# Patient Record
Sex: MALE | Race: WHITE | NOT HISPANIC OR LATINO | Employment: FULL TIME | URBAN - METROPOLITAN AREA
[De-identification: names, ages, dates, MRNs, and addresses within clinical notes are randomized per-mention and may not be internally consistent; named-entity substitution may affect disease eponyms.]

---

## 2019-01-14 RX ORDER — ATORVASTATIN CALCIUM 20 MG/1
TABLET, FILM COATED ORAL
Qty: 90 TABLET | Refills: 1 | Status: SHIPPED | OUTPATIENT
Start: 2019-01-14 | End: 2019-07-24 | Stop reason: SDUPTHER

## 2019-02-01 ENCOUNTER — LAB (OUTPATIENT)
Dept: LAB | Facility: HOSPITAL | Age: 69
End: 2019-02-01

## 2019-02-01 ENCOUNTER — OFFICE VISIT (OUTPATIENT)
Dept: INTERNAL MEDICINE | Facility: CLINIC | Age: 69
End: 2019-02-01

## 2019-02-01 VITALS
HEIGHT: 70 IN | SYSTOLIC BLOOD PRESSURE: 136 MMHG | HEART RATE: 60 BPM | WEIGHT: 200 LBS | TEMPERATURE: 97.6 F | DIASTOLIC BLOOD PRESSURE: 80 MMHG | BODY MASS INDEX: 28.63 KG/M2

## 2019-02-01 DIAGNOSIS — E78.00 HYPERCHOLESTEROLEMIA: ICD-10-CM

## 2019-02-01 DIAGNOSIS — Z12.5 PROSTATE CANCER SCREENING: ICD-10-CM

## 2019-02-01 DIAGNOSIS — I10 BENIGN ESSENTIAL HYPERTENSION: ICD-10-CM

## 2019-02-01 DIAGNOSIS — Z00.00 MEDICARE ANNUAL WELLNESS VISIT, SUBSEQUENT: Primary | ICD-10-CM

## 2019-02-01 LAB
ALBUMIN SERPL-MCNC: 4.47 G/DL (ref 3.2–4.8)
ALBUMIN/GLOB SERPL: 2.3 G/DL (ref 1.5–2.5)
ALP SERPL-CCNC: 50 U/L (ref 25–100)
ALT SERPL W P-5'-P-CCNC: 31 U/L (ref 7–40)
ANION GAP SERPL CALCULATED.3IONS-SCNC: 4 MMOL/L (ref 3–11)
ARTICHOKE IGE QN: 104 MG/DL (ref 0–130)
AST SERPL-CCNC: 31 U/L (ref 0–33)
BASOPHILS # BLD AUTO: 0.02 10*3/MM3 (ref 0–0.2)
BASOPHILS NFR BLD AUTO: 0.2 % (ref 0–1)
BILIRUB SERPL-MCNC: 0.8 MG/DL (ref 0.3–1.2)
BUN BLD-MCNC: 20 MG/DL (ref 9–23)
BUN/CREAT SERPL: 17.4 (ref 7–25)
CALCIUM SPEC-SCNC: 10.1 MG/DL (ref 8.7–10.4)
CHLORIDE SERPL-SCNC: 103 MMOL/L (ref 99–109)
CHOLEST SERPL-MCNC: 180 MG/DL (ref 0–200)
CO2 SERPL-SCNC: 31 MMOL/L (ref 20–31)
CREAT BLD-MCNC: 1.15 MG/DL (ref 0.6–1.3)
DEPRECATED RDW RBC AUTO: 44.2 FL (ref 37–54)
EOSINOPHIL # BLD AUTO: 0.19 10*3/MM3 (ref 0–0.3)
EOSINOPHIL NFR BLD AUTO: 2.3 % (ref 0–3)
ERYTHROCYTE [DISTWIDTH] IN BLOOD BY AUTOMATED COUNT: 12.5 % (ref 11.3–14.5)
GFR SERPL CREATININE-BSD FRML MDRD: 63 ML/MIN/1.73
GLOBULIN UR ELPH-MCNC: 1.9 GM/DL
GLUCOSE BLD-MCNC: 90 MG/DL (ref 70–100)
HCT VFR BLD AUTO: 49.8 % (ref 38.9–50.9)
HDLC SERPL-MCNC: 68 MG/DL (ref 40–60)
HGB BLD-MCNC: 16.5 G/DL (ref 13.1–17.5)
IMM GRANULOCYTES # BLD AUTO: 0.02 10*3/MM3 (ref 0–0.03)
IMM GRANULOCYTES NFR BLD AUTO: 0.2 % (ref 0–0.6)
LYMPHOCYTES # BLD AUTO: 3.61 10*3/MM3 (ref 0.6–4.8)
LYMPHOCYTES NFR BLD AUTO: 43.2 % (ref 24–44)
MCH RBC QN AUTO: 32 PG (ref 27–31)
MCHC RBC AUTO-ENTMCNC: 33.1 G/DL (ref 32–36)
MCV RBC AUTO: 96.5 FL (ref 80–99)
MONOCYTES # BLD AUTO: 0.88 10*3/MM3 (ref 0–1)
MONOCYTES NFR BLD AUTO: 10.5 % (ref 0–12)
NEUTROPHILS # BLD AUTO: 3.64 10*3/MM3 (ref 1.5–8.3)
NEUTROPHILS NFR BLD AUTO: 43.6 % (ref 41–71)
PLATELET # BLD AUTO: 321 10*3/MM3 (ref 150–450)
PMV BLD AUTO: 10.2 FL (ref 6–12)
POTASSIUM BLD-SCNC: 4.6 MMOL/L (ref 3.5–5.5)
PROT SERPL-MCNC: 6.4 G/DL (ref 5.7–8.2)
PSA SERPL-MCNC: 0.76 NG/ML (ref 0–4)
RBC # BLD AUTO: 5.16 10*6/MM3 (ref 4.2–5.76)
SODIUM BLD-SCNC: 138 MMOL/L (ref 132–146)
TRIGL SERPL-MCNC: 103 MG/DL (ref 0–150)
TSH SERPL DL<=0.05 MIU/L-ACNC: 2.31 MIU/ML (ref 0.35–5.35)
WBC NRBC COR # BLD: 8.36 10*3/MM3 (ref 3.5–10.8)

## 2019-02-01 PROCEDURE — 80061 LIPID PANEL: CPT

## 2019-02-01 PROCEDURE — 82043 UR ALBUMIN QUANTITATIVE: CPT

## 2019-02-01 PROCEDURE — 84443 ASSAY THYROID STIM HORMONE: CPT

## 2019-02-01 PROCEDURE — 85025 COMPLETE CBC W/AUTO DIFF WBC: CPT

## 2019-02-01 PROCEDURE — 80053 COMPREHEN METABOLIC PANEL: CPT

## 2019-02-01 PROCEDURE — 82570 ASSAY OF URINE CREATININE: CPT

## 2019-02-01 PROCEDURE — G0103 PSA SCREENING: HCPCS

## 2019-02-01 PROCEDURE — G0439 PPPS, SUBSEQ VISIT: HCPCS | Performed by: NURSE PRACTITIONER

## 2019-02-01 RX ORDER — MAGNESIUM CARB/ALUMINUM HYDROX 105-160MG
1 TABLET,CHEWABLE ORAL 2 TIMES DAILY
COMMUNITY
Start: 2013-02-01

## 2019-02-01 RX ORDER — VALACYCLOVIR HYDROCHLORIDE 500 MG/1
TABLET, FILM COATED ORAL
COMMUNITY
Start: 2018-04-19 | End: 2019-04-18 | Stop reason: SDUPTHER

## 2019-02-01 RX ORDER — ENALAPRIL MALEATE 5 MG/1
TABLET ORAL
COMMUNITY
Start: 2018-01-19 | End: 2019-04-18 | Stop reason: SDUPTHER

## 2019-02-01 NOTE — PROGRESS NOTES
QUICK REFERENCE INFORMATION:  The ABCs of the Annual Wellness Visit    Subsequent Medicare Wellness Visit    HEALTH RISK ASSESSMENT    1950    Recent Hospitalizations:  No hospitalization(s) within the last year..        Current Medical Providers:  Patient Care Team:  Keshawn Alexandre MD as PCP - General (Internal Medicine)  TinoScooter perez MD as Consulting Physician (Colon and Rectal Surgery)  Yanelis Hernandez APRN as Nurse Practitioner (Family Medicine)        Smoking Status:  Social History     Tobacco Use   Smoking Status Never Smoker   Smokeless Tobacco Never Used       Alcohol Consumption:  Social History     Substance and Sexual Activity   Alcohol Use Yes    Comment: occass       Depression Screen:   PHQ-2/PHQ-9 Depression Screening 2/1/2019   Little interest or pleasure in doing things 0   Feeling down, depressed, or hopeless 0   Total Score 0       Health Habits and Functional and Cognitive Screening:  Functional & Cognitive Status 2/1/2019   Do you have difficulty preparing food and eating? No   Do you have difficulty bathing yourself, getting dressed or grooming yourself? No   Do you have difficulty using the toilet? No   Do you have difficulty moving around from place to place? No   Do you have trouble with steps or getting out of a bed or a chair? No   In the past year have you fallen or experienced a near fall? No   Current Diet Well Balanced Diet   Dental Exam Up to date   Eye Exam Up to date   Exercise (times per week) 3 times per week   Current Exercise Activities Include Stationary Bicycling/Spin Class   Do you need help using the phone?  No   Are you deaf or do you have serious difficulty hearing?  No   Do you need help with transportation? No   Do you need help shopping? No   Do you need help preparing meals?  No   Do you need help with housework?  No   Do you need help with laundry? No   Do you need help taking your medications? No   Do you need help managing money? No   Do you ever drive  or ride in a car without wearing a seat belt? No   Have you felt unusual stress, anger or loneliness in the last month? No   Who do you live with? Spouse   If you need help, do you have trouble finding someone available to you? No   Have you been bothered in the last four weeks by sexual problems? No   Do you have difficulty concentrating, remembering or making decisions? No           Does the patient have evidence of cognitive impairment? No    Aspirin use counseling: Taking ASA appropriately as indicated      Recent Lab Results:  CMP:     HbA1c:  No results found for: HGBA1C  Microalbumin:  No results found for: MICROALBUR, POCMALB, POCCREAT  Lipid Panel  No results found for: CHOL, TRIG, HDL, LDL, AST, ALT    Visual Acuity:  No exam data present    Age-appropriate Screening Schedule:  Refer to the list below for future screening recommendations based on patient's age, sex and/or medical conditions. Orders for these recommended tests are listed in the plan section. The patient has been provided with a written plan.    Health Maintenance   Topic Date Due   • ZOSTER VACCINE (1 of 2) 09/20/2000   • TDAP/TD VACCINES (1 - Tdap) 06/05/2010   • COLONOSCOPY  07/11/2018   • LIPID PANEL  02/01/2019   • INFLUENZA VACCINE  Completed   • PNEUMOCOCCAL VACCINES (65+ LOW/MEDIUM RISK)  Completed        Subjective   History of Present Illness    Jamari Munoz is a 68 y.o. male who presents for an Subsequent Wellness Visit.    The following portions of the patient's history were reviewed and updated as appropriate: allergies, current medications, past family history, past medical history, past social history, past surgical history and problem list.    Outpatient Medications Prior to Visit   Medication Sig Dispense Refill   • atorvastatin (LIPITOR) 20 MG tablet TAKE 1 TABLET EVERY DAY 90 tablet 1   • enalapril (VASOTEC) 5 MG tablet enalapril maleate 5 mg tablet     • Glucosamine-Chondroitin 750-600 MG tablet Take  by mouth.     •  "hepatitis A (HAVRIX) 1440 EL U/ML vaccine Havrix (PF) 1,440 DENA unit/mL intramuscular syringe     • valACYclovir (VALTREX) 500 MG tablet Take 1/2 tablet twice a day       No facility-administered medications prior to visit.        Patient Active Problem List   Diagnosis   • Osteoarthritis   • Benign essential hypertension   • Hypercholesterolemia   • Low back pain   • Carbuncle and furuncle   • RLS (restless legs syndrome)       Advance Care Planning:  has an advance directive - a copy HAS NOT been provided. Have asked the patient to send this to us to add to record.    Identification of Risk Factors:  Risk factors include: none identified.    Review of Systems   Constitutional: Negative for chills, fatigue and fever.   HENT: Negative for congestion, ear pain and sinus pressure.    Respiratory: Negative for cough, chest tightness, shortness of breath and wheezing.    Cardiovascular: Negative for chest pain and palpitations.   Gastrointestinal: Negative for abdominal pain and blood in stool.   Skin: Negative for color change.   Allergic/Immunologic: Negative for environmental allergies.   Neurological: Negative for dizziness, speech difficulty and headaches.   Psychiatric/Behavioral: Negative for decreased concentration. The patient is not nervous/anxious.        Compared to one year ago, the patient feels his physical health is the same.  Compared to one year ago, the patient feels his mental health is the same.    Objective     Physical Exam     Procedures     Vitals:    02/01/19 0821   BP: 136/80   BP Location: Left arm   Patient Position: Sitting   Cuff Size: Adult   Pulse: 60   Temp: 97.6 °F (36.4 °C)   TempSrc: Temporal   Weight: 90.7 kg (200 lb)  Comment: without shoes   Height: 178 cm (70.08\")   PainSc: 0-No pain       Patient's Body mass index is 28.63 kg/m². BMI is above normal parameters. Recommendations include: none.      Assessment/Plan   Patient Self-Management and Personalized Health Advice  The " patient has been provided with information about: none and preventive services including:   · none.    Visit Diagnoses:    ICD-10-CM ICD-9-CM   1. Medicare annual wellness visit, subsequent Z00.00 V70.0   2. Benign essential hypertension I10 401.1   3. Hypercholesterolemia E78.00 272.0   4. Prostate cancer screening Z12.5 V76.44       Orders Placed This Encounter   Procedures   • Comprehensive Metabolic Panel     Standing Status:   Future   • Lipid Panel     Standing Status:   Future   • PSA Screen     Standing Status:   Future     Standing Expiration Date:   2/1/2020   • Microalbumin / Creatinine Urine Ratio - Urine, Clean Catch     Standing Status:   Future     Standing Expiration Date:   2/1/2020   • TSH Rfx On Abnormal To Free T4     Standing Status:   Future     Standing Expiration Date:   2/1/2020   • CBC & Differential     Standing Status:   Future     Order Specific Question:   Manual Differential     Answer:   No       Outpatient Encounter Medications as of 2/1/2019   Medication Sig Dispense Refill   • atorvastatin (LIPITOR) 20 MG tablet TAKE 1 TABLET EVERY DAY 90 tablet 1   • enalapril (VASOTEC) 5 MG tablet enalapril maleate 5 mg tablet     • Glucosamine-Chondroitin 750-600 MG tablet Take  by mouth.     • hepatitis A (HAVRIX) 1440 EL U/ML vaccine Havrix (PF) 1,440 DENA unit/mL intramuscular syringe     • valACYclovir (VALTREX) 500 MG tablet Take 1/2 tablet twice a day       No facility-administered encounter medications on file as of 2/1/2019.        Reviewed use of high risk medication in the elderly: not applicable  Reviewed for potential of harmful drug interactions in the elderly: not applicable    Follow Up:  Return for Labs this visit, Next scheduled follow up.     An After Visit Summary and PPPS with all of these plans were given to the patient.

## 2019-02-01 NOTE — PATIENT INSTRUCTIONS
Medicare Wellness  Personal Prevention Plan of Service     Date of Office Visit:  2019  Encounter Provider:  MARINA Carias  Place of Service:  [facility]  Patient Name: Jamari Munoz  :  1950    As part of the Medicare Wellness portion of your visit today, we are providing you with this personalized preventive plan of services (PPPS). This plan is based upon recommendations of the [place] Preventive Services Task Force (USPSTF) and the Advisory Committee on Immunization Practices (ACIP).    This lists the preventive care services that should be considered, and provides dates of when you are due. Items listed as completed are up-to-date and do not require any further intervention.    Health Maintenance   Topic Date Due   • ZOSTER VACCINE (1 of 2) 2000   • TDAP/TD VACCINES (1 - Tdap) 2010   • HEPATITIS C SCREENING  2018   • MEDICARE ANNUAL WELLNESS  2018   • COLONOSCOPY  2018   • LIPID PANEL  2019   • INFLUENZA VACCINE  Completed   • PNEUMOCOCCAL VACCINES (65+ LOW/MEDIUM RISK)  Completed       Orders Placed This Encounter   Procedures   • Comprehensive Metabolic Panel     Standing Status:   Future   • Lipid Panel     Standing Status:   Future   • PSA Screen     Standing Status:   Future     Standing Expiration Date:   2020   • Microalbumin / Creatinine Urine Ratio - Urine, Clean Catch     Standing Status:   Future     Standing Expiration Date:   2020   • TSH Rfx On Abnormal To Free T4     Standing Status:   Future     Standing Expiration Date:   2020   • CBC & Differential     Standing Status:   Future     Order Specific Question:   Manual Differential     Answer:   No       Return for Labs this visit, Next scheduled follow up.

## 2019-02-02 LAB
CREAT 24H UR-MCNC: 19.8 MG/DL
MICROALBUMIN UR-MCNC: <3 UG/ML
MICROALBUMIN/CREAT UR: <15.2 MG/G CREAT (ref 0–30)

## 2019-02-07 PROBLEM — E66.9 OBESITY: Status: ACTIVE | Noted: 2019-02-07

## 2019-02-07 PROBLEM — E78.2 MIXED HYPERLIPIDEMIA: Status: ACTIVE | Noted: 2019-02-07

## 2019-02-07 PROBLEM — I45.10 INCOMPLETE RIGHT BUNDLE BRANCH BLOCK: Status: ACTIVE | Noted: 2019-02-07

## 2019-02-07 PROBLEM — E66.3 OVERWEIGHT: Status: ACTIVE | Noted: 2019-02-07

## 2019-02-07 PROBLEM — M67.40 GANGLION CYST: Status: ACTIVE | Noted: 2019-02-07

## 2019-02-11 ENCOUNTER — OFFICE VISIT (OUTPATIENT)
Dept: INTERNAL MEDICINE | Facility: CLINIC | Age: 69
End: 2019-02-11

## 2019-02-11 VITALS
WEIGHT: 201 LBS | DIASTOLIC BLOOD PRESSURE: 88 MMHG | BODY MASS INDEX: 28.77 KG/M2 | SYSTOLIC BLOOD PRESSURE: 136 MMHG | HEIGHT: 70 IN

## 2019-02-11 DIAGNOSIS — I45.10 INCOMPLETE RIGHT BUNDLE BRANCH BLOCK: Primary | ICD-10-CM

## 2019-02-11 DIAGNOSIS — M54.50 MIDLINE LOW BACK PAIN WITHOUT SCIATICA, UNSPECIFIED CHRONICITY: ICD-10-CM

## 2019-02-11 DIAGNOSIS — E78.2 MIXED HYPERLIPIDEMIA: ICD-10-CM

## 2019-02-11 DIAGNOSIS — I10 BENIGN ESSENTIAL HYPERTENSION: ICD-10-CM

## 2019-02-11 DIAGNOSIS — M19.91 PRIMARY OSTEOARTHRITIS, UNSPECIFIED SITE: ICD-10-CM

## 2019-02-11 PROCEDURE — 99214 OFFICE O/P EST MOD 30 MIN: CPT | Performed by: INTERNAL MEDICINE

## 2019-02-11 PROCEDURE — 93000 ELECTROCARDIOGRAM COMPLETE: CPT | Performed by: INTERNAL MEDICINE

## 2019-02-11 NOTE — PATIENT INSTRUCTIONS
Results of EKG are discussed  Results of lab are discussed in copy given  Continue all current medications  Intended diet and exercise  The patient back in 6 months or when necessary
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
## 2019-04-18 RX ORDER — VALACYCLOVIR HYDROCHLORIDE 500 MG/1
TABLET, FILM COATED ORAL
Qty: 45 TABLET | Refills: 3 | Status: SHIPPED | OUTPATIENT
Start: 2019-04-18 | End: 2020-02-04 | Stop reason: SDUPTHER

## 2019-04-18 RX ORDER — ENALAPRIL MALEATE 5 MG/1
TABLET ORAL
Qty: 90 TABLET | Refills: 3 | Status: SHIPPED | OUTPATIENT
Start: 2019-04-18 | End: 2020-02-04 | Stop reason: SDUPTHER

## 2019-07-24 ENCOUNTER — TELEPHONE (OUTPATIENT)
Dept: INTERNAL MEDICINE | Facility: CLINIC | Age: 69
End: 2019-07-24

## 2019-07-24 RX ORDER — ATORVASTATIN CALCIUM 20 MG/1
TABLET, FILM COATED ORAL
Qty: 90 TABLET | Refills: 1 | Status: SHIPPED | OUTPATIENT
Start: 2019-07-24 | End: 2020-02-04 | Stop reason: SDUPTHER

## 2019-07-24 NOTE — TELEPHONE ENCOUNTER
Problems   This clinical information was not verified, but there are updates pending review:   No Longer Applicable Problems Start Date Reported By  Comments   Obesity 2/7/2019 Jamari Munoz  im not obese   Medications   This clinical information was not verified.   Allergies   This clinical information was not verified.

## 2019-08-12 ENCOUNTER — OFFICE VISIT (OUTPATIENT)
Dept: INTERNAL MEDICINE | Facility: CLINIC | Age: 69
End: 2019-08-12

## 2019-08-12 ENCOUNTER — APPOINTMENT (OUTPATIENT)
Dept: LAB | Facility: HOSPITAL | Age: 69
End: 2019-08-12

## 2019-08-12 VITALS
SYSTOLIC BLOOD PRESSURE: 136 MMHG | WEIGHT: 200 LBS | BODY MASS INDEX: 28.63 KG/M2 | HEART RATE: 56 BPM | HEIGHT: 70 IN | DIASTOLIC BLOOD PRESSURE: 82 MMHG

## 2019-08-12 DIAGNOSIS — B00.9 HERPES SIMPLEX TYPE 2 INFECTION: ICD-10-CM

## 2019-08-12 DIAGNOSIS — E78.2 MIXED HYPERLIPIDEMIA: ICD-10-CM

## 2019-08-12 DIAGNOSIS — I10 BENIGN ESSENTIAL HYPERTENSION: Primary | ICD-10-CM

## 2019-08-12 DIAGNOSIS — E78.00 HYPERCHOLESTEROLEMIA: ICD-10-CM

## 2019-08-12 LAB
ALBUMIN SERPL-MCNC: 4.3 G/DL (ref 3.5–5.2)
ALBUMIN/GLOB SERPL: 2 G/DL
ALP SERPL-CCNC: 44 U/L (ref 39–117)
ALT SERPL W P-5'-P-CCNC: 28 U/L (ref 1–41)
ANION GAP SERPL CALCULATED.3IONS-SCNC: 10.1 MMOL/L (ref 5–15)
AST SERPL-CCNC: 27 U/L (ref 1–40)
BILIRUB SERPL-MCNC: 0.5 MG/DL (ref 0.2–1.2)
BUN BLD-MCNC: 15 MG/DL (ref 8–23)
BUN/CREAT SERPL: 16 (ref 7–25)
CALCIUM SPEC-SCNC: 9.9 MG/DL (ref 8.6–10.5)
CHLORIDE SERPL-SCNC: 102 MMOL/L (ref 98–107)
CHOLEST SERPL-MCNC: 154 MG/DL (ref 0–200)
CO2 SERPL-SCNC: 27.9 MMOL/L (ref 22–29)
CREAT BLD-MCNC: 0.94 MG/DL (ref 0.76–1.27)
GFR SERPL CREATININE-BSD FRML MDRD: 80 ML/MIN/1.73
GLOBULIN UR ELPH-MCNC: 2.1 GM/DL
GLUCOSE BLD-MCNC: 95 MG/DL (ref 65–99)
HDLC SERPL-MCNC: 64 MG/DL (ref 40–60)
LDLC SERPL CALC-MCNC: 75 MG/DL (ref 0–100)
LDLC/HDLC SERPL: 1.18 {RATIO}
POTASSIUM BLD-SCNC: 5 MMOL/L (ref 3.5–5.2)
PROT SERPL-MCNC: 6.4 G/DL (ref 6–8.5)
SODIUM BLD-SCNC: 140 MMOL/L (ref 136–145)
TRIGL SERPL-MCNC: 74 MG/DL (ref 0–150)
VLDLC SERPL-MCNC: 14.8 MG/DL (ref 5–40)

## 2019-08-12 PROCEDURE — 80053 COMPREHEN METABOLIC PANEL: CPT | Performed by: INTERNAL MEDICINE

## 2019-08-12 PROCEDURE — 99214 OFFICE O/P EST MOD 30 MIN: CPT | Performed by: INTERNAL MEDICINE

## 2019-08-12 PROCEDURE — 80061 LIPID PANEL: CPT | Performed by: INTERNAL MEDICINE

## 2019-08-12 NOTE — PATIENT INSTRUCTIONS
Fasting lipid and CMP are pending  Continue current medical therapy  Encouraged same x-ray activity of exercise bicycle riding and physical fitness and healthy cardiac diet  Return visit in 6 months or as needed.

## 2019-08-12 NOTE — ASSESSMENT & PLAN NOTE
History of mixed hyperlipidemia on Lipitor 20 mg denies myalgia arthralgia continue current therapy fasting lab and CMP are pending

## 2019-08-12 NOTE — PROGRESS NOTES
[facility]     Jamari Munoz  1950   [id number]      Patient Care Team:  Keshawn Alexandre MD as PCP - General (Internal Medicine)  Yanelis Hernandez APRN as PCP - Claims Attributed  Scooter Jiménez MD as Consulting Physician (Colon and Rectal Surgery)  Yanelis Hernandez APRN as Nurse Practitioner (Family Medicine)    Chief Complaint::   Chief Complaint   Patient presents with   • Hyperlipidemia            HPI  Patient 68-year-old male with a history of essential hypertension hyperlipidemia overall feeling well no recent changes in diet medication or activity he remains physically active with bicycle riding general physical exercise takes his medications on a regular basis no change in diet sleep.  His medicines include Osteo Bi-Flex aspirin 81 Lipitor 20 Vasotec 5 Valtrex 250 mg      Patient Active Problem List   Diagnosis   • Osteoarthritis   • Benign essential hypertension   • Low back pain   • Carbuncle and furuncle   • RLS (restless legs syndrome)   • Overweight   • Ganglion cyst   • Incomplete right bundle branch block   • Mixed hyperlipidemia   • Herpes simplex type 2 infection        Past Medical History:   Diagnosis Date   • Arthritis    • Ganglion    • Hyperlipidemia    • Hypertension        Past Surgical History:   Procedure Laterality Date   • CYST REMOVAL      cyst removed left shoulder and left knee   • KNEE MENISCAL REPAIR  2005   • TONSILLECTOMY AND ADENOIDECTOMY     • VASECTOMY         Family History   Problem Relation Age of Onset   • Diabetes Maternal Grandmother    • Arthritis Paternal Grandmother    • Osteoarthritis Paternal Grandmother    • Coronary artery disease Paternal Grandfather        Social History     Socioeconomic History   • Marital status:      Spouse name: Not on file   • Number of children: Not on file   • Years of education: Not on file   • Highest education level: Not on file   Tobacco Use   • Smoking status: Never Smoker   • Smokeless tobacco: Never  "Used   Substance and Sexual Activity   • Alcohol use: Yes     Comment: occass   • Drug use: Defer   • Sexual activity: Defer       No Known Allergies    Review of Systems     HEENT: Denies headache or dizzy  NECK: Denies dysphasia or reflux  CHEST: Denies cough or wheeze is a non-smoker  CARDIAC: Denies chest pain pressure tightness palpitations  ABD: Denies nausea vomiting abdominal pain  : Denies dysuria frequency  NEURO: Denies headaches syncope concussion  PSYCH: Denies anxiety depression  EXTREM: Complains of minimal arthritic soreness denies edema    Vital Signs  Vitals:    08/12/19 0903   BP: 136/82   BP Location: Left arm   Patient Position: Sitting   Cuff Size: Adult   Pulse: 56   Weight: 90.7 kg (200 lb)   Height: 178 cm (70.08\")   PainSc: 0-No pain     Body mass index is 28.63 kg/m².        Current Outpatient Medications:   •  aspirin 81 MG tablet, Take 1 tablet by mouth Daily., Disp: 30 tablet, Rfl: 0  •  atorvastatin (LIPITOR) 20 MG tablet, TAKE 1 TABLET EVERY DAY, Disp: 90 tablet, Rfl: 1  •  enalapril (VASOTEC) 5 MG tablet, TAKE 1 TABLET EVERY DAY, Disp: 90 tablet, Rfl: 3  •  Glucosamine-Chondroitin 750-600 MG tablet, Take  by mouth., Disp: , Rfl:   •  valACYclovir (VALTREX) 500 MG tablet, TAKE 1/2 TABLET EVERY DAY, Disp: 45 tablet, Rfl: 3    Physical Exam     ACE III MINI         HEENT: Pupils equal reactive ENT clear no asymmetry pharynx is clear  NECK: No masses bruits thyromegaly or neck vein distention  CHEST: Clear  CARDIAC: Regular rhythm without gallop or rub  ABD: Abdomen supple liver and spleen are normal positive bowel sounds no bruits  :   NEURO: CNS is intact no neuropathy  PSYCH: Normal  EXTREM: Minimal arthritic change no edema  Skin: Patient has skin lesions that will be removed with Mohs surgery in the near future on face     Results Review:    No results found for this or any previous visit (from the past 672 hour(s)).  Procedures    Medication Review: Medications reviewed and " noted    Patient wellness counseling  Exercise: Continue riding exercise and general physical fitness  Diet: Continue excellent cardiac healthy diet  Smoking: Non-smoker  Alcohol: Infrequent alcohol  Screening:    Assessment/Plan:    Problem List Items Addressed This Visit        Cardiovascular and Mediastinum    Benign essential hypertension - Primary    Current Assessment & Plan     History of essential hypertension with current pressure 136/82 left and right on enalapril 5 mg daily denies headache or cough continue current therapy         Relevant Medications    enalapril (VASOTEC) 5 MG tablet    Other Relevant Orders    Comprehensive Metabolic Panel    Mixed hyperlipidemia    Current Assessment & Plan     History of mixed hyperlipidemia on Lipitor 20 mg denies myalgia arthralgia continue current therapy fasting lab and CMP are pending         Relevant Medications    atorvastatin (LIPITOR) 20 MG tablet    Other Relevant Orders    Lipid Panel       Other    Herpes simplex type 2 infection    Overview     On chronic suppression of Valtrex 500 mg half tablet daily         Relevant Medications    valACYclovir (VALTREX) 500 MG tablet      Other Visit Diagnoses     Hypercholesterolemia               Patient Instructions   Fasting lipid and CMP are pending  Continue current medical therapy  Encouraged same x-ray activity of exercise bicycle riding and physical fitness and healthy cardiac diet  Return visit in 6 months or as needed.       Plan of care reviewed with patient at the conclusion of today's visit. Education was provided regarding diagnosis, management, and any prescribed or recommended OTC medications.Patient verbalizes understanding of and agreement with management plan.         Keshawn Alexandre MD      Note: Part of this note may be an electronic transcription/translation of spoken language to printed text using the Dragon Dictation system.

## 2019-08-12 NOTE — ASSESSMENT & PLAN NOTE
History of essential hypertension with current pressure 136/82 left and right on enalapril 5 mg daily denies headache or cough continue current therapy

## 2019-08-13 ENCOUNTER — PATIENT MESSAGE (OUTPATIENT)
Dept: INTERNAL MEDICINE | Facility: CLINIC | Age: 69
End: 2019-08-13

## 2019-08-16 NOTE — TELEPHONE ENCOUNTER
Patient had not read his portal message in 5 days.  I called him and notified him his labs were normal.  He will view his results on [facility]

## 2020-01-31 ENCOUNTER — TELEPHONE (OUTPATIENT)
Dept: INTERNAL MEDICINE | Facility: CLINIC | Age: 70
End: 2020-01-31

## 2020-01-31 DIAGNOSIS — Z12.5 SPECIAL SCREENING FOR MALIGNANT NEOPLASM OF PROSTATE: ICD-10-CM

## 2020-01-31 DIAGNOSIS — I10 BENIGN ESSENTIAL HYPERTENSION: ICD-10-CM

## 2020-01-31 DIAGNOSIS — E78.2 MIXED HYPERLIPIDEMIA: Primary | ICD-10-CM

## 2020-02-04 ENCOUNTER — LAB (OUTPATIENT)
Dept: LAB | Facility: HOSPITAL | Age: 70
End: 2020-02-04

## 2020-02-04 ENCOUNTER — OFFICE VISIT (OUTPATIENT)
Dept: INTERNAL MEDICINE | Facility: CLINIC | Age: 70
End: 2020-02-04

## 2020-02-04 VITALS
DIASTOLIC BLOOD PRESSURE: 70 MMHG | WEIGHT: 200 LBS | OXYGEN SATURATION: 97 % | HEIGHT: 70 IN | HEART RATE: 55 BPM | SYSTOLIC BLOOD PRESSURE: 124 MMHG | BODY MASS INDEX: 28.63 KG/M2

## 2020-02-04 DIAGNOSIS — I10 BENIGN ESSENTIAL HYPERTENSION: Primary | ICD-10-CM

## 2020-02-04 DIAGNOSIS — B00.9 HERPES SIMPLEX TYPE 2 INFECTION: ICD-10-CM

## 2020-02-04 DIAGNOSIS — E78.2 MIXED HYPERLIPIDEMIA: ICD-10-CM

## 2020-02-04 DIAGNOSIS — Z12.5 SPECIAL SCREENING FOR MALIGNANT NEOPLASM OF PROSTATE: ICD-10-CM

## 2020-02-04 DIAGNOSIS — I10 BENIGN ESSENTIAL HYPERTENSION: ICD-10-CM

## 2020-02-04 LAB
ALBUMIN SERPL-MCNC: 4.2 G/DL (ref 3.5–5.2)
ALBUMIN UR-MCNC: <1.2 MG/DL
ALBUMIN/GLOB SERPL: 1.8 G/DL
ALP SERPL-CCNC: 47 U/L (ref 39–117)
ALT SERPL W P-5'-P-CCNC: 28 U/L (ref 1–41)
ANION GAP SERPL CALCULATED.3IONS-SCNC: 13.9 MMOL/L (ref 5–15)
AST SERPL-CCNC: 30 U/L (ref 1–40)
BASOPHILS # BLD AUTO: 0.03 10*3/MM3 (ref 0–0.2)
BASOPHILS NFR BLD AUTO: 0.5 % (ref 0–1.5)
BILIRUB SERPL-MCNC: 0.6 MG/DL (ref 0.2–1.2)
BUN BLD-MCNC: 17 MG/DL (ref 8–23)
BUN/CREAT SERPL: 14.2 (ref 7–25)
CALCIUM SPEC-SCNC: 9.5 MG/DL (ref 8.6–10.5)
CHLORIDE SERPL-SCNC: 100 MMOL/L (ref 98–107)
CHOLEST SERPL-MCNC: 158 MG/DL (ref 0–200)
CO2 SERPL-SCNC: 24.1 MMOL/L (ref 22–29)
CREAT BLD-MCNC: 1.2 MG/DL (ref 0.76–1.27)
CREAT UR-MCNC: 107.4 MG/DL
DEPRECATED RDW RBC AUTO: 43.2 FL (ref 37–54)
EOSINOPHIL # BLD AUTO: 0.17 10*3/MM3 (ref 0–0.4)
EOSINOPHIL NFR BLD AUTO: 2.7 % (ref 0.3–6.2)
ERYTHROCYTE [DISTWIDTH] IN BLOOD BY AUTOMATED COUNT: 12.2 % (ref 12.3–15.4)
GFR SERPL CREATININE-BSD FRML MDRD: 60 ML/MIN/1.73
GLOBULIN UR ELPH-MCNC: 2.4 GM/DL
GLUCOSE BLD-MCNC: 76 MG/DL (ref 65–99)
HCT VFR BLD AUTO: 45.4 % (ref 37.5–51)
HDLC SERPL-MCNC: 68 MG/DL (ref 40–60)
HGB BLD-MCNC: 15 G/DL (ref 13–17.7)
IMM GRANULOCYTES # BLD AUTO: 0.02 10*3/MM3 (ref 0–0.05)
IMM GRANULOCYTES NFR BLD AUTO: 0.3 % (ref 0–0.5)
LDLC SERPL CALC-MCNC: 75 MG/DL (ref 0–100)
LDLC/HDLC SERPL: 1.11 {RATIO}
LYMPHOCYTES # BLD AUTO: 2.6 10*3/MM3 (ref 0.7–3.1)
LYMPHOCYTES NFR BLD AUTO: 41 % (ref 19.6–45.3)
MCH RBC QN AUTO: 32.2 PG (ref 26.6–33)
MCHC RBC AUTO-ENTMCNC: 33 G/DL (ref 31.5–35.7)
MCV RBC AUTO: 97.4 FL (ref 79–97)
MICROALBUMIN/CREAT UR: NORMAL MG/G{CREAT}
MONOCYTES # BLD AUTO: 0.75 10*3/MM3 (ref 0.1–0.9)
MONOCYTES NFR BLD AUTO: 11.8 % (ref 5–12)
NEUTROPHILS # BLD AUTO: 2.77 10*3/MM3 (ref 1.7–7)
NEUTROPHILS NFR BLD AUTO: 43.7 % (ref 42.7–76)
NRBC BLD AUTO-RTO: 0 /100 WBC (ref 0–0.2)
PLATELET # BLD AUTO: 333 10*3/MM3 (ref 140–450)
PMV BLD AUTO: 10.1 FL (ref 6–12)
POTASSIUM BLD-SCNC: 4.5 MMOL/L (ref 3.5–5.2)
PROT SERPL-MCNC: 6.6 G/DL (ref 6–8.5)
PSA SERPL-MCNC: 1.3 NG/ML (ref 0–4)
RBC # BLD AUTO: 4.66 10*6/MM3 (ref 4.14–5.8)
SODIUM BLD-SCNC: 138 MMOL/L (ref 136–145)
TRIGL SERPL-MCNC: 74 MG/DL (ref 0–150)
TSH SERPL DL<=0.05 MIU/L-ACNC: 1.4 UIU/ML (ref 0.27–4.2)
VLDLC SERPL-MCNC: 14.8 MG/DL (ref 5–40)
WBC NRBC COR # BLD: 6.34 10*3/MM3 (ref 3.4–10.8)

## 2020-02-04 PROCEDURE — 85025 COMPLETE CBC W/AUTO DIFF WBC: CPT

## 2020-02-04 PROCEDURE — 80061 LIPID PANEL: CPT

## 2020-02-04 PROCEDURE — G0103 PSA SCREENING: HCPCS

## 2020-02-04 PROCEDURE — 84443 ASSAY THYROID STIM HORMONE: CPT

## 2020-02-04 PROCEDURE — 82043 UR ALBUMIN QUANTITATIVE: CPT

## 2020-02-04 PROCEDURE — G0439 PPPS, SUBSEQ VISIT: HCPCS | Performed by: PHYSICIAN ASSISTANT

## 2020-02-04 PROCEDURE — 80053 COMPREHEN METABOLIC PANEL: CPT

## 2020-02-04 PROCEDURE — 82570 ASSAY OF URINE CREATININE: CPT

## 2020-02-04 RX ORDER — ATORVASTATIN CALCIUM 20 MG/1
20 TABLET, FILM COATED ORAL DAILY
Qty: 90 TABLET | Refills: 1 | Status: SHIPPED | OUTPATIENT
Start: 2020-02-04 | End: 2020-02-07 | Stop reason: SDUPTHER

## 2020-02-04 RX ORDER — ENALAPRIL MALEATE 5 MG/1
5 TABLET ORAL DAILY
Qty: 90 TABLET | Refills: 3 | Status: SHIPPED | OUTPATIENT
Start: 2020-02-04 | End: 2020-02-07 | Stop reason: SDUPTHER

## 2020-02-04 RX ORDER — VALACYCLOVIR HYDROCHLORIDE 500 MG/1
250 TABLET, FILM COATED ORAL DAILY
Qty: 45 TABLET | Refills: 3 | Status: SHIPPED | OUTPATIENT
Start: 2020-02-04 | End: 2020-02-07 | Stop reason: SDUPTHER

## 2020-02-04 NOTE — PATIENT INSTRUCTIONS
"BMI for Adults    Body mass index (BMI) is a number that is calculated from a person's weight and height. BMI may help to estimate how much of a person's weight is composed of fat. BMI can help identify those who may be at higher risk for certain medical problems.  How is BMI used with adults?  BMI is used as a screening tool to identify possible weight problems. It is used to check whether a person is obese, overweight, healthy weight, or underweight.  How is BMI calculated?  BMI measures your weight and compares it to your height. This can be done either in English ([place] or metric measurements. Note that charts are available to help you find your BMI quickly and easily without having to do these calculations yourself.  To calculate your BMI in English ([place] measurements, your health care provider will:  1. Measure your weight in pounds (lb).  2. Multiply the number of pounds by 703.  ? For example, for a person who weighs 180 lb, multiply that number by 703, which equals 126,540.  3. Measure your height in inches (in). Then multiply that number by itself to get a measurement called \"inches squared.\"  ? For example, for a person who is 70 in tall, the \"inches squared\" measurement is 70 in x 70 in, which equals 4900 inches squared.  4. Divide the total from Step 2 (number of lb x 703) by the total from Step 3 (inches squared): 126,540 ÷ 4900 = 25.8. This is your BMI.  To calculate your BMI in metric measurements, your health care provider will:  1. Measure your weight in kilograms (kg).  2. Measure your height in meters (m). Then multiply that number by itself to get a measurement called \"meters squared.\"  ? For example, for a person who is 1.75 m tall, the \"meters squared\" measurement is 1.75 m x 1.75 m, which is equal to 3.1 meters squared.  3. Divide the number of kilograms (your weight) by the meters squared number. In this example: 70 ÷ 3.1 = 22.6. This is your BMI.  How is BMI interpreted?  To interpret your " results, your health care provider will use BMI charts to identify whether you are underweight, normal weight, overweight, or obese. The following guidelines will be used:  · Underweight: BMI less than 18.5.  · Normal weight: BMI between 18.5 and 24.9.  · Overweight: BMI between 25 and 29.9.  · Obese: BMI of 30 and above.  Please note:  · Weight includes both fat and muscle, so someone with a muscular build, such as an athlete, may have a BMI that is higher than 24.9. In cases like these, BMI is not an accurate measure of body fat.  · To determine if excess body fat is the cause of a BMI of 25 or higher, further assessments may need to be done by a health care provider.  · BMI is usually interpreted in the same way for men and women.  Why is BMI a useful tool?  BMI is useful in two ways:  · Identifying a weight problem that may be related to a medical condition, or that may increase the risk for medical problems.  · Promoting lifestyle and diet changes in order to reach a healthy weight.  Summary  · Body mass index (BMI) is a number that is calculated from a person's weight and height.  · BMI may help to estimate how much of a person's weight is composed of fat. BMI can help identify those who may be at higher risk for certain medical problems.  · BMI can be measured using English measurements or metric measurements.  · To interpret your results, your health care provider will use BMI charts to identify whether you are underweight, normal weight, overweight, or obese.  This information is not intended to replace advice given to you by your health care provider. Make sure you discuss any questions you have with your health care provider.  Document Released: 08/29/2005 Document Revised: 10/31/2018 Document Reviewed: 10/31/2018  [employer] Interactive Patient Education © 2019 [employer]    Medicare Wellness  Personal Prevention Plan of Service     Date of Office Visit:  02/04/2020  Encounter Provider:  Nu Banegas  JEAN Deng  Place of Service:  [facility]  Patient Name: Jamari Munoz  :  1950    As part of the Medicare Wellness portion of your visit today, we are providing you with this personalized preventive plan of services (PPPS). This plan is based upon recommendations of the [place] Preventive Services Task Force (USPSTF) and the Advisory Committee on Immunization Practices (ACIP).    This lists the preventive care services that should be considered, and provides dates of when you are due. Items listed as completed are up-to-date and do not require any further intervention.    Health Maintenance   Topic Date Due   • TDAP/TD VACCINES (1 - Tdap) 1961   • HEPATITIS C SCREENING  2018   • ZOSTER VACCINE (2 of 2) 2020   • MEDICARE ANNUAL WELLNESS  2020   • LIPID PANEL  2020   • COLONOSCOPY  2025   • Pneumococcal Vaccine Once at 65 Years Old  Completed   • INFLUENZA VACCINE  Completed       No orders of the defined types were placed in this encounter.      No follow-ups on file.

## 2020-02-04 NOTE — ASSESSMENT & PLAN NOTE
Obesity is improving with lifestyle modifications.  Discussed the patient's BMI.  The BMI is in the acceptable range.  Regular aerobic exercise program discussed.

## 2020-02-04 NOTE — PROGRESS NOTES
The ABCs of the Annual Wellness Visit  Subsequent Medicare Wellness Visit    Chief Complaint   Patient presents with   • Medicare Wellness-subsequent     He is fasting       Subjective   History of Present Illness:  Jamari Munoz is a 69 y.o. male who presents for a Subsequent Medicare Wellness Visit.    HEALTH RISK ASSESSMENT    Recent Hospitalizations:  No hospitalization(s) within the last year.    Current Medical Providers:  Patient Care Team:  Keshawn Alexandre MD as PCP - General (Internal Medicine)  Yanelis Hernandez APRN as PCP - Claims Attributed  TinoScooter MD as Consulting Physician (Colon and Rectal Surgery)  Yanelis Hernandez APRN as Nurse Practitioner (Family Medicine)  Alvino Berrios MD as Consulting Physician (Ophthalmology)    Smoking Status:  Social History     Tobacco Use   Smoking Status Never Smoker   Smokeless Tobacco Never Used       Alcohol Consumption:  Social History     Substance and Sexual Activity   Alcohol Use Yes   • Frequency: 4 or more times a week   • Drinks per session: 1 or 2   • Binge frequency: Never       Depression Screen:   PHQ-2/PHQ-9 Depression Screening 2/4/2020   Little interest or pleasure in doing things 0   Feeling down, depressed, or hopeless 0   Total Score 0       Fall Risk Screen:  STEADI Fall Risk Assessment was completed, and patient is at LOW risk for falls.Assessment completed on:2/4/2020    Health Habits and Functional and Cognitive Screening:  Functional & Cognitive Status 2/4/2020   Do you have difficulty preparing food and eating? No   Do you have difficulty bathing yourself, getting dressed or grooming yourself? No   Do you have difficulty using the toilet? No   Do you have difficulty moving around from place to place? No   Do you have trouble with steps or getting out of a bed or a chair? -   Current Diet Well Balanced Diet   Dental Exam Up to date   Eye Exam Up to date   Exercise (times per week) 4 times per week   Current Exercise Activities  Include Bicycling Outdoors   Do you need help using the phone?  No   Are you deaf or do you have serious difficulty hearing?  No   Do you need help with transportation? No   Do you need help shopping? No   Do you need help preparing meals?  No   Do you need help with housework?  No   Do you need help with laundry? No   Do you need help taking your medications? No   Do you need help managing money? No   Do you ever drive or ride in a car without wearing a seat belt? No   Have you felt unusual stress, anger or loneliness in the last month? No   Who do you live with? Spouse   If you need help, do you have trouble finding someone available to you? No   Have you been bothered in the last four weeks by sexual problems? No   Do you have difficulty concentrating, remembering or making decisions? No         Does the patient have evidence of cognitive impairment? No    Asprin use counseling:Taking ASA appropriately as indicated    Age-appropriate Screening Schedule:  Refer to the list below for future screening recommendations based on patient's age, sex and/or medical conditions. Orders for these recommended tests are listed in the plan section. The patient has been provided with a written plan.    Health Maintenance   Topic Date Due   • TDAP/TD VACCINES (1 - Tdap) 09/20/1961   • ZOSTER VACCINE (2 of 2) 01/03/2020   • LIPID PANEL  08/12/2020   • COLONOSCOPY  01/20/2025   • INFLUENZA VACCINE  Completed          The following portions of the patient's history were reviewed and updated as appropriate: allergies, current medications, past family history, past medical history, past social history, past surgical history and problem list.    Outpatient Medications Prior to Visit   Medication Sig Dispense Refill   • aspirin 81 MG tablet Take 1 tablet by mouth Daily. 30 tablet 0   • Glucosamine-Chondroitin 750-600 MG tablet Take  by mouth.     • atorvastatin (LIPITOR) 20 MG tablet TAKE 1 TABLET EVERY DAY 90 tablet 1   • enalapril  "(VASOTEC) 5 MG tablet TAKE 1 TABLET EVERY DAY 90 tablet 3   • valACYclovir (VALTREX) 500 MG tablet TAKE 1/2 TABLET EVERY DAY 45 tablet 3     No facility-administered medications prior to visit.        Patient Active Problem List   Diagnosis   • Osteoarthritis   • Benign essential hypertension   • Low back pain   • Carbuncle and furuncle   • RLS (restless legs syndrome)   • Overweight   • Ganglion cyst   • Incomplete right bundle branch block   • Mixed hyperlipidemia   • Herpes simplex type 2 infection       Advanced Care Planning:  ACP discussion was held with the patient during this visit. Patient has an advance directive, copy requested.    Review of Systems   Constitutional: Negative for chills, fatigue and fever.   HENT: Negative for congestion, ear pain and sinus pressure.    Respiratory: Negative for cough, chest tightness, shortness of breath and wheezing.    Cardiovascular: Negative for chest pain and palpitations.   Gastrointestinal: Negative for abdominal pain, blood in stool and constipation.   Skin: Negative for color change.   Allergic/Immunologic: Negative for environmental allergies.   Neurological: Negative for dizziness, speech difficulty and headaches.   Psychiatric/Behavioral: Negative for confusion. The patient is not nervous/anxious.        Compared to one year ago, the patient feels his physical health is the same.  Compared to one year ago, the patient feels his mental health is the same.    Reviewed chart for potential of high risk medication in the elderly: yes  Reviewed chart for potential of harmful drug interactions in the elderly:yes    Objective         Vitals:    02/04/20 0842   BP: 124/70   BP Location: Left arm   Patient Position: Sitting   Cuff Size: Adult   Pulse: 55   SpO2: 97%   Weight: 90.7 kg (200 lb)   Height: 178 cm (70.08\")   PainSc: 0-No pain       Body mass index is 28.63 kg/m².  Discussed the patient's BMI with him. The BMI is in the acceptable range.    Physical Exam     "      Assessment/Plan   Medicare Risks and Personalized Health Plan  CMS Preventative Services Quick Reference  Cardiovascular risk    The above risks/problems have been discussed with the patient.  Pertinent information has been shared with the patient in the After Visit Summary.  Follow up plans and orders are seen below in the Assessment/Plan Section.    Diagnoses and all orders for this visit:    1. Benign essential hypertension (Primary)  Assessment & Plan:  Hypertension is improving with lifestyle modifications.  Continue current treatment regimen.  Dietary sodium restriction.  Weight loss.  Regular aerobic exercise.  Continue current medications.  Blood pressure will be reassessed at the next regular appointment.    Orders:  -     enalapril (VASOTEC) 5 MG tablet; Take 1 tablet by mouth Daily.  Dispense: 90 tablet; Refill: 3    2. Mixed hyperlipidemia  Assessment & Plan:  Fasting labs today    Orders:  -     atorvastatin (LIPITOR) 20 MG tablet; Take 1 tablet by mouth Daily.  Dispense: 90 tablet; Refill: 1    3. Herpes simplex type 2 infection  -     valACYclovir (VALTREX) 500 MG tablet; Take 0.5 tablets by mouth Daily.  Dispense: 45 tablet; Refill: 3    4. BMI 28.0-28.9,adult  Assessment & Plan:  Obesity is improving with lifestyle modifications.  Discussed the patient's BMI.  The BMI is in the acceptable range.  Regular aerobic exercise program discussed.      Follow Up:  Return for Annual physical in 1 year.     An After Visit Summary and PPPS were given to the patient.             Answers for HPI/ROS submitted by the patient on 1/28/2020   What is the primary reason for your visit?: Physical

## 2020-02-07 ENCOUNTER — TELEPHONE (OUTPATIENT)
Dept: INTERNAL MEDICINE | Facility: CLINIC | Age: 70
End: 2020-02-07

## 2020-02-07 DIAGNOSIS — B00.9 HERPES SIMPLEX TYPE 2 INFECTION: ICD-10-CM

## 2020-02-07 DIAGNOSIS — I10 BENIGN ESSENTIAL HYPERTENSION: ICD-10-CM

## 2020-02-07 DIAGNOSIS — E78.2 MIXED HYPERLIPIDEMIA: ICD-10-CM

## 2020-02-07 RX ORDER — ENALAPRIL MALEATE 5 MG/1
5 TABLET ORAL DAILY
Qty: 90 TABLET | Refills: 3 | Status: SHIPPED | OUTPATIENT
Start: 2020-02-07 | End: 2021-01-18

## 2020-02-07 RX ORDER — VALACYCLOVIR HYDROCHLORIDE 500 MG/1
250 TABLET, FILM COATED ORAL DAILY
Qty: 45 TABLET | Refills: 3 | Status: SHIPPED | OUTPATIENT
Start: 2020-02-07 | End: 2021-01-13 | Stop reason: SDUPTHER

## 2020-02-07 RX ORDER — ATORVASTATIN CALCIUM 20 MG/1
20 TABLET, FILM COATED ORAL DAILY
Qty: 90 TABLET | Refills: 1 | Status: SHIPPED | OUTPATIENT
Start: 2020-02-07 | End: 2020-07-22

## 2020-02-07 NOTE — TELEPHONE ENCOUNTER
Called patient and told him that his prescriptions with to [facility].  However, when I called them and spoke with pharmacist Keith she told me that [facility] was being merged with [facility].  [facility] received our 3 prescriptions however they were denied because patient has [employer] Medicare.  I was told to call [facility] directly [phone] and spoke with Holden MCCARTHY.    [facility] was called and I spoke with Holden and he stated they would handled the [employer] Medicare prescriptions and to resend them to their facility.  Fax #[phone].  Changed patient's chart pharmacy to reflect the new change to [facility]. Resent prescriptions to [facility] and notified patient.

## 2020-02-07 NOTE — TELEPHONE ENCOUNTER
Patient states that the following medications atorvastatin (LIPITOR) 20 MG tablet, enalapril (VASOTEC) 5 MG tablet, & valACYclovir (VALTREX) 500 MG tablet should be sent to [facility]. Their telephone number is [phone].

## 2020-02-11 ENCOUNTER — OFFICE VISIT (OUTPATIENT)
Dept: INTERNAL MEDICINE | Facility: CLINIC | Age: 70
End: 2020-02-11

## 2020-02-11 VITALS
SYSTOLIC BLOOD PRESSURE: 128 MMHG | BODY MASS INDEX: 28.63 KG/M2 | HEIGHT: 70 IN | WEIGHT: 200 LBS | DIASTOLIC BLOOD PRESSURE: 80 MMHG | HEART RATE: 54 BPM

## 2020-02-11 DIAGNOSIS — B00.9 HERPES SIMPLEX TYPE 2 INFECTION: ICD-10-CM

## 2020-02-11 DIAGNOSIS — I10 BENIGN ESSENTIAL HYPERTENSION: Primary | ICD-10-CM

## 2020-02-11 DIAGNOSIS — R00.1 SINUS BRADYCARDIA: ICD-10-CM

## 2020-02-11 DIAGNOSIS — E78.2 MIXED HYPERLIPIDEMIA: ICD-10-CM

## 2020-02-11 PROCEDURE — 93000 ELECTROCARDIOGRAM COMPLETE: CPT | Performed by: INTERNAL MEDICINE

## 2020-02-11 PROCEDURE — 99214 OFFICE O/P EST MOD 30 MIN: CPT | Performed by: INTERNAL MEDICINE

## 2020-02-11 NOTE — PROGRESS NOTES
[facility]     Jamari Munoz  1950   [id number]      Patient Care Team:  Keshawn Alexandre MD as PCP - General (Internal Medicine)  Yanelis Hernandez APRN as PCP - Claims Attributed  Scooter Jiménez MD as Consulting Physician (Colon and Rectal Surgery)  Yanelis Hernandez APRN as Nurse Practitioner (Family Medicine)  Alvino Berrios MD as Consulting Physician (Ophthalmology)    Chief Complaint::   Chief Complaint   Patient presents with   • Hypertension     follow up.  Patient had preventative visit on 2/1/19 with MARINA Carias   • Hyperlipidemia     follow up            HPI  Patient 69-year-old male with a history of essential hypertension and mixed hyperlipidemia HSV-2 and EKG showing sinus bradycardia asymptomatic the patient overall feels well he exercises on a near daily basis either doing spin bicycle riding or riding bicycles is a major form of exercise.  The patient is a non-smoker denies cough or wheeze denies chest pain or pressure denies nausea vomiting said no recent bicycle injuries or falls overall doing well.      Patient Active Problem List   Diagnosis   • Osteoarthritis   • Benign essential hypertension   • Low back pain   • Carbuncle and furuncle   • RLS (restless legs syndrome)   • Overweight   • Ganglion cyst   • Incomplete right bundle branch block   • Mixed hyperlipidemia   • Herpes simplex type 2 infection   • Sinus bradycardia        Past Medical History:   Diagnosis Date   • Arthritis    • Ganglion    • Hyperlipidemia    • Hypertension        Past Surgical History:   Procedure Laterality Date   • CYST REMOVAL      cyst removed left shoulder and left knee   • KNEE MENISCAL REPAIR  2005   • TONSILLECTOMY AND ADENOIDECTOMY     • VASECTOMY         Family History   Problem Relation Age of Onset   • Diabetes Maternal Grandmother         controlled   • Arthritis Paternal Grandmother         not too bad   • Osteoarthritis Paternal Grandmother    • Coronary artery  "disease Paternal Grandfather    • Diabetes Mother         mostly controlled       Social History     Socioeconomic History   • Marital status:      Spouse name: Not on file   • Number of children: Not on file   • Years of education: Not on file   • Highest education level: Not on file   Tobacco Use   • Smoking status: Never Smoker   • Smokeless tobacco: Never Used   Substance and Sexual Activity   • Alcohol use: Yes     Frequency: 4 or more times a week     Drinks per session: 1 or 2     Binge frequency: Never   • Drug use: No   • Sexual activity: Yes     Partners: Female     Birth control/protection: None       No Known Allergies    Review of Systems     HEENT: Denies headache or dizzy vision or hearing change  NECK: Denies dysphasia or reflux or stiffness  CHEST: Non-smoker denies cough or wheeze  CARDIAC: Denies chest pain pressure tightness palpitations history of hypertension well-controlled  ABD: Denies nausea vomiting abdominal pain  : Denies dysuria or frequency  NEURO: Denies syncope concussion or neuropathy  PSYCH: Denies anxiety depression  EXTREM: Denies extremity arthritic changes or edema    Vital Signs  Vitals:    02/11/20 0949   BP: 128/80   BP Location: Right arm   Patient Position: Sitting   Cuff Size: Adult   Pulse: 54   Weight: 90.7 kg (200 lb)   Height: 178 cm (70.08\")   PainSc: 0-No pain     Body mass index is 28.63 kg/m².    Current Outpatient Medications:   •  aspirin 81 MG tablet, Take 1 tablet by mouth Daily., Disp: 30 tablet, Rfl: 0  •  atorvastatin (LIPITOR) 20 MG tablet, Take 1 tablet by mouth Daily., Disp: 90 tablet, Rfl: 1  •  enalapril (VASOTEC) 5 MG tablet, Take 1 tablet by mouth Daily., Disp: 90 tablet, Rfl: 3  •  Glucosamine-Chondroitin 750-600 MG tablet, Take  by mouth., Disp: , Rfl:   •  valACYclovir (VALTREX) 500 MG tablet, Take 0.5 tablets by mouth Daily., Disp: 45 tablet, Rfl: 3    Physical Exam     ACE III MINI         HEENT: No facial asymmetry pharynx is " clear  NECK: No masses bruit or thyromegaly  CHEST: Clear without rales or wheezing  CARDIAC: Regular rhythm without gallop or rub  ABD: Liver and spleen are normal positive bowel sounds no bruit  : Deferred  NEURO: CNS is intact no neuropathy  PSYCH: Normal  EXTREM: Normal no edema  Skin: Clear    Patient's Body mass index is 28.63 kg/m². BMI is above normal parameters. Recommendations include: nutrition counseling.     Results Review:    Recent Results (from the past 672 hour(s))   Comprehensive Metabolic Panel    Collection Time: 02/04/20  8:19 AM   Result Value Ref Range    Glucose 76 65 - 99 mg/dL    BUN 17 8 - 23 mg/dL    Creatinine 1.20 0.76 - 1.27 mg/dL    Sodium 138 136 - 145 mmol/L    Potassium 4.5 3.5 - 5.2 mmol/L    Chloride 100 98 - 107 mmol/L    CO2 24.1 22.0 - 29.0 mmol/L    Calcium 9.5 8.6 - 10.5 mg/dL    Total Protein 6.6 6.0 - 8.5 g/dL    Albumin 4.20 3.50 - 5.20 g/dL    ALT (SGPT) 28 1 - 41 U/L    AST (SGOT) 30 1 - 40 U/L    Alkaline Phosphatase 47 39 - 117 U/L    Total Bilirubin 0.6 0.2 - 1.2 mg/dL    eGFR Non African Amer 60 (L) >60 mL/min/1.73    Globulin 2.4 gm/dL    A/G Ratio 1.8 g/dL    BUN/Creatinine Ratio 14.2 7.0 - 25.0    Anion Gap 13.9 5.0 - 15.0 mmol/L   Lipid Panel    Collection Time: 02/04/20  8:19 AM   Result Value Ref Range    Total Cholesterol 158 0 - 200 mg/dL    Triglycerides 74 0 - 150 mg/dL    HDL Cholesterol 68 (H) 40 - 60 mg/dL    LDL Cholesterol  75 0 - 100 mg/dL    VLDL Cholesterol 14.8 5 - 40 mg/dL    LDL/HDL Ratio 1.11    TSH    Collection Time: 02/04/20  8:19 AM   Result Value Ref Range    TSH 1.400 0.270 - 4.200 uIU/mL   PSA Screen    Collection Time: 02/04/20  8:19 AM   Result Value Ref Range    PSA 1.300 0.000 - 4.000 ng/mL   Microalbumin / Creatinine Urine Ratio - Urine, Clean Catch    Collection Time: 02/04/20  8:19 AM   Result Value Ref Range    Microalbumin/Creatinine Ratio      Creatinine, Urine 107.4 mg/dL    Microalbumin, Urine <1.2 mg/dL   CBC Auto  Differential    Collection Time: 02/04/20  8:19 AM   Result Value Ref Range    WBC 6.34 3.40 - 10.80 10*3/mm3    RBC 4.66 4.14 - 5.80 10*6/mm3    Hemoglobin 15.0 13.0 - 17.7 g/dL    Hematocrit 45.4 37.5 - 51.0 %    MCV 97.4 (H) 79.0 - 97.0 fL    MCH 32.2 26.6 - 33.0 pg    MCHC 33.0 31.5 - 35.7 g/dL    RDW 12.2 (L) 12.3 - 15.4 %    RDW-SD 43.2 37.0 - 54.0 fl    MPV 10.1 6.0 - 12.0 fL    Platelets 333 140 - 450 10*3/mm3    Neutrophil % 43.7 42.7 - 76.0 %    Lymphocyte % 41.0 19.6 - 45.3 %    Monocyte % 11.8 5.0 - 12.0 %    Eosinophil % 2.7 0.3 - 6.2 %    Basophil % 0.5 0.0 - 1.5 %    Immature Grans % 0.3 0.0 - 0.5 %    Neutrophils, Absolute 2.77 1.70 - 7.00 10*3/mm3    Lymphocytes, Absolute 2.60 0.70 - 3.10 10*3/mm3    Monocytes, Absolute 0.75 0.10 - 0.90 10*3/mm3    Eosinophils, Absolute 0.17 0.00 - 0.40 10*3/mm3    Basophils, Absolute 0.03 0.00 - 0.20 10*3/mm3    Immature Grans, Absolute 0.02 0.00 - 0.05 10*3/mm3    nRBC 0.0 0.0 - 0.2 /100 WBC       ECG 12 Lead  Date/Time: 2/11/2020 5:26 PM  Performed by: Keshawn Alexandre MD  Authorized by: Keshawn Alexandre MD   Comparison: not compared with previous ECG   Rhythm: sinus rhythm and sinus bradycardia  Rate: bradycardic  Conduction: conduction normal  ST Segments: ST segments normal    Clinical impression: normal ECG and non-specific ECG  Comments: EKG shows sinus rhythm heart rate of 53 no ischemic change asymptomatic        Patient Wellness Counseling:   Plan of care reviewed with patient at the conclusion of today's visit. Education was provided in regards to diagnosis, diet and exercise, prostate cancer screening discussed including benefit of early detection, potential need for follow-up, and harms associated with additional management. Patient agrees to screening.    Nutrition, physical activity, healthy weight,ways to reduce stress, adequate sleep, injury prevention, misuse of tobacco, alcohol and drugs, sexual behavior and STD's, dental health, mental health, and  immunizations.    Plan of care reviewed with patient at the conclusion of today's visit. Education was provided regarding diagnosis, management and any prescribed or recommended OTC medications.  Patient verbalizes understanding of and agreement with management plan.        Medication Review: Medications reviewed and noted    Patient wellness counseling  Exercise: Continue excellent exercise and physical fitness training  Diet: Continue good cardiac diet  Smoking: Non-smoker  Alcohol: Infrequent alcohol  Screening: Recent labs discussed EKG discussed with sinus bradycardia asymptomatic    Assessment/Plan:    Problem List Items Addressed This Visit        Cardiovascular and Mediastinum    Benign essential hypertension - Primary    Overview     History of essential hypertension on enalapril 5 mg daily denies headache or cough         Current Assessment & Plan     Hypertension treated with enalapril 5 mg daily continue therapy recent CMP was normal with mild reduced GFR of 60 with creatinine of 1.20 BUN of 17 and blood sugar 76 continue enalapril 5.  With current blood pressure 128/80.         Relevant Medications    enalapril (VASOTEC) 5 MG tablet    Other Relevant Orders    ECG 12 Lead    Mixed hyperlipidemia    Overview     History of mixed hyperlipidemia on atorvastatin 20 mg daily denies myalgia arthralgia         Current Assessment & Plan     Recent cholesterol 158 LDL of 75 and HDL of 86 on atorvastatin 20 continue current therapy liver functions were normal.         Relevant Medications    atorvastatin (LIPITOR) 20 MG tablet    Sinus bradycardia    Overview     EKG shows sinus bradycardia with a rate of 53 asymptomatic no acute change         Current Assessment & Plan     History of sinus bradycardia on EKG with current heart rate of 54 and blood pressure 128/80 currently asymptomatic suggest no change in aspirin 81 enalapril 5 or atorvastatin 20.         Relevant Orders    ECG 12 Lead       Other    Herpes  simplex type 2 infection    Overview     On chronic suppression of Valtrex 500 mg half tablet daily         Current Assessment & Plan     Continue daily Valtrex 500 mg half tablet daily         Relevant Medications    valACYclovir (VALTREX) 500 MG tablet           Patient Instructions   Continue current medications  Labs reviewed, EKG reviewed  Continue excellent exercise program and healthy cardiac diet  Return visit in 6 months or as needed       Plan of care reviewed with patient at the conclusion of today's visit. Education was provided regarding diagnosis, management, and any prescribed or recommended OTC medications.Patient verbalizes understanding of and agreement with management plan.         Keshawn Alexandre MD      Note: Part of this note may be an electronic transcription/translation of spoken language to printed text using the Dragon Dictation system.

## 2020-02-11 NOTE — ASSESSMENT & PLAN NOTE
Recent cholesterol 158 LDL of 75 and HDL of 86 on atorvastatin 20 continue current therapy liver functions were normal.

## 2020-02-11 NOTE — ASSESSMENT & PLAN NOTE
Hypertension treated with enalapril 5 mg daily continue therapy recent CMP was normal with mild reduced GFR of 60 with creatinine of 1.20 BUN of 17 and blood sugar 76 continue enalapril 5.  With current blood pressure 128/80.

## 2020-02-11 NOTE — ASSESSMENT & PLAN NOTE
History of sinus bradycardia on EKG with current heart rate of 54 and blood pressure 128/80 currently asymptomatic suggest no change in aspirin 81 enalapril 5 or atorvastatin 20.

## 2020-02-11 NOTE — PATIENT INSTRUCTIONS
Continue current medications  Labs reviewed, EKG reviewed  Continue excellent exercise program and healthy cardiac diet  Return visit in 6 months or as needed

## 2020-03-23 ENCOUNTER — TELEPHONE (OUTPATIENT)
Dept: INTERNAL MEDICINE | Facility: CLINIC | Age: 70
End: 2020-03-23

## 2020-03-23 RX ORDER — OSELTAMIVIR PHOSPHATE 75 MG/1
75 CAPSULE ORAL 2 TIMES DAILY
Qty: 10 CAPSULE | Refills: 0 | Status: SHIPPED | OUTPATIENT
Start: 2020-03-23 | End: 2020-08-13

## 2020-03-23 NOTE — TELEPHONE ENCOUNTER
Patient spouse sent a portal message states patient has symptoms of cough, fever (101.4), headache and nausea.  Denies SOB or known COVID exposure.  Per , will treat for flu with Tamiflu 75 mg BID x 5 days.  RX sent to  for sign-off.

## 2020-04-04 ENCOUNTER — TELEPHONE (OUTPATIENT)
Dept: INTERNAL MEDICINE | Facility: CLINIC | Age: 70
End: 2020-04-04

## 2020-04-04 RX ORDER — DOXYCYCLINE HYCLATE 100 MG/1
100 CAPSULE ORAL 2 TIMES DAILY
Qty: 14 CAPSULE | Refills: 0 | Status: SHIPPED | OUTPATIENT
Start: 2020-04-04 | End: 2020-08-13

## 2020-04-04 NOTE — TELEPHONE ENCOUNTER
"PT's wife called, has had \"flu-like\"symptoms for several weeks. Has had fever on and off, but now cough is worse, nonproductive, temp .  Cough is paroxysmal, but otherwise he is in no distress.  Called in doxycycline for presumptive secondary bacterial infection.  "

## 2020-07-22 DIAGNOSIS — E78.2 MIXED HYPERLIPIDEMIA: ICD-10-CM

## 2020-07-22 RX ORDER — ATORVASTATIN CALCIUM 20 MG/1
TABLET, FILM COATED ORAL
Qty: 90 TABLET | Refills: 1 | Status: SHIPPED | OUTPATIENT
Start: 2020-07-22 | End: 2021-01-18

## 2020-07-23 ENCOUNTER — TELEPHONE (OUTPATIENT)
Dept: INTERNAL MEDICINE | Facility: CLINIC | Age: 70
End: 2020-07-23

## 2020-07-23 DIAGNOSIS — Z20.822 EXPOSURE TO COVID-19 VIRUS: Primary | ICD-10-CM

## 2020-07-23 PROCEDURE — U0003 INFECTIOUS AGENT DETECTION BY NUCLEIC ACID (DNA OR RNA); SEVERE ACUTE RESPIRATORY SYNDROME CORONAVIRUS 2 (SARS-COV-2) (CORONAVIRUS DISEASE [COVID-19]), AMPLIFIED PROBE TECHNIQUE, MAKING USE OF HIGH THROUGHPUT TECHNOLOGIES AS DESCRIBED BY CMS-2020-01-R: HCPCS | Performed by: INTERNAL MEDICINE

## 2020-07-23 NOTE — TELEPHONE ENCOUNTER
Please place Covid 19 order.  Going to [facility] to obtain today.  No symptoms, but has been in close contact to positive Covid pt.

## 2020-08-11 ENCOUNTER — PATIENT MESSAGE (OUTPATIENT)
Dept: INTERNAL MEDICINE | Facility: CLINIC | Age: 70
End: 2020-08-11

## 2020-08-11 DIAGNOSIS — E78.2 MIXED HYPERLIPIDEMIA: Primary | ICD-10-CM

## 2020-08-12 ENCOUNTER — LAB (OUTPATIENT)
Dept: LAB | Facility: HOSPITAL | Age: 70
End: 2020-08-12

## 2020-08-12 DIAGNOSIS — E78.2 MIXED HYPERLIPIDEMIA: ICD-10-CM

## 2020-08-12 LAB
ALBUMIN SERPL-MCNC: 4.2 G/DL (ref 3.5–5.2)
ALBUMIN/GLOB SERPL: 1.8 G/DL
ALP SERPL-CCNC: 44 U/L (ref 39–117)
ALT SERPL W P-5'-P-CCNC: 31 U/L (ref 1–41)
ANION GAP SERPL CALCULATED.3IONS-SCNC: 8.7 MMOL/L (ref 5–15)
AST SERPL-CCNC: 28 U/L (ref 1–40)
BILIRUB SERPL-MCNC: 0.5 MG/DL (ref 0–1.2)
BUN SERPL-MCNC: 20 MG/DL (ref 8–23)
BUN/CREAT SERPL: 17.7 (ref 7–25)
CALCIUM SPEC-SCNC: 9.7 MG/DL (ref 8.6–10.5)
CHLORIDE SERPL-SCNC: 102 MMOL/L (ref 98–107)
CHOLEST SERPL-MCNC: 161 MG/DL (ref 0–200)
CO2 SERPL-SCNC: 27.3 MMOL/L (ref 22–29)
CREAT SERPL-MCNC: 1.13 MG/DL (ref 0.76–1.27)
GFR SERPL CREATININE-BSD FRML MDRD: 64 ML/MIN/1.73
GLOBULIN UR ELPH-MCNC: 2.3 GM/DL
GLUCOSE SERPL-MCNC: 87 MG/DL (ref 65–99)
HDLC SERPL-MCNC: 59 MG/DL (ref 40–60)
LDLC SERPL CALC-MCNC: 86 MG/DL (ref 0–100)
LDLC/HDLC SERPL: 1.45 {RATIO}
POTASSIUM SERPL-SCNC: 4.1 MMOL/L (ref 3.5–5.2)
PROT SERPL-MCNC: 6.5 G/DL (ref 6–8.5)
SODIUM SERPL-SCNC: 138 MMOL/L (ref 136–145)
TRIGL SERPL-MCNC: 81 MG/DL (ref 0–150)
VLDLC SERPL-MCNC: 16.2 MG/DL (ref 5–40)

## 2020-08-12 PROCEDURE — 80061 LIPID PANEL: CPT

## 2020-08-12 PROCEDURE — 80053 COMPREHEN METABOLIC PANEL: CPT

## 2020-08-13 ENCOUNTER — OFFICE VISIT (OUTPATIENT)
Dept: INTERNAL MEDICINE | Facility: CLINIC | Age: 70
End: 2020-08-13

## 2020-08-13 VITALS
HEIGHT: 70 IN | BODY MASS INDEX: 28.58 KG/M2 | DIASTOLIC BLOOD PRESSURE: 72 MMHG | WEIGHT: 199.6 LBS | SYSTOLIC BLOOD PRESSURE: 128 MMHG | HEART RATE: 64 BPM | TEMPERATURE: 96.8 F

## 2020-08-13 DIAGNOSIS — E78.2 MIXED HYPERLIPIDEMIA: Primary | ICD-10-CM

## 2020-08-13 DIAGNOSIS — I10 BENIGN ESSENTIAL HYPERTENSION: ICD-10-CM

## 2020-08-13 PROCEDURE — 99214 OFFICE O/P EST MOD 30 MIN: CPT | Performed by: INTERNAL MEDICINE

## 2020-08-13 NOTE — ASSESSMENT & PLAN NOTE
Mixed hyperlipidemia on atorvastatin 20 mg daily denies myalgia arthralgia recent lab reveals cholesterol 161 and LDL of 86 continue therapy

## 2020-08-13 NOTE — ASSESSMENT & PLAN NOTE
Current blood pressure 128/72 left and right sitting on enalapril 5 mg daily denies headache or hyperactive airway cough CMP is normal continue therapy

## 2020-08-13 NOTE — PROGRESS NOTES
[facility]     Jamari Munoz  1950   [id number]      Patient Care Team:  Keshawn Alexandre MD as PCP - General (Internal Medicine)  Keshawn Alexandre MD as PCP - Claims Attributed  Scooter Jiménez MD as Consulting Physician (Colon and Rectal Surgery)  Yanelis Hernandez APRN as Nurse Practitioner (Family Medicine)  Alvino Berrios MD as Consulting Physician (Ophthalmology)    Chief Complaint::   Chief Complaint   Patient presents with   • Hypertension   • Hyperlipidemia            HPI  69-year-old male with a history of essential hypertension and mixed hyperlipidemia overall feeling well denying headache dizzy cough or wheeze recent lung infection chest pain pressure palpitations nausea vomiting he continues to exercise riding his bicycle multi-miles per week no recent falls or injuries overall doing well with no change in diet medication or rest.      Patient Active Problem List   Diagnosis   • Osteoarthritis   • Benign essential hypertension   • Low back pain   • Carbuncle and furuncle   • RLS (restless legs syndrome)   • Overweight   • Ganglion cyst   • Incomplete right bundle branch block   • Mixed hyperlipidemia   • Herpes simplex type 2 infection   • Sinus bradycardia        Past Medical History:   Diagnosis Date   • Arthritis    • Ganglion    • Hyperlipidemia    • Hypertension        Past Surgical History:   Procedure Laterality Date   • CYST REMOVAL      cyst removed left shoulder and left knee   • KNEE MENISCAL REPAIR  2005   • TONSILLECTOMY AND ADENOIDECTOMY     • VASECTOMY         Family History   Problem Relation Age of Onset   • Diabetes Maternal Grandmother         controlled   • Arthritis Paternal Grandmother         not too bad   • Osteoarthritis Paternal Grandmother    • Coronary artery disease Paternal Grandfather    • Diabetes Mother         mostly controlled       Social History     Socioeconomic History   • Marital status:      Spouse name: Not on file   • Number of  "children: Not on file   • Years of education: Not on file   • Highest education level: Not on file   Tobacco Use   • Smoking status: Never Smoker   • Smokeless tobacco: Never Used   Substance and Sexual Activity   • Alcohol use: Yes     Alcohol/week: 14.0 standard drinks     Types: 7 Cans of beer, 7 Glasses of wine per week     Frequency: 4 or more times a week     Drinks per session: 1 or 2     Binge frequency: Never   • Drug use: No   • Sexual activity: Yes     Partners: Female     Birth control/protection: None       No Known Allergies    Review of Systems     HEENT: Denies headache or dizzy vision or hearing change  NECK: Denies pain stiffness dysphasia  CHEST: No recent chest infections non-smoker denies cough wheeze shortness of breath  CARDIAC: Denies chest pain or pressure history of essential hypertension currently stable  ABD: Denies nausea vomiting abdominal pain  : Denies dysuria frequency  NEURO: Denies syncope concussion  PSYCH: Denies anxiety depression  EXTREM: Denies extremity discomfort soreness or edema    Vital Signs  Vitals:    08/13/20 0847   BP: 128/72   BP Location: Left arm   Patient Position: Sitting   Cuff Size: Adult   Pulse: 64   Temp: 96.8 °F (36 °C)   TempSrc: Temporal   Weight: 90.5 kg (199 lb 9.6 oz)   Height: 177.8 cm (70\")   PainSc: 0-No pain     Body mass index is 28.64 kg/m².  Patient's Body mass index is 28.64 kg/m². BMI is within normal parameters. No follow-up required..     Advance Care Planning        Current Outpatient Medications:   •  aspirin 81 MG tablet, Take 1 tablet by mouth Daily., Disp: 30 tablet, Rfl: 0  •  atorvastatin (LIPITOR) 20 MG tablet, TAKE 1 TABLET DAILY, Disp: 90 tablet, Rfl: 1  •  CALCIUM-MAGNESIUM-ZINC PO, Take 7 tablets by mouth Daily., Disp: , Rfl:   •  enalapril (VASOTEC) 5 MG tablet, Take 1 tablet by mouth Daily., Disp: 90 tablet, Rfl: 3  •  Glucosamine-Chondroitin 750-600 MG tablet, Take 1 tablet by mouth 2 (Two) Times a Day., Disp: , Rfl:   •  " valACYclovir (VALTREX) 500 MG tablet, Take 0.5 tablets by mouth Daily., Disp: 45 tablet, Rfl: 3    Physical Exam     ACE III MINI         HEENT: No asymmetry  NECK: No masses bruit thyromegaly  CHEST: Clear without rales or wheezing  CARDIAC: Regular rhythm without gallop or rub  ABD: Liver spleen normal positive bowel sounds no bruit  : Deferred  NEURO: Intact  PSYCH: Normal  EXTREM: Minimal arthritic change no edema  Skin: Clear     Results Review:    Recent Results (from the past 672 hour(s))   COVID-19,[facility] ROUTINE, NP/OP SWAB IN TRANSPORT MEDIA OR ESWAB 72 HR TAT - Swab, Oropharynx    Collection Time: 07/23/20  4:05 PM   Result Value Ref Range    SARS-CoV-2, ANGELINE Not Detected Not Detected   Comprehensive Metabolic Panel    Collection Time: 08/12/20  8:38 AM   Result Value Ref Range    Glucose 87 65 - 99 mg/dL    BUN 20 8 - 23 mg/dL    Creatinine 1.13 0.76 - 1.27 mg/dL    Sodium 138 136 - 145 mmol/L    Potassium 4.1 3.5 - 5.2 mmol/L    Chloride 102 98 - 107 mmol/L    CO2 27.3 22.0 - 29.0 mmol/L    Calcium 9.7 8.6 - 10.5 mg/dL    Total Protein 6.5 6.0 - 8.5 g/dL    Albumin 4.20 3.50 - 5.20 g/dL    ALT (SGPT) 31 1 - 41 U/L    AST (SGOT) 28 1 - 40 U/L    Alkaline Phosphatase 44 39 - 117 U/L    Total Bilirubin 0.5 0.0 - 1.2 mg/dL    eGFR Non African Amer 64 >60 mL/min/1.73    Globulin 2.3 gm/dL    A/G Ratio 1.8 g/dL    BUN/Creatinine Ratio 17.7 7.0 - 25.0    Anion Gap 8.7 5.0 - 15.0 mmol/L   Lipid Panel    Collection Time: 08/12/20  8:38 AM   Result Value Ref Range    Total Cholesterol 161 0 - 200 mg/dL    Triglycerides 81 0 - 150 mg/dL    HDL Cholesterol 59 40 - 60 mg/dL    LDL Cholesterol  86 0 - 100 mg/dL    VLDL Cholesterol 16.2 5 - 40 mg/dL    LDL/HDL Ratio 1.45      Procedures    Medication Review: Medications reviewed and noted    Patient wellness counseling  Exercise: Encouraged continued excellent exercise including bicycle riding  Diet: Encouraged healthy cardiac diet  Smoking: Non-smoker  Alcohol:  Infrequent alcohol  Screening: Labs discussed and copy given    Assessment/Plan:    Problem List Items Addressed This Visit        Cardiovascular and Mediastinum    Benign essential hypertension    Overview     History of essential hypertension on enalapril 5 mg daily denies headache or cough         Current Assessment & Plan       Current blood pressure 128/72 left and right sitting on enalapril 5 mg daily denies headache or hyperactive airway cough CMP is normal continue therapy         Relevant Medications    enalapril (VASOTEC) 5 MG tablet    Mixed hyperlipidemia - Primary    Overview     History of mixed hyperlipidemia on atorvastatin 20 mg daily denies myalgia arthralgia         Current Assessment & Plan       Mixed hyperlipidemia on atorvastatin 20 mg daily denies myalgia arthralgia recent lab reveals cholesterol 161 and LDL of 86 continue therapy           Relevant Medications    atorvastatin (LIPITOR) 20 MG tablet           Patient Instructions   Recent labs discussed and copy given  Continue all medical therapy  Continue excellent exercise program and healthy cardiac diet  Return visit for annual wellness visit and physical 6 months       Plan of care reviewed with patient at the conclusion of today's visit. Education was provided regarding diagnosis, management, and any prescribed or recommended OTC medications.Patient verbalizes understanding of and agreement with management plan.         Keshawn Alexandre MD      Note: Part of this note may be an electronic transcription/translation of spoken language to printed text using the Dragon Dictation system.

## 2020-08-14 NOTE — PATIENT INSTRUCTIONS
Recent labs discussed and copy given  Continue all medical therapy  Continue excellent exercise program and healthy cardiac diet  Return visit for annual wellness visit and physical 6 months

## 2021-01-13 DIAGNOSIS — B00.9 HERPES SIMPLEX TYPE 2 INFECTION: ICD-10-CM

## 2021-01-13 RX ORDER — VALACYCLOVIR HYDROCHLORIDE 500 MG/1
250 TABLET, FILM COATED ORAL DAILY
Qty: 45 TABLET | Refills: 3 | Status: SHIPPED | OUTPATIENT
Start: 2021-01-13 | End: 2022-01-06

## 2021-01-18 DIAGNOSIS — E78.2 MIXED HYPERLIPIDEMIA: ICD-10-CM

## 2021-01-18 DIAGNOSIS — I10 BENIGN ESSENTIAL HYPERTENSION: ICD-10-CM

## 2021-01-18 RX ORDER — ENALAPRIL MALEATE 5 MG/1
TABLET ORAL
Qty: 90 TABLET | Refills: 3 | Status: SHIPPED | OUTPATIENT
Start: 2021-01-18 | End: 2022-01-10 | Stop reason: SDUPTHER

## 2021-01-18 RX ORDER — ATORVASTATIN CALCIUM 20 MG/1
TABLET, FILM COATED ORAL
Qty: 90 TABLET | Refills: 3 | Status: SHIPPED | OUTPATIENT
Start: 2021-01-18 | End: 2022-01-10 | Stop reason: SDUPTHER

## 2021-02-02 ENCOUNTER — TELEPHONE (OUTPATIENT)
Dept: INTERNAL MEDICINE | Facility: CLINIC | Age: 71
End: 2021-02-02

## 2021-02-02 DIAGNOSIS — I10 BENIGN ESSENTIAL HYPERTENSION: ICD-10-CM

## 2021-02-02 DIAGNOSIS — E78.2 MIXED HYPERLIPIDEMIA: ICD-10-CM

## 2021-02-02 DIAGNOSIS — Z12.5 SPECIAL SCREENING FOR MALIGNANT NEOPLASM OF PROSTATE: Primary | ICD-10-CM

## 2021-02-02 NOTE — TELEPHONE ENCOUNTER
Caller: Jamari Munoz    Relationship: Self    Best call back number: [phone]    What orders are you requesting (i.e. lab or imaging): LABS    In what timeframe would the patient need to come in: 02/05/2021    Where will you receive your lab/imaging services: IN OFFICE OR [facility]    Additional notes: PATIENT STATED THAT HE HAS AN APPOINTMENT ON 02/15/2021 HOWEVER HE WOULD LIKE HIS LABS TO BE DONE PRIOR. HE WOULD LIKE AN ACTIVE LAB ORDER SO HE CAN GET THEM DONE ON 02/05/2021.    PLEASE ADVISE AND CALL PATIENT [phone]. OR SEND HIM A MESSAGE VIA [employer]

## 2021-02-05 ENCOUNTER — LAB (OUTPATIENT)
Dept: LAB | Facility: HOSPITAL | Age: 71
End: 2021-02-05

## 2021-02-05 DIAGNOSIS — I10 BENIGN ESSENTIAL HYPERTENSION: ICD-10-CM

## 2021-02-05 DIAGNOSIS — Z12.5 SPECIAL SCREENING FOR MALIGNANT NEOPLASM OF PROSTATE: ICD-10-CM

## 2021-02-05 DIAGNOSIS — E78.2 MIXED HYPERLIPIDEMIA: ICD-10-CM

## 2021-02-05 LAB
ALBUMIN SERPL-MCNC: 4.1 G/DL (ref 3.5–5.2)
ALBUMIN/GLOB SERPL: 1.8 G/DL
ALP SERPL-CCNC: 47 U/L (ref 39–117)
ALT SERPL W P-5'-P-CCNC: 26 U/L (ref 1–41)
ANION GAP SERPL CALCULATED.3IONS-SCNC: 9.9 MMOL/L (ref 5–15)
AST SERPL-CCNC: 24 U/L (ref 1–40)
BASOPHILS # BLD AUTO: 0.04 10*3/MM3 (ref 0–0.2)
BASOPHILS NFR BLD AUTO: 0.5 % (ref 0–1.5)
BILIRUB SERPL-MCNC: 0.4 MG/DL (ref 0–1.2)
BUN SERPL-MCNC: 16 MG/DL (ref 8–23)
BUN/CREAT SERPL: 14.3 (ref 7–25)
CALCIUM SPEC-SCNC: 10.2 MG/DL (ref 8.6–10.5)
CHLORIDE SERPL-SCNC: 103 MMOL/L (ref 98–107)
CHOLEST SERPL-MCNC: 165 MG/DL (ref 0–200)
CO2 SERPL-SCNC: 28.1 MMOL/L (ref 22–29)
CREAT SERPL-MCNC: 1.12 MG/DL (ref 0.76–1.27)
DEPRECATED RDW RBC AUTO: 40.6 FL (ref 37–54)
EOSINOPHIL # BLD AUTO: 0.28 10*3/MM3 (ref 0–0.4)
EOSINOPHIL NFR BLD AUTO: 3.3 % (ref 0.3–6.2)
ERYTHROCYTE [DISTWIDTH] IN BLOOD BY AUTOMATED COUNT: 11.6 % (ref 12.3–15.4)
GFR SERPL CREATININE-BSD FRML MDRD: 65 ML/MIN/1.73
GLOBULIN UR ELPH-MCNC: 2.3 GM/DL
GLUCOSE SERPL-MCNC: 80 MG/DL (ref 65–99)
HCT VFR BLD AUTO: 48 % (ref 37.5–51)
HDLC SERPL-MCNC: 58 MG/DL (ref 40–60)
HGB BLD-MCNC: 16.3 G/DL (ref 13–17.7)
IMM GRANULOCYTES # BLD AUTO: 0.03 10*3/MM3 (ref 0–0.05)
IMM GRANULOCYTES NFR BLD AUTO: 0.4 % (ref 0–0.5)
LDLC SERPL CALC-MCNC: 87 MG/DL (ref 0–100)
LDLC/HDLC SERPL: 1.45 {RATIO}
LYMPHOCYTES # BLD AUTO: 3.36 10*3/MM3 (ref 0.7–3.1)
LYMPHOCYTES NFR BLD AUTO: 39.4 % (ref 19.6–45.3)
MCH RBC QN AUTO: 32.8 PG (ref 26.6–33)
MCHC RBC AUTO-ENTMCNC: 34 G/DL (ref 31.5–35.7)
MCV RBC AUTO: 96.6 FL (ref 79–97)
MONOCYTES # BLD AUTO: 0.92 10*3/MM3 (ref 0.1–0.9)
MONOCYTES NFR BLD AUTO: 10.8 % (ref 5–12)
NEUTROPHILS NFR BLD AUTO: 3.9 10*3/MM3 (ref 1.7–7)
NEUTROPHILS NFR BLD AUTO: 45.6 % (ref 42.7–76)
NRBC BLD AUTO-RTO: 0 /100 WBC (ref 0–0.2)
PLATELET # BLD AUTO: 343 10*3/MM3 (ref 140–450)
PMV BLD AUTO: 10.4 FL (ref 6–12)
POTASSIUM SERPL-SCNC: 5 MMOL/L (ref 3.5–5.2)
PROT SERPL-MCNC: 6.4 G/DL (ref 6–8.5)
PSA SERPL-MCNC: 0.8 NG/ML (ref 0–4)
RBC # BLD AUTO: 4.97 10*6/MM3 (ref 4.14–5.8)
SODIUM SERPL-SCNC: 141 MMOL/L (ref 136–145)
TRIGL SERPL-MCNC: 115 MG/DL (ref 0–150)
TSH SERPL DL<=0.05 MIU/L-ACNC: 2.1 UIU/ML (ref 0.27–4.2)
VLDLC SERPL-MCNC: 20 MG/DL (ref 5–40)
WBC # BLD AUTO: 8.53 10*3/MM3 (ref 3.4–10.8)

## 2021-02-05 PROCEDURE — 80053 COMPREHEN METABOLIC PANEL: CPT

## 2021-02-05 PROCEDURE — 80061 LIPID PANEL: CPT

## 2021-02-05 PROCEDURE — 85025 COMPLETE CBC W/AUTO DIFF WBC: CPT

## 2021-02-05 PROCEDURE — G0103 PSA SCREENING: HCPCS

## 2021-02-05 PROCEDURE — 84443 ASSAY THYROID STIM HORMONE: CPT

## 2021-02-08 ENCOUNTER — OFFICE VISIT (OUTPATIENT)
Dept: INTERNAL MEDICINE | Facility: CLINIC | Age: 71
End: 2021-02-08

## 2021-02-08 VITALS
OXYGEN SATURATION: 97 % | HEIGHT: 70 IN | SYSTOLIC BLOOD PRESSURE: 127 MMHG | WEIGHT: 205.6 LBS | HEART RATE: 58 BPM | BODY MASS INDEX: 29.43 KG/M2 | TEMPERATURE: 97.3 F | DIASTOLIC BLOOD PRESSURE: 71 MMHG

## 2021-02-08 DIAGNOSIS — Z00.00 MEDICARE ANNUAL WELLNESS VISIT, SUBSEQUENT: Primary | ICD-10-CM

## 2021-02-08 DIAGNOSIS — B00.9 HERPES SIMPLEX TYPE 2 INFECTION: ICD-10-CM

## 2021-02-08 DIAGNOSIS — E78.2 MIXED HYPERLIPIDEMIA: ICD-10-CM

## 2021-02-08 DIAGNOSIS — I10 BENIGN ESSENTIAL HYPERTENSION: ICD-10-CM

## 2021-02-08 PROCEDURE — G0439 PPPS, SUBSEQ VISIT: HCPCS | Performed by: PHYSICIAN ASSISTANT

## 2021-02-08 NOTE — PATIENT INSTRUCTIONS
Advance Directive    Advance directives are legal documents that let you make choices ahead of time about your health care and medical treatment in case you become unable to communicate for yourself. Advance directives are a way for you to make known your wishes to family, friends, and health care providers. This can let others know about your end-of-life care if you become unable to communicate.  Discussing and writing advance directives should happen over time rather than all at once. Advance directives can be changed depending on your situation and what you want, even after you have signed the advance directives.  There are different types of advance directives, such as:  · Medical power of .  · Living will.  · Do not resuscitate (DNR) or do not attempt resuscitation (DNAR) order.  Health care proxy and medical power of   A health care proxy is also called a health care agent. This is a person who is appointed to make medical decisions for you in cases where you are unable to make the decisions yourself. Generally, people choose someone they know well and trust to represent their preferences. Make sure to ask this person for an agreement to act as your proxy. A proxy may have to exercise judgment in the event of a medical decision for which your wishes are not known.  A medical power of  is a legal document that names your health care proxy. Depending on the laws in your state, after the document is written, it may also need to be:  · Signed.  · Notarized.  · Dated.  · Copied.  · Witnessed.  · Incorporated into your medical record.  You may also want to appoint someone to manage your money in a situation in which you are unable to do so. This is called a durable power of  for finances. It is a separate legal document from the durable power of  for health care. You may choose the same person or someone different from your health care proxy to act as your agent in money  matters.  If you do not appoint a proxy, or if there is a concern that the proxy is not acting in your best interests, a court may appoint a guardian to act on your behalf.  Living will  A living will is a set of instructions that state your wishes about medical care when you cannot express them yourself. Health care providers should keep a copy of your living will in your medical record. You may want to give a copy to family members or friends. To alert caregivers in case of an emergency, you can place a card in your wallet to let them know that you have a living will and where they can find it. A living will is used if you become:  · Terminally ill.  · Disabled.  · Unable to communicate or make decisions.  Items to consider in your living will include:  · To use or not to use life-support equipment, such as dialysis machines and breathing machines (ventilators).  · A DNR or DNAR order. This tells health care providers not to use cardiopulmonary resuscitation (CPR) if breathing or heartbeat stops.  · To use or not to use tube feeding.  · To be given or not to be given food and fluids.  · Comfort (palliative) care when the goal becomes comfort rather than a cure.  · Donation of organs and tissues.  A living will does not give instructions for distributing your money and property if you should pass away.  DNR or DNAR  A DNR or DNAR order is a request not to have CPR in the event that your heart stops beating or you stop breathing. If a DNR or DNAR order has not been made and shared, a health care provider will try to help any patient whose heart has stopped or who has stopped breathing. If you plan to have surgery, talk with your health care provider about how your DNR or DNAR order will be followed if problems occur.  What if I do not have an advance directive?  If you do not have an advance directive, some states assign family decision makers to act on your behalf based on how closely you are related to them. Each  state has its own laws about advance directives. You may want to check with your health care provider, , or state representative about the laws in your state.  Summary  · Advance directives are the legal documents that allow you to make choices ahead of time about your health care and medical treatment in case you become unable to tell others about your care.  · The process of discussing and writing advance directives should happen over time. You can change the advance directives, even after you have signed them.  · Advance directives include DNR or DNAR orders, living goldberg, and designating an agent as your medical power of .  This information is not intended to replace advice given to you by your health care provider. Make sure you discuss any questions you have with your health care provider.  Document Revised: 2020 Document Reviewed: 2020  Elsevier Patient Education ©  [employer]    Medicare Wellness  Personal Prevention Plan of Service     Date of Office Visit:  2021  Encounter Provider:  Nu Deng PA-C  Place of Service:  [facility]  Patient Name: Jamari Munoz  :  1950    As part of the Medicare Wellness portion of your visit today, we are providing you with this personalized preventive plan of services (PPPS). This plan is based upon recommendations of the [place] Preventive Services Task Force (USPSTF) and the Advisory Committee on Immunization Practices (ACIP).    This lists the preventive care services that should be considered, and provides dates of when you are due. Items listed as completed are up-to-date and do not require any further intervention.    Health Maintenance   Topic Date Due   • TDAP/TD VACCINES (1 - Tdap) 1969   • HEPATITIS C SCREENING  2018   • INFLUENZA VACCINE  2020   • ANNUAL WELLNESS VISIT  2021   • LIPID PANEL  2022   • COLONOSCOPY  2025   •  Pneumococcal Vaccine 65+  Completed   • ZOSTER VACCINE  Completed   • MENINGOCOCCAL VACCINE  Aged Out       No orders of the defined types were placed in this encounter.      No follow-ups on file.

## 2021-02-08 NOTE — PROGRESS NOTES
"The ABCs of the Annual Wellness Visit  Subsequent Medicare Wellness Visit    Chief Complaint   Patient presents with   • Annual Exam     f/u        Subjective   History of Present Illness:  Jamari Munoz is a 70 y.o. male who presents for a Subsequent Medicare Wellness Visit.    HEALTH RISK ASSESSMENT    Recent Hospitalizations:  {Hospitalization history:4324267925::\"No hospitalization(s) within the last year.\"}    Current Medical Providers:  Patient Care Team:  Keshawn Alexandre MD as PCP - General (Internal Medicine)  TinoScooter perez MD as Consulting Physician (Colon and Rectal Surgery)  Yanelis Hernandez APRN as Nurse Practitioner (Family Medicine)  Alvino Berrios MD as Consulting Physician (Ophthalmology)    Smoking Status:  Social History     Tobacco Use   Smoking Status Never Smoker   Smokeless Tobacco Never Used       Alcohol Consumption:  Social History     Substance and Sexual Activity   Alcohol Use Yes   • Alcohol/week: 14.0 standard drinks   • Types: 7 Cans of beer, 7 Glasses of wine per week   • Frequency: 4 or more times a week   • Drinks per session: 1 or 2   • Binge frequency: Never       Depression Screen:   PHQ-2/PHQ-9 Depression Screening 2/8/2021   Little interest or pleasure in doing things 0   Feeling down, depressed, or hopeless 0   Total Score 0       Fall Risk Screen:  STEADI Fall Risk Assessment was completed, and patient is at LOW risk for falls.Assessment completed on:2/8/2021    Health Habits and Functional and Cognitive Screening:  Functional & Cognitive Status 2/8/2021   Do you have difficulty preparing food and eating? No   Do you have difficulty bathing yourself, getting dressed or grooming yourself? No   Do you have difficulty using the toilet? No   Do you have difficulty moving around from place to place? No   Do you have trouble with steps or getting out of a bed or a chair? No   Current Diet Well Balanced Diet   Dental Exam Up to date   Eye Exam Up to date   Exercise (times " "per week) 3 times per week   Current Exercise Activities Include Bicycling Outdoors   Do you need help using the phone?  No   Are you deaf or do you have serious difficulty hearing?  No   Do you need help with transportation? No   Do you need help shopping? No   Do you need help preparing meals?  No   Do you need help with housework?  No   Do you need help with laundry? No   Do you need help taking your medications? No   Do you need help managing money? No   Do you ever drive or ride in a car without wearing a seat belt? No   Have you felt unusual stress, anger or loneliness in the last month? -   Who do you live with? -   If you need help, do you have trouble finding someone available to you? -   Have you been bothered in the last four weeks by sexual problems? -   Do you have difficulty concentrating, remembering or making decisions? -         Does the patient have evidence of cognitive impairment? {Yes/No w/ pre-defaulted No:44341::\"No\"}    Asprin use counseling:{Aspirin :72591}    Age-appropriate Screening Schedule:  Refer to the list below for future screening recommendations based on patient's age, sex and/or medical conditions. Orders for these recommended tests are listed in the plan section. The patient has been provided with a written plan.    Health Maintenance   Topic Date Due   • TDAP/TD VACCINES (1 - Tdap) 09/20/1969   • INFLUENZA VACCINE  08/01/2020   • LIPID PANEL  02/05/2022   • COLONOSCOPY  01/20/2025   • ZOSTER VACCINE  Completed          The following portions of the patient's history were reviewed and updated as appropriate: {history reviewed:20406::\"allergies\",\"current medications\",\"past family history\",\"past medical history\",\"past social history\",\"past surgical history\",\"problem list\"}.    Outpatient Medications Prior to Visit   Medication Sig Dispense Refill   • aspirin 81 MG tablet Take 1 tablet by mouth Daily. 30 tablet 0   • atorvastatin (LIPITOR) 20 MG tablet TAKE 1 TABLET DAILY 90 " "tablet 3   • CALCIUM-MAGNESIUM-ZINC PO Take 7 tablets by mouth Daily.     • enalapril (VASOTEC) 5 MG tablet TAKE 1 TABLET DAILY 90 tablet 3   • Glucosamine-Chondroitin 750-600 MG tablet Take 1 tablet by mouth 2 (Two) Times a Day.     • valACYclovir (VALTREX) 500 MG tablet Take 0.5 tablets by mouth Daily. 45 tablet 3     No facility-administered medications prior to visit.        Patient Active Problem List   Diagnosis   • Osteoarthritis   • Benign essential hypertension   • Low back pain   • Carbuncle and furuncle   • RLS (restless legs syndrome)   • Overweight   • Ganglion cyst   • Incomplete right bundle branch block   • Mixed hyperlipidemia   • Herpes simplex type 2 infection   • Sinus bradycardia       Advanced Care Planning:  {Advanced Directive Status:26161}    Review of Systems    Compared to one year ago, the patient feels his physical health is {better worse same:09045}.  Compared to one year ago, the patient feels his mental health is {better worse same:89810}.    Reviewed chart for potential of high risk medication in the elderly: {Response;Yes/No/NA:1856128481::\"yes\"}  Reviewed chart for potential of harmful drug interactions in the elderly:{Response;Yes/No/NA:3184910037::\"yes\"}    Objective         Vitals:    02/08/21 0834   PainSc: 0-No pain       There is no height or weight on file to calculate BMI.  Discussed the patient's BMI with him. The BMI {BMI plan ([facility] measure 421):25154}.    Physical Exam    Lab Results   Component Value Date    TRIG 115 02/05/2021    HDL 58 02/05/2021    LDL 87 02/05/2021    VLDL 20 02/05/2021        Assessment/Plan   Medicare Risks and Personalized Health Plan  CMS Preventative Services Quick Reference  {Medicare Wellness Risk Factors and Personalized Health Plan:61319}    The above risks/problems have been discussed with the patient.  Pertinent information has been shared with the patient in the After Visit Summary.  Follow up plans and orders are seen below in the " Assessment/Plan Section.    There are no diagnoses linked to this encounter.  Follow Up:  No follow-ups on file.     An After Visit Summary and PPPS were given to the patient.

## 2021-02-08 NOTE — PROGRESS NOTES
QUICK REFERENCE INFORMATION:  The ABCs of the Annual Wellness Visit    Subsequent Medicare Wellness Visit     HEALTH RISK ASSESSMENT    : 1950    Recent Hospitalizations:  No hospitalization(s) within the last year..  ccc      Current Medical Providers:  Patient Care Team:  Keshawn Alexandre MD as PCP - General (Internal Medicine)  Scooter Jiménez MD as Consulting Physician (Colon and Rectal Surgery)  Yanelis Hernandez APRN as Nurse Practitioner (Family Medicine)  Alvino Berrios MD as Consulting Physician (Ophthalmology)        Smoking Status:  Social History     Tobacco Use   Smoking Status Never Smoker   Smokeless Tobacco Never Used       Alcohol Consumption:  Social History     Substance and Sexual Activity   Alcohol Use Yes   • Alcohol/week: 14.0 standard drinks   • Types: 7 Cans of beer, 7 Glasses of wine per week   • Frequency: 4 or more times a week   • Drinks per session: 1 or 2   • Binge frequency: Never       Depression Screen:   PHQ-2/PHQ-9 Depression Screening 2021   Little interest or pleasure in doing things 0   Feeling down, depressed, or hopeless 0   Total Score 0       Health Habits and Functional and Cognitive Screening:  Functional & Cognitive Status 2021   Do you have difficulty preparing food and eating? No   Do you have difficulty bathing yourself, getting dressed or grooming yourself? No   Do you have difficulty using the toilet? No   Do you have difficulty moving around from place to place? No   Do you have trouble with steps or getting out of a bed or a chair? No   Current Diet Well Balanced Diet   Dental Exam Up to date   Eye Exam Up to date   Exercise (times per week) 3 times per week   Current Exercise Activities Include Bicycling Outdoors   Do you need help using the phone?  No   Are you deaf or do you have serious difficulty hearing?  No   Do you need help with transportation? No   Do you need help shopping? No   Do you need help preparing meals?  No   Do you need  help with housework?  No   Do you need help with laundry? No   Do you need help taking your medications? No   Do you need help managing money? No   Do you ever drive or ride in a car without wearing a seat belt? No   Have you felt unusual stress, anger or loneliness in the last month? -   Who do you live with? -   If you need help, do you have trouble finding someone available to you? -   Have you been bothered in the last four weeks by sexual problems? -   Do you have difficulty concentrating, remembering or making decisions? -         Does the patient have evidence of cognitive impairment? No    Asiprin use counseling: Taking ASA appropriately as indicated    Recent Lab Results:  Lab on 02/05/2021   Component Date Value Ref Range Status   • Glucose 02/05/2021 80  65 - 99 mg/dL Final   • BUN 02/05/2021 16  8 - 23 mg/dL Final   • Creatinine 02/05/2021 1.12  0.76 - 1.27 mg/dL Final   • Sodium 02/05/2021 141  136 - 145 mmol/L Final   • Potassium 02/05/2021 5.0  3.5 - 5.2 mmol/L Final   • Chloride 02/05/2021 103  98 - 107 mmol/L Final   • CO2 02/05/2021 28.1  22.0 - 29.0 mmol/L Final   • Calcium 02/05/2021 10.2  8.6 - 10.5 mg/dL Final   • Total Protein 02/05/2021 6.4  6.0 - 8.5 g/dL Final   • Albumin 02/05/2021 4.10  3.50 - 5.20 g/dL Final   • ALT (SGPT) 02/05/2021 26  1 - 41 U/L Final   • AST (SGOT) 02/05/2021 24  1 - 40 U/L Final   • Alkaline Phosphatase 02/05/2021 47  39 - 117 U/L Final   • Total Bilirubin 02/05/2021 0.4  0.0 - 1.2 mg/dL Final   • eGFR Non  Amer 02/05/2021 65  >60 mL/min/1.73 Final   • Globulin 02/05/2021 2.3  gm/dL Final   • A/G Ratio 02/05/2021 1.8  g/dL Final   • BUN/Creatinine Ratio 02/05/2021 14.3  7.0 - 25.0 Final   • Anion Gap 02/05/2021 9.9  5.0 - 15.0 mmol/L Final   • Total Cholesterol 02/05/2021 165  0 - 200 mg/dL Final   • Triglycerides 02/05/2021 115  0 - 150 mg/dL Final   • HDL Cholesterol 02/05/2021 58  40 - 60 mg/dL Final   • LDL Cholesterol  02/05/2021 87  0 - 100 mg/dL Final    • VLDL Cholesterol 02/05/2021 20  5 - 40 mg/dL Final   • LDL/HDL Ratio 02/05/2021 1.45   Final   • TSH 02/05/2021 2.100  0.270 - 4.200 uIU/mL Final   • PSA 02/05/2021 0.799  0.000 - 4.000 ng/mL Final   • WBC 02/05/2021 8.53  3.40 - 10.80 10*3/mm3 Final   • RBC 02/05/2021 4.97  4.14 - 5.80 10*6/mm3 Final   • Hemoglobin 02/05/2021 16.3  13.0 - 17.7 g/dL Final   • Hematocrit 02/05/2021 48.0  37.5 - 51.0 % Final   • MCV 02/05/2021 96.6  79.0 - 97.0 fL Final   • MCH 02/05/2021 32.8  26.6 - 33.0 pg Final   • MCHC 02/05/2021 34.0  31.5 - 35.7 g/dL Final   • RDW 02/05/2021 11.6* 12.3 - 15.4 % Final   • RDW-SD 02/05/2021 40.6  37.0 - 54.0 fl Final   • MPV 02/05/2021 10.4  6.0 - 12.0 fL Final   • Platelets 02/05/2021 343  140 - 450 10*3/mm3 Final   • Neutrophil % 02/05/2021 45.6  42.7 - 76.0 % Final   • Lymphocyte % 02/05/2021 39.4  19.6 - 45.3 % Final   • Monocyte % 02/05/2021 10.8  5.0 - 12.0 % Final   • Eosinophil % 02/05/2021 3.3  0.3 - 6.2 % Final   • Basophil % 02/05/2021 0.5  0.0 - 1.5 % Final   • Immature Grans % 02/05/2021 0.4  0.0 - 0.5 % Final   • Neutrophils, Absolute 02/05/2021 3.90  1.70 - 7.00 10*3/mm3 Final   • Lymphocytes, Absolute 02/05/2021 3.36* 0.70 - 3.10 10*3/mm3 Final   • Monocytes, Absolute 02/05/2021 0.92* 0.10 - 0.90 10*3/mm3 Final   • Eosinophils, Absolute 02/05/2021 0.28  0.00 - 0.40 10*3/mm3 Final   • Basophils, Absolute 02/05/2021 0.04  0.00 - 0.20 10*3/mm3 Final   • Immature Grans, Absolute 02/05/2021 0.03  0.00 - 0.05 10*3/mm3 Final   • nRBC 02/05/2021 0.0  0.0 - 0.2 /100 WBC Final       Imaging:  No radiology results for the last 30 days.    Age-appropriate Screening Schedule:  Refer to the list below for future screening recommendations based on patient's age, sex and/or medical conditions. Orders for these recommended tests are listed in the plan section. The patient has been provided with a written plan.    Health Maintenance   Topic Date Due   • TDAP/TD VACCINES (1 - Tdap) 09/20/1969    • INFLUENZA VACCINE  08/01/2020   • LIPID PANEL  02/05/2022   • COLONOSCOPY  01/20/2025   • ZOSTER VACCINE  Completed        Subjective   History of Present Illness    Jamari Munoz is a 70 y.o. male who presents for an Annual Wellness Visit.  He continues to work as an .  Did have Covid infection early last year.  No complications.  Has recently completed both Covid vaccinations.  He has gained 6 pounds in the past 6 months.  Was going to spin class regularly prior to pandemic.  Compliant with medications.  Denies chest pain, shortness of breath, cough, or headaches.    The following portions of the patient's history were reviewed and updated as appropriate: allergies, current medications, past family history, past medical history, past social history, past surgical history and problem list.    Outpatient Medications Prior to Visit   Medication Sig Dispense Refill   • aspirin 81 MG tablet Take 1 tablet by mouth Daily. 30 tablet 0   • atorvastatin (LIPITOR) 20 MG tablet TAKE 1 TABLET DAILY 90 tablet 3   • CALCIUM-MAGNESIUM-ZINC PO Take 7 tablets by mouth Daily.     • enalapril (VASOTEC) 5 MG tablet TAKE 1 TABLET DAILY 90 tablet 3   • Glucosamine-Chondroitin 750-600 MG tablet Take 1 tablet by mouth 2 (Two) Times a Day.     • valACYclovir (VALTREX) 500 MG tablet Take 0.5 tablets by mouth Daily. 45 tablet 3     No facility-administered medications prior to visit.        Patient Active Problem List   Diagnosis   • Osteoarthritis   • Benign essential hypertension   • Low back pain   • Carbuncle and furuncle   • RLS (restless legs syndrome)   • Overweight   • Ganglion cyst   • Incomplete right bundle branch block   • Mixed hyperlipidemia   • Herpes simplex type 2 infection   • Sinus bradycardia   • Medicare annual wellness visit, subsequent       Advance Care Planning:  ACP discussion was held with the patient during this visit. Patient has an advance directive (not in EMR), copy requested.    Identification  of Risk Factors:  Risk factors include: Advance Directive Discussion.    Review of Systems   Constitutional: Negative for activity change, appetite change, diaphoresis, fatigue, unexpected weight gain and unexpected weight loss.   HENT: Negative for hearing loss.    Eyes: Negative for visual disturbance.   Respiratory: Negative for chest tightness and shortness of breath.    Cardiovascular: Negative for chest pain, palpitations and leg swelling.   Gastrointestinal: Negative for abdominal pain, blood in stool, GERD and indigestion.   Endocrine: Negative for cold intolerance and heat intolerance.   Genitourinary: Negative for dysuria and hematuria.   Musculoskeletal: Negative for arthralgias and myalgias.   Skin: Negative for skin lesions.   Neurological: Negative for tremors, seizures, syncope, speech difficulty, weakness, headache, memory problem and confusion.   Hematological: Does not bruise/bleed easily.   Psychiatric/Behavioral: Negative for sleep disturbance and depressed mood. The patient is not nervous/anxious.        Compared to one year ago, the patient feels his physical health is the same.  Compared to one year ago, the patient feels his mental health is the same.    Objective     Physical Exam  Vitals signs and nursing note reviewed.   Constitutional:       General: He is not in acute distress.     Appearance: He is well-developed and normal weight. He is not diaphoretic.   HENT:      Head: Normocephalic and atraumatic.      Nose: Nose normal.   Neck:      Vascular: No JVD.   Cardiovascular:      Rate and Rhythm: Normal rate and regular rhythm.      Heart sounds: Normal heart sounds. No murmur.   Pulmonary:      Effort: Pulmonary effort is normal. No respiratory distress.      Breath sounds: Normal breath sounds.   Chest:      Chest wall: No tenderness.   Abdominal:      General: There is no distension.      Palpations: Abdomen is soft.      Tenderness: There is no abdominal tenderness.   Skin:      "General: Skin is warm and dry.      Coloration: Skin is not pale.      Findings: No erythema.   Neurological:      Mental Status: He is alert.   Psychiatric:         Mood and Affect: Mood normal.         Behavior: Behavior normal.         Thought Content: Thought content normal.         Judgment: Judgment normal.         Vitals:    02/08/21 0834   BP: 127/71   BP Location: Right arm   Patient Position: Sitting   Cuff Size: Adult   Pulse: 58   Temp: 97.3 °F (36.3 °C)   TempSrc: Temporal   SpO2: 97%   Weight: 93.3 kg (205 lb 9.6 oz)   Height: 177.8 cm (70\")   PainSc: 0-No pain       Patient's Body mass index is 29.5 kg/m². BMI is within normal parameters. No follow-up required..      Assessment/Plan   Patient Self-Management and Personalized Health Advice  The patient has been provided with information about: exercise and preventive services including:   · Annual Wellness Visit (AWV).    Problem List Items Addressed This Visit        Cardiac and Vasculature    Benign essential hypertension    Overview     History of essential hypertension on enalapril 5 mg daily denies headache or cough         Current Assessment & Plan     Hypertension is improving with treatment.  Continue current treatment regimen.  Dietary sodium restriction.  Weight loss.  Regular aerobic exercise.  Continue current medications.  Blood pressure will be reassessed at the next regular appointment.         Relevant Medications    enalapril (VASOTEC) 5 MG tablet    Mixed hyperlipidemia    Overview     History of mixed hyperlipidemia on atorvastatin 20 mg daily denies myalgia arthralgia         Relevant Medications    atorvastatin (LIPITOR) 20 MG tablet       Health Encounters    Medicare annual wellness visit, subsequent - Primary    Overview     He is current with vaccinations and immunizations.  Reviewed recent labs with patient.  He has gained 6 pounds in the past 6 months.  Discussed importance of regular cardiovascular exercise.  ACE 3 mini 29 " out of 30.            Infectious Diseases    Herpes simplex type 2 infection    Overview     On chronic suppression of Valtrex 500 mg half tablet daily         Relevant Medications    valACYclovir (VALTREX) 500 MG tablet          Reviewed use of high risk medication in the elderly: yes  Reviewed for potential of harmful drug interactions in the elderly: yes    Follow Up:  Return for Next scheduled follow up.     An After Visit Summary and PPPS with all of these plans were given to the patient.

## 2021-02-15 ENCOUNTER — OFFICE VISIT (OUTPATIENT)
Dept: INTERNAL MEDICINE | Facility: CLINIC | Age: 71
End: 2021-02-15

## 2021-02-15 VITALS
WEIGHT: 205 LBS | BODY MASS INDEX: 29.35 KG/M2 | DIASTOLIC BLOOD PRESSURE: 78 MMHG | HEART RATE: 65 BPM | SYSTOLIC BLOOD PRESSURE: 122 MMHG | HEIGHT: 70 IN | TEMPERATURE: 96.4 F

## 2021-02-15 DIAGNOSIS — E78.2 MIXED HYPERLIPIDEMIA: ICD-10-CM

## 2021-02-15 DIAGNOSIS — I10 BENIGN ESSENTIAL HYPERTENSION: ICD-10-CM

## 2021-02-15 DIAGNOSIS — Z00.00 ANNUAL PHYSICAL EXAM: Primary | ICD-10-CM

## 2021-02-15 DIAGNOSIS — E66.3 OVERWEIGHT: ICD-10-CM

## 2021-02-15 DIAGNOSIS — R00.1 SINUS BRADYCARDIA: ICD-10-CM

## 2021-02-15 PROCEDURE — 93000 ELECTROCARDIOGRAM COMPLETE: CPT | Performed by: INTERNAL MEDICINE

## 2021-02-15 PROCEDURE — 99214 OFFICE O/P EST MOD 30 MIN: CPT | Performed by: INTERNAL MEDICINE

## 2021-02-15 NOTE — PATIENT INSTRUCTIONS
Lab on February 8, 2021 discussed  EKG at this time discussed-normal  Continue all current medications  Encouraged resuming exercise program including walking and bicycling  Encouraged healthy cardiac diet reduce carbs smaller portions of weight loss  Return visit in 6 months or as needed

## 2021-02-15 NOTE — ASSESSMENT & PLAN NOTE
Current blood pressure 122/78, on enalapril 5 mg daily denies headache or cough CMP is normal continue enalapril 5 mg daily

## 2021-02-15 NOTE — ASSESSMENT & PLAN NOTE
Patient's current weight is 205 pounds with a BMI of 29.41 encouraged healthy cardiac diet with reduce carbs smaller portions and weight loss and resuming exercise

## 2021-02-15 NOTE — PROGRESS NOTES
[facility]     Jamari Munoz  1950   [id number]      Patient Care Team:  Keshawn Alexandre MD as PCP - General (Internal Medicine)  Scooter Jiménez MD as Consulting Physician (Colon and Rectal Surgery)  Yanelis Hernandez APRN as Nurse Practitioner (Family Medicine)  Alvino Berrios MD as Consulting Physician (Ophthalmology)    Chief Complaint::   Chief Complaint   Patient presents with   • Hypertension     follow up. Patient had preventative visit on 2/8/21 with Nu Deng PA-C   • Hyperlipidemia     follow up            HPI  Patient 70-year-old male presents for annual physical examination having a preventative visit on February 8, 2021 with a history of essential hypertension on enalapril 5 mg and mixed hyperlipidemia on atorvastatin 20 mg with a history of overweight.  The patient had probable Covid infection March 22 through April 5, 2020 he tested - negative July 23, 2020 he recently had blood donation and was found to have COVID-19 antibodies, he had his first Covid vaccination on December 25 followed by the second Covid vaccination with no sequela patient overall feels well without headache or dizzy cough or wheeze chest pain or pressure nausea vomiting or extremity discomfort.      Patient Active Problem List   Diagnosis   • Osteoarthritis   • Benign essential hypertension   • Low back pain   • Carbuncle and furuncle   • RLS (restless legs syndrome)   • Overweight   • Ganglion cyst   • Incomplete right bundle branch block   • Mixed hyperlipidemia   • Herpes simplex type 2 infection   • Sinus bradycardia   • Medicare annual wellness visit, subsequent   • Annual physical exam        Past Medical History:   Diagnosis Date   • Arthritis    • Ganglion    • Hyperlipidemia    • Hypertension        Past Surgical History:   Procedure Laterality Date   • CYST REMOVAL      cyst removed left shoulder and left knee   • KNEE MENISCAL REPAIR  2005   • TONSILLECTOMY AND ADENOIDECTOMY     •  "VASECTOMY         Family History   Problem Relation Age of Onset   • Diabetes Maternal Grandmother         controlled   • Arthritis Paternal Grandmother         not too bad   • Osteoarthritis Paternal Grandmother    • Coronary artery disease Paternal Grandfather    • Diabetes Mother         mostly controlled       Social History     Socioeconomic History   • Marital status:      Spouse name: Not on file   • Number of children: Not on file   • Years of education: Not on file   • Highest education level: Not on file   Tobacco Use   • Smoking status: Never Smoker   • Smokeless tobacco: Never Used   Substance and Sexual Activity   • Alcohol use: Yes     Alcohol/week: 14.0 standard drinks     Types: 7 Cans of beer, 7 Glasses of wine per week     Frequency: 4 or more times a week     Drinks per session: 1 or 2     Binge frequency: Never   • Drug use: No   • Sexual activity: Yes     Partners: Female     Birth control/protection: None       No Known Allergies    Review of Systems   Constitutional: Negative for chills, fatigue and fever.   HENT: Negative for congestion, ear pain and sinus pressure.    Respiratory: Negative for cough, chest tightness, shortness of breath and wheezing.    Cardiovascular: Negative for chest pain and palpitations.   Gastrointestinal: Negative for abdominal pain, blood in stool and constipation.   Skin: Negative for color change.   Allergic/Immunologic: Negative for environmental allergies.   Neurological: Negative for dizziness, speech difficulty and headache.   Psychiatric/Behavioral: Negative for decreased concentration. The patient is not nervous/anxious.             Vital Signs  Vitals:    02/15/21 0930 02/15/21 0940   BP: 142/82 122/78   BP Location: Left arm    Patient Position: Sitting    Cuff Size: Adult    Pulse: 65    Temp: 96.4 °F (35.8 °C)    Weight: 93 kg (205 lb)    Height: 177.8 cm (70\")    PainSc: 0-No pain      Body mass index is 29.41 kg/m².  Patient's Body mass index is " 29.41 kg/m². BMI is above normal parameters. Recommendations include: nutrition counseling.     Advance Care Planning   ACP discussion was held with the patient during this visit. Patient has an advance directive in EMR which is still valid.        Current Outpatient Medications:   •  aspirin 81 MG tablet, Take 1 tablet by mouth Daily., Disp: 30 tablet, Rfl: 0  •  atorvastatin (LIPITOR) 20 MG tablet, TAKE 1 TABLET DAILY, Disp: 90 tablet, Rfl: 3  •  CALCIUM-MAGNESIUM-ZINC PO, Take 7 tablets by mouth Daily., Disp: , Rfl:   •  enalapril (VASOTEC) 5 MG tablet, TAKE 1 TABLET DAILY, Disp: 90 tablet, Rfl: 3  •  Glucosamine-Chondroitin 750-600 MG tablet, Take 1 tablet by mouth 2 (Two) Times a Day., Disp: , Rfl:   •  valACYclovir (VALTREX) 500 MG tablet, Take 0.5 tablets by mouth Daily., Disp: 45 tablet, Rfl: 3    Physical Exam     ACE III MINI         HEENT: Pupils equal reactive ENT clear no facial asymmetry pharynx is clear  NECK: No mass bruit thyromegaly neck vein distention  CHEST: Clear without rales wheezes or rhonchi  CARDIAC: Regular rhythm without gallop rub or click blood pressure repeated 122/78 left and right sitting with normal heart rate and normal EKG  ABD: Liver spleen normal positive bowel sounds no bruit  : Deferred  NEURO: Intact  PSYCH: Normal  EXTREM: Normal  Skin: Clear     Results Review:    Recent Results (from the past 672 hour(s))   Comprehensive Metabolic Panel    Collection Time: 02/05/21  8:17 AM    Specimen: Blood   Result Value Ref Range    Glucose 80 65 - 99 mg/dL    BUN 16 8 - 23 mg/dL    Creatinine 1.12 0.76 - 1.27 mg/dL    Sodium 141 136 - 145 mmol/L    Potassium 5.0 3.5 - 5.2 mmol/L    Chloride 103 98 - 107 mmol/L    CO2 28.1 22.0 - 29.0 mmol/L    Calcium 10.2 8.6 - 10.5 mg/dL    Total Protein 6.4 6.0 - 8.5 g/dL    Albumin 4.10 3.50 - 5.20 g/dL    ALT (SGPT) 26 1 - 41 U/L    AST (SGOT) 24 1 - 40 U/L    Alkaline Phosphatase 47 39 - 117 U/L    Total Bilirubin 0.4 0.0 - 1.2 mg/dL    eGFR  Non African Amer 65 >60 mL/min/1.73    Globulin 2.3 gm/dL    A/G Ratio 1.8 g/dL    BUN/Creatinine Ratio 14.3 7.0 - 25.0    Anion Gap 9.9 5.0 - 15.0 mmol/L   Lipid Panel    Collection Time: 02/05/21  8:17 AM    Specimen: Blood   Result Value Ref Range    Total Cholesterol 165 0 - 200 mg/dL    Triglycerides 115 0 - 150 mg/dL    HDL Cholesterol 58 40 - 60 mg/dL    LDL Cholesterol  87 0 - 100 mg/dL    VLDL Cholesterol 20 5 - 40 mg/dL    LDL/HDL Ratio 1.45    TSH    Collection Time: 02/05/21  8:17 AM    Specimen: Blood   Result Value Ref Range    TSH 2.100 0.270 - 4.200 uIU/mL   PSA Screen    Collection Time: 02/05/21  8:17 AM    Specimen: Blood   Result Value Ref Range    PSA 0.799 0.000 - 4.000 ng/mL   CBC Auto Differential    Collection Time: 02/05/21  8:17 AM    Specimen: Blood   Result Value Ref Range    WBC 8.53 3.40 - 10.80 10*3/mm3    RBC 4.97 4.14 - 5.80 10*6/mm3    Hemoglobin 16.3 13.0 - 17.7 g/dL    Hematocrit 48.0 37.5 - 51.0 %    MCV 96.6 79.0 - 97.0 fL    MCH 32.8 26.6 - 33.0 pg    MCHC 34.0 31.5 - 35.7 g/dL    RDW 11.6 (L) 12.3 - 15.4 %    RDW-SD 40.6 37.0 - 54.0 fl    MPV 10.4 6.0 - 12.0 fL    Platelets 343 140 - 450 10*3/mm3    Neutrophil % 45.6 42.7 - 76.0 %    Lymphocyte % 39.4 19.6 - 45.3 %    Monocyte % 10.8 5.0 - 12.0 %    Eosinophil % 3.3 0.3 - 6.2 %    Basophil % 0.5 0.0 - 1.5 %    Immature Grans % 0.4 0.0 - 0.5 %    Neutrophils, Absolute 3.90 1.70 - 7.00 10*3/mm3    Lymphocytes, Absolute 3.36 (H) 0.70 - 3.10 10*3/mm3    Monocytes, Absolute 0.92 (H) 0.10 - 0.90 10*3/mm3    Eosinophils, Absolute 0.28 0.00 - 0.40 10*3/mm3    Basophils, Absolute 0.04 0.00 - 0.20 10*3/mm3    Immature Grans, Absolute 0.03 0.00 - 0.05 10*3/mm3    nRBC 0.0 0.0 - 0.2 /100 WBC       ECG 12 Lead    Date/Time: 2/15/2021 12:20 PM  Performed by: Keshawn Alexandre MD  Authorized by: Keshawn Alexandre MD   Comparison: compared with previous ECG from 2/11/2020  Comparison to previous ECG: Today's EKG is normal with normal heart rate no  ST changes as compared to bradycardia arrhythmia and nonspecific ST changes on EKG the February 11, 2020  Rhythm: sinus rhythm  Rate: normal  Conduction: conduction normal  ST Segments: ST segments normal  T Waves: T waves normal  QRS axis: normal  Other: no other findings    Clinical impression: normal ECG  Comments: EKG is normal sinus rhythm with no ischemia with a heart rate of 65 as compared to EKG of February 11, 2020 with sinus bradycardia and nonspecific ST changes        Patient Wellness Counseling:   Plan of care reviewed with patient at the conclusion of today's visit. Education was provided in regards to diagnosis, diet and exercise, prostate cancer screening discussed including benefit of early detection, potential need for follow-up, and harms associated with additional management. Patient agrees to screening.    Nutrition, physical activity, healthy weight,ways to reduce stress, adequate sleep, injury prevention, misuse of tobacco, alcohol and drugs, sexual behavior and STD's, dental health, mental health, and immunizations.    Plan of care reviewed with patient at the conclusion of today's visit. Education was provided regarding diagnosis, management and any prescribed or recommended OTC medications.  Patient verbalizes understanding of and agreement with management plan.      Medication Review: Medications reviewed and noted    Patient wellness counseling  Exercise: Resume exercise primarily bicycle riding and walking  Diet: Healthy cardiac diet reduce portions lower carbs and weight loss  Smoking: Non-smoker  Alcohol: In the morning  Screening: Recent labs February 2021 discussed    Assessment/Plan:    Problem List Items Addressed This Visit        Cardiac and Vasculature    Benign essential hypertension    Overview     History of essential hypertension on enalapril 5 mg daily denies headache or cough         Current Assessment & Plan       Current blood pressure 122/78, on enalapril 5 mg daily  denies headache or cough CMP is normal continue enalapril 5 mg daily         Relevant Medications    enalapril (VASOTEC) 5 MG tablet    Other Relevant Orders    ECG 12 Lead    Mixed hyperlipidemia    Overview     History of mixed hyperlipidemia on atorvastatin 20 mg daily denies myalgia arthralgia         Current Assessment & Plan       Current cholesterol 165 and LDL is 87 February 7, 2021 patient is on atorvastatin 20 mg daily denies myalgia arthralgia continue therapy         Relevant Medications    atorvastatin (LIPITOR) 20 MG tablet    Sinus bradycardia    Overview     EKG shows sinus bradycardia with a rate of 53 asymptomatic no acute change         Current Assessment & Plan     Current heart rate is 65 with a stable normal EKG is compared with February 11, 2020 showing bradycardia arrhythmia and ST changes EKG this day is normal         Relevant Orders    ECG 12 Lead       Endocrine and Metabolic    Overweight    Overview     BMI 25-29         Current Assessment & Plan       Patient's current weight is 205 pounds with a BMI of 29.41 encouraged healthy cardiac diet with reduce carbs smaller portions and weight loss and resuming exercise            Health Encounters    Annual physical exam - Primary    Overview     Patient 70-year-old male presents for annual physical examination having undergone preventative visit on February 8, 2021                Patient Instructions   Lab on February 8, 2021 discussed  EKG at this time discussed-normal  Continue all current medications  Encouraged resuming exercise program including walking and bicycling  Encouraged healthy cardiac diet reduce carbs smaller portions of weight loss  Return visit in 6 months or as needed       Plan of care reviewed with patient at the conclusion of today's visit. Education was provided regarding diagnosis, management, and any prescribed or recommended OTC medications.Patient verbalizes understanding of and agreement with management plan.          Keshawn Alexandre MD      Note: Part of this note may be an electronic transcription/translation of spoken language to printed text using the Dragon Dictation system.

## 2021-02-15 NOTE — ASSESSMENT & PLAN NOTE
Current heart rate is 65 with a stable normal EKG is compared with February 11, 2020 showing bradycardia arrhythmia and ST changes EKG this day is normal

## 2021-02-15 NOTE — ASSESSMENT & PLAN NOTE
Current cholesterol 165 and LDL is 87 February 7, 2021 patient is on atorvastatin 20 mg daily denies myalgia arthralgia continue therapy

## 2021-08-16 ENCOUNTER — OFFICE VISIT (OUTPATIENT)
Dept: INTERNAL MEDICINE | Facility: CLINIC | Age: 71
End: 2021-08-16

## 2021-08-16 ENCOUNTER — LAB (OUTPATIENT)
Dept: LAB | Facility: HOSPITAL | Age: 71
End: 2021-08-16

## 2021-08-16 VITALS
SYSTOLIC BLOOD PRESSURE: 136 MMHG | DIASTOLIC BLOOD PRESSURE: 76 MMHG | HEIGHT: 70 IN | BODY MASS INDEX: 28.35 KG/M2 | HEART RATE: 52 BPM | TEMPERATURE: 97.7 F | WEIGHT: 198 LBS

## 2021-08-16 DIAGNOSIS — I10 BENIGN ESSENTIAL HYPERTENSION: ICD-10-CM

## 2021-08-16 DIAGNOSIS — E78.2 MIXED HYPERLIPIDEMIA: Primary | ICD-10-CM

## 2021-08-16 DIAGNOSIS — E78.2 MIXED HYPERLIPIDEMIA: ICD-10-CM

## 2021-08-16 DIAGNOSIS — M19.91 PRIMARY OSTEOARTHRITIS, UNSPECIFIED SITE: ICD-10-CM

## 2021-08-16 PROCEDURE — 80053 COMPREHEN METABOLIC PANEL: CPT

## 2021-08-16 PROCEDURE — 80061 LIPID PANEL: CPT

## 2021-08-16 PROCEDURE — 99213 OFFICE O/P EST LOW 20 MIN: CPT | Performed by: INTERNAL MEDICINE

## 2021-08-16 NOTE — ASSESSMENT & PLAN NOTE
Mixed hyperlipidemia on atorvastatin 20 mg daily denies myalgia arthralgia continue therapy fasting CMP and lipid are pending

## 2021-08-16 NOTE — ASSESSMENT & PLAN NOTE
Essential hypertension with current blood pressure 136/76 continue enalapril 5 mg daily CMP is pending

## 2021-08-16 NOTE — PATIENT INSTRUCTIONS
Continue current therapy  Fasting CMP and lipid are pending  Suggest Voltaren gel to the thumb joints 2-3 times per day  Continue excellent exercise  Continue healthy cardiac diet  Return visit in 6 months for annual wellness visit

## 2021-08-16 NOTE — ASSESSMENT & PLAN NOTE
Degenerative arthritis disease primarily of the thumbs right greater than left of the MCP joint encouraged Voltaren gel twice daily

## 2021-08-16 NOTE — PROGRESS NOTES
[facility]     Jamari Munoz  1950   [id number]      Patient Care Team:  Keshawn Alexandre MD as PCP - General (Internal Medicine)  Scooter Jiménez MD as Consulting Physician (Colon and Rectal Surgery)  Yanelis Hernandez APRN as Nurse Practitioner (Family Medicine)  Alvino Berrios MD as Consulting Physician (Ophthalmology)    Chief Complaint::   Chief Complaint   Patient presents with   • Hyperlipidemia     follow up   • Hand Pain     bilateral thumb, right worse than left            HPI  Patient 70-year-old male with essential hypertension and mixed hyperlipidemia complaining of some hand arthritic soreness primarily of the thumbs MCP joints right hand greater than left, the patient has recently taken some rather long possible excursions in the [place] and overall did well with no falls or injuries and minor shortness of breath and fatigue with extended drips the patient states he overall feels well with exception of hand and thumb digits discomfort.  Patient denies headache or dizzy cough or wheeze chest pain or pressure nausea vomiting.      Patient Active Problem List   Diagnosis   • Osteoarthritis   • Benign essential hypertension   • Low back pain   • Carbuncle and furuncle   • RLS (restless legs syndrome)   • Overweight   • Ganglion cyst   • Incomplete right bundle branch block   • Mixed hyperlipidemia   • Herpes simplex type 2 infection   • Sinus bradycardia   • Medicare annual wellness visit, subsequent   • Annual physical exam        Past Medical History:   Diagnosis Date   • Arthritis    • Ganglion    • Hyperlipidemia    • Hypertension        Past Surgical History:   Procedure Laterality Date   • CYST REMOVAL      cyst removed left shoulder and left knee   • KNEE MENISCAL REPAIR  2005   • TONSILLECTOMY AND ADENOIDECTOMY     • VASECTOMY         Family History   Problem Relation Age of Onset   • Diabetes Maternal Grandmother         controlled   • Arthritis Paternal  "Grandmother         not too bad   • Osteoarthritis Paternal Grandmother    • Coronary artery disease Paternal Grandfather    • Diabetes Mother         mostly controlled       Social History     Socioeconomic History   • Marital status:      Spouse name: Not on file   • Number of children: Not on file   • Years of education: Not on file   • Highest education level: Not on file   Tobacco Use   • Smoking status: Never Smoker   • Smokeless tobacco: Never Used   Substance and Sexual Activity   • Alcohol use: Yes     Alcohol/week: 14.0 standard drinks     Types: 7 Cans of beer, 7 Glasses of wine per week   • Drug use: No   • Sexual activity: Yes     Partners: Female     Birth control/protection: None       No Known Allergies    Review of Systems     HEENT: Denies headache or dizzy vision or hearing change  NECK: Denies pain stiffness swelling  CHEST: Denies cough wheeze or shortness of breath non-smoker no recent lung infections  CARDIAC: Denies chest pain or pressure hypertension controlled with enalapril 5 mg  ABD: Denies nausea vomiting  : Denies dysuria frequency  NEURO: Denies syncope concussion  PSYCH: Denies anxiety depression  EXTREM: Complains of arthritic soreness primarily the hands right greater than left thumb MCP joint    Vital Signs  Vitals:    08/16/21 0903 08/16/21 0930   BP: 140/80 136/76   BP Location: Left arm    Patient Position: Sitting    Cuff Size: Adult    Pulse: 52    Temp: 97.7 °F (36.5 °C)    Weight: 89.8 kg (198 lb)    Height: 177.8 cm (70\")    PainSc: 0-No pain      Body mass index is 28.41 kg/m².  Patient's Body mass index is 28.41 kg/m². indicating that he is overweight (BMI 25-29.9). Obesity-related health conditions include the following: hypertension. Obesity is improving with treatment. BMI is is above average; BMI management plan is completed. We discussed low calorie, low carb based diet program, portion control and increasing exercise..     Advance Care Planning   ACP " discussion was held with the patient during this visit. Patient has an advance directive in EMR which is still valid.        Current Outpatient Medications:   •  aspirin 81 MG tablet, Take 1 tablet by mouth Daily., Disp: 30 tablet, Rfl: 0  •  atorvastatin (LIPITOR) 20 MG tablet, TAKE 1 TABLET DAILY, Disp: 90 tablet, Rfl: 3  •  CALCIUM-MAGNESIUM-ZINC PO, Take 7 tablets by mouth Daily., Disp: , Rfl:   •  enalapril (VASOTEC) 5 MG tablet, TAKE 1 TABLET DAILY, Disp: 90 tablet, Rfl: 3  •  Glucosamine-Chondroitin 750-600 MG tablet, Take 1 tablet by mouth 2 (Two) Times a Day., Disp: , Rfl:   •  valACYclovir (VALTREX) 500 MG tablet, Take 0.5 tablets by mouth Daily., Disp: 45 tablet, Rfl: 3  •  Diclofenac Sodium (VOLTAREN) 1 % gel gel, Apply 4 g topically to the appropriate area as directed 2 (Two) Times a Day., Disp: , Rfl:     Physical Exam     ACE III MINI         HEENT: No asymmetry pharynx is clear  NECK: No mass bruit or thyromegaly or neck vein distention  CHEST: Clear without rales or wheezing  CARDIAC: Regular rhythm without gallop rub or click  ABD: Liver and spleen normal good bowel sounds  : Deferred  NEURO: Intact  PSYCH: Normal  EXTREM: Mild arthritic changes of the MCPs right hand greater than left of the thumb joints  Skin: Clear     Results Review:    No results found for this or any previous visit (from the past 672 hour(s)).  Procedures    Medication Review: Medications reviewed and noted    Patient wellness counseling  Exercise: Encouraged continued healthy exercise  Diet: Encouraged continued healthy cardiac diet  Smoking: Non-smoker  Alcohol: Evening wine  Screening: Fasting CMP and lipid are pending    Assessment/Plan:    Problem List Items Addressed This Visit        Cardiac and Vasculature    Benign essential hypertension    Overview     History of essential hypertension on enalapril 5 mg daily denies headache or cough         Current Assessment & Plan       Essential hypertension with current blood  pressure 136/76 continue enalapril 5 mg daily CMP is pending         Relevant Medications    enalapril (VASOTEC) 5 MG tablet    Other Relevant Orders    Comprehensive Metabolic Panel    Lipid Panel    Mixed hyperlipidemia - Primary    Overview     History of mixed hyperlipidemia on atorvastatin 20 mg daily denies myalgia arthralgia         Current Assessment & Plan       Mixed hyperlipidemia on atorvastatin 20 mg daily denies myalgia arthralgia continue therapy fasting CMP and lipid are pending         Relevant Medications    atorvastatin (LIPITOR) 20 MG tablet    Other Relevant Orders    Comprehensive Metabolic Panel    Lipid Panel       Musculoskeletal and Injuries    Osteoarthritis    Overview     Patient complains of bilateral thumb MCP joint discomfort and swelling particularly with activity he has used CBD oil with some effect, suggest Voltaren gel 2-3 times per day         Current Assessment & Plan     Degenerative arthritis disease primarily of the thumbs right greater than left of the MCP joint encouraged Voltaren gel twice daily                Patient Instructions   Continue current therapy  Fasting CMP and lipid are pending  Suggest Voltaren gel to the thumb joints 2-3 times per day  Continue excellent exercise  Continue healthy cardiac diet  Return visit in 6 months for annual wellness visit       Plan of care reviewed with patient at the conclusion of today's visit. Education was provided regarding diagnosis, management, and any prescribed or recommended OTC medications.Patient verbalizes understanding of and agreement with management plan.         Keshawn Alexandre MD      Note: Part of this note may be an electronic transcription/translation of spoken language to printed text using the Dragon Dictation system.

## 2021-08-17 LAB
ALBUMIN SERPL-MCNC: 4.1 G/DL (ref 3.5–5.2)
ALBUMIN/GLOB SERPL: 1.6 G/DL
ALP SERPL-CCNC: 46 U/L (ref 39–117)
ALT SERPL W P-5'-P-CCNC: 41 U/L (ref 1–41)
ANION GAP SERPL CALCULATED.3IONS-SCNC: 7.7 MMOL/L (ref 5–15)
AST SERPL-CCNC: 56 U/L (ref 1–40)
BILIRUB SERPL-MCNC: 0.4 MG/DL (ref 0–1.2)
BUN SERPL-MCNC: 19 MG/DL (ref 8–23)
BUN/CREAT SERPL: 19.2 (ref 7–25)
CALCIUM SPEC-SCNC: 9.4 MG/DL (ref 8.6–10.5)
CHLORIDE SERPL-SCNC: 104 MMOL/L (ref 98–107)
CHOLEST SERPL-MCNC: 162 MG/DL (ref 0–200)
CO2 SERPL-SCNC: 25.3 MMOL/L (ref 22–29)
CREAT SERPL-MCNC: 0.99 MG/DL (ref 0.76–1.27)
GFR SERPL CREATININE-BSD FRML MDRD: 75 ML/MIN/1.73
GLOBULIN UR ELPH-MCNC: 2.6 GM/DL
GLUCOSE SERPL-MCNC: 89 MG/DL (ref 65–99)
HDLC SERPL-MCNC: 71 MG/DL (ref 40–60)
LDLC SERPL CALC-MCNC: 76 MG/DL (ref 0–100)
LDLC/HDLC SERPL: 1.05 {RATIO}
POTASSIUM SERPL-SCNC: 4.8 MMOL/L (ref 3.5–5.2)
PROT SERPL-MCNC: 6.7 G/DL (ref 6–8.5)
SODIUM SERPL-SCNC: 137 MMOL/L (ref 136–145)
TRIGL SERPL-MCNC: 82 MG/DL (ref 0–150)
VLDLC SERPL-MCNC: 15 MG/DL (ref 5–40)

## 2022-01-06 DIAGNOSIS — B00.9 HERPES SIMPLEX TYPE 2 INFECTION: ICD-10-CM

## 2022-01-06 RX ORDER — VALACYCLOVIR HYDROCHLORIDE 500 MG/1
TABLET, FILM COATED ORAL
Qty: 45 TABLET | Refills: 3 | Status: SHIPPED | OUTPATIENT
Start: 2022-01-06 | End: 2022-01-10 | Stop reason: SDUPTHER

## 2022-01-10 ENCOUNTER — TELEPHONE (OUTPATIENT)
Dept: INTERNAL MEDICINE | Facility: CLINIC | Age: 72
End: 2022-01-10

## 2022-01-10 DIAGNOSIS — B00.9 HERPES SIMPLEX TYPE 2 INFECTION: ICD-10-CM

## 2022-01-10 DIAGNOSIS — I10 BENIGN ESSENTIAL HYPERTENSION: ICD-10-CM

## 2022-01-10 DIAGNOSIS — E78.2 MIXED HYPERLIPIDEMIA: ICD-10-CM

## 2022-01-10 RX ORDER — ENALAPRIL MALEATE 5 MG/1
5 TABLET ORAL DAILY
Qty: 90 TABLET | Refills: 3 | Status: SHIPPED | OUTPATIENT
Start: 2022-01-10 | End: 2022-01-13

## 2022-01-10 RX ORDER — ATORVASTATIN CALCIUM 20 MG/1
20 TABLET, FILM COATED ORAL DAILY
Qty: 90 TABLET | Refills: 3 | Status: SHIPPED | OUTPATIENT
Start: 2022-01-10 | End: 2022-01-13

## 2022-01-10 RX ORDER — VALACYCLOVIR HYDROCHLORIDE 500 MG/1
250 TABLET, FILM COATED ORAL DAILY
Qty: 45 TABLET | Refills: 3 | Status: SHIPPED | OUTPATIENT
Start: 2022-01-10 | End: 2023-01-17 | Stop reason: SDUPTHER

## 2022-01-10 NOTE — TELEPHONE ENCOUNTER
Caller: Jamari Munoz    Relationship to patient: Self    Best call back number: [phone]    Patient is needing: PATIENT STATED THAT HE IS CHANGING PRESCRIPTION PLANS TO [employer] [facility] TO [facility] [facility] FOR MEDICATIONS AND REFILLS     MEMBER ID [id number]  [facility] #[phone] FOR MEDICATIONS     PLEASE ADVISE

## 2022-01-10 NOTE — TELEPHONE ENCOUNTER
I called patient.  Needs new prescriptions sent to his new mailorder pharmacy.  The phone number provided below is associated with [facility].

## 2022-01-13 DIAGNOSIS — I10 BENIGN ESSENTIAL HYPERTENSION: ICD-10-CM

## 2022-01-13 DIAGNOSIS — E78.2 MIXED HYPERLIPIDEMIA: ICD-10-CM

## 2022-01-13 RX ORDER — ENALAPRIL MALEATE 5 MG/1
TABLET ORAL
Qty: 90 TABLET | Refills: 3 | Status: SHIPPED | OUTPATIENT
Start: 2022-01-13 | End: 2022-12-27

## 2022-01-13 RX ORDER — ATORVASTATIN CALCIUM 20 MG/1
TABLET, FILM COATED ORAL
Qty: 90 TABLET | Refills: 3 | Status: SHIPPED | OUTPATIENT
Start: 2022-01-13 | End: 2022-12-27

## 2022-02-11 ENCOUNTER — TELEPHONE (OUTPATIENT)
Dept: INTERNAL MEDICINE | Facility: CLINIC | Age: 72
End: 2022-02-11

## 2022-02-11 NOTE — TELEPHONE ENCOUNTER
Caller: Jamari Munoz    Relationship: Self    Best call back number: [phone]    What orders are you requesting (i.e. lab or imaging): LABS    In what timeframe would the patient need to come in: 02/14/2022    Where will you receive your lab/imaging services: IN OFFICE     Additional notes: PATIENT WOULD LIKE A CALL BACK WHEN THE ORDERS ARE PLACED

## 2022-02-14 DIAGNOSIS — I10 BENIGN ESSENTIAL HYPERTENSION: Primary | ICD-10-CM

## 2022-02-14 DIAGNOSIS — Z12.5 SPECIAL SCREENING FOR MALIGNANT NEOPLASM OF PROSTATE: ICD-10-CM

## 2022-02-14 DIAGNOSIS — E78.2 MIXED HYPERLIPIDEMIA: ICD-10-CM

## 2022-02-16 ENCOUNTER — LAB (OUTPATIENT)
Dept: LAB | Facility: HOSPITAL | Age: 72
End: 2022-02-16

## 2022-02-16 ENCOUNTER — OFFICE VISIT (OUTPATIENT)
Dept: INTERNAL MEDICINE | Facility: CLINIC | Age: 72
End: 2022-02-16

## 2022-02-16 VITALS
TEMPERATURE: 98.4 F | DIASTOLIC BLOOD PRESSURE: 80 MMHG | WEIGHT: 206.2 LBS | SYSTOLIC BLOOD PRESSURE: 140 MMHG | HEART RATE: 66 BPM | HEIGHT: 70 IN | BODY MASS INDEX: 29.52 KG/M2

## 2022-02-16 DIAGNOSIS — E66.3 OVERWEIGHT: ICD-10-CM

## 2022-02-16 DIAGNOSIS — M19.91 PRIMARY OSTEOARTHRITIS, UNSPECIFIED SITE: ICD-10-CM

## 2022-02-16 DIAGNOSIS — I10 BENIGN ESSENTIAL HYPERTENSION: ICD-10-CM

## 2022-02-16 DIAGNOSIS — Z12.5 SPECIAL SCREENING FOR MALIGNANT NEOPLASM OF PROSTATE: ICD-10-CM

## 2022-02-16 DIAGNOSIS — E78.2 MIXED HYPERLIPIDEMIA: ICD-10-CM

## 2022-02-16 DIAGNOSIS — Z00.00 MEDICARE ANNUAL WELLNESS VISIT, SUBSEQUENT: Primary | ICD-10-CM

## 2022-02-16 LAB
ALBUMIN SERPL-MCNC: 4.2 G/DL (ref 3.5–5.2)
ALBUMIN/GLOB SERPL: 1.8 G/DL
ALP SERPL-CCNC: 53 U/L (ref 39–117)
ALT SERPL W P-5'-P-CCNC: 30 U/L (ref 1–41)
ANION GAP SERPL CALCULATED.3IONS-SCNC: 5.6 MMOL/L (ref 5–15)
AST SERPL-CCNC: 28 U/L (ref 1–40)
BASOPHILS # BLD AUTO: 0.04 10*3/MM3 (ref 0–0.2)
BASOPHILS NFR BLD AUTO: 0.5 % (ref 0–1.5)
BILIRUB SERPL-MCNC: 0.5 MG/DL (ref 0–1.2)
BUN SERPL-MCNC: 15 MG/DL (ref 8–23)
BUN/CREAT SERPL: 13.2 (ref 7–25)
CALCIUM SPEC-SCNC: 9.9 MG/DL (ref 8.6–10.5)
CHLORIDE SERPL-SCNC: 102 MMOL/L (ref 98–107)
CHOLEST SERPL-MCNC: 162 MG/DL (ref 0–200)
CO2 SERPL-SCNC: 26.4 MMOL/L (ref 22–29)
CREAT SERPL-MCNC: 1.14 MG/DL (ref 0.76–1.27)
DEPRECATED RDW RBC AUTO: 41.6 FL (ref 37–54)
EOSINOPHIL # BLD AUTO: 0.42 10*3/MM3 (ref 0–0.4)
EOSINOPHIL NFR BLD AUTO: 5.5 % (ref 0.3–6.2)
ERYTHROCYTE [DISTWIDTH] IN BLOOD BY AUTOMATED COUNT: 11.8 % (ref 12.3–15.4)
GFR SERPL CREATININE-BSD FRML MDRD: 63 ML/MIN/1.73
GLOBULIN UR ELPH-MCNC: 2.4 GM/DL
GLUCOSE SERPL-MCNC: 92 MG/DL (ref 65–99)
HCT VFR BLD AUTO: 48.8 % (ref 37.5–51)
HDLC SERPL-MCNC: 57 MG/DL (ref 40–60)
HGB BLD-MCNC: 16.6 G/DL (ref 13–17.7)
IMM GRANULOCYTES # BLD AUTO: 0.02 10*3/MM3 (ref 0–0.05)
IMM GRANULOCYTES NFR BLD AUTO: 0.3 % (ref 0–0.5)
LDLC SERPL CALC-MCNC: 87 MG/DL (ref 0–100)
LDLC/HDLC SERPL: 1.49 {RATIO}
LYMPHOCYTES # BLD AUTO: 3.1 10*3/MM3 (ref 0.7–3.1)
LYMPHOCYTES NFR BLD AUTO: 40.5 % (ref 19.6–45.3)
MCH RBC QN AUTO: 32.7 PG (ref 26.6–33)
MCHC RBC AUTO-ENTMCNC: 34 G/DL (ref 31.5–35.7)
MCV RBC AUTO: 96.1 FL (ref 79–97)
MONOCYTES # BLD AUTO: 0.89 10*3/MM3 (ref 0.1–0.9)
MONOCYTES NFR BLD AUTO: 11.6 % (ref 5–12)
NEUTROPHILS NFR BLD AUTO: 3.19 10*3/MM3 (ref 1.7–7)
NEUTROPHILS NFR BLD AUTO: 41.6 % (ref 42.7–76)
NRBC BLD AUTO-RTO: 0 /100 WBC (ref 0–0.2)
PLATELET # BLD AUTO: 303 10*3/MM3 (ref 140–450)
PMV BLD AUTO: 9.9 FL (ref 6–12)
POTASSIUM SERPL-SCNC: 4.4 MMOL/L (ref 3.5–5.2)
PROT SERPL-MCNC: 6.6 G/DL (ref 6–8.5)
PSA SERPL-MCNC: 0.73 NG/ML (ref 0–4)
RBC # BLD AUTO: 5.08 10*6/MM3 (ref 4.14–5.8)
SODIUM SERPL-SCNC: 134 MMOL/L (ref 136–145)
TRIGL SERPL-MCNC: 100 MG/DL (ref 0–150)
TSH SERPL DL<=0.05 MIU/L-ACNC: 2.23 UIU/ML (ref 0.27–4.2)
VLDLC SERPL-MCNC: 18 MG/DL (ref 5–40)
WBC NRBC COR # BLD: 7.66 10*3/MM3 (ref 3.4–10.8)

## 2022-02-16 PROCEDURE — 1159F MED LIST DOCD IN RCRD: CPT | Performed by: INTERNAL MEDICINE

## 2022-02-16 PROCEDURE — 80053 COMPREHEN METABOLIC PANEL: CPT

## 2022-02-16 PROCEDURE — 85025 COMPLETE CBC W/AUTO DIFF WBC: CPT

## 2022-02-16 PROCEDURE — G0103 PSA SCREENING: HCPCS

## 2022-02-16 PROCEDURE — 93000 ELECTROCARDIOGRAM COMPLETE: CPT | Performed by: INTERNAL MEDICINE

## 2022-02-16 PROCEDURE — 80061 LIPID PANEL: CPT

## 2022-02-16 PROCEDURE — G0439 PPPS, SUBSEQ VISIT: HCPCS | Performed by: INTERNAL MEDICINE

## 2022-02-16 PROCEDURE — 84443 ASSAY THYROID STIM HORMONE: CPT

## 2022-02-16 PROCEDURE — 99213 OFFICE O/P EST LOW 20 MIN: CPT | Performed by: INTERNAL MEDICINE

## 2022-02-16 NOTE — ASSESSMENT & PLAN NOTE
Fasting labs were drawn earlier this morning and are pending.  EKG is normal.  He will continue all current medications.  I encouraged healthy cardiac diet and weight loss.  Essential hypertension with current blood pressure 140/80 on enalapril 5 mg daily continue therapy

## 2022-02-16 NOTE — ASSESSMENT & PLAN NOTE
Fasting labs were drawn earlier this morning and are pending.  He will continue all current medications.  I encouraged healthy cardiac diet and weight loss.  Continue atorvastatin 20 mg denies myalgia arthralgia continue therapy pending lab

## 2022-02-16 NOTE — PROGRESS NOTES
QUICK REFERENCE INFORMATION:  The ABCs of the Annual Wellness Visit    Subsequent Medicare Wellness Visit    HEALTH RISK ASSESSMENT    1950    Recent Hospitalizations:  No hospitalization(s) within the last year..        Current Medical Providers:  Patient Care Team:  Keshawn Alexandre MD as PCP - General (Internal Medicine)  Scooter Jiménez MD as Consulting Physician (Colon and Rectal Surgery)  Yanelis Hernandez APRN as Nurse Practitioner (Family Medicine)  Alvino Berrios MD as Consulting Physician (Ophthalmology)        Smoking Status:  Social History     Tobacco Use   Smoking Status Never Smoker   Smokeless Tobacco Never Used       Alcohol Consumption:  Social History     Substance and Sexual Activity   Alcohol Use Yes   • Alcohol/week: 14.0 standard drinks   • Types: 7 Cans of beer, 7 Glasses of wine per week       Depression Screen:   PHQ-2/PHQ-9 Depression Screening 2/16/2022   Little interest or pleasure in doing things 0   Feeling down, depressed, or hopeless 0   Total Score 0       Health Habits and Functional and Cognitive Screening:  Functional & Cognitive Status 2/16/2022   Do you have difficulty preparing food and eating? No   Do you have difficulty bathing yourself, getting dressed or grooming yourself? No   Do you have difficulty using the toilet? No   Do you have difficulty moving around from place to place? No   Do you have trouble with steps or getting out of a bed or a chair? No   Current Diet Well Balanced Diet   Dental Exam Up to date   Eye Exam Up to date   Exercise (times per week) 3 times per week   Current Exercises Include Bicycling Outdoors   Current Exercise Activities Include -   Do you need help using the phone?  No   Are you deaf or do you have serious difficulty hearing?  No   Do you need help with transportation? No   Do you need help shopping? No   Do you need help preparing meals?  No   Do you need help with housework?  No   Do you need help with laundry? No   Do you need  help taking your medications? No   Do you need help managing money? No   Do you ever drive or ride in a car without wearing a seat belt? No   Have you felt unusual stress, anger or loneliness in the last month? No   Who do you live with? Spouse   If you need help, do you have trouble finding someone available to you? No   Have you been bothered in the last four weeks by sexual problems? No   Do you have difficulty concentrating, remembering or making decisions? No       Fall Risk Screen:  STEADI Fall Risk Assessment was completed, and patient is at LOW risk for falls.Assessment completed on:2/16/2022    ACE III MINI        Does the patient have evidence of cognitive impairment? No    Aspirin use counseling: Does not need ASA (and currently is not on it)    Recent Lab Results:  CMP:  Lab Results   Component Value Date    BUN 19 08/16/2021    CREATININE 0.99 08/16/2021    EGFRIFNONA 75 08/16/2021    BCR 19.2 08/16/2021     08/16/2021    K 4.8 08/16/2021    CO2 25.3 08/16/2021    CALCIUM 9.4 08/16/2021    ALBUMIN 4.10 08/16/2021    BILITOT 0.4 08/16/2021    ALKPHOS 46 08/16/2021    AST 56 (H) 08/16/2021    ALT 41 08/16/2021     HbA1c:  No results found for: HGBA1C  Microalbumin:  Lab Results   Component Value Date    MICROALBUR <1.2 02/04/2020     Lipid Panel  Lab Results   Component Value Date    CHOL 162 08/16/2021    TRIG 82 08/16/2021    HDL 71 (H) 08/16/2021    LDL 76 08/16/2021    AST 56 (H) 08/16/2021    ALT 41 08/16/2021       Visual Acuity:  No exam data present    Age-appropriate Screening Schedule:  Refer to the list below for future screening recommendations based on patient's age, sex and/or medical conditions. Orders for these recommended tests are listed in the plan section. The patient has been provided with a written plan.    Health Maintenance   Topic Date Due   • TDAP/TD VACCINES (2 - Tdap) 06/04/2020   • LIPID PANEL  08/16/2022   • INFLUENZA VACCINE  Completed   • ZOSTER VACCINE  Completed     "    Subjective   History of Present Illness    The patient consents to being recorded using JESSICA.    Jamari Munoz is a 71 y.o. male who presents for a Subsequent Wellness Visit with essential hypertension, mixed hyperlipidemia, degenerative arthritis disease.    The patient states he has been doing well. He reports he gained 5 or 6 pounds since he has not been to the gym or riding his bike much this winter. He reports he walked 0.8 mile to [employer] last night and consumed a shrimp wrap and salad for dinner. The patient reports he pulled a muscle in his left back while using a kettle ball, which caused him to cease activity for approximately 3 weeks to allow time to heal. He states he is asymptomatic at this time. The patient denies recent bicycle crashes. He reports he went to [place] in 07/2021 and had swollen ankles for a few days after mountain biking. He states he now wears compression socks when he cycles. The patient states he is traveling to [place], [place] for 1 week on 02/18/2022 where he will ride his gravel bike for 20 to 25 miles daily.     The patient denies any recent changes to his medication. He reports he has discontinued aspirin 81 mg because he \"read some things\" and due to platelet donation. The patient confirms he continues to take atorvastatin, calcium, and enalapril 5 mg. He states he takes 0.5 tablet of Valtrex daily for suppression therapy. He states he has taken Osteo Bi-Flex for approximately 20 years and reports his knees are still doing very well. The patient states he switched to Medicare Part D this year and had to receive all new prescriptions, so he does not need refills at this time.     The patient denies allergy drainage and adds he has to blow his nose every morning. He confirms Dr. Alvino Berrios states his eyes are doing well, and he denies having cataract surgery. He states he visits Dr. Berrios once per year and was told he has \"slight\" cataracts and is to " "inform the provider when he is ready for surgery. The patient states his hearing is \"too good\" and he adds he had to leave the [employer] at the [facility] 2 weeks ago because it was too loud. He reports waking once per night to empty his bladder on occasion.     CHRONIC CONDITIONS    The following portions of the patient's history were reviewed and updated as appropriate: allergies, current medications, past family history, past medical history, past social history and past surgical history.    Outpatient Medications Prior to Visit   Medication Sig Dispense Refill   • atorvastatin (LIPITOR) 20 MG tablet TAKE 1 TABLET DAILY 90 tablet 3   • CALCIUM-MAGNESIUM-ZINC PO Take 7 tablets by mouth Daily.     • enalapril (VASOTEC) 5 MG tablet TAKE 1 TABLET DAILY 90 tablet 3   • Glucosamine-Chondroitin 750-600 MG tablet Take 1 tablet by mouth 2 (Two) Times a Day.     • valACYclovir (VALTREX) 500 MG tablet Take 0.5 tablets by mouth Daily. 45 tablet 3   • aspirin 81 MG tablet Take 1 tablet by mouth Daily. 30 tablet 0   • Diclofenac Sodium (VOLTAREN) 1 % gel gel Apply 4 g topically to the appropriate area as directed 2 (Two) Times a Day.       No facility-administered medications prior to visit.       Patient Active Problem List   Diagnosis   • Osteoarthritis   • Benign essential hypertension   • Low back pain   • Carbuncle and furuncle   • RLS (restless legs syndrome)   • Overweight   • Ganglion cyst   • Incomplete right bundle branch block   • Mixed hyperlipidemia   • Herpes simplex type 2 infection   • Sinus bradycardia   • Medicare annual wellness visit, subsequent   • Annual physical exam   • Medicare annual wellness visit, subsequent       Advance Care Planning:  ACP discussion was held with the patient during this visit. Patient has an advance directive in EMR which is still valid.     Identification of Risk Factors:  Risk factors include: Cardiovascular risk.    Review of Systems    HEENT: Denies any " hearing changes, eyesight changes, no headache or dizziness, denies allergy drainage, reports clears nose each morning, denies cataract surgery  NECK: Denies any pain, stiffness, swelling or dysphagia  CHEST: Denies cough or wheeze or recent lung infections, denies COVID-19 infections  CARDIAC: Denies chest pain, pressure or palpitations  ABD: Denies nausea, vomiting or abdominal pain  : Denies dysuria, reports occasional nocturia  NEURO: Denies syncope, concussion, neuropathy  PSYCH: Denies any stress  EXTREM: Denies arthritic changes myalgia or arthralgia, denies lower extremity edema with compression socks  SKIN: Denies any rash    Compared to one year ago, the patient feels his physical health is the same.  Compared to one year ago, the patient feels his mental health is the same.    Objective     Physical Exam     HEENT: Pupils equal reactive ENT clear, no facial asymmetry, pharynx is clear  NECK: No masses bruit or thyromegaly  CHEST: Clear without rales wheezes or rhonchi  CARDIAC: Regular rhythm without gallop rub or click, blood pressure is 128/78 mmHg bilaterally, heart rate normal and stable, no murmur  ABD: Liver and spleen normal, good bowel sounds, nontender, no umbilical hernia  : Deferred  NEURO: Intact  PSYCH: Normal  EXTREM: No significant arthritic changes, no edema, good circulation, no fluid in the left leg with excellent circulation in the foot, right knee and hip are normal, left knee and hip are normal  SKIN: Clear      ECG 12 Lead    Date/Time: 2/16/2022 5:02 PM  Performed by: Keshawn Alexandre MD  Authorized by: Keshawn Alexandre MD   Comparison: compared with previous ECG from 2/15/2021  Similar to previous ECG  Comparison to previous ECG: EKG is sinus rhythm normal similar to EKG of February 15, 2021  Rhythm: sinus rhythm  Rate: normal  Conduction: conduction normal  ST Segments: ST segments normal  T Waves: T waves normal  QRS axis: normal  Other: no other findings    Clinical impression:  "normal ECG  Comments: Current EKG is sinus rhythm normal and similar to EKG of February 15, 2021         Patient Wellness Counseling:   Plan of care reviewed with patient at the conclusion of today's visit. Education was provided in regards to diagnosis, diet and exercise, prostate cancer screening discussed including benefit of early detection, potential need for follow-up, and harms associated with additional management. Patient agrees to screening.    Nutrition, physical activity, healthy weight,ways to reduce stress, adequate sleep, injury prevention, misuse of tobacco, alcohol and drugs, sexual behavior and STD's, dental health, mental health, and immunizations.    Plan of care reviewed with patient at the conclusion of today's visit. Education was provided regarding diagnosis, management and any prescribed or recommended OTC medications.  Patient verbalizes understanding of and agreement with management plan.        Vitals:    02/16/22 0950   BP: 140/80   BP Location: Left arm   Patient Position: Sitting   Cuff Size: Adult   Pulse: 66   Temp: 98.4 °F (36.9 °C)   TempSrc: Infrared   Weight: 93.5 kg (206 lb 3.2 oz)   Height: 177.8 cm (70\")   PainSc: 0-No pain       Patient's Body mass index is 29.59 kg/m². indicating that he is overweight (BMI 25-29.9). Patient's (Body mass index is 29.59 kg/m².) indicates that they are overweight with health conditions that include hypertension . Weight is unchanged. BMI is is above average; BMI management plan is completed. We discussed low calorie, low carb based diet program, portion control and increasing exercise. .      Assessment/Plan   Problem List Items Addressed This Visit        Cardiac and Vasculature    Benign essential hypertension    Overview     History of essential hypertension on enalapril 5 mg daily denies headache or cough         Current Assessment & Plan     Fasting labs were drawn earlier this morning and are pending.  EKG is normal.  He will continue all " current medications.  I encouraged healthy cardiac diet and weight loss.  Essential hypertension with current blood pressure 140/80 on enalapril 5 mg daily continue therapy         Relevant Medications    enalapril (VASOTEC) 5 MG tablet    Other Relevant Orders    ECG 12 Lead    Mixed hyperlipidemia    Overview     History of mixed hyperlipidemia on atorvastatin 20 mg daily denies myalgia arthralgia         Current Assessment & Plan     Fasting labs were drawn earlier this morning and are pending.  He will continue all current medications.  I encouraged healthy cardiac diet and weight loss.  Continue atorvastatin 20 mg denies myalgia arthralgia continue therapy pending lab         Relevant Medications    atorvastatin (LIPITOR) 20 MG tablet       Endocrine and Metabolic    Overweight    Overview     BMI 25-29         Current Assessment & Plan     I encouraged healthy cardiac diet and weight loss.  Current weight is 206 pounds up 8 pounds since August 2021 with BMI of 29.59 encouraged healthy cardiac diet            Health Encounters    Medicare annual wellness visit, subsequent - Primary    Overview     Patient 71-year-old male presents for Medicare annual wellness visit and physical exam            Musculoskeletal and Injuries    Osteoarthritis    Overview     Patient complains of bilateral thumb MCP joint discomfort and swelling particularly with activity he has used CBD oil with some effect, suggest Voltaren gel 2-3 times per day         Current Assessment & Plan     He will continue all current medications.             Patient Self-Management and Personalized Health Advice  The patient has been provided with information about: prevention of cardiac or vascular disease and preventive services including:   · Annual Wellness Visit (AWV).    Outpatient Encounter Medications as of 2/16/2022   Medication Sig Dispense Refill   • atorvastatin (LIPITOR) 20 MG tablet TAKE 1 TABLET DAILY 90 tablet 3   •  CALCIUM-MAGNESIUM-ZINC PO Take 7 tablets by mouth Daily.     • enalapril (VASOTEC) 5 MG tablet TAKE 1 TABLET DAILY 90 tablet 3   • Glucosamine-Chondroitin 750-600 MG tablet Take 1 tablet by mouth 2 (Two) Times a Day.     • valACYclovir (VALTREX) 500 MG tablet Take 0.5 tablets by mouth Daily. 45 tablet 3   • [DISCONTINUED] aspirin 81 MG tablet Take 1 tablet by mouth Daily. 30 tablet 0   • [DISCONTINUED] Diclofenac Sodium (VOLTAREN) 1 % gel gel Apply 4 g topically to the appropriate area as directed 2 (Two) Times a Day.       No facility-administered encounter medications on file as of 2/16/2022.       Reviewed use of high risk medication in the elderly: yes  Reviewed for potential of harmful drug interactions in the elderly: yes    Follow Up:  Return in about 6 months (around 8/16/2022) for Recheck.     Patient Instructions   Fasting labs are pending drawn earlier this morning  EKG is normal  Continue all current medications  Encouraged exercise  Encouraged healthy cardiac diet and weight loss  Return visit in 6 months or as needed      An After Visit Summary and PPPS with all of these plans were given to the patient.          Transcribed from ambient dictation for Keshawn Alexandre MD by Lashay Roberto.  02/16/22   16:56 EST    Patient verbalized consent to the visit recording.  I have personally performed the services described in this document as transcribed by the above individual, and it is both accurate and complete.  Keshawn Alexandre MD  2/16/2022  17:04 EST

## 2022-02-16 NOTE — ASSESSMENT & PLAN NOTE
I encouraged healthy cardiac diet and weight loss.  Current weight is 206 pounds up 8 pounds since August 2021 with BMI of 29.59 encouraged healthy cardiac diet

## 2022-02-16 NOTE — PATIENT INSTRUCTIONS
Fasting labs are pending drawn earlier this morning  EKG is normal  Continue all current medications  Encouraged exercise  Encouraged healthy cardiac diet and weight loss  Return visit in 6 months or as needed

## 2022-02-21 ENCOUNTER — PATIENT MESSAGE (OUTPATIENT)
Dept: INTERNAL MEDICINE | Facility: CLINIC | Age: 72
End: 2022-02-21

## 2022-08-02 ENCOUNTER — OFFICE VISIT (OUTPATIENT)
Dept: INTERNAL MEDICINE | Facility: CLINIC | Age: 72
End: 2022-08-02

## 2022-08-02 VITALS
HEART RATE: 64 BPM | SYSTOLIC BLOOD PRESSURE: 130 MMHG | HEIGHT: 70 IN | BODY MASS INDEX: 29.63 KG/M2 | WEIGHT: 207 LBS | DIASTOLIC BLOOD PRESSURE: 80 MMHG

## 2022-08-02 DIAGNOSIS — M25.421 ELBOW SWELLING, RIGHT: Primary | ICD-10-CM

## 2022-08-02 DIAGNOSIS — M70.21 OLECRANON BURSITIS OF RIGHT ELBOW: ICD-10-CM

## 2022-08-02 PROCEDURE — 99213 OFFICE O/P EST LOW 20 MIN: CPT | Performed by: INTERNAL MEDICINE

## 2022-08-02 NOTE — ASSESSMENT & PLAN NOTE
Apply ice packs 4 times a day for 24 hours then heat 4 times a day for 2 days and take Aleve 220 mg 2 tablets twice daily for 5 days with food if not improved notify us for potential orthopedic evaluation    - I advised the patient to be careful about hammering or any twisting, such as using a screwdriver, or performing any kind of yard work for approximately 3 to 4 days.

## 2022-08-02 NOTE — ASSESSMENT & PLAN NOTE
Right elbow olecranon bursitis  Suggest ice 24 hours  Suggest heat to follow-up  Suggest Aleve 440 mg twice daily for 5 days  To notify us if not improved within 48 hours and will refer to Ortho

## 2022-08-02 NOTE — PROGRESS NOTES
[facility]     Jamari Munoz  1950   [id number]      Patient Care Team:  Keshawn Alexandre MD as PCP - General (Internal Medicine)  Scooter Jiménez MD as Consulting Physician (Colon and Rectal Surgery)  Yanelis Hernandez APRN as Nurse Practitioner (Family Medicine)  Alvino Berrios MD as Consulting Physician (Ophthalmology)    Chief Complaint::   Chief Complaint   Patient presents with   • Joint Swelling     Elbow, right            HPI    The patient is a 71-year-old male complaining of right elbow pain and swelling.    The patient states that he has had elbow issues in the past, approximately 40 years ago. He notes that his right elbow felt warmer this morning, 08/02/2022, than it feels currently. He has an abrasion of his right elbow, which he first noticed 08/01/2022 or 07/31/2022, and he does not know how the abrasion occurred. The patient notes he recently went camping. He states that the right elbow abrasion is not painful. He reports that this morning, he placed his right elbow on a piece of paper on a counter and noted blood on the piece of paper. He states that the right elbow abrasion is improved compared to its appearance on 08/01/2022. The patient reports he briefly applied heat to his right elbow. He states that he took approximately 2 Advil  on the night of 07/31/2022 for a slight headache and denies having taken Advil on 08/01/2022. He notes that he did apply alcohol to his right elbow abrasion. The patient denies fever and chills and states that he feels well. He denies any shoulder, wrist, or hand soreness. He states that he has been camping off and on over the past approximately 3 weeks and has visited [place], [place]; [place]; [place]; and [place]. He denies over-exercising and chopping wood. The patient states that he rides his bike while camping. He recalls magan staph infections in high school and college, affecting either one of his elbows or his bilateral  elbows. One of these staph infections was contracted playing basketball. The patient states that he has plenty of Advil at home.    His repeat blood pressure today is 120/70 mmHg.      Patient Active Problem List   Diagnosis   • Osteoarthritis   • Benign essential hypertension   • Low back pain   • Carbuncle and furuncle   • RLS (restless legs syndrome)   • Overweight   • Ganglion cyst   • Incomplete right bundle branch block   • Mixed hyperlipidemia   • Herpes simplex type 2 infection   • Sinus bradycardia   • Medicare annual wellness visit, subsequent   • Annual physical exam   • Medicare annual wellness visit, subsequent   • Elbow swelling, right   • Olecranon bursitis of right elbow        Past Medical History:   Diagnosis Date   • Arthritis    • Ganglion    • Hyperlipidemia    • Hypertension        Past Surgical History:   Procedure Laterality Date   • CYST REMOVAL      cyst removed left shoulder and left knee   • KNEE MENISCAL REPAIR  2005   • TONSILLECTOMY AND ADENOIDECTOMY     • VASECTOMY         Family History   Problem Relation Age of Onset   • Diabetes Maternal Grandmother         controlled   • Arthritis Paternal Grandmother         not too bad   • Osteoarthritis Paternal Grandmother    • Coronary artery disease Paternal Grandfather    • Diabetes Mother         mostly controlled       Social History     Socioeconomic History   • Marital status:    Tobacco Use   • Smoking status: Never Smoker   • Smokeless tobacco: Never Used   Substance and Sexual Activity   • Alcohol use: Yes     Alcohol/week: 14.0 standard drinks     Types: 7 Cans of beer, 7 Glasses of wine per week   • Drug use: No   • Sexual activity: Yes     Partners: Female     Birth control/protection: None       No Known Allergies    Review of Systems     HEENT: Denies any hearing changes, eyesight changes, no headache or dizziness  NECK: Denies any pain, stiffness, swelling or dysphagia  CHEST: Denies cough or wheeze or recent lung  "infections  CARDIAC: Denies chest pain, pressure or palpitations  ABD: Denies nausea, vomiting or abdominal pain  : Denies dysuria  NEURO: Denies syncope, concussion, neuropathy  PSYCH: Denies any stress  EXTREM: Denies arthritic changes myalgia or  arthralgia complains of soreness and swelling of the right elbow  SKIN: Denies any rash    Vital Signs  Vitals:    08/02/22 1355   BP: 130/80   BP Location: Left arm   Patient Position: Sitting   Cuff Size: Adult   Pulse: 64   Weight: 93.9 kg (207 lb)   Height: 177.8 cm (70\")   PainSc: 0-No pain     Body mass index is 29.7 kg/m².  BMI is >= 25 and <30. (Overweight) The following options were offered after discussion;: exercise counseling/recommendations and nutrition counseling/recommendations     Advance Care Planning   ACP discussion was held with the patient during this visit. Patient has an advance directive in EMR which is still valid.        Current Outpatient Medications:   •  atorvastatin (LIPITOR) 20 MG tablet, TAKE 1 TABLET DAILY, Disp: 90 tablet, Rfl: 3  •  CALCIUM-MAGNESIUM-ZINC PO, Take 7 tablets by mouth Daily., Disp: , Rfl:   •  enalapril (VASOTEC) 5 MG tablet, TAKE 1 TABLET DAILY, Disp: 90 tablet, Rfl: 3  •  Glucosamine-Chondroitin 750-600 MG tablet, Take 1 tablet by mouth 2 (Two) Times a Day., Disp: , Rfl:   •  valACYclovir (VALTREX) 500 MG tablet, Take 0.5 tablets by mouth Daily., Disp: 45 tablet, Rfl: 3    Physical Exam     ACE III MINI         HEENT: Pupils equal reactive ENT clear, no facial asymmetry, pharynx is clear  NECK: No masses bruit or thyromegaly  CHEST: Clear without rales wheezes or rhonchi  CARDIAC: Regular rhythm without gallop rub or click, blood pressure heart rate stable  ABD: Liver spleen normal, good bowel sounds, nontender  : Deferred  NEURO: Intact  PSYCH: Normal  EXTREM: Right olecranon bursitis and right elbow abrasion noted. Right elbow is very soft, movable, does not appear infected. No prominent lymph glands noted under " his right upper extremity. No significant arthritic changes, no edema  SKIN: Clear      Results Review:    No results found for this or any previous visit (from the past 672 hour(s)).  Procedures    Medication Review: Medications reviewed and noted    Patient wellness counseling  Exercise: Denies exercise right arm  Diet: Healthy cardiac diet  Screening:  Social History     Socioeconomic History   • Marital status:    Tobacco Use   • Smoking status: Never Smoker   • Smokeless tobacco: Never Used   Substance and Sexual Activity   • Alcohol use: Yes     Alcohol/week: 14.0 standard drinks     Types: 7 Cans of beer, 7 Glasses of wine per week   • Drug use: No   • Sexual activity: Yes     Partners: Female     Birth control/protection: None        Assessment/Plan:    Problem List Items Addressed This Visit        Musculoskeletal and Injuries    Elbow swelling, right - Primary    Overview     Swollen right elbow without significant pain discomfort fever chills and no injury         Current Assessment & Plan     Right elbow olecranon bursitis  Suggest ice 24 hours  Suggest heat to follow-up  Suggest Aleve 440 mg twice daily for 5 days  To notify us if not improved within 48 hours and will refer to Ortho         Olecranon bursitis of right elbow    Overview     48 hours of swelling right elbow minor sore no fever chills no injury         Current Assessment & Plan     Apply ice packs 4 times a day for 24 hours then heat 4 times a day for 2 days and take Aleve 220 mg 2 tablets twice daily for 5 days with food if not improved notify us for potential orthopedic evaluation    - I advised the patient to be careful about hammering or any twisting, such as using a screwdriver, or performing any kind of yard work for approximately 3 to 4 days.                Patient Instructions   Olecranon bursitis right elbow  Apply ice 24 hours then heat 10 minutes 4 times a day  Take Aleve 220 mg 2 tablets twice daily for 5 days  If not  improved to notify us  See regular appointment August 17       Plan of care reviewed with patient at the conclusion of today's visit. Education was provided regarding diagnosis, management, and any prescribed or recommended OTC medications.Patient verbalizes understanding of and agreement with management plan.         Keshawn Alexandre MD      Note: Part of this note may be an electronic transcription/translation of spoken language to printed text using the Dragon Dictation system.    Transcribed from ambient dictation for Keshawn Alexandre MD by KEYON TAY.  08/02/22   14:47 EDT    Patient verbalized consent to the visit recording.  I have personally performed the services described in this document as transcribed by the above individual, and it is both accurate and complete.  Keshawn Alexandre MD  8/2/2022  19:13 EDT

## 2022-08-02 NOTE — PATIENT INSTRUCTIONS
Olecranon bursitis right elbow  Apply ice 24 hours then heat 10 minutes 4 times a day  Take Aleve 220 mg 2 tablets twice daily for 5 days  If not improved to notify us  See regular appointment August 17

## 2022-08-12 ENCOUNTER — TELEPHONE (OUTPATIENT)
Dept: INTERNAL MEDICINE | Facility: CLINIC | Age: 72
End: 2022-08-12

## 2022-08-12 DIAGNOSIS — I10 BENIGN ESSENTIAL HYPERTENSION: Primary | ICD-10-CM

## 2022-08-12 DIAGNOSIS — E78.2 MIXED HYPERLIPIDEMIA: ICD-10-CM

## 2022-08-15 ENCOUNTER — LAB (OUTPATIENT)
Dept: LAB | Facility: HOSPITAL | Age: 72
End: 2022-08-15

## 2022-08-15 DIAGNOSIS — E78.2 MIXED HYPERLIPIDEMIA: ICD-10-CM

## 2022-08-15 LAB
ALBUMIN SERPL-MCNC: 4.2 G/DL (ref 3.5–5.2)
ALBUMIN/GLOB SERPL: 2.1 G/DL
ALP SERPL-CCNC: 52 U/L (ref 39–117)
ALT SERPL W P-5'-P-CCNC: 30 U/L (ref 1–41)
ANION GAP SERPL CALCULATED.3IONS-SCNC: 6.8 MMOL/L (ref 5–15)
AST SERPL-CCNC: 26 U/L (ref 1–40)
BILIRUB SERPL-MCNC: 0.6 MG/DL (ref 0–1.2)
BUN SERPL-MCNC: 19 MG/DL (ref 8–23)
BUN/CREAT SERPL: 16.7 (ref 7–25)
CALCIUM SPEC-SCNC: 9.6 MG/DL (ref 8.6–10.5)
CHLORIDE SERPL-SCNC: 105 MMOL/L (ref 98–107)
CHOLEST SERPL-MCNC: 175 MG/DL (ref 0–200)
CO2 SERPL-SCNC: 26.2 MMOL/L (ref 22–29)
CREAT SERPL-MCNC: 1.14 MG/DL (ref 0.76–1.27)
EGFRCR SERPLBLD CKD-EPI 2021: 68.8 ML/MIN/1.73
GLOBULIN UR ELPH-MCNC: 2 GM/DL
GLUCOSE SERPL-MCNC: 92 MG/DL (ref 65–99)
HDLC SERPL-MCNC: 60 MG/DL (ref 40–60)
LDLC SERPL CALC-MCNC: 92 MG/DL (ref 0–100)
LDLC/HDLC SERPL: 1.47 {RATIO}
POTASSIUM SERPL-SCNC: 4.7 MMOL/L (ref 3.5–5.2)
PROT SERPL-MCNC: 6.2 G/DL (ref 6–8.5)
SODIUM SERPL-SCNC: 138 MMOL/L (ref 136–145)
TRIGL SERPL-MCNC: 133 MG/DL (ref 0–150)
VLDLC SERPL-MCNC: 23 MG/DL (ref 5–40)

## 2022-08-15 PROCEDURE — 80061 LIPID PANEL: CPT

## 2022-08-15 PROCEDURE — 80053 COMPREHEN METABOLIC PANEL: CPT

## 2022-08-17 ENCOUNTER — OFFICE VISIT (OUTPATIENT)
Dept: INTERNAL MEDICINE | Facility: CLINIC | Age: 72
End: 2022-08-17

## 2022-08-17 VITALS
HEIGHT: 70 IN | HEART RATE: 60 BPM | WEIGHT: 201.6 LBS | BODY MASS INDEX: 28.86 KG/M2 | SYSTOLIC BLOOD PRESSURE: 126 MMHG | DIASTOLIC BLOOD PRESSURE: 76 MMHG

## 2022-08-17 DIAGNOSIS — E78.2 MIXED HYPERLIPIDEMIA: ICD-10-CM

## 2022-08-17 DIAGNOSIS — I10 BENIGN ESSENTIAL HYPERTENSION: Primary | ICD-10-CM

## 2022-08-17 PROCEDURE — 99213 OFFICE O/P EST LOW 20 MIN: CPT | Performed by: INTERNAL MEDICINE

## 2022-08-17 NOTE — PATIENT INSTRUCTIONS
Lab of August 15 discussed copy given with excellent cholesterol and CMP  Continue current therapy  Continue exercise  Continue healthy cardiac diet  Return visit in 6 months for annual wellness

## 2022-08-17 NOTE — PROGRESS NOTES
[facility]     Jamari Munoz  1950   [id number]      Patient Care Team:  Keshawn Alexandre MD as PCP - General (Internal Medicine)  Scooter Jiménez MD as Consulting Physician (Colon and Rectal Surgery)  Yanelis Hernandez APRN as Nurse Practitioner (Family Medicine)  Alvino Berrios MD as Consulting Physician (Ophthalmology)    Chief Complaint::   Chief Complaint   Patient presents with   • Hyperlipidemia     Follow up            HPI    The patient is a 71-year-old male who presents for an office visit for hypercholesterolemia and essential hypertension.    The patient reports that he is doing well. He denies any recent changes in his medication, diet, or activities. He reports this being a stressful time of year for him due to his occupation. He denies having any bicycle incidents since 2018.     The patient reports that he had laboratory work done on 08/16/2022. He confirms continuing to take atorvastatin 20 mg.    He denies any headache, dizziness, lightheadedness, allergies, or congestion. He denies any difficulty swallowing, coughing, wheezing, or shortness of breath. He denies any chest pain, palpitations, or anything unusual.     The patient reports that he has received both doses of the COVID-19 vaccine, and both boosters with the last one given on 06/14/2022. The patient receives his medications through a 90-day mail order and is not requesting any refills at this time.       Patient Active Problem List   Diagnosis   • Osteoarthritis   • Benign essential hypertension   • Low back pain   • Carbuncle and furuncle   • RLS (restless legs syndrome)   • Overweight   • Ganglion cyst   • Incomplete right bundle branch block   • Mixed hyperlipidemia   • Herpes simplex type 2 infection   • Sinus bradycardia   • Medicare annual wellness visit, subsequent   • Annual physical exam   • Medicare annual wellness visit, subsequent   • Elbow swelling, right   • Olecranon bursitis of right elbow     "    Past Medical History:   Diagnosis Date   • Arthritis    • Ganglion    • Hyperlipidemia    • Hypertension        Past Surgical History:   Procedure Laterality Date   • CYST REMOVAL      cyst removed left shoulder and left knee   • KNEE MENISCAL REPAIR  2005   • TONSILLECTOMY AND ADENOIDECTOMY     • VASECTOMY         Family History   Problem Relation Age of Onset   • Diabetes Maternal Grandmother         controlled   • Arthritis Paternal Grandmother         not too bad   • Osteoarthritis Paternal Grandmother    • Coronary artery disease Paternal Grandfather    • Diabetes Mother         mostly controlled       Social History     Socioeconomic History   • Marital status:    Tobacco Use   • Smoking status: Never Smoker   • Smokeless tobacco: Never Used   Substance and Sexual Activity   • Alcohol use: Yes     Alcohol/week: 14.0 standard drinks     Types: 7 Cans of beer, 7 Glasses of wine per week   • Drug use: No   • Sexual activity: Yes     Partners: Female     Birth control/protection: None       No Known Allergies    Review of Systems     HEENT: Denies any hearing changes, eyesight changes, no headache or dizziness  NECK: Denies any pain, stiffness, swelling or dysphagia  CHEST: Denies cough or wheeze or recent lung infections  CARDIAC: Denies chest pain, pressure or palpitations  ABD: Denies nausea, vomiting or abdominal pain  : Denies dysuria  NEURO: Denies syncope, concussion, neuropathy  PSYCH: Denies any stress  EXTREM: Denies arthritic changes myalgia or arthralgia  SKIN: Denies any rash    Vital Signs  Vitals:    08/17/22 0850 08/17/22 0905   BP: 140/84 126/76   BP Location: Left arm    Patient Position: Sitting    Cuff Size: Adult    Pulse: 60    Weight: 91.4 kg (201 lb 9.6 oz)    Height: 177.8 cm (70\")    PainSc: 0-No pain      Body mass index is 28.93 kg/m².  BMI is >= 25 and <30. (Overweight) The following options were offered after discussion;: exercise counseling/recommendations and nutrition " counseling/recommendations     Advance Care Planning   ACP discussion was held with the patient during this visit. Patient has an advance directive in EMR which is still valid.        Current Outpatient Medications:   •  atorvastatin (LIPITOR) 20 MG tablet, TAKE 1 TABLET DAILY, Disp: 90 tablet, Rfl: 3  •  CALCIUM-MAGNESIUM-ZINC PO, Take 7 tablets by mouth Daily., Disp: , Rfl:   •  enalapril (VASOTEC) 5 MG tablet, TAKE 1 TABLET DAILY, Disp: 90 tablet, Rfl: 3  •  Glucosamine-Chondroitin 750-600 MG tablet, Take 1 tablet by mouth 2 (Two) Times a Day., Disp: , Rfl:   •  valACYclovir (VALTREX) 500 MG tablet, Take 0.5 tablets by mouth Daily., Disp: 45 tablet, Rfl: 3    Physical Exam     ACE III MINI         HEENT: Pupils equal reactive ENT clear, no facial asymmetry, pharynx is clear  NECK: No masses bruit or thyromegaly  CHEST: Clear without rales wheezes or rhonchi  CARDIAC: Regular rhythm, normal heart rate, and excellent blood pressure repeated  ABD: Liver spleen normal, good bowel sounds, nontender  : Deferred  NEURO: Intact  PSYCH: Normal  EXTREM: No significant arthritic changes, no edema  SKIN: Clear  VITALS: Blood pressure is 140/84 mmHg in the office today. Repeat blood pressure 124/76 mmHg and at 126/76 mmHg.      Results Review:    Recent Results (from the past 672 hour(s))   Comprehensive Metabolic Panel    Collection Time: 08/15/22  8:43 AM    Specimen: Blood   Result Value Ref Range    Glucose 92 65 - 99 mg/dL    BUN 19 8 - 23 mg/dL    Creatinine 1.14 0.76 - 1.27 mg/dL    Sodium 138 136 - 145 mmol/L    Potassium 4.7 3.5 - 5.2 mmol/L    Chloride 105 98 - 107 mmol/L    CO2 26.2 22.0 - 29.0 mmol/L    Calcium 9.6 8.6 - 10.5 mg/dL    Total Protein 6.2 6.0 - 8.5 g/dL    Albumin 4.20 3.50 - 5.20 g/dL    ALT (SGPT) 30 1 - 41 U/L    AST (SGOT) 26 1 - 40 U/L    Alkaline Phosphatase 52 39 - 117 U/L    Total Bilirubin 0.6 0.0 - 1.2 mg/dL    Globulin 2.0 gm/dL    A/G Ratio 2.1 g/dL    BUN/Creatinine Ratio 16.7 7.0 -  25.0    Anion Gap 6.8 5.0 - 15.0 mmol/L    eGFR 68.8 >60.0 mL/min/1.73   Lipid Panel    Collection Time: 08/15/22  8:43 AM    Specimen: Blood   Result Value Ref Range    Total Cholesterol 175 0 - 200 mg/dL    Triglycerides 133 0 - 150 mg/dL    HDL Cholesterol 60 40 - 60 mg/dL    LDL Cholesterol  92 0 - 100 mg/dL    VLDL Cholesterol 23 5 - 40 mg/dL    LDL/HDL Ratio 1.47      Procedures    Medication Review: Medications reviewed and noted    Patient wellness counseling  Exercise: Continue excellent exercise program including bicycle riding   Diet: Continue with cardiac diet with reduced cholesterol and weight loss  Screening: Recent lipid panel and CPM noted and discussed  Social History     Socioeconomic History   • Marital status:    Tobacco Use   • Smoking status: Never Smoker   • Smokeless tobacco: Never Used   Substance and Sexual Activity   • Alcohol use: Yes     Alcohol/week: 14.0 standard drinks     Types: 7 Cans of beer, 7 Glasses of wine per week   • Drug use: No   • Sexual activity: Yes     Partners: Female     Birth control/protection: None        Assessment/Plan:    Problem List Items Addressed This Visit        Cardiac and Vasculature    Benign essential hypertension - Primary    Overview     History of essential hypertension on enalapril 5 mg daily denies headache or cough         Current Assessment & Plan       Essential hypertension with current blood pressure 126/76 on enalapril 5 mg daily continue therapy, recent CMP of August 15 is normal continue therapy           Relevant Medications    enalapril (VASOTEC) 5 MG tablet    Mixed hyperlipidemia    Overview     History of mixed hyperlipidemia on atorvastatin 20 mg daily denies myalgia arthralgia         Current Assessment & Plan         Lipidemia on atorvastatin 20 mg daily denies myalgia arthralgia, lab of August 15 reveals cholesterol of 175 and liver functions normal continue therapy           Relevant Medications    atorvastatin (LIPITOR)  20 MG tablet         The patient was counseled on the new booster for COVID-19 that will be given this fall. He was instructed to not pursue any boosters at this time since he has been vaccinated with both boosters.    Patient Instructions   Lab of August 15 discussed copy given with excellent cholesterol and CMP  Continue current therapy  Continue exercise  Continue healthy cardiac diet  Return visit in 6 months for annual wellness       Plan of care reviewed with patient at the conclusion of today's visit. Education was provided regarding diagnosis, management, and any prescribed or recommended OTC medications.Patient verbalizes understanding of and agreement with management plan.         Keshawn Alexandre MD      Note: Part of this note may be an electronic transcription/translation of spoken language to printed text using the Dragon Dictation system.      Answers for HPI/ROS submitted by the patient on 8/11/2022  What is the primary reason for your visit?: Physical    Transcribed from ambient dictation for Keshawn Alexandre MD by Ally Crokos.  08/17/22   11:10 EDT    Patient verbalized consent to the visit recording.  I have personally performed the services described in this document as transcribed by the above individual, and it is both accurate and complete.  Keshawn Alexandre MD  8/17/2022  16:27 EDT

## 2022-08-17 NOTE — ASSESSMENT & PLAN NOTE
Essential hypertension with current blood pressure 126/76 on enalapril 5 mg daily continue therapy, recent CMP of August 15 is normal continue therapy

## 2022-08-17 NOTE — ASSESSMENT & PLAN NOTE
Lipidemia on atorvastatin 20 mg daily denies myalgia arthralgia, lab of August 15 reveals cholesterol of 175 and liver functions normal continue therapy

## 2022-12-27 DIAGNOSIS — E78.2 MIXED HYPERLIPIDEMIA: ICD-10-CM

## 2022-12-27 DIAGNOSIS — I10 BENIGN ESSENTIAL HYPERTENSION: ICD-10-CM

## 2022-12-27 RX ORDER — ATORVASTATIN CALCIUM 20 MG/1
TABLET, FILM COATED ORAL
Qty: 90 TABLET | Refills: 3 | Status: SHIPPED | OUTPATIENT
Start: 2022-12-27 | End: 2023-02-10 | Stop reason: SDUPTHER

## 2022-12-27 RX ORDER — ENALAPRIL MALEATE 5 MG/1
TABLET ORAL
Qty: 90 TABLET | Refills: 3 | Status: SHIPPED | OUTPATIENT
Start: 2022-12-27 | End: 2023-02-10 | Stop reason: SDUPTHER

## 2023-01-17 ENCOUNTER — PATIENT MESSAGE (OUTPATIENT)
Dept: INTERNAL MEDICINE | Facility: CLINIC | Age: 73
End: 2023-01-17
Payer: MEDICARE

## 2023-01-17 DIAGNOSIS — B00.9 HERPES SIMPLEX TYPE 2 INFECTION: ICD-10-CM

## 2023-01-17 DIAGNOSIS — E78.2 MIXED HYPERLIPIDEMIA: ICD-10-CM

## 2023-01-17 DIAGNOSIS — I10 BENIGN ESSENTIAL HYPERTENSION: ICD-10-CM

## 2023-01-17 RX ORDER — VALACYCLOVIR HYDROCHLORIDE 500 MG/1
250 TABLET, FILM COATED ORAL DAILY
Qty: 45 TABLET | Refills: 3 | Status: SHIPPED | OUTPATIENT
Start: 2023-01-17 | End: 2023-02-10 | Stop reason: SDUPTHER

## 2023-01-17 RX ORDER — ENALAPRIL MALEATE 5 MG/1
5 TABLET ORAL DAILY
Qty: 90 TABLET | Refills: 3 | OUTPATIENT
Start: 2023-01-17

## 2023-01-17 RX ORDER — ATORVASTATIN CALCIUM 20 MG/1
20 TABLET, FILM COATED ORAL DAILY
Qty: 90 TABLET | Refills: 3 | OUTPATIENT
Start: 2023-01-17

## 2023-02-10 RX ORDER — ENALAPRIL MALEATE 5 MG/1
5 TABLET ORAL DAILY
Qty: 90 TABLET | Refills: 3 | Status: SHIPPED | OUTPATIENT
Start: 2023-02-10

## 2023-02-10 RX ORDER — VALACYCLOVIR HYDROCHLORIDE 500 MG/1
250 TABLET, FILM COATED ORAL DAILY
Qty: 45 TABLET | Refills: 3 | Status: SHIPPED | OUTPATIENT
Start: 2023-02-10

## 2023-02-10 RX ORDER — ATORVASTATIN CALCIUM 20 MG/1
20 TABLET, FILM COATED ORAL DAILY
Qty: 90 TABLET | Refills: 3 | Status: SHIPPED | OUTPATIENT
Start: 2023-02-10

## 2023-02-10 NOTE — TELEPHONE ENCOUNTER
Rx Refill Note  Requested Prescriptions     Pending Prescriptions Disp Refills   • atorvastatin (LIPITOR) 20 MG tablet 90 tablet 3     Sig: Take 1 tablet by mouth Daily.   • enalapril (VASOTEC) 5 MG tablet 90 tablet 3     Sig: Take 1 tablet by mouth Daily.     Signed Prescriptions Disp Refills   • valACYclovir (VALTREX) 500 MG tablet 45 tablet 3     Sig: Take 0.5 tablets by mouth Daily.     Authorizing Provider: KERRY JAMES     Ordering User: DONNA BORGES      Last office visit with prescribing clinician: 8/17/2022   Last telemedicine visit with prescribing clinician: 3/6/2023   Next office visit with prescribing clinician: 3/6/2023                             Donna Borges RN  02/10/23, 10:39 EST

## 2023-03-01 DIAGNOSIS — Z12.5 SPECIAL SCREENING FOR MALIGNANT NEOPLASM OF PROSTATE: ICD-10-CM

## 2023-03-01 DIAGNOSIS — I10 BENIGN ESSENTIAL HYPERTENSION: ICD-10-CM

## 2023-03-01 DIAGNOSIS — E78.2 MIXED HYPERLIPIDEMIA: Primary | ICD-10-CM

## 2023-03-03 ENCOUNTER — LAB (OUTPATIENT)
Dept: LAB | Facility: HOSPITAL | Age: 73
End: 2023-03-03
Payer: MEDICARE

## 2023-03-03 DIAGNOSIS — I10 BENIGN ESSENTIAL HYPERTENSION: ICD-10-CM

## 2023-03-03 DIAGNOSIS — E78.2 MIXED HYPERLIPIDEMIA: ICD-10-CM

## 2023-03-03 DIAGNOSIS — Z12.5 SPECIAL SCREENING FOR MALIGNANT NEOPLASM OF PROSTATE: ICD-10-CM

## 2023-03-03 LAB
ALBUMIN SERPL-MCNC: 4.3 G/DL (ref 3.5–5.2)
ALBUMIN/GLOB SERPL: 1.8 G/DL
ALP SERPL-CCNC: 55 U/L (ref 39–117)
ALT SERPL W P-5'-P-CCNC: 37 U/L (ref 1–41)
ANION GAP SERPL CALCULATED.3IONS-SCNC: 6.9 MMOL/L (ref 5–15)
AST SERPL-CCNC: 34 U/L (ref 1–40)
BASOPHILS # BLD AUTO: 0.04 10*3/MM3 (ref 0–0.2)
BASOPHILS NFR BLD AUTO: 0.5 % (ref 0–1.5)
BILIRUB SERPL-MCNC: 0.6 MG/DL (ref 0–1.2)
BUN SERPL-MCNC: 20 MG/DL (ref 8–23)
BUN/CREAT SERPL: 18.7 (ref 7–25)
CALCIUM SPEC-SCNC: 9.8 MG/DL (ref 8.6–10.5)
CHLORIDE SERPL-SCNC: 104 MMOL/L (ref 98–107)
CHOLEST SERPL-MCNC: 175 MG/DL (ref 0–200)
CO2 SERPL-SCNC: 27.1 MMOL/L (ref 22–29)
CREAT SERPL-MCNC: 1.07 MG/DL (ref 0.76–1.27)
DEPRECATED RDW RBC AUTO: 44.6 FL (ref 37–54)
EGFRCR SERPLBLD CKD-EPI 2021: 73.7 ML/MIN/1.73
EOSINOPHIL # BLD AUTO: 0.37 10*3/MM3 (ref 0–0.4)
EOSINOPHIL NFR BLD AUTO: 5 % (ref 0.3–6.2)
ERYTHROCYTE [DISTWIDTH] IN BLOOD BY AUTOMATED COUNT: 12.5 % (ref 12.3–15.4)
GLOBULIN UR ELPH-MCNC: 2.4 GM/DL
GLUCOSE SERPL-MCNC: 91 MG/DL (ref 65–99)
HCT VFR BLD AUTO: 45.9 % (ref 37.5–51)
HDLC SERPL-MCNC: 60 MG/DL (ref 40–60)
HGB BLD-MCNC: 15.4 G/DL (ref 13–17.7)
IMM GRANULOCYTES # BLD AUTO: 0.03 10*3/MM3 (ref 0–0.05)
IMM GRANULOCYTES NFR BLD AUTO: 0.4 % (ref 0–0.5)
LDLC SERPL CALC-MCNC: 96 MG/DL (ref 0–100)
LDLC/HDLC SERPL: 1.55 {RATIO}
LYMPHOCYTES # BLD AUTO: 3.11 10*3/MM3 (ref 0.7–3.1)
LYMPHOCYTES NFR BLD AUTO: 42.3 % (ref 19.6–45.3)
MCH RBC QN AUTO: 33 PG (ref 26.6–33)
MCHC RBC AUTO-ENTMCNC: 33.6 G/DL (ref 31.5–35.7)
MCV RBC AUTO: 98.3 FL (ref 79–97)
MONOCYTES # BLD AUTO: 0.92 10*3/MM3 (ref 0.1–0.9)
MONOCYTES NFR BLD AUTO: 12.5 % (ref 5–12)
NEUTROPHILS NFR BLD AUTO: 2.89 10*3/MM3 (ref 1.7–7)
NEUTROPHILS NFR BLD AUTO: 39.3 % (ref 42.7–76)
NRBC BLD AUTO-RTO: 0 /100 WBC (ref 0–0.2)
PLATELET # BLD AUTO: 301 10*3/MM3 (ref 140–450)
PMV BLD AUTO: 10.1 FL (ref 6–12)
POTASSIUM SERPL-SCNC: 4.7 MMOL/L (ref 3.5–5.2)
PROT SERPL-MCNC: 6.7 G/DL (ref 6–8.5)
PSA SERPL-MCNC: 0.82 NG/ML (ref 0–4)
RBC # BLD AUTO: 4.67 10*6/MM3 (ref 4.14–5.8)
SODIUM SERPL-SCNC: 138 MMOL/L (ref 136–145)
TRIGL SERPL-MCNC: 109 MG/DL (ref 0–150)
TSH SERPL DL<=0.05 MIU/L-ACNC: 2.55 UIU/ML (ref 0.27–4.2)
VLDLC SERPL-MCNC: 19 MG/DL (ref 5–40)
WBC NRBC COR # BLD: 7.36 10*3/MM3 (ref 3.4–10.8)

## 2023-03-03 PROCEDURE — 84443 ASSAY THYROID STIM HORMONE: CPT

## 2023-03-03 PROCEDURE — 80061 LIPID PANEL: CPT

## 2023-03-03 PROCEDURE — 85025 COMPLETE CBC W/AUTO DIFF WBC: CPT

## 2023-03-03 PROCEDURE — 80053 COMPREHEN METABOLIC PANEL: CPT

## 2023-03-03 PROCEDURE — G0103 PSA SCREENING: HCPCS

## 2023-03-05 PROBLEM — Z00.00 MEDICARE ANNUAL WELLNESS VISIT, SUBSEQUENT: Status: ACTIVE | Noted: 2023-03-05

## 2023-03-06 ENCOUNTER — OFFICE VISIT (OUTPATIENT)
Dept: INTERNAL MEDICINE | Facility: CLINIC | Age: 73
End: 2023-03-06
Payer: MEDICARE

## 2023-03-06 VITALS
DIASTOLIC BLOOD PRESSURE: 86 MMHG | HEIGHT: 70 IN | SYSTOLIC BLOOD PRESSURE: 134 MMHG | WEIGHT: 203 LBS | HEART RATE: 64 BPM | BODY MASS INDEX: 29.06 KG/M2

## 2023-03-06 DIAGNOSIS — Z00.00 MEDICARE ANNUAL WELLNESS VISIT, SUBSEQUENT: Primary | ICD-10-CM

## 2023-03-06 DIAGNOSIS — G89.29 CHRONIC PAIN OF LEFT KNEE: ICD-10-CM

## 2023-03-06 DIAGNOSIS — I10 BENIGN ESSENTIAL HYPERTENSION: ICD-10-CM

## 2023-03-06 DIAGNOSIS — M25.562 CHRONIC PAIN OF LEFT KNEE: ICD-10-CM

## 2023-03-06 DIAGNOSIS — E78.2 MIXED HYPERLIPIDEMIA: ICD-10-CM

## 2023-03-06 DIAGNOSIS — M77.8 LEFT SHOULDER TENDINITIS: ICD-10-CM

## 2023-03-06 DIAGNOSIS — G25.81 RLS (RESTLESS LEGS SYNDROME): ICD-10-CM

## 2023-03-06 PROCEDURE — 1125F AMNT PAIN NOTED PAIN PRSNT: CPT | Performed by: INTERNAL MEDICINE

## 2023-03-06 PROCEDURE — 99213 OFFICE O/P EST LOW 20 MIN: CPT | Performed by: INTERNAL MEDICINE

## 2023-03-06 PROCEDURE — 93000 ELECTROCARDIOGRAM COMPLETE: CPT | Performed by: INTERNAL MEDICINE

## 2023-03-06 PROCEDURE — 1170F FXNL STATUS ASSESSED: CPT | Performed by: INTERNAL MEDICINE

## 2023-03-06 PROCEDURE — 1159F MED LIST DOCD IN RCRD: CPT | Performed by: INTERNAL MEDICINE

## 2023-03-06 PROCEDURE — G0439 PPPS, SUBSEQ VISIT: HCPCS | Performed by: INTERNAL MEDICINE

## 2023-03-06 NOTE — ASSESSMENT & PLAN NOTE
Mixed hyperlipidemia on atorvastatin 20 mg daily denies myalgia arthralgia, recent cholesterol of 175 HDL of 60 and LDL of 96 continue therapy and encouraged healthy cardiac diet with reduce carbs smaller portions and weight loss and continue therapy

## 2023-03-06 NOTE — PROGRESS NOTES
QUICK REFERENCE INFORMATION:  The ABCs of the Annual Wellness Visit    Subsequent Medicare Wellness Visit    HEALTH RISK ASSESSMENT    1950    Recent Hospitalizations:  No hospitalization(s) within the last year..        Current Medical Providers:  Patient Care Team:  Keshawn Alexandre MD as PCP - General (Internal Medicine)  Scotoer Jiménez MD as Consulting Physician (Colon and Rectal Surgery)  Yanelis Hernandez APRN as Nurse Practitioner (Family Medicine)  Alvino Berrios MD as Consulting Physician (Ophthalmology)        Smoking Status:  Social History     Tobacco Use   Smoking Status Never   Smokeless Tobacco Never       Alcohol Consumption:  Social History     Substance and Sexual Activity   Alcohol Use Yes   • Alcohol/week: 14.0 standard drinks   • Types: 7 Cans of beer, 7 Glasses of wine per week       Depression Screen:   PHQ-2/PHQ-9 Depression Screening 3/6/2023   Retired PHQ-9 Total Score -   Retired Total Score -   Little Interest or Pleasure in Doing Things 0-->not at all   Feeling Down, Depressed or Hopeless 0-->not at all   PHQ-9: Brief Depression Severity Measure Score 0       Health Habits and Functional and Cognitive Screening:  Functional & Cognitive Status 3/6/2023   Do you have difficulty preparing food and eating? No   Do you have difficulty bathing yourself, getting dressed or grooming yourself? No   Do you have difficulty using the toilet? No   Do you have difficulty moving around from place to place? No   Do you have trouble with steps or getting out of a bed or a chair? No   Current Diet Well Balanced Diet   Dental Exam Up to date   Eye Exam Up to date   Exercise (times per week) 7 times per week   Current Exercises Include Bicycling Outdoors   Current Exercise Activities Include -   Do you need help using the phone?  No   Are you deaf or do you have serious difficulty hearing?  No   Do you need help with transportation? No   Do you need help shopping? No   Do you need help preparing  meals?  No   Do you need help with housework?  No   Do you need help with laundry? No   Do you need help taking your medications? No   Do you need help managing money? No   Do you ever drive or ride in a car without wearing a seat belt? No   Have you felt unusual stress, anger or loneliness in the last month? No   Who do you live with? Spouse   If you need help, do you have trouble finding someone available to you? No   Have you been bothered in the last four weeks by sexual problems? No   Do you have difficulty concentrating, remembering or making decisions? No       Fall Risk Screen:  STEADI Fall Risk Assessment was completed, and patient is at LOW risk for falls.Assessment completed on:3/6/2023    ACE III MINI        Does the patient have evidence of cognitive impairment? No    Aspirin use counseling: Does not need ASA (and currently is not on it)    Recent Lab Results:  CMP:  Lab Results   Component Value Date    BUN 20 03/03/2023    CREATININE 1.07 03/03/2023    EGFRIFNONA 63 02/16/2022    BCR 18.7 03/03/2023     03/03/2023    K 4.7 03/03/2023    CO2 27.1 03/03/2023    CALCIUM 9.8 03/03/2023    ALBUMIN 4.3 03/03/2023    BILITOT 0.6 03/03/2023    ALKPHOS 55 03/03/2023    AST 34 03/03/2023    ALT 37 03/03/2023     HbA1c:  No results found for: HGBA1C  Microalbumin:  Lab Results   Component Value Date    MICROALBUR <1.2 02/04/2020     Lipid Panel  Lab Results   Component Value Date    CHOL 175 03/03/2023    TRIG 109 03/03/2023    HDL 60 03/03/2023    LDL 96 03/03/2023    AST 34 03/03/2023    ALT 37 03/03/2023       Visual Acuity:  No results found.    Age-appropriate Screening Schedule:  Refer to the list below for future screening recommendations based on patient's age, sex and/or medical conditions. Orders for these recommended tests are listed in the plan section. The patient has been provided with a written plan.    Health Maintenance   Topic Date Due   • HEPATITIS C SCREENING  Never done   • TDAP/TD  VACCINES (2 - Tdap) 06/04/2020   • LIPID PANEL  03/03/2024   • ANNUAL WELLNESS VISIT  03/06/2024   • COLORECTAL CANCER SCREENING  01/20/2025   • COVID-19 Vaccine  Completed   • INFLUENZA VACCINE  Completed   • Pneumococcal Vaccine 65+  Completed   • ZOSTER VACCINE  Completed        Subjective   History of Present Illness    Jamari Munoz is a 72 y.o. male who presents for a Medicare Annual Wellness Visit and physical examination. He is complaining of left knee pain and left shoulder discomfort with a history of mixed hyperlipidemia and essential hypertension.    The patient reports that he is doing well overall. He has been experiencing pain in his left shoulder. He describes occasional catching in his left shoulder and left knee. He had a meniscus repair done in his left knee approximately 15 years ago by Dr. Reginaldo Mckinney, orthopedic surgeon. The patient notes intermittent pain of the left knee, but denies any pain today, 03/06/2023. He notices pain when he navigates stairs. He has intermittent pain when he twists or takes a step. He denies any locking or giving way. He denies pain while riding his bicycle. He denies any falls since 10/2018. He purchased an e-assist mountain bike and rode 44 miles on Wednesday, 03/01/2023.     The patient has intermittent pain in his left shoulder. He has pain when he tries to do push-ups, or while in bed rolling over the wrong way. He denies pain waking him up at night. The pain has not been present during the last  2 to 3 days. He has tenderness in the left shoulder.      He denies any headache, dizziness, or lightheadedness. His vision is doing well. His hearing is good. The patient has mild allergy congestion and drainage every morning that occurs only in his house. He denies any trouble swallowing. He denies any coughing, wheezing, or shortness of breath. The patient denies any chest pain, heart skipping, or palpitations. He denies any nausea or vomiting. His bowel and  "bladder are well.     The patient has not had cataract surgery performed. He has an appointment with his ophthalmologist in 05/2023. His vision is good. He admits to using readers.    The patient is still taking atorvastatin 20 mg and enalapril 5 mg. He denies any headaches or coughing with the medications. He denies any aches or pains while on the medications.    The patient describes his feet to be cold at night during the winter. He wears short socks on cold nights and takes them off longterm through the night.    The patient had a \"bad\" restless leg night approximately 2 weeks ago. Symptoms kept him awake for 2.5 hours.    CHRONIC CONDITIONS    The following portions of the patient's history were reviewed and updated as appropriate: allergies, current medications, past family history, past medical history, past social history and past surgical history.    Outpatient Medications Prior to Visit   Medication Sig Dispense Refill   • atorvastatin (LIPITOR) 20 MG tablet Take 1 tablet by mouth Daily. 90 tablet 3   • CALCIUM-MAGNESIUM-ZINC PO Take 7 tablets by mouth Daily.     • enalapril (VASOTEC) 5 MG tablet Take 1 tablet by mouth Daily. 90 tablet 3   • Glucosamine-Chondroitin 750-600 MG tablet Take 1 tablet by mouth 2 (Two) Times a Day.     • valACYclovir (VALTREX) 500 MG tablet Take 0.5 tablets by mouth Daily. 45 tablet 3     No facility-administered medications prior to visit.       Patient Active Problem List   Diagnosis   • Osteoarthritis   • Benign essential hypertension   • Low back pain   • Carbuncle and furuncle   • RLS (restless legs syndrome)   • Overweight   • Ganglion cyst   • Incomplete right bundle branch block   • Mixed hyperlipidemia   • Herpes simplex type 2 infection   • Sinus bradycardia   • Medicare annual wellness visit, subsequent   • Annual physical exam   • Medicare annual wellness visit, subsequent   • Elbow swelling, right   • Olecranon bursitis of right elbow   • Medicare annual wellness " visit, subsequent   • Left shoulder tendinitis   • Chronic pain of left knee       Advance Care Planning:  ACP discussion was held with the patient during this visit. Patient has an advance directive in EMR which is still valid.     Identification of Risk Factors:  Risk factors include: Cardiovascular risk  Obesity/Overweight .    Review of Systems    HEENT: Denies any hearing changes, eyesight changes, no headache or dizziness  NECK: Denies any pain, stiffness, swelling or dysphagia  CHEST: Denies cough or wheeze or recent lung infections  CARDIAC: Denies chest pain, pressure or palpitations  ABD: Denies nausea, vomiting or abdominal pain  : Denies dysuria  NEURO: Denies syncope, concussion, neuropathy  PSYCH: Denies any stress  EXTREM: Denies arthritic changes myalgia or arthralgia complains of left anterior shoulder discomfort and left knee medial aspect discomfort  SKIN: Denies any rash    Compared to one year ago, the patient feels his physical health is the same.  Compared to one year ago, the patient feels his mental health is the same.    Objective     Physical Exam     HEENT: Pupils equal reactive ENT clear, no facial asymmetry, pharynx is clear, cerumen noted in right ear, mild cerumen in the left ear, bilateral eardrums are normal  NECK: No masses bruit or thyromegaly  CHEST: Clear without rales wheezes or rhonchi  CARDIAC: Regular rhythm without gallop rub or click, blood pressure 138/76 mmHg, heart rate normal, no murmur heard  ABD: Liver edge normal, spleen normal, good bowel sounds, nontender  : Deferred  NEURO: Intact  PSYCH: Normal  EXTREM: No significant arthritic changes, no edema in bilateral ankles, posterior tibialis and dorsalis pedis excellent circulation, bilateral feet are warm, small amount of fluid noted in the left patella mild tenderness left anterior shoulder  SKIN: Clear      ECG 12 Lead    Date/Time: 3/6/2023 6:09 PM  Performed by: Keshawn Alexandre MD  Authorized by: Keshawn Alexandre,  "MD   Comparison: compared with previous ECG from 2/16/2022  Similar to previous ECG  Comparison to previous ECG: Current EKG is normal and similar to EKG of February 16, 2022  Rhythm: sinus rhythm  Rate: normal  Conduction: conduction normal  ST Segments: ST segments normal  T Waves: T waves normal  QRS axis: normal  Other: no other findings    Clinical impression: normal ECG  Comments: Current EKG is sinus rhythm normal similar to EKG of February 16, 2022         Patient Wellness Counseling:   Plan of care reviewed with patient at the conclusion of today's visit. Education was provided in regards to diagnosis, diet and exercise, prostate cancer screening discussed including benefit of early detection, potential need for follow-up, and harms associated with additional management. Patient agrees to screening.    Nutrition, physical activity, healthy weight,ways to reduce stress, adequate sleep, injury prevention, misuse of tobacco, alcohol and drugs, sexual behavior and STD's, dental health, mental health, and immunizations.    Plan of care reviewed with patient at the conclusion of today's visit. Education was provided regarding diagnosis, management and any prescribed or recommended OTC medications.  Patient verbalizes understanding of and agreement with management plan.        Vitals:    03/06/23 0933   BP: 134/86   BP Location: Right arm   Patient Position: Sitting   Cuff Size: Adult   Pulse: 64   Weight: 92.1 kg (203 lb)   Height: 177.8 cm (70\")   PainSc: 0-No pain       BMI is >= 25 and <30. (Overweight) The following options were offered after discussion;: nutrition counseling/recommendations      Assessment & Plan    Problem List Items Addressed This Visit        Cardiac and Vasculature    Benign essential hypertension    Overview     History of essential hypertension on enalapril 5 mg daily denies headache or cough         Current Assessment & Plan       Essential hypertension with current blood pressure " 134/84 and heart rate of 64 on enalapril 5 mg daily denies headache denies hyperactive airway cough recent chemistry profile of CMP is normal EKG is normal continue the         Relevant Medications    enalapril (VASOTEC) 5 MG tablet    Other Relevant Orders    ECG 12 Lead    Mixed hyperlipidemia    Overview     History of mixed hyperlipidemia on atorvastatin 20 mg daily denies myalgia arthralgia         Current Assessment & Plan       Mixed hyperlipidemia on atorvastatin 20 mg daily denies myalgia arthralgia, recent cholesterol of 175 HDL of 60 and LDL of 96 continue therapy and encouraged healthy cardiac diet with reduce carbs smaller portions and weight loss and continue therapy         Relevant Medications    atorvastatin (LIPITOR) 20 MG tablet       Health Encounters    Medicare annual wellness visit, subsequent - Primary    Overview     72-year-old male presents for Medicare annual wellness visit and physical examination on March 6, 2023            Musculoskeletal and Injuries    Left shoulder tendinitis    Overview     Complains of mild left anterior shoulder discomfort and soreness he knows of no specific injury, and it only bothers him intermittently with certain anterior rotation movements General exam reveals good range of motion and some soreness in the right anterior shoulder area         Current Assessment & Plan     Believe the patient has a low-grade tendinitis right anterior shoulder suggest heat application twice daily and ibuprofen twice daily and mild range of motion if not improved will refer to Ortho         Chronic pain of left knee    Overview     Remote meniscus surgery approximately 20 years ago left knee         Current Assessment & Plan     Current right knee medial aspect soreness and occasional discomfort it does not bother him with walking or with riding his bicycle long distances but occasionally on steps twisting etc. he had remote meniscus surgery approximately 20 years ago on the  left knee the knee is stable and he has not fallen, suggest elastic bracing when physically active such as playing pickle ball or riding bicycle to apply heat and ibuprofen as needed if not improved will refer to Ortho            Neuro    RLS (restless legs syndrome)    Overview     Occasional restless legs calcium magnesium zinc seems to be affected in therapy treating the leg cramping         Current Assessment & Plan     Continue with over-the-counter magnesium therapy          Problem List Items Addressed This Visit        Cardiac and Vasculature    Benign essential hypertension    Overview     History of essential hypertension on enalapril 5 mg daily denies headache or cough         Relevant Medications    enalapril (VASOTEC) 5 MG tablet    Mixed hyperlipidemia    Overview     History of mixed hyperlipidemia on atorvastatin 20 mg daily denies myalgia arthralgia         Relevant Medications    atorvastatin (LIPITOR) 20 MG tablet       Health Encounters    Medicare annual wellness visit, subsequent - Primary    Overview     72-year-old male presents for Medicare annual wellness visit and physical examination on March 6, 2023            Musculoskeletal and Injuries    Left shoulder tendinitis    Chronic pain of left knee    Overview     Remote meniscus surgery approximately 20 years ago left knee            Neuro    RLS (restless legs syndrome)     The patient will follow up in 6 months or as needed.    Patient Self-Management and Personalized Health Advice  The patient has been provided with information about: weight management and prevention of cardiac or vascular disease and preventive services including:   · Annual Wellness Visit (AWV).    Outpatient Encounter Medications as of 3/6/2023   Medication Sig Dispense Refill   • atorvastatin (LIPITOR) 20 MG tablet Take 1 tablet by mouth Daily. 90 tablet 3   • CALCIUM-MAGNESIUM-ZINC PO Take 7 tablets by mouth Daily.     • enalapril (VASOTEC) 5 MG tablet Take 1 tablet  by mouth Daily. 90 tablet 3   • Glucosamine-Chondroitin 750-600 MG tablet Take 1 tablet by mouth 2 (Two) Times a Day.     • valACYclovir (VALTREX) 500 MG tablet Take 0.5 tablets by mouth Daily. 45 tablet 3     No facility-administered encounter medications on file as of 3/6/2023.       Reviewed use of high risk medication in the elderly: yes  Reviewed for potential of harmful drug interactions in the elderly: yes    Follow Up:  Return in about 6 months (around 9/6/2023) for Recheck.     Patient Instructions   Lab work discussed  EKG normal discussed  Suggest heat application left shoulder twice daily along with Advil or ibuprofen twice daily and if not improved notify us  Suggest elastic brace left knee with heat application and Advil ibuprofen as needed  Continue all current medications  Encouraged healthy cardiac diet  Encouraged continued exercise including bicycle riding and now pickleball  Return visit in 6 months or as needed      An After Visit Summary and PPPS with all of these plans were given to the patient.      Keshawn Alexandre MD    Transcribed from ambient dictation for Keshawn Alexandre MD by Ally Crooks.  03/06/23   13:54 EST    Patient or patient representative verbalized consent to the visit recording.  I have personally performed the services described in this document as transcribed by the above individual, and it is both accurate and complete.  Keshawn Alexandre MD  3/6/2023  18:11 EST

## 2023-03-06 NOTE — ASSESSMENT & PLAN NOTE
Believe the patient has a low-grade tendinitis right anterior shoulder suggest heat application twice daily and ibuprofen twice daily and mild range of motion if not improved will refer to Ortho

## 2023-03-06 NOTE — PATIENT INSTRUCTIONS
Lab work discussed  EKG normal discussed  Suggest heat application left shoulder twice daily along with Advil or ibuprofen twice daily and if not improved notify us  Suggest elastic brace left knee with heat application and Advil ibuprofen as needed  Continue all current medications  Encouraged healthy cardiac diet  Encouraged continued exercise including bicycle riding and now pickleball  Return visit in 6 months or as needed

## 2023-03-06 NOTE — ASSESSMENT & PLAN NOTE
Essential hypertension with current blood pressure 134/84 and heart rate of 64 on enalapril 5 mg daily denies headache denies hyperactive airway cough recent chemistry profile of CMP is normal EKG is normal continue the

## 2023-08-30 DIAGNOSIS — E78.2 MIXED HYPERLIPIDEMIA: ICD-10-CM

## 2023-08-30 DIAGNOSIS — I10 BENIGN ESSENTIAL HYPERTENSION: Primary | ICD-10-CM

## 2023-09-05 ENCOUNTER — LAB (OUTPATIENT)
Dept: LAB | Facility: HOSPITAL | Age: 73
End: 2023-09-05
Payer: MEDICARE

## 2023-09-05 DIAGNOSIS — I10 BENIGN ESSENTIAL HYPERTENSION: ICD-10-CM

## 2023-09-05 DIAGNOSIS — E78.2 MIXED HYPERLIPIDEMIA: ICD-10-CM

## 2023-09-05 LAB
ALBUMIN SERPL-MCNC: 4.6 G/DL (ref 3.5–5.2)
ALBUMIN/GLOB SERPL: 2.2 G/DL
ALP SERPL-CCNC: 54 U/L (ref 39–117)
ALT SERPL W P-5'-P-CCNC: 27 U/L (ref 1–41)
ANION GAP SERPL CALCULATED.3IONS-SCNC: 10 MMOL/L (ref 5–15)
AST SERPL-CCNC: 28 U/L (ref 1–40)
BASOPHILS # BLD AUTO: 0.03 10*3/MM3 (ref 0–0.2)
BASOPHILS NFR BLD AUTO: 0.5 % (ref 0–1.5)
BILIRUB SERPL-MCNC: 0.5 MG/DL (ref 0–1.2)
BUN SERPL-MCNC: 22 MG/DL (ref 8–23)
BUN/CREAT SERPL: 21 (ref 7–25)
CALCIUM SPEC-SCNC: 9.8 MG/DL (ref 8.6–10.5)
CHLORIDE SERPL-SCNC: 102 MMOL/L (ref 98–107)
CHOLEST SERPL-MCNC: 175 MG/DL (ref 0–200)
CO2 SERPL-SCNC: 26 MMOL/L (ref 22–29)
CREAT SERPL-MCNC: 1.05 MG/DL (ref 0.76–1.27)
DEPRECATED RDW RBC AUTO: 39.8 FL (ref 37–54)
EGFRCR SERPLBLD CKD-EPI 2021: 75.4 ML/MIN/1.73
EOSINOPHIL # BLD AUTO: 0.31 10*3/MM3 (ref 0–0.4)
EOSINOPHIL NFR BLD AUTO: 4.7 % (ref 0.3–6.2)
ERYTHROCYTE [DISTWIDTH] IN BLOOD BY AUTOMATED COUNT: 11.6 % (ref 12.3–15.4)
GLOBULIN UR ELPH-MCNC: 2.1 GM/DL
GLUCOSE SERPL-MCNC: 86 MG/DL (ref 65–99)
HCT VFR BLD AUTO: 46.1 % (ref 37.5–51)
HDLC SERPL-MCNC: 64 MG/DL (ref 40–60)
HGB BLD-MCNC: 15.8 G/DL (ref 13–17.7)
IMM GRANULOCYTES # BLD AUTO: 0.02 10*3/MM3 (ref 0–0.05)
IMM GRANULOCYTES NFR BLD AUTO: 0.3 % (ref 0–0.5)
LDLC SERPL CALC-MCNC: 94 MG/DL (ref 0–100)
LDLC/HDLC SERPL: 1.44 {RATIO}
LYMPHOCYTES # BLD AUTO: 3.03 10*3/MM3 (ref 0.7–3.1)
LYMPHOCYTES NFR BLD AUTO: 46 % (ref 19.6–45.3)
MCH RBC QN AUTO: 32.6 PG (ref 26.6–33)
MCHC RBC AUTO-ENTMCNC: 34.3 G/DL (ref 31.5–35.7)
MCV RBC AUTO: 95.2 FL (ref 79–97)
MONOCYTES # BLD AUTO: 0.74 10*3/MM3 (ref 0.1–0.9)
MONOCYTES NFR BLD AUTO: 11.2 % (ref 5–12)
NEUTROPHILS NFR BLD AUTO: 2.45 10*3/MM3 (ref 1.7–7)
NEUTROPHILS NFR BLD AUTO: 37.3 % (ref 42.7–76)
NRBC BLD AUTO-RTO: 0 /100 WBC (ref 0–0.2)
PLATELET # BLD AUTO: 304 10*3/MM3 (ref 140–450)
PMV BLD AUTO: 9.9 FL (ref 6–12)
POTASSIUM SERPL-SCNC: 4.5 MMOL/L (ref 3.5–5.2)
PROT SERPL-MCNC: 6.7 G/DL (ref 6–8.5)
RBC # BLD AUTO: 4.84 10*6/MM3 (ref 4.14–5.8)
SODIUM SERPL-SCNC: 138 MMOL/L (ref 136–145)
TRIGL SERPL-MCNC: 95 MG/DL (ref 0–150)
VLDLC SERPL-MCNC: 17 MG/DL (ref 5–40)
WBC NRBC COR # BLD: 6.58 10*3/MM3 (ref 3.4–10.8)

## 2023-09-05 PROCEDURE — 85025 COMPLETE CBC W/AUTO DIFF WBC: CPT

## 2023-09-05 PROCEDURE — 80053 COMPREHEN METABOLIC PANEL: CPT

## 2023-09-05 PROCEDURE — 80061 LIPID PANEL: CPT

## 2023-09-06 ENCOUNTER — OFFICE VISIT (OUTPATIENT)
Dept: INTERNAL MEDICINE | Facility: CLINIC | Age: 73
End: 2023-09-06
Payer: MEDICARE

## 2023-09-06 VITALS
DIASTOLIC BLOOD PRESSURE: 80 MMHG | HEART RATE: 60 BPM | HEIGHT: 70 IN | SYSTOLIC BLOOD PRESSURE: 128 MMHG | WEIGHT: 192 LBS | BODY MASS INDEX: 27.49 KG/M2

## 2023-09-06 DIAGNOSIS — I10 BENIGN ESSENTIAL HYPERTENSION: Primary | ICD-10-CM

## 2023-09-06 DIAGNOSIS — M54.50 MIDLINE LOW BACK PAIN WITHOUT SCIATICA, UNSPECIFIED CHRONICITY: ICD-10-CM

## 2023-09-06 DIAGNOSIS — E78.2 MIXED HYPERLIPIDEMIA: ICD-10-CM

## 2023-09-06 PROCEDURE — 1160F RVW MEDS BY RX/DR IN RCRD: CPT | Performed by: INTERNAL MEDICINE

## 2023-09-06 PROCEDURE — 3079F DIAST BP 80-89 MM HG: CPT | Performed by: INTERNAL MEDICINE

## 2023-09-06 PROCEDURE — 99214 OFFICE O/P EST MOD 30 MIN: CPT | Performed by: INTERNAL MEDICINE

## 2023-09-06 PROCEDURE — 3074F SYST BP LT 130 MM HG: CPT | Performed by: INTERNAL MEDICINE

## 2023-09-06 PROCEDURE — 1159F MED LIST DOCD IN RCRD: CPT | Performed by: INTERNAL MEDICINE

## 2023-09-06 NOTE — ASSESSMENT & PLAN NOTE
Essential hypertension with current blood pressure 128/80 and heart rate of 60 on enalapril 5 mg daily denies headache or hyperactive airway cough with normal CMP blood sugar kidney function liver function and electrolyte salts continue therapy

## 2023-09-06 NOTE — PATIENT INSTRUCTIONS
Recent lab discussed no change in therapy  Suggest physical therapy for right SI lumbar pain  Continue current medications  Continue ADL active lifestyle and healthy cardiac diet  Return for annual wellness visit in 6 months or as needed

## 2023-09-06 NOTE — ASSESSMENT & PLAN NOTE
Mixed hyperlipidemia on atorvastatin 20 mg daily with current cholesterol 175 triglycerides 95 LDL 94 and HDL 64 with normal liver functions continue therapy

## 2023-10-13 ENCOUNTER — TELEMEDICINE (OUTPATIENT)
Dept: FAMILY MEDICINE CLINIC | Facility: TELEHEALTH | Age: 73
End: 2023-10-13
Payer: MEDICARE

## 2023-10-13 ENCOUNTER — E-VISIT (OUTPATIENT)
Dept: FAMILY MEDICINE CLINIC | Facility: TELEHEALTH | Age: 73
End: 2023-10-13
Payer: MEDICARE

## 2023-10-13 DIAGNOSIS — U07.1 COVID-19 VIRUS INFECTION: Primary | ICD-10-CM

## 2023-10-13 NOTE — PATIENT INSTRUCTIONS
"Here are the facts about Paxlovid. Please review and acknowledge.      Authorized Use     The FDA has authorized the emergency use of PAXLOVID, an investigational medicine, for the treatment of mild-to-moderate COVID-19 in adults and children (12 years of age and older weighing at least 88 pounds [40 kg]) with a positive test for the virus that causes COVID-19, and who are at high risk for progression to severe COVID-19, including hospitalization or death, under an EUA.     PAXLOVID is investigational because it is still being studied. There is limited information about the safety and effectiveness of using PAXLOVID to treat people with mild-to-moderate COVID-19.  FACT SHEET FOR PATIENTS, PARENTS, AND CAREGIVERS  EMERGENCY USE AUTHORIZATION (EUA) OF PAXLOVID  FOR CORONAVIRUS DISEASE 2019 (COVID-19)  You are being given this Fact Sheet because your healthcare provider believes it is   necessary to provide you with PAXLOVID for the treatment of mild-to-moderate   coronavirus disease (COVID-19) caused by the SARS-CoV-2 virus. This Fact Sheet   contains information to help you understand the risks and benefits of taking the   PAXLOVID you have received or may receive.   The [place] Food and Drug Administration (FDA) has issued an Emergency Use   Authorization (EUA) to make PAXLOVID available during the COVID-19 pandemic (for   more details about an EUA please see "What is an Emergency Use Authorization?"   at the end of this document). PAXLOVID is not an FDA-approved medicine in the   [place]. Read this Fact Sheet for information about PAXLOVID. Talk to your   healthcare provider about your options or if you have any questions. It is your choice to   take PAXLOVID.     What is COVID-19?  COVID-19 is caused by a virus called a coronavirus. You can get COVID-19 through   close contact with another person who has the virus.   COVID-19 illnesses have ranged from very mild-to-severe, including illness resulting in "   death. While information so far suggests that most COVID-19 illness is mild, serious   illness can happen and may cause some of your other medical conditions to become   worse. Older people and people of all ages with severe, long lasting (chronic) medical   conditions like heart disease, lung disease, and diabetes, for example seem to be at   higher risk of being hospitalized for COVID-19.     What is PAXLOVID?  PAXLOVID is an investigational medicine used to treat mild-to-moderate COVID-19 in   adults and children [12 years of age and older weighing at least 88 pounds (40 kg)] with   positive results of direct SARS-CoV-2 viral testing, and who are at high risk for   progression to severe COVID-19, including hospitalization or death. PAXLOVID is   investigational because it is still being studied. There is limited information about the   safety and effectiveness of using PAXLOVID to treat people with mild-to-moderate   COVID-19.    The FDA has authorized the emergency use of PAXLOVID for the treatment of mild-tomoderate COVID-19 in adults and children [12 years of age and older weighing at least   88 pounds (40 kg)] with a positive test for the virus that causes COVID-19, and who are   at high risk for progression to severe COVID-19, including hospitalization or death,   under an EUA.  What should I tell my healthcare provider before I take PAXLOVID?  Tell your healthcare provider if you:   Have any allergies   Have liver or kidney disease   Are pregnant or plan to become pregnant   Are breastfeeding a child   Have any serious illnesses    Tell your healthcare provider about all the medicines you take, including   prescription and over-the-counter medicines, vitamins, and herbal supplements.   Some medicines may interact with PAXLOVID and may cause serious side effects.   Keep a list of your medicines to show your healthcare provider and pharmacist when   you get a new medicine.   You can ask your healthcare  provider or pharmacist for a list of medicines that interact   with PAXLOVID. Do not start taking a new medicine without telling your   healthcare provider. Your healthcare provider can tell you if it is safe to take   PAXLOVID with other medicines.   Tell your healthcare provider if you are taking combined hormonal contraceptive.  PAXLOVID may affect how your birth control pills work. Females who are able to   become pregnant should use another effective alternative form of contraception or an   additional barrier method of contraception. Talk to your healthcare provider if you have   any questions about contraceptive methods that might be right for you.    How do I take PAXLOVID?    PAXLOVID consists of 2 medicines: nirmatrelvir and ritonavir.  o Take 2 pink tablets of nirmatrelvir with 1 white tablet of ritonavir by mouth  2 times each day (in the morning and in the evening) for 5 days. For each  dose, take all 3 tablets at the same time.  o If you have kidney disease, talk to your healthcare provider. You  may need a different dose.   Swallow the tablets whole. Do not chew, break, or crush the tablets.   Take PAXLOVID with or without food.   Do not stop taking PAXLOVID without talking to your healthcare provider, even if  you feel better.   If you miss a dose of PAXLOVID within 8 hours of the time it is usually taken,  take it as soon as you remember. If you miss a dose by more than 8 hours, skip  the missed dose and take the next dose at your regular time. Do not take  2 doses of PAXLOVID at the same time.   If you take too much PAXLOVID, call your healthcare provider or go to the  nearest hospital emergency room right away.   If you are taking a ritonavir- or cobicistat-containing medicine to treat hepatitis C  or Human Immunodeficiency Virus (HIV), you should continue to take your  medicine as prescribed by your healthcare provider.  3   Talk to your healthcare provider if you do not feel better or if you  feel worse after   5 days.    Who should generally not take PAXLOVID?   Do not take PAXLOVID if:   You are allergic to nirmatrelvir, ritonavir, or any of the ingredients in PAXLOVID.   You are taking any of the following medicines:  o Alfuzosin  o Pethidine, piroxicam, propoxyphene  o Ranolazine  o Amiodarone, dronedarone, flecainide, propafenone, quinidine  o Colchicine  o Lurasidone, pimozide, clozapine  o Dihydroergotamine, ergotamine, methylergonovine  o Lovastatin, simvastatin  o Sildenafil (Revatior) for pulmonary arterial hypertension (PAH)  o Triazolam, oral midazolam  o Apalutamide  o Carbamazepine, phenobarbital, phenytoin  o Rifampin  o Lidia's Wort (hypericum perforatum)  Taking PAXLOVID with these medicines may cause serious or life-threatening side   effects or affect how PAXLOVID works.     These are not the only medicines that may cause serious side effects if taken with   PAXLOVID. PAXLOVID may increase or decrease the levels of multiple other   medicines. It is very important to tell your healthcare provider about all of the medicines   you are taking because additional laboratory tests or changes in the dose of your other   medicines may be necessary while you are taking PAXLOVID. Your healthcare provider   may also tell you about specific symptoms to watch out for that may indicate that you   need to stop or decrease the dose of some of your other medicines.    What are the important possible side effects of PAXLOVID?   Possible side effects of PAXLOVID are:   Liver Problems. Tell your healthcare provider right away if you have any of  these signs and symptoms of liver problems: loss of appetite, yellowing of your  skin and the whites of eyes (jaundice), dark-colored urine, pale colored stools  and itchy skin, stomach area (abdominal) pain.   Resistance to HIV Medicines. If you have untreated HIV infection, PAXLOVID  may lead to some HIV medicines not working as well in the future.  4    Other  possible side effects include:  o altered sense of taste  o diarrhea  o high blood pressure  o muscle aches  These are not all the possible side effects of PAXLOVID. Not many people have taken  PAXLOVID. Serious and unexpected side effects may happen. PAXLOVID is still being   studied, so it is possible that all of the risks are not known at this time.    What other treatment choices are there?  Like PAXLOVID, FDA may allow for the emergency use of other medicines to treat   people with COVID-19. Go to https://www.fda.gov/emergency-preparedness-andresponse/mcm-legal-regulatory-and-policy-framework/emergency-use-authorization for   information on the emergency use of other medicines that are authorized by FDA to   treat people with COVID-19. Your healthcare provider may talk with you about clinical   trials for which you may be eligible.   It is your choice to be treated or not to be treated with PAXLOVID. Should you decide   not to receive it or for your child not to receive it, it will not change your standard   medical care.    What if I am pregnant or breastfeeding?   There is no experience treating pregnant women or breastfeeding mothers with   PAXLOVID. For a mother and unborn baby, the benefit of taking PAXLOVID may be   greater than the risk from the treatment. If you are pregnant, discuss your options and   specific situation with your healthcare provider.   It is recommended that you use effective barrier contraception or do not have sexual   activity while taking PAXLOVID.   If you are breastfeeding, discuss your options and specific situation with your   healthcare provider.     How do I report side effects with PAXLOVID?   Contact your healthcare provider if you have any side effects that bother you or do not   go away.   Report side effects to FDA MedWatch at www.fda.gov/medwatch or call 5-021-BAT1940 or you can report side effects to [employer] at the contact information provided   below.  Website Fax  number Telephone number  [email or web address] [phone]  5     How should I store PAXLOVID?   Store PAXLOVID tablets at room temperature, between 68?F to 77?F (20?C to 25?C).  How can I learn more about COVID-19?    Ask your healthcare provider.   Visit https://www.cdc.gov/COVID19.   Contact your local or state public health department.  What is an Emergency Use Authorization (EUA)?   The [place] FDA has made PAXLOVID available under an emergency access   mechanism called an Emergency Use Authorization (EUA). The EUA is supported by a    of Health and Human Service (HHS) declaration that circumstances exist to   justify the emergency use of drugs and biological products during the COVID-19   pandemic.     PAXLOVID for the treatment of mild-to-moderate COVID-19 in adults and children   [12 years of age and older weighing at least 88 pounds (40 kg)] with positive results of   direct SARS-CoV-2 viral testing, and who are at high risk for progression to severe   COVID-19, including hospitalization or death, has not undergone the same type of   review as an FDA-approved product. In issuing an EUA under the COVID-19 public   health emergency, the FDA has determined, among other things, that based on the   total amount of scientific evidence available including data from adequate and   well-controlled clinical trials, if available, it is reasonable to believe that the product may   be effective for diagnosing, treating, or preventing COVID-19, or a serious or   life-threatening disease or condition caused by COVID-19; that the known and potential   benefits of the product, when used to diagnose, treat, or prevent such disease or   condition, outweigh the known and potential risks of such product; and that there are no   adequate, approved, and available alternatives.    All of these criteria must be met to allow for the product to be used in the treatment of   patients during the  COVID-19 pandemic. The EUA for PAXLOVID is in effect for the   duration of the COVID-19 declaration justifying emergency use of this product, unless  terminated or revoked (after which the products may no longer be used under the   EUA).  6     Additional Information  For general questions, visit the website or call the telephone number provided below.   Website Telephone number  [employer]  [phone]  (5-306-O28-CUMA)  You can also go to [email or web address] or call [phone] for more   information.  [id number]   Revised: 12/22/2021

## 2023-10-13 NOTE — E-VISIT ESCALATED
Patient escalated   Provider MARINA Law chose to escalate patient to another level of care because: Insufficient information to diagnose   Patient was sent the following message:   Based on the information you've provided us, you need to take another step to get care.   What to do now:   Setup a video visit   Please, schedule your video visit   Video visit     You won't be charged for your eVisit. If you paid with a credit card, the charge will be reversed.   Chief Complaint: Cold, flu, COVID, sinus, hay fever, or seasonal allergies   Patient introduction   Patient is 73-year-old male with cough, congestion, nasal discharge, itchy or watery eyes, itchy nose or sneezing, sore throat, and sweats that started 3 to 5 days ago. Regarding date of symptom onset, patient writes: monday October 9.   COVID-19 testing history, vaccination status, and exposure:    Patient did a self-test within the last 24 hours. Test result was positive.    Received 3 doses of the COVID-19 vaccine (Pfizer, Pfizer, Pfizer). Received their most recent dose of the vaccine more than 14 days ago.   Risk factors for severe disease from COVID-19 infection    Age 65 or older.    BMI >= 25.   General presentation   Symptoms came on suddenly.   Fever:    No fever.   Sinus and nasal symptoms:    Nasal discharge.    Itchy nose or sneezing.    Clear nasal drainage.    Nasal drainage is thin.    Postnasal drip.    Sinus pain or pressure on or around the forehead, eyes, and nose.    Patient first noticed sinus pain 5 to 9 days ago.    Sinus pain is not worse with Valsalva.   Throat symptoms:    Sore throat.    Previous tonsillectomy.    Patient can swallow liquids and solid foods with ease.   Head and body aches:    Sweats.    No headache.    No chills.    No myalgia.    No fatigue.   Cough:    Cough is not noticeably worse depending on time of day    Cough is not productive of sputum.   Wheezing and shortness of breath:    No COPD diagnosis.    No  asthma diagnosis.    No wheezing.    No shortness of breath.    No previous albuterol inhaler use for URIs, bronchitis, or pneumonia.    No previous steroid inhaler use for URIs, bronchitis, or pneumonia.   Chest pain:    No chest pain.   Ear symptoms:    None.   Dizziness:    No dizziness.   Flu exposure:    Has not had a flu vaccine this season.   Patient is taking over-the-counter medications for current symptoms, including dextromethorphan and ibuprofen.   Review of red flags/alarm symptoms:    No changes in alertness or awareness.    No nuchal rigidity.    No symptoms suggesting airway obstruction.    No decreased urination.   Self-exam:    Height: 177 centimeters    Weight: 84.8 kilograms    No difficulty moving their chin toward their chest.    No palatal petechiae.    Neck lymph nodes feel normal.    No periorbital edema.   Current medications   Currently taking enalapril 5 MG tablet, valACYclovir 500 MG tablet, atorvastatin 20 MG tablet, Glucosamine-Chondroitin 750-600 MG tablet, and CALCIUM-MAGNESIUM-ZINC PO.   Medication allergies   None.   Medication contraindication review   Patient may be eligible for treatment with Paxlovid. Use of Paxlovid with drugs that either induce CYP3 or are highly dependent on CYP3 for clearance may lead to significant drug interactions.   No history of anaphylactic reaction to beta-lactam antibiotics; aspirin triad; blood dyscrasia; bone marrow depression; catecholamine-releasing paraganglioma; coronary artery disease; coagulation disorder; congenital long QT syndrome; depression; electrolyte abnormalities; fungal infection; GI bleeding; GI obstruction; G6PD deficiency; heart arrhythmia; hypertension; mononucleosis; myasthenia; recent myocardial infarction; NSAID-induced asthma/urticaria; Parkinson's disease; pheochromocytoma; porphyria; Reye syndrome; seizure disorder; ulcerative colitis; and urinary retention.   No history of metoclopramide-associated dystonic reaction and  tardive dyskinesia.   No known history of amoxicillin-clavulanate-associated cholestatic jaundice or hepatic impairment.   No known history of azithromycin-associated cholestatic jaundice or hepatic impairment.   Past medical history   Immune conditions: No immunocompromising conditions.   No history of cancer.   Social history   Never smoked tobacco.   High-risk household contacts:    Household contact 65 years or older.   Patient-submitted comments   Patient was asked if they had anything to add about their symptoms. Patient writes: I am some better today, Friday. the symptoms started Monday.   Patient did not request an excuse note.   Assessment:   Patient determined to need a level of care not appropriate to be delivered through eVisit.   Plan:   Patient informed of need to seek in-person care   ----------   Electronically signed by MARINA Law on 2023-10-13 at 11:44AM   ----------   Patient Interview Transcript:   Which of these symptoms are bothering you? Select all that apply.    Cough    Stuffed-up nose or sinuses    Runny nose    Itchy or watery eyes    Itchy nose or sneezing    Sore throat    Sweats   Not selected:    Shortness of breath    Loss of smell or taste    Hoarse voice or loss of voice    Headache    Fever    Chills    Muscle or body aches    Fatigue or tiredness    Nausea or vomiting    Diarrhea    I don't have any of these symptoms   When did your current symptoms start? Select one.    3 to 5 days ago   Not selected:    Less than 48 hours ago    6 to 9 days ago    10 to 14 days ago    2 to 3 weeks ago    3 to 4 weeks ago    More than a month ago   Do you know the exact date your symptoms started? If so, enter the date as MM/DD/YY. Select one.    Yes (specify): monday October 9   Not selected:    No   Did your symptoms come on suddenly or gradually? Select one.    Suddenly   Not selected:    Gradually    I'm not sure   Since your current symptoms started, have you had a viral test for  COVID-19? - This includes home self-tests as well as nose swab or saliva tests done at a doctor's office, lab, or testing site. - It does NOT include antibody tests, which use a blood sample to test for past infection. Select one.    Yes   Not selected:    No   When was your most recent COVID-19 test? Select one.    Within the last 24 hours   Not selected:    Within the last week (specify date as MM/DD/YY)    7 to 14 days ago    15 to 30 days ago    More than 1 month ago   What type of COVID-19 test did you most recently have? Select one.    Viral self-test or home test   Not selected:    Viral test at a doctor's office, lab, or testing site   What was the result of your most recent COVID-19 test? Select one.    Positive   Not selected:    Negative   Have you gotten the COVID-19 vaccine? Select one.    Yes   Not selected:    No   How many total doses of the COVID-19 vaccine have you gotten? This includes boosters as well as additional doses for those who are immunocompromised. Select one.    3 doses   Not selected:    1 dose    2 doses    4 doses    5 doses    6 doses   1st dose    Pfizer   Not selected:    J&J/Cirilo    Moderna    Novavax   2nd dose    Pfizer   Not selected:    J&J/Cirilo    Moderna    Novavax   3rd dose    Pfizer   Not selected:    J&J/Cirilo    Moderna    Novavax   When did you get your most recent dose of the COVID-19 vaccine?    More than 14 days ago   Not selected:    Less than 48 hours (2 days) ago    48 to 72 hours (3 days) ago    3 to 5 days ago    5 to 7 days ago    7 to 14 days ago   Do you cough so hard that it's made you gag or vomit? By gag, we mean has your coughing made you choke or dry heave? Select all that apply.    No   Not selected:    Yes, my coughing has made me gag    Yes, my coughing has made me vomit   When is your cough the worst? Select all that apply.    I haven't noticed a difference depending on time of day   Not selected:    In the morning, or when I wake up    " During the day    At nighttime, or while I'm sleeping   Are you coughing up mucus or phlegm? Select one.    No, my cough is dry   Not selected:    Yes, a little    Yes, a lot   Do you feel sinus pain or pressure in any of these areas?    In my forehead    Around my eyes    Behind my nose   Not selected:    In my cheeks    In my upper teeth or jaw    No   When did you first notice your sinus pain or pressure? Select one.    5 to 9 days ago   Not selected:    Less than 5 days ago    10 to 14 days ago    2 to 4 weeks ago    1 month ago or longer   Does coughing, sneezing, or leaning forward make your sinuses feel worse? Select one.    No   Not selected:    Yes   What color is your nasal drainage? Select one.    Clear   Not selected:    White    Yellow or yellowish    Green or greenish    My nose is stuffed but not draining or running   Is your nasal drainage thick or thin? Select one.    Thin   Not selected:    Thick   Is there any drainage (mucus) going down the back of your throat? This kind of drainage is also called \"postnasal drip.\" Select one.    Yes   Not selected:    No, not that I know of   Can you swallow liquids and solid foods? A sore throat may be painful when swallowing, but it shouldn't prevent you from swallowing. Select one.    Yes, with ease   Not selected:    Yes, but it's uncomfortable    Yes, but it's painful    It's hard to swallow anything because it feels like liquids and food get stuck in my throat    No, I can't swallow anything, liquid or solid foods   Is your throat pain worse on one side than the other? Select one.    No, it's the same on both sides   Not selected:    Yes, it's worse on the right side    Yes, it's worse on the left side   Since your symptoms started, have you felt dizzy? Select one.    No   Not selected:    Yes, but I can continue with my regular daily activities    Yes, and it makes it hard to stand, walk, or do daily activities   Do you have chest pain? You might also " feel it as discomfort, aching, tightness, or squeezing in the chest. Select one.    No   Not selected:    Yes   Have you urinated at least 3 times in the last 24 hours? Select one.    Yes   Not selected:    No   Changes in alertness or awareness may mean you need emergency care. Since your symptoms started, have you had any of these? Select all that apply.    None of the above   Not selected:    Confusion    Slurred speech    Not knowing where you are or what day it is    Difficulty staying conscious    Fainting or passing out   Do your symptoms include a whistling sound, or wheezing, when you breathe? Select one.    No   Not selected:    Yes    I'm not sure   Are your eyelids or the areas around your eyes puffy? Select one.    No   Not selected:    Yes, but I can easily open my eye(s)    Yes, and it's hard to open my eye(s)    Yes, and my eye(s) are completely swollen shut   Do you have any of these symptoms in your ear(s)? Select all that apply.    None of the above   Not selected:    Pain    Pressure    Fullness    Crackling or popping    Plugged or blocked sensation   Can you move your chin toward your chest?    Yes   Not selected:    No, my neck is too stiff   Are your tonsils larger than usual?    I've had my tonsils removed   Not selected:    Yes    No, not that I can tell   Are there red spots on the roof of your mouth or the back of your throat?    No, not that I can see   Not selected:    Yes   Are your glands/lymph nodes swollen, or does it hurt when you touch them?    No, not that I can tell   Not selected:    Yes   Have you ever been diagnosed with asthma? Select one.    No   Not selected:    Yes   Have you ever been prescribed albuterol to use for wheezing, cough, or shortness of breath caused by a cold, bronchitis, or pneumonia? Albuterol (ProAir, Proventil, Ventolin) is prescribed as an inhaler or a solution to be used with a nebulizer machine. Select one.    No, not that I know of   Not selected:     Yes   Have you ever been prescribed a steroid inhaler to use for wheezing, cough, or shortness of breath caused by a cold, bronchitis, or pneumonia? Some examples of steroid inhalers include Pulmicort, Flovent, Qvar, and Alvesco. Select one.    No, not that I know of   Not selected:    Yes   Have you ever been diagnosed with chronic obstructive pulmonary disease (COPD)? Select one.    No, not that I know of   Not selected:    Yes   Have you had a flu shot this season? Select one.    No   Not selected:    Yes, less than 2 weeks ago    Yes, 2 to 4 weeks ago    Yes, 1 to 3 months ago    Yes, 3 to 6 months ago    Yes, more than 6 months ago   The flu and COVID-19 can be more serious for people in certain groups. The next few questions help us figure out if you or anyone you live with is at higher risk for complications from these infections. Do any of these statements apply to you? Select all that apply.    None of the above   Not selected:    I'm     I'm     I'm Black    I'm  or    Do you smoke tobacco? Select one.    No   Not selected:    Yes, every day    Yes, some days    No, I quit   Do you have any of these conditions? Select all that apply.    None of the above   Not selected:    Chronic lung disease, such as cystic fibrosis or interstitial fibrosis    Heart disease, such as congenital heart disease, congestive heart failure, or coronary artery disease    Disorder of the brain, spinal cord, or nerves and muscles, such as dementia, cerebral palsy, epilepsy, muscular dystrophy, or developmental delay    Metabolic disorder or mitochondrial disease    Cerebrovascular disease, such as stroke or another condition affecting the blood vessels or blood supply to the brain    Down syndrome    Mood disorder, including depression or schizophrenia spectrum disorders    Substance use disorder, such as alcohol, opioid, or cocaine use disorder    Tuberculosis   Do you live in a group  care setting? Examples include: - Nursing home - Residential care - Psychiatric treatment facility - [facility] - Board and [facility] home - Homeless shelter - Foster care setting Select one.    No   Not selected:    Yes   Are you a healthcare worker? Select one.    No   Not selected:    Yes   People with a very high body mass index (BMI) are at higher risk for developing complications from the flu and severe illness from COVID-19. To determine your BMI, we need to know your weight and height. Please enter your weight (in pounds).    Weight   Please enter your height.    Height   Do you have any of these conditions that can affect the immune system? Scroll to see all options. Select all that apply.    None of these   Not selected:    History of bone marrow transplant    Chronic kidney disease    Chronic liver disease (including cirrhosis)    HIV/AIDS    Inflammatory bowel disease (Crohn's disease or ulcerative colitis)    Lupus    Moderate to severe plaque psoriasis    Multiple sclerosis    Rheumatoid arthritis    Sickle cell anemia    Alpha or beta thalassemia    History of solid organ transplant (kidney, liver, or heart)    History of spleen removal    An autoimmune disorder not listed here (specify)    A condition requiring treatment with long-term use of oral steroids (such as prednisone, prednisolone, or dexamethasone) (specify)   Have you ever been diagnosed with cancer? Select one.    No   Not selected:    Yes, I have cancer now    Yes, but I'm in remission   Do any of these apply to you? Select all that apply.    None of the above   Not selected:    I've been hospitalized within the last 5 days    I have diabetes    I'm in close contact with a child in    The flu and COVID-19 can be more serious for people in certain groups. Do any of these apply to the people who live with you? Select all that apply.    Over age 65   Not selected:    Under age 5            Black      or     Pregnant    Has given birth, had a miscarriage, had a pregnancy loss, or had an  in the last 2 weeks    None of the above   Does any member of your household have any of these medical conditions? Select all that apply.    None of the above   Not selected:    Asthma    Disorders of the brain, spinal cord, or nerves and muscles, such as dementia, cerebral palsy, epilepsy, muscular dystrophy, or developmental delay    Chronic lung disease, such as COPD or cystic fibrosis    Heart disease, such as congenital heart disease, congestive heart failure, or coronary artery disease    Cerebrovascular disease, such as stroke or another condition affecting the blood vessels or blood supply to the brain    Blood disorders, such as sickle cell disease    Diabetes    Metabolic disorders such as inherited metabolic disorders or mitochondrial disease    Kidney disorders    Liver disorders    Weakened immune system due to illness or medications such as chemotherapy or steroids    Children under the age of 19 who are on long-term aspirin therapy    Extreme obesity (BMI > 40)   Do you have any of these conditions? Scroll to see all options. Select all that apply.    None of the above   Not selected:    Aspirin triad (also known as Samter's triad or ASA triad)    Asthma or hives from taking aspirin or other NSAIDs, such as ibuprofen or naproxen    Blockage or narrowing of the blood vessels of the heart    Blood clotting disorder    Blood dyscrasia, such anemia, leukemia, lymphoma, or myeloma    Bone marrow depression    Catecholamine-releasing paraganglioma    Congenital long QT syndrome    Depression    Difficulty urinating or completely emptying your bladder    Uncorrected electrolyte abnormalities    Fungal infection    Gastrointestinal (GI) bleeding    Gastrointestinal (GI) obstruction    G6PD deficiency    Recent heart attack    High blood pressure    Irregular heartbeat or heart rhythm     Mononucleosis (mono)    Myasthenia gravis    Parkinson's disease    Pheochromocytoma    Reye syndrome    Seizure disorder    Thyroid disease    Ulcerative colitis   Have you ever had either of these conditions? Select all that apply.    No   Not selected:    Metoclopramide-associated dystonic reaction    Tardive dyskinesia   Just a few more questions about medications, and then you're finished. Have you used any non-prescription medications or nasal sprays for your current symptoms? Examples include saline sprays, decongestants, NyQuil, and Tylenol. Select one.    Yes   Not selected:    No   Which of these non-prescription medications have you tried? Scroll to see all options. Select all that apply.    Dextromethorphan (Delsym, Robitussin, Vicks DayQuil Cough)    Ibuprofen (Advil, Motrin, Midol)   Not selected:    Acetaminophen (Tylenol)    Budesonide (Rhinocort)    Cetirizine (Zyrtec)    Chlorpheniramine (Aller-chlor, Chlor-Trimeton)    Cromolyn (NasalCrom)    Diphenhydramine (Benadryl)    Fexofenadine (Allegra)    Fluticasone (Flonase)    Guaifenesin (Mucinex)    Guaifenesin/dextromethorphan (Delsym DM, Mucinex DM, Robitussin DM)    Ketotifen (Alaway, Zaditor)    Loratadine (Alavert, Claritin)    Naphazoline-pheniramine (Naphcon-A, Opcon-A, Visine-A)    Omeprazole (Prilosec)    Oxymetazoline (Afrin)    Phenylephrine (Sudafed PE)    Triamcinolone (Nasacort)    None of the above   Have you taken any monoamine oxidase inhibitor (MAOI) medications in the last 14 days? Examples include rasagiline (Azilect), selegiline (Eldepryl, Zelapar), isocarboxazid (Marplan), phenelzine (Nardil), and tranylcypromine (Parnate). Select one.    No, not that I know of   Not selected:    Yes   Do you take Kynmobi or Apokyn (apomorphine)? Select one.    No   Not selected:    Yes   Are you still taking these medications listed in your medical record? If you're not taking any of these, click Next. Select all that apply.    enalapril 5 MG  tablet    valACYclovir 500 MG tablet    atorvastatin 20 MG tablet    Glucosamine-Chondroitin 750-600 MG tablet    CALCIUM-MAGNESIUM-ZINC PO   Are you taking any other medications, vitamins, or supplements? Select one.    No   Not selected:    Yes   Have you ever had an allergic or bad reaction to any medication? Select one.    No   Not selected:    Yes   Are you allergic to milk or to the proteins found in milk (for example, whey or casein)? A milk allergy is different from lactose intolerance. Select one.    No, not that I know of   Not selected:    Yes   Have you ever had jaundice or liver problems as a result of taking amoxicillin-clavulanate (Augmentin)? Jaundice is a condition in which the skin and the whites of the eyes turn yellow. Select all that apply.    No, not that I know of   Not selected:    Yes, jaundice    Yes, liver problems   Have you ever had jaundice or liver problems as a result of taking azithromycin (Zithromax, Zmax)? Jaundice is a condition in which the skin and the whites of the eyes turn yellow. Select all that apply.    No, not that I know of   Not selected:    Yes, jaundice    Yes, liver problems   Do you need a doctor's note? A doctor's note confirms that you received care today and states when you can return to school or work. It does not contain information about your diagnosis or treatment plan. Your provider will make the final decision on whether to give you a doctor's note and for how long. Doctor's notes CANNOT be backdated. We can't provide medical leave paperwork through this type of visit. If more paperwork is needed to request time off, contact your primary care provider. Select one.    No   Not selected:    Today only (1 day)    Today and tomorrow (2 days)    3 days    5 days    7 days    10 days    14 days   Is there anything you'd like to add about your symptoms? Please limit your comments to the symptoms asked about in this interview. If you include comments about other  concerns, your provider may recommend that you be seen in person.    I am some better today, Friday. the symptoms started Monday   ----------   Medical history   The following information was received from the EMR on October 13, 2023.   Allergies:    No Known Allergies   - Allergy Type:   - Reaction:   - Severity: None   - Clinical Status: Active   - Verification Status: Confirmed   Medications:    enalapril (VASOTEC) tablet   - Route: Oral   - Start Date: February 10, 2023   - End Date: None   - Status: Active    valACYclovir (VALTREX) tablet   - Route: Oral   - Start Date: February 10, 2023   - End Date: None   - Status: Active    atorvastatin (LIPITOR) tablet   - Route: Oral   - Start Date: February 10, 2023   - End Date: None   - Status: Active    GLUCOSAMINE-CHONDROITIN 750-600 MG PO TABS   - Route: Oral   - Start Date: February 01, 2019   - End Date: None   - Status: Active    CALCIUM-MAGNESIUM-ZINC PO   - Route: Oral   - Start Date: August 13, 2020   - End Date: None   - Status: Active   Problem list:    Osteoarthritis   - Category: Problem List Item   - Health Status:   - Start Date: January 30, 2019   - End Date: None   - Status: Active    Benign essential hypertension   - Category: Problem List Item   - Health Status:   - Start Date: January 30, 2019   - End Date: None   - Status: Active    Hypercholesterolemia   - Category: Problem List Item   - Health Status:   - Start Date: January 30, 2019   - End Date: None   - Status: Inactive    Low back pain   - Category: Problem List Item   - Health Status:   - Start Date: January 30, 2019   - End Date: None   - Status: Active    Carbuncle and furuncle   - Category: Problem List Item   - Health Status:   - Start Date: January 30, 2019   - End Date: None   - Status: Active    RLS (restless legs syndrome)   - Category: Problem List Item   - Health Status:   - Start Date: January 30, 2019   - End Date: None   - Status: Active    Overweight   - Category: Problem List  Item   - Health Status:   - Start Date: February 07, 2019   - End Date: None   - Status: Active    Ganglion cyst   - Category: Problem List Item   - Health Status:   - Start Date: February 07, 2019   - End Date: None   - Status: Active    Incomplete right bundle branch block   - Category: Problem List Item   - Health Status:   - Start Date: February 07, 2019   - End Date: None   - Status: Active    Mixed hyperlipidemia   - Category: Problem List Item   - Health Status:   - Start Date: February 07, 2019   - End Date: None   - Status: Active    Obesity   - Category: Problem List Item   - Health Status:   - Start Date: February 07, 2019   - End Date: None   - Status: Inactive    Herpes simplex type 2 infection   - Category: Problem List Item   - Health Status:   - Start Date: August 12, 2019   - End Date: None   - Status: Active    Sinus bradycardia   - Category: Problem List Item   - Health Status:   - Start Date: February 11, 2020   - End Date: None   - Status: Active    Medicare annual wellness visit, subsequent   - Category: Problem List Item   - Health Status:   - Start Date: February 08, 2021   - End Date: None   - Status: Active    Annual physical exam   - Category: Problem List Item   - Health Status:   - Start Date: February 15, 2021   - End Date: None   - Status: Active    Medicare annual wellness visit, subsequent   - Category: Problem List Item   - Health Status:   - Start Date: February 16, 2022   - End Date: None   - Status: Active    Elbow swelling, right   - Category: Problem List Item   - Health Status:   - Start Date: August 02, 2022   - End Date: None   - Status: Active    Olecranon bursitis of right elbow   - Category: Problem List Item   - Health Status:   - Start Date: August 02, 2022   - End Date: None   - Status: Active    Medicare annual wellness visit, subsequent   - Category: Problem List Item   - Health Status:   - Start Date: March 05, 2023   - End Date: None   - Status: Active    Left  shoulder tendinitis   - Category: Problem List Item   - Health Status:   - Start Date: March 06, 2023   - End Date: None   - Status: Active    Chronic pain of left knee   - Category: Problem List Item   - Health Status:   - Start Date: March 06, 2023   - End Date: None   - Status: Active

## 2023-10-13 NOTE — PROGRESS NOTES
You have chosen to receive care through a telehealth visit.  Do you consent to use a video/audio connection for your medical care today? Yes     CHIEF COMPLAINT  No chief complaint on file.        HPI  Jamari Munoz is a 73 y.o. male  presents with complaint of tested positive for COVID with symptoms beginning on Monday, runny nose.  Wants to take paxlovid.  Is starting to feel better today.  Is taking dayquil and nyquil for symptoms which does help some.     Review of Systems  See HPI    Past Medical History:   Diagnosis Date    Arthritis     Ganglion     Hyperlipidemia     Hypertension        Family History   Problem Relation Age of Onset    Diabetes Maternal Grandmother         controlled    Arthritis Paternal Grandmother         not too bad    Osteoarthritis Paternal Grandmother     Coronary artery disease Paternal Grandfather     Diabetes Mother         mostly controlled       Social History     Socioeconomic History    Marital status:    Tobacco Use    Smoking status: Never    Smokeless tobacco: Never   Substance and Sexual Activity    Alcohol use: Yes     Alcohol/week: 14.0 standard drinks of alcohol     Types: 7 Cans of beer, 7 Glasses of wine per week    Drug use: No    Sexual activity: Yes     Partners: Female     Birth control/protection: None       Jamari Munoz  reports that he has never smoked. He has never used smokeless tobacco..              There were no vitals taken for this visit.    PHYSICAL EXAM  Physical Exam   Constitutional: He is oriented to person, place, and time. He appears well-developed and well-nourished. He does not have a sickly appearance. He appears ill.   HENT:   Head: Normocephalic and atraumatic.   Pulmonary/Chest: Effort normal.  No respiratory distress (persistent cough during visit).  Neurological: He is alert and oriented to person, place, and time.           Diagnoses and all orders for this visit:    1. COVID-19 virus infection (Primary)  -      Nirmatrelvir&Ritonavir 300/100 (PAXLOVID) 20 x 150 MG & 10 x 100MG tablet therapy pack tablet; Take 3 tablets by mouth 2 (Two) Times a Day for 5 days.  Dispense: 30 tablet; Refill: 0    --take medications as prescribed  --increase fluids, rest as needed, tylenol or ibuprofen for pain  --f/u in 5-7 days if no improvement        FOLLOW-UP  As discussed during visit with PCP/[facility] if no improvement or Urgent Care/Emergency Department if worsening of symptoms    Patient verbalizes understanding of medication dosage, comfort measures, instructions for treatment and follow-up.    Parvin Saleh, APRN  10/13/2023  11:54 EDT    The use of a video visit has been reviewed with the patient and verbal informed consent has been obtained. Myself and Jamari Munoz participated in this visit. The patient is located in [address] [place] [address].    I am located in [place], [place]. [employer] [employer] [facility] [employer] were utilized. I spent 8 minutes in the patient's chart for this visit.

## 2024-01-03 DIAGNOSIS — I10 BENIGN ESSENTIAL HYPERTENSION: ICD-10-CM

## 2024-01-03 DIAGNOSIS — B00.9 HERPES SIMPLEX TYPE 2 INFECTION: ICD-10-CM

## 2024-01-03 DIAGNOSIS — E78.2 MIXED HYPERLIPIDEMIA: ICD-10-CM

## 2024-01-03 RX ORDER — ATORVASTATIN CALCIUM 20 MG/1
20 TABLET, FILM COATED ORAL DAILY
Qty: 90 TABLET | Refills: 3 | Status: SHIPPED | OUTPATIENT
Start: 2024-01-03

## 2024-01-03 RX ORDER — VALACYCLOVIR HYDROCHLORIDE 500 MG/1
250 TABLET, FILM COATED ORAL DAILY
Qty: 45 TABLET | Refills: 3 | Status: SHIPPED | OUTPATIENT
Start: 2024-01-03

## 2024-01-03 RX ORDER — ENALAPRIL MALEATE 5 MG/1
5 TABLET ORAL DAILY
Qty: 90 TABLET | Refills: 3 | Status: SHIPPED | OUTPATIENT
Start: 2024-01-03

## 2024-02-09 DIAGNOSIS — I10 BENIGN ESSENTIAL HYPERTENSION: ICD-10-CM

## 2024-02-09 DIAGNOSIS — B00.9 HERPES SIMPLEX TYPE 2 INFECTION: ICD-10-CM

## 2024-02-09 DIAGNOSIS — E78.2 MIXED HYPERLIPIDEMIA: ICD-10-CM

## 2024-02-09 RX ORDER — VALACYCLOVIR HYDROCHLORIDE 500 MG/1
250 TABLET, FILM COATED ORAL DAILY
Qty: 45 TABLET | Refills: 3 | Status: SHIPPED | OUTPATIENT
Start: 2024-02-09

## 2024-02-09 RX ORDER — ATORVASTATIN CALCIUM 20 MG/1
20 TABLET, FILM COATED ORAL DAILY
Qty: 90 TABLET | Refills: 3 | Status: SHIPPED | OUTPATIENT
Start: 2024-02-09

## 2024-02-09 RX ORDER — ENALAPRIL MALEATE 5 MG/1
5 TABLET ORAL DAILY
Qty: 90 TABLET | Refills: 3 | Status: SHIPPED | OUTPATIENT
Start: 2024-02-09

## 2024-03-07 ENCOUNTER — PATIENT MESSAGE (OUTPATIENT)
Dept: INTERNAL MEDICINE | Facility: CLINIC | Age: 74
End: 2024-03-07

## 2024-03-07 DIAGNOSIS — R00.1 SINUS BRADYCARDIA: ICD-10-CM

## 2024-03-07 DIAGNOSIS — I10 BENIGN ESSENTIAL HYPERTENSION: Primary | ICD-10-CM

## 2024-03-07 DIAGNOSIS — Z12.5 SCREENING PSA (PROSTATE SPECIFIC ANTIGEN): ICD-10-CM

## 2024-03-07 DIAGNOSIS — E78.2 MIXED HYPERLIPIDEMIA: ICD-10-CM

## 2024-03-07 NOTE — TELEPHONE ENCOUNTER
From: Jamari Munoz  To: Keshawn Alexandre  Sent: 3/7/2024 1:47 PM EST  Subject: Labs    I would like to schedule my labs for next Tuesday before my appt on Thursday.....

## 2024-03-12 ENCOUNTER — LAB (OUTPATIENT)
Dept: LAB | Facility: HOSPITAL | Age: 74
End: 2024-03-12
Payer: MEDICARE

## 2024-03-12 DIAGNOSIS — I10 BENIGN ESSENTIAL HYPERTENSION: ICD-10-CM

## 2024-03-12 DIAGNOSIS — Z12.5 SCREENING PSA (PROSTATE SPECIFIC ANTIGEN): ICD-10-CM

## 2024-03-12 DIAGNOSIS — E78.2 MIXED HYPERLIPIDEMIA: ICD-10-CM

## 2024-03-12 LAB
ALBUMIN SERPL-MCNC: 4.3 G/DL (ref 3.5–5.2)
ALBUMIN UR-MCNC: <1.2 MG/DL
ALBUMIN/GLOB SERPL: 2 G/DL
ALP SERPL-CCNC: 53 U/L (ref 39–117)
ALT SERPL W P-5'-P-CCNC: 28 U/L (ref 1–41)
ANION GAP SERPL CALCULATED.3IONS-SCNC: 8.5 MMOL/L (ref 5–15)
AST SERPL-CCNC: 26 U/L (ref 1–40)
BASOPHILS # BLD AUTO: 0.04 10*3/MM3 (ref 0–0.2)
BASOPHILS NFR BLD AUTO: 0.4 % (ref 0–1.5)
BILIRUB SERPL-MCNC: 0.6 MG/DL (ref 0–1.2)
BUN SERPL-MCNC: 19 MG/DL (ref 8–23)
BUN/CREAT SERPL: 17.6 (ref 7–25)
CALCIUM SPEC-SCNC: 9.9 MG/DL (ref 8.6–10.5)
CHLORIDE SERPL-SCNC: 104 MMOL/L (ref 98–107)
CHOLEST SERPL-MCNC: 158 MG/DL (ref 0–200)
CO2 SERPL-SCNC: 27.5 MMOL/L (ref 22–29)
CREAT SERPL-MCNC: 1.08 MG/DL (ref 0.76–1.27)
CREAT UR-MCNC: 107.6 MG/DL
DEPRECATED RDW RBC AUTO: 43.5 FL (ref 37–54)
EGFRCR SERPLBLD CKD-EPI 2021: 72.5 ML/MIN/1.73
EOSINOPHIL # BLD AUTO: 0.27 10*3/MM3 (ref 0–0.4)
EOSINOPHIL NFR BLD AUTO: 2.8 % (ref 0.3–6.2)
ERYTHROCYTE [DISTWIDTH] IN BLOOD BY AUTOMATED COUNT: 12.1 % (ref 12.3–15.4)
GLOBULIN UR ELPH-MCNC: 2.2 GM/DL
GLUCOSE SERPL-MCNC: 81 MG/DL (ref 65–99)
HCT VFR BLD AUTO: 46.1 % (ref 37.5–51)
HDLC SERPL-MCNC: 58 MG/DL (ref 40–60)
HGB BLD-MCNC: 15.4 G/DL (ref 13–17.7)
IMM GRANULOCYTES # BLD AUTO: 0.04 10*3/MM3 (ref 0–0.05)
IMM GRANULOCYTES NFR BLD AUTO: 0.4 % (ref 0–0.5)
LDLC SERPL CALC-MCNC: 82 MG/DL (ref 0–100)
LDLC/HDLC SERPL: 1.38 {RATIO}
LYMPHOCYTES # BLD AUTO: 3.17 10*3/MM3 (ref 0.7–3.1)
LYMPHOCYTES NFR BLD AUTO: 33 % (ref 19.6–45.3)
MCH RBC QN AUTO: 32.6 PG (ref 26.6–33)
MCHC RBC AUTO-ENTMCNC: 33.4 G/DL (ref 31.5–35.7)
MCV RBC AUTO: 97.5 FL (ref 79–97)
MICROALBUMIN/CREAT UR: NORMAL MG/G{CREAT}
MONOCYTES # BLD AUTO: 0.95 10*3/MM3 (ref 0.1–0.9)
MONOCYTES NFR BLD AUTO: 9.9 % (ref 5–12)
NEUTROPHILS NFR BLD AUTO: 5.14 10*3/MM3 (ref 1.7–7)
NEUTROPHILS NFR BLD AUTO: 53.5 % (ref 42.7–76)
NRBC BLD AUTO-RTO: 0 /100 WBC (ref 0–0.2)
PLATELET # BLD AUTO: 313 10*3/MM3 (ref 140–450)
PMV BLD AUTO: 10 FL (ref 6–12)
POTASSIUM SERPL-SCNC: 4.6 MMOL/L (ref 3.5–5.2)
PROT SERPL-MCNC: 6.5 G/DL (ref 6–8.5)
PSA SERPL-MCNC: 0.9 NG/ML (ref 0–4)
RBC # BLD AUTO: 4.73 10*6/MM3 (ref 4.14–5.8)
SODIUM SERPL-SCNC: 140 MMOL/L (ref 136–145)
TRIGL SERPL-MCNC: 99 MG/DL (ref 0–150)
TSH SERPL DL<=0.05 MIU/L-ACNC: 1.83 UIU/ML (ref 0.27–4.2)
VLDLC SERPL-MCNC: 18 MG/DL (ref 5–40)
WBC NRBC COR # BLD AUTO: 9.61 10*3/MM3 (ref 3.4–10.8)

## 2024-03-12 PROCEDURE — 85025 COMPLETE CBC W/AUTO DIFF WBC: CPT

## 2024-03-12 PROCEDURE — G0103 PSA SCREENING: HCPCS

## 2024-03-12 PROCEDURE — 84443 ASSAY THYROID STIM HORMONE: CPT

## 2024-03-12 PROCEDURE — 82570 ASSAY OF URINE CREATININE: CPT

## 2024-03-12 PROCEDURE — 80053 COMPREHEN METABOLIC PANEL: CPT

## 2024-03-12 PROCEDURE — 82043 UR ALBUMIN QUANTITATIVE: CPT

## 2024-03-12 PROCEDURE — 80061 LIPID PANEL: CPT

## 2024-03-14 ENCOUNTER — OFFICE VISIT (OUTPATIENT)
Dept: INTERNAL MEDICINE | Facility: CLINIC | Age: 74
End: 2024-03-14
Payer: MEDICARE

## 2024-03-14 VITALS
DIASTOLIC BLOOD PRESSURE: 84 MMHG | OXYGEN SATURATION: 97 % | BODY MASS INDEX: 28.35 KG/M2 | HEIGHT: 70 IN | SYSTOLIC BLOOD PRESSURE: 136 MMHG | HEART RATE: 60 BPM | WEIGHT: 198 LBS

## 2024-03-14 DIAGNOSIS — I10 BENIGN ESSENTIAL HYPERTENSION: ICD-10-CM

## 2024-03-14 DIAGNOSIS — E78.2 MIXED HYPERLIPIDEMIA: ICD-10-CM

## 2024-03-14 DIAGNOSIS — S49.91XA INJURY OF RIGHT SHOULDER, INITIAL ENCOUNTER: ICD-10-CM

## 2024-03-14 DIAGNOSIS — Z00.00 MEDICARE ANNUAL WELLNESS VISIT, SUBSEQUENT: Primary | ICD-10-CM

## 2024-03-14 NOTE — ASSESSMENT & PLAN NOTE
Essential hypertension with current blood pressure 136/84 heart rate of 60 O2 saturation 97% on enalapril 5 mg daily denies headache or cough with recent CMP normal and EKG sinus rhythm normal continue therapy

## 2024-03-14 NOTE — ASSESSMENT & PLAN NOTE
Mixed hyperlipidemia on atorvastatin 20 mg daily denies myalgia or arthralgia liver functions are normal recent cholesterol on March 13 of 158 and LDL of 82 continue therapy
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
## 2024-03-14 NOTE — PATIENT INSTRUCTIONS
Lab discussed of March 12, 2024  EKG normal discussed  Continue current medications  Continue healthy cardiac diet but encouraged reduced carbs and weight loss  Continue daily exercise ADL active lifestyle  Suggest Tylenol and heat application right shoulder and range of motion exercises  Return visit in 6 months or as needed

## 2024-04-05 ENCOUNTER — APPOINTMENT (OUTPATIENT)
Dept: MRI IMAGING | Facility: HOSPITAL | Age: 74
End: 2024-04-05
Payer: MEDICARE

## 2024-04-05 ENCOUNTER — APPOINTMENT (OUTPATIENT)
Dept: CT IMAGING | Facility: HOSPITAL | Age: 74
End: 2024-04-05
Payer: MEDICARE

## 2024-04-05 ENCOUNTER — APPOINTMENT (OUTPATIENT)
Dept: GENERAL RADIOLOGY | Facility: HOSPITAL | Age: 74
End: 2024-04-05
Payer: MEDICARE

## 2024-04-05 ENCOUNTER — HOSPITAL ENCOUNTER (EMERGENCY)
Facility: HOSPITAL | Age: 74
Discharge: HOME | End: 2024-04-05
Attending: EMERGENCY MEDICINE
Payer: MEDICARE

## 2024-04-05 VITALS
DIASTOLIC BLOOD PRESSURE: 83 MMHG | BODY MASS INDEX: 28.06 KG/M2 | RESPIRATION RATE: 20 BRPM | TEMPERATURE: 97 F | WEIGHT: 196 LBS | HEART RATE: 55 BPM | HEIGHT: 70 IN | SYSTOLIC BLOOD PRESSURE: 125 MMHG | OXYGEN SATURATION: 95 %

## 2024-04-05 DIAGNOSIS — R26.81 GAIT INSTABILITY: ICD-10-CM

## 2024-04-05 DIAGNOSIS — R29.90 EPISODE OF TRANSIENT NEUROLOGIC SYMPTOMS: ICD-10-CM

## 2024-04-05 DIAGNOSIS — R68.89 OTHER GENERAL SYMPTOMS AND SIGNS: ICD-10-CM

## 2024-04-05 DIAGNOSIS — I10 BENIGN ESSENTIAL HYPERTENSION: ICD-10-CM

## 2024-04-05 DIAGNOSIS — H53.2 DIPLOPIA: Primary | ICD-10-CM

## 2024-04-05 DIAGNOSIS — G45.9 TIA (TRANSIENT ISCHEMIC ATTACK): ICD-10-CM

## 2024-04-05 LAB
ALBUMIN SERPL-MCNC: 4.4 G/DL (ref 3.5–5.2)
ALBUMIN/GLOB SERPL: 1.8 G/DL
ALP SERPL-CCNC: 57 U/L (ref 39–117)
ALT SERPL W P-5'-P-CCNC: 32 U/L (ref 1–41)
ANION GAP SERPL CALCULATED.3IONS-SCNC: 8 MMOL/L (ref 5–15)
AST SERPL-CCNC: 33 U/L (ref 1–40)
BASOPHILS # BLD AUTO: 0.04 10*3/MM3 (ref 0–0.2)
BASOPHILS NFR BLD AUTO: 0.5 % (ref 0–1.5)
BILIRUB SERPL-MCNC: 0.6 MG/DL (ref 0–1.2)
BILIRUB UR QL STRIP: NEGATIVE
BUN SERPL-MCNC: 17 MG/DL (ref 8–23)
BUN/CREAT SERPL: 18.5 (ref 7–25)
CALCIUM SPEC-SCNC: 9.9 MG/DL (ref 8.6–10.5)
CHLORIDE SERPL-SCNC: 101 MMOL/L (ref 98–107)
CLARITY UR: CLEAR
CO2 SERPL-SCNC: 25 MMOL/L (ref 22–29)
COLOR UR: YELLOW
CREAT SERPL-MCNC: 0.92 MG/DL (ref 0.76–1.27)
DEPRECATED RDW RBC AUTO: 44.6 FL (ref 37–54)
EGFRCR SERPLBLD CKD-EPI 2021: 87.8 ML/MIN/1.73
EOSINOPHIL # BLD AUTO: 0.37 10*3/MM3 (ref 0–0.4)
EOSINOPHIL NFR BLD AUTO: 4.8 % (ref 0.3–6.2)
ERYTHROCYTE [DISTWIDTH] IN BLOOD BY AUTOMATED COUNT: 12.3 % (ref 12.3–15.4)
GLOBULIN UR ELPH-MCNC: 2.4 GM/DL
GLUCOSE SERPL-MCNC: 103 MG/DL (ref 65–99)
GLUCOSE UR STRIP-MCNC: NEGATIVE MG/DL
HCT VFR BLD AUTO: 48.8 % (ref 37.5–51)
HGB BLD-MCNC: 16.2 G/DL (ref 13–17.7)
HGB UR QL STRIP.AUTO: NEGATIVE
HOLD SPECIMEN: NORMAL
IMM GRANULOCYTES # BLD AUTO: 0.05 10*3/MM3 (ref 0–0.05)
IMM GRANULOCYTES NFR BLD AUTO: 0.7 % (ref 0–0.5)
KETONES UR QL STRIP: NEGATIVE
LEUKOCYTE ESTERASE UR QL STRIP.AUTO: NEGATIVE
LYMPHOCYTES # BLD AUTO: 3.23 10*3/MM3 (ref 0.7–3.1)
LYMPHOCYTES NFR BLD AUTO: 42.3 % (ref 19.6–45.3)
MAGNESIUM SERPL-MCNC: 2.1 MG/DL (ref 1.6–2.4)
MCH RBC QN AUTO: 32.5 PG (ref 26.6–33)
MCHC RBC AUTO-ENTMCNC: 33.2 G/DL (ref 31.5–35.7)
MCV RBC AUTO: 98 FL (ref 79–97)
MONOCYTES # BLD AUTO: 0.77 10*3/MM3 (ref 0.1–0.9)
MONOCYTES NFR BLD AUTO: 10.1 % (ref 5–12)
NEUTROPHILS NFR BLD AUTO: 3.17 10*3/MM3 (ref 1.7–7)
NEUTROPHILS NFR BLD AUTO: 41.6 % (ref 42.7–76)
NITRITE UR QL STRIP: NEGATIVE
NRBC BLD AUTO-RTO: 0 /100 WBC (ref 0–0.2)
PH UR STRIP.AUTO: 7.5 [PH] (ref 5–8)
PLATELET # BLD AUTO: 316 10*3/MM3 (ref 140–450)
PMV BLD AUTO: 9.6 FL (ref 6–12)
POTASSIUM SERPL-SCNC: 4.5 MMOL/L (ref 3.5–5.2)
PROT SERPL-MCNC: 6.8 G/DL (ref 6–8.5)
PROT UR QL STRIP: NEGATIVE
RBC # BLD AUTO: 4.98 10*6/MM3 (ref 4.14–5.8)
SODIUM SERPL-SCNC: 134 MMOL/L (ref 136–145)
SP GR UR STRIP: 1.03 (ref 1–1.03)
TROPONIN T SERPL HS-MCNC: 11 NG/L
UROBILINOGEN UR QL STRIP: NORMAL
WBC NRBC COR # BLD AUTO: 7.63 10*3/MM3 (ref 3.4–10.8)
WHOLE BLOOD HOLD COAG: NORMAL
WHOLE BLOOD HOLD SPECIMEN: NORMAL

## 2024-04-05 PROCEDURE — 71045 X-RAY EXAM CHEST 1 VIEW: CPT

## 2024-04-05 PROCEDURE — 85025 COMPLETE CBC W/AUTO DIFF WBC: CPT

## 2024-04-05 PROCEDURE — 70498 CT ANGIOGRAPHY NECK: CPT

## 2024-04-05 PROCEDURE — 36415 COLL VENOUS BLD VENIPUNCTURE: CPT

## 2024-04-05 PROCEDURE — 84484 ASSAY OF TROPONIN QUANT: CPT

## 2024-04-05 PROCEDURE — 70551 MRI BRAIN STEM W/O DYE: CPT

## 2024-04-05 PROCEDURE — 25510000001 IOPAMIDOL PER 1 ML: Performed by: EMERGENCY MEDICINE

## 2024-04-05 PROCEDURE — 80053 COMPREHEN METABOLIC PANEL: CPT

## 2024-04-05 PROCEDURE — 0042T HC CT CEREBRAL PERFUSION W/WO CONTRAST: CPT

## 2024-04-05 PROCEDURE — 99285 EMERGENCY DEPT VISIT HI MDM: CPT

## 2024-04-05 PROCEDURE — 99284 EMERGENCY DEPT VISIT MOD MDM: CPT | Performed by: NURSE PRACTITIONER

## 2024-04-05 PROCEDURE — 70450 CT HEAD/BRAIN W/O DYE: CPT

## 2024-04-05 PROCEDURE — 93005 ELECTROCARDIOGRAM TRACING: CPT

## 2024-04-05 PROCEDURE — 70496 CT ANGIOGRAPHY HEAD: CPT

## 2024-04-05 PROCEDURE — 81003 URINALYSIS AUTO W/O SCOPE: CPT

## 2024-04-05 PROCEDURE — 83735 ASSAY OF MAGNESIUM: CPT

## 2024-04-05 RX ORDER — SODIUM CHLORIDE 0.9 % (FLUSH) 0.9 %
10 SYRINGE (ML) INJECTION AS NEEDED
Status: DISCONTINUED | OUTPATIENT
Start: 2024-04-05 | End: 2024-04-05 | Stop reason: HOSPADM

## 2024-04-05 RX ORDER — ASPIRIN 81 MG/1
81 TABLET, CHEWABLE ORAL DAILY
Status: DISCONTINUED | OUTPATIENT
Start: 2024-04-05 | End: 2024-04-05 | Stop reason: HOSPADM

## 2024-04-05 RX ADMIN — IOPAMIDOL 115 ML: 755 INJECTION, SOLUTION INTRAVENOUS at 10:53

## 2024-04-05 RX ADMIN — ASPIRIN 81 MG: 81 TABLET, CHEWABLE ORAL at 11:24

## 2024-04-05 NOTE — DISCHARGE INSTRUCTIONS
Initiate Aspirin 81mg daily.  Follow up in the [facility]roke [facility]rology [facility] as scheduled.    Discussed the importance of medication compliance Aspirin 81mg daily and Atorvastatin 20mg nightly and lifestyle modifications adequate control of blood pressure, adequate control of cholesterol (goal LDL <70), adequate control of glucose (<140, A1c goal <7), increased physical activity, and implementation of healthy diet to help reduce the risk of future cerebrovascular events.  Also discussed the signs symptoms that would warrant the patient return back to the emergency department including unilateral weakness, unilateral numbness, visual disturbances, loss of balance, speech difficulties, and/or a sudden severe headache.

## 2024-04-05 NOTE — CONSULTS
Stroke Consult Note    Patient Name: Jamari Munoz   MRN: [id number]  Age: 73 y.o.  Sex: male  : 1950    Primary Care Physician: Keshawn Alexandre MD  Referring Physician: Dr. Richard Delacruz    TIME STROKE TEAM CALLED: 0940 EST     TIME PATIENT SEEN: 1000 EST  ED consult.    Handedness: Right  Race:     Chief Complaint/Reason for Consultation: Transient episode of double vision and difficulty walking    Subjective .  HPI:     Jamari Munoz is a 73-year-old  male with a past medical history significant for hyperlipidemia, hypertension, bradycardia, ROS, multiple lifetime and arthritis.  He presented to the emergency department this morning with complaints of double vision and difficulty walking.  He was driving his car and was stopped at a traffic light when he had a sudden onset of double vision.  He reports that with closure or covering of the left eye or the right eye that the double vision was corrected.  He was able to continue his drive to his destination and when he got out of the car at that time he realized that he was also having a very mild difficulty with his gait.  Entire episode lasted approximately 6-10 minutes.  Now in the emergency department he denies any neurologic changes or symptoms.  Blood pressure is normotensive 125/83.  He is afebrile.  Denies any pain or headache.  Speech is fluent and intact.  NIH 0 on my examination.  Of note he does tell me that he has a history of ocular migraine with aura, however he does feel like this episode was different than his typical episodes. Further workup to be completed at this time in the ED.     Last Known Normal Date/Time: 830 EST     Review of Systems   HENT:  Negative for trouble swallowing.    Eyes:  Positive for visual disturbance.   Cardiovascular:  Negative for palpitations.   Gastrointestinal:  Positive for vomiting. Negative for nausea.   Musculoskeletal:  Positive for gait problem.   Neurological:  Negative for speech  difficulty, weakness and headaches.   Psychiatric/Behavioral:  Negative for confusion. The patient is not nervous/anxious.         Past Medical History:   Diagnosis Date    Arthritis     Ganglion     Hyperlipidemia     Hypertension      Past Surgical History:   Procedure Laterality Date    CYST REMOVAL      cyst removed left shoulder and left knee    KNEE MENISCAL REPAIR  2005    TONSILLECTOMY AND ADENOIDECTOMY      VASECTOMY       Family History   Problem Relation Age of Onset    Diabetes Maternal Grandmother         controlled    Arthritis Paternal Grandmother         not too bad    Osteoarthritis Paternal Grandmother     Coronary artery disease Paternal Grandfather     Diabetes Mother         mostly controlled     Social History     Socioeconomic History    Marital status:    Tobacco Use    Smoking status: Never    Smokeless tobacco: Never   Substance and Sexual Activity    Alcohol use: Yes     Alcohol/week: 14.0 standard drinks of alcohol     Types: 7 Cans of beer, 7 Glasses of wine per week    Drug use: No    Sexual activity: Yes     Partners: Female     Birth control/protection: None     No Known Allergies  Prior to Admission medications    Medication Sig Start Date End Date Taking? Authorizing Provider   atorvastatin (LIPITOR) 20 MG tablet TAKE 1 TABLET DAILY 2/9/24   Neri Collins MD   CALCIUM-MAGNESIUM-ZINC PO Take 7 tablets by mouth Daily.    ProviderKalyan MD   enalapril (VASOTEC) 5 MG tablet TAKE 1 TABLET DAILY 2/9/24   Neri Collins MD   Glucosamine-Chondroitin 750-600 MG tablet Take 1 tablet by mouth 2 (Two) Times a Day. 2/1/13   Kalyan Barrett MD   valACYclovir (VALTREX) 500 MG tablet TAKE ONE-HALF (1/2) TABLET DAILY 2/9/24   Keshawn Alexandre MD     Objective     Temp:  [97 °F (36.1 °C)] 97 °F (36.1 °C)  Heart Rate:  [65] 65  Resp:  [20] 20  BP: (158)/(88) 158/88    Neurological Exam  Mental Status  Alert. Oriented to person, place, time and situation. Oriented to person,  place, and time. Speech is normal. Language is fluent with no aphasia. Fund of knowledge is appropriate for level of education.    Cranial Nerves  CN II: Visual fields full to confrontation.  CN III, IV, VI: Extraocular movements intact bilaterally. Pupils equal round and reactive to light bilaterally.  CN V:  Right: Facial sensation is normal.  Left: Facial sensation is normal on the left.  CN VII:  Right: There is no facial weakness.  Left: There is no facial weakness.  CN VIII: Hearing is intact bilaterally .  CN IX, X:  Right: Palate is normal.  Left: Palate is normal.    Motor  Normal muscle bulk throughout. No fasciculations present. Normal muscle tone. Strength is 5/5 throughout all four extremities.    Sensory  Light touch is normal in upper and lower extremities.     Coordination  Right: Finger-to-nose normal. Rapid alternating movement normal.Left: Finger-to-nose normal. Rapid alternating movement normal.    Gait  Casual gait is normal including stance, stride, and arm swing.      Physical Exam  Vitals reviewed.   Constitutional:       General: He is not in acute distress.     Appearance: Normal appearance.   HENT:      Head: Normocephalic and atraumatic.      Mouth/Throat:      Pharynx: Oropharynx is clear.   Eyes:      Extraocular Movements: Extraocular movements intact.      Pupils: Pupils are equal, round, and reactive to light.   Cardiovascular:      Rate and Rhythm: Normal rate and regular rhythm.   Pulmonary:      Effort: Pulmonary effort is normal.   Musculoskeletal:         General: Normal range of motion.      Cervical back: Normal range of motion.   Skin:     General: Skin is warm and dry.   Neurological:      General: No focal deficit present.      Mental Status: He is alert and oriented to person, place, and time. Mental status is at baseline.      Motor: Motor strength is normal.  Psychiatric:         Mood and Affect: Mood normal.         Speech: Speech normal.         Behavior: Behavior  normal.         Thought Content: Thought content normal.         Judgment: Judgment normal.       Acute Stroke Data    IV Thrombolytic (TPA/Tenecteplase) Inclusion / Exclusion Criteria    Time: 09:58 EDT  Person Administering Scale: MARINA Brown    Inclusion Criteria  [x]   18 years of age or greater   []   Onset of symptoms < 4.5 hours before beginning treatment (stroke onset = time patient was last seen well or without symptoms).   []   Diagnosis of acute ischemic stroke causing measurable disabling deficit (Complete Hemianopia, Any Aphasia, Visual or Sensory Extinction, Any weakness limiting sustained effort against gravity)   []   Any remaining deficit considered potentially disabling in view of patient and practitioner   Exclusion criteria (Do not proceed with Alteplase if any are checked under exclusion criteria)  []   Onset unknown or GREATER than 4.5 hours   []   ICH on CT/MRI   []   CT demonstrates hypodensity representing acute or subacute infarct   []   Significant head trauma or prior stroke in the previous 3 months   []   Symptoms suggestive of subarachnoid hemorrhage   []   History of un-ruptured intracranial aneurysm GREATER than 10 mm   []   Recent intracranial or intraspinal surgery within the last 3 months   []   Arterial puncture at a non-compressible site in the previous 7 days   []   Active internal bleeding   []   Acute bleeding tendency   []   Platelet count LESS than 100,000 for known hematological diseases such as leukemia, thrombocytopenia or chronic cirrhosis   []   Current use of anticoagulant with INR GREATER than 1.7 or PT GREATER than 15 seconds, aPTT GREATER than 40 seconds   []   Heparin received within 48 hours, resulting in abnormally elevated aPTT GREATER than upper limit of normal   []   Current use of direct thrombin inhibitors or direct factor Xa inhibitors in the past 48 hours   []   Elevated blood pressure refractory to treatment (systolic GREATER than 185 mm/Hg or  diastolic  GREATER than 110 mm/Hg   []   Suspected infective endocarditis and aortic arch dissection   []   Current use of therapeutic treatment dose of low-molecular-weight heparin (LMWH) within the previous 24 hours   []   Structural GI malignancy or bleed   Relative exclusion for all patients  []   Only minor nondisabling symptoms   []   Pregnancy   []   Seizure at onset with postictal residual neurological impairments   []   Major surgery or previous trauma within past 14 days   []   History of previous spontaneous ICH, intracranial neoplasm, or AV malformation   []   Postpartum (within previous 14 days)   []   Recent GI or urinary tract hemorrhage (within previous 21 days)   []   Recent acute MI (within previous 3 months)   []   History of unruptured intracranial aneurysm LESS than 10 mm   []   History of ruptured intracranial aneurysm   []   Blood glucose LESS than 50 mg/dL (2.7 mmol/L)   []   Dural puncture within the last 7 days   []   Known GREATER than 10 cerebral microbleeds   Additional exclusions for patients with symptoms onset between 3 and 4.5 hours.  []   Age > 80.   []   On any anticoagulants regardless of INR  >>> Warfarin (Coumadin), Heparin, Enoxaparin (Lovenox), fondaparinux (Arixtra), bivalirudin (Angiomax), Argatroban, dabigatran (Pradaxa), rivaroxaban (Xarelto), or apixaban (Eliquis)   []   Severe stroke (NIHSS > 25).   []   History of BOTH diabetes and previous ischemic stroke.   []   The risks and benefits have been discussed with the patient or family related to the administration of IV alteplase for stroke symptoms.   []   I have discussed and reviewed the patient's case and imaging with the attending prior to IV Thrombolytic (TPA/Tenecteplase).   N/A Time Thrombolytic administered     Return to baseline    Hospital Meds:  Scheduled-    Infusions-     PRNs-   sodium chloride    Functional Status Prior to Current Stroke/Pickens Score: 0    NIH Stroke Scale  Time: 09:58 EDT  Person  Administering Scale: MARINA Brown    1a  Level of consciousness: 0=alert; keenly responsive   1b. LOC questions:  0=Performs both tasks correctly   1c. LOC commands: 0=Performs both tasks correctly   2.  Best Gaze: 0=normal   3.  Visual: 0=No visual loss   4. Facial Palsy: 0=Normal symmetric movement   5a.  Motor left arm: 0=No drift, limb holds 90 (or 45) degrees for full 10 seconds   5b.  Motor right arm: 0=No drift, limb holds 90 (or 45) degrees for full 10 seconds   6a. motor left le=No drift, limb holds 90 (or 45) degrees for full 10 seconds   6b  Motor right le=No drift, limb holds 90 (or 45) degrees for full 10 seconds   7. Limb Ataxia: 0=Absent   8.  Sensory: 0=Normal; no sensory loss   9. Best Language:  0=No aphasia, normal   10. Dysarthria: 0=Normal   11. Extinction and Inattention: 0=No abnormality    Total:   0       Results Reviewed:  I have personally reviewed current lab, radiology, and data and agree with results.    Lab Results   Component Value Date    GLUCOSE 103 (H) 2024    BUN 17 2024    CREATININE 0.92 2024    EGFR 87.8 2024    BCR 18.5 2024    K 4.5 2024    CO2 25.0 2024    CALCIUM 9.9 2024    ALBUMIN 4.4 2024    BILITOT 0.6 2024    AST 33 2024    ALT 32 2024     PCXR: negative     ECG 12 Lead ED Triage Standing Order; Weak / Dizzy / AMS   Preliminary Result   Test Reason : ED Triage Standing Order~   Blood Pressure :   */*   mmHG   Vent. Rate :  58 BPM     Atrial Rate :  58 BPM      P-R Int : 168 ms          QRS Dur :  92 ms       QT Int : 404 ms       P-R-T Axes :  45   9  24 degrees      QTc Int : 396 ms      Sinus bradycardia   Otherwise normal ECG   No previous ECGs available      Referred By: EDMD           Confirmed By:         CT Head Without Contrast Stroke Protocol    Result Date: 2024  CT HEAD WO CONTRAST STROKE PROTOCOL Date of Exam: 2024 9:50 AM EDT Indication: Double vision,  difficulty walking. Comparison: None available. Technique: Axial CT images were obtained of the head without contrast administration.  Reconstructed coronal images were also obtained. Automated exposure control and iterative construction methods were used. Findings: Gray-white differentiation appears maintained and there is no evidence of intracranial hemorrhage, mass or mass effect. The ventricles are normal in size and configuration. The orbits are normal. The paranasal sinuses are clear. The calvarium is intact.     Impression: Impression: No acute intracranial abnormality. Electronically Signed: Judd Alonso MD  4/5/2024 10:55 AM EDT  Workstation ID: [id number]     CT ANGIOGRAM HEAD W AI ANALYSIS OF LVO, CT ANGIOGRAM NECK, CT CEREBRAL PERFUSION W WO CONTRAST     Date of Exam: 4/5/2024 9:51 AM EDT     Indication: Acute Stroke.     Comparison: Noncontrast head CT from the same day     Technique: CTA of the head was performed after the uneventful intravenous administration of 115 mL Isovue-370. Reconstructed coronal and sagittal images were also obtained. In addition, a 3-D volume rendered image was created for interpretation.   Automated exposure control and iterative reconstruction methods were used.     FINDINGS:     Vascular Findings:     The right common carotid, internal carotid, middle cerebral, anterior cerebral, vertebral, and posterior cerebral arteries are patent without abrupt cut off or aneurysmal dilation. 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     The left common carotid, internal carotid, middle cerebral, anterior cerebral, vertebral, and posterior cerebral arteries are patent without abrupt cut off or aneurysmal dilation.     Basilar artery appears patent and appears unremarkable.     Non-vascular Findings:     For description of nonvascular intracranial findings, please refer to the noncontrast head CT performed the same date.     No acute  abnormality is identified within the visualized soft tissue or bony structures of the neck.     The visualized lung apices are clear.     FINDINGS:      CT Perfusion:  CBF (<30%) volume: 0 mL  Tmax (>6.0s) volume: 52 mL  Mismatch volume: 52 mL  Mismatch ratio: Infinite     Geographic region of prolonged Tmax greater than 6 seconds (52 mL) involving the bilateral cerebellum on a single image. This appears artifactual. Recommend correlation with MRI.           IMPRESSION:  1.No acute abnormality identified within the large arteries of the head or neck.  2.No significant stenosis of the bilateral internal carotid arteries.  3.CT perfusion study shows a geographic region of prolonged Tmax greater than 6 seconds (52 mL) involving the bilateral cerebellum on a single image. This appears artifactual and may be related to streak artifact on the corresponding source images. No   territorial core infarct is demonstrated. Recommend correlation with MRI.              Electronically Signed: Magdaleno Lopez MD    4/5/2024 11:09 AM EDT    Workstation ID: [id number]    CT ANGIOGRAM HEAD W AI ANALYSIS OF LVO, CT ANGIOGRAM NECK, CT CEREBRAL PERFUSION W WO CONTRAST     Date of Exam: 4/5/2024 9:51 AM EDT     Indication: Acute Stroke.     Comparison: Noncontrast head CT from the same day     Technique: CTA of the head was performed after the uneventful intravenous administration of 115 mL Isovue-370. Reconstructed coronal and sagittal images were also obtained. In addition, a 3-D volume rendered image was created for interpretation.   Automated exposure control and iterative reconstruction methods were used.     FINDINGS:     Vascular Findings:     The right common carotid, internal carotid, middle cerebral, anterior cerebral, vertebral, and posterior cerebral arteries are patent without abrupt cut off or aneurysmal dilation. 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     The left common  carotid, internal carotid, middle cerebral, anterior cerebral, vertebral, and posterior cerebral arteries are patent without abrupt cut off or aneurysmal dilation.     Basilar artery appears patent and appears unremarkable.     Non-vascular Findings:     For description of nonvascular intracranial findings, please refer to the noncontrast head CT performed the same date.     No acute abnormality is identified within the visualized soft tissue or bony structures of the neck.     The visualized lung apices are clear.     FINDINGS:      CT Perfusion:  CBF (<30%) volume: 0 mL  Tmax (>6.0s) volume: 52 mL  Mismatch volume: 52 mL  Mismatch ratio: Infinite     Geographic region of prolonged Tmax greater than 6 seconds (52 mL) involving the bilateral cerebellum on a single image. This appears artifactual. Recommend correlation with MRI.           IMPRESSION:  1.No acute abnormality identified within the large arteries of the head or neck.  2.No significant stenosis of the bilateral internal carotid arteries.  3.CT perfusion study shows a geographic region of prolonged Tmax greater than 6 seconds (52 mL) involving the bilateral cerebellum on a single image. This appears artifactual and may be related to streak artifact on the corresponding source images. No   territorial core infarct is demonstrated. Recommend correlation with MRI.              Electronically Signed: Magdaleno Lopez MD    4/5/2024 11:09 AM EDT    Workstation ID: [id number]      Assessment/Plan:  73-year-old  male with vascular risk factors who presented to [facility] emergency department after had a transient episode of double vision and gait instability. CT imaging is negative for any acute findings. CTA head and neck is negative for any area of significant stenosis or LVO.  Not deemed to be an appropriate IV thrombolytic therapy candidate secondary to resolution of symptoms.     Antiplatelet PTA: None  Anticoagulant PTA: None       Transient episode of blurred vision and Gait Disturbance  Differentials to include TIA versus ocular migraine  -ASA 81mg daily, discussed implementation with the patient & he is agreeable  -CT imaging obtained and reviewed cholesterol medications  -MRI brain without contrast, stat  -Outpatient echocardiogram with bubble study, ordered  -Follow-up in the stroke neurology clinic, ordered (ADT)  -Continue home medications  -Discussed stroke signs and symptoms with the patient including reasons to return to the emergency department    Discussed the importance of medication compliance Aspirin 81mg daily and Atorvastatin 20mg nightly and lifestyle modifications adequate control of blood pressure, adequate control of cholesterol (goal LDL <70), adequate control of glucose (<140, A1c goal <7), increased physical activity, and implementation of healthy diet to help reduce the risk of future cerebrovascular events.  Also discussed the signs symptoms that would warrant the patient return back to the emergency department including unilateral weakness, unilateral numbness, visual disturbances, loss of balance, speech difficulties, and/or a sudden severe headache.  Patient and his wife verbalized understanding.    If MRI brain without contrast is negative, no further inpatient stroke workup is warranted at this time.  Plan of care discussed with the patient and his wife as well as Dr. Delacruz.  All are agreeable.  Thanks for the consult the care of the patient.  Please call with any further questions or concerns.    Jennifer Renee, APRN  April 5, 2024  13:03 EDT

## 2024-04-05 NOTE — ED PROVIDER NOTES
Subjective   History of Present Illness  Mr. Munoz presents with an episode of double vision and difficulty walking.  He tells me he was sitting on a traffic light in his car when his vision suddenly doubled.  He drives to his destination and when he got out of the car he realized he was having difficulty walking.  He denies any focal numbness or weakness.  He tells me the episode lasted 15 to 20 minutes.  He tells me he felt fine afterward and now feels fine again.  He denies any history of similar episodes.  He denies any recent illnesses.      Review of Systems    Past Medical History:   Diagnosis Date    Arthritis     Ganglion     Hyperlipidemia     Hypertension        No Known Allergies    Past Surgical History:   Procedure Laterality Date    CYST REMOVAL      cyst removed left shoulder and left knee    KNEE MENISCAL REPAIR  2005    TONSILLECTOMY AND ADENOIDECTOMY      VASECTOMY         Family History   Problem Relation Age of Onset    Diabetes Maternal Grandmother         controlled    Arthritis Paternal Grandmother         not too bad    Osteoarthritis Paternal Grandmother     Coronary artery disease Paternal Grandfather     Diabetes Mother         mostly controlled       Social History     Socioeconomic History    Marital status:    Tobacco Use    Smoking status: Never    Smokeless tobacco: Never   Substance and Sexual Activity    Alcohol use: Yes     Alcohol/week: 14.0 standard drinks of alcohol     Types: 7 Cans of beer, 7 Glasses of wine per week    Drug use: No    Sexual activity: Yes     Partners: Female     Birth control/protection: None           Objective   Physical Exam  Vitals and nursing note reviewed.   Constitutional:       General: He is not in acute distress.     Appearance: Normal appearance.   HENT:      Nose: Nose normal.   Eyes:      General: No scleral icterus.     Conjunctiva/sclera: Conjunctivae normal.   Cardiovascular:      Rate and Rhythm: Normal rate and regular rhythm.       Heart sounds: No murmur heard.  Pulmonary:      Effort: Pulmonary effort is normal.      Breath sounds: Normal breath sounds. No wheezing or rales.   Musculoskeletal:      Cervical back: Normal range of motion and neck supple.   Skin:     General: Skin is warm and dry.      Capillary Refill: Capillary refill takes less than 2 seconds.   Neurological:      General: No focal deficit present.      Mental Status: He is alert.   Psychiatric:         Mood and Affect: Mood normal.         Thought Content: Thought content normal.         Procedures           ED Course  ED Course as of 04/05/24 1355   Fri Apr 05, 2024   0931 I spoke with the stroke navigator.  Symptoms are suspicious for TIA.  Have ordered CT scans. [DT]   1117 I discussed his CT angiograms and perfusion study with Jennifer from the stroke team.  They have ordered MRI.  As long as that looks okay they recommend discharge with baby aspirin and will follow-up in neurology clinic. [DT]   1307 The stroke team has reviewed his MRI and advises it is normal.  They have recommended he start a baby aspirin daily and they will call him and make arrangements to follow him up in their clinic. [DT]      ED Course User Index  [DT] Richard Delacruz MD                          Total (NIH Stroke Scale): 0                  Medical Decision Making  Please see course notes.  I saw him on arrival in the hallway.  I ordered and interpreted multiple labs as well as CT angiograms of his head and neck.  I consulted the stroke team.  They ordered MRI and I observed him while in the emergency department.  MRI was normal.  We arranged outpatient follow-up and I had reevaluation.    Problems Addressed:  TIA (transient ischemic attack): complicated acute illness or injury that poses a threat to life or bodily functions    Amount and/or Complexity of Data Reviewed  Labs: ordered. Decision-making details documented in ED Course.  Radiology: ordered. Decision-making details documented in ED  Course.  ECG/medicine tests: ordered. Decision-making details documented in ED Course.    Risk  Prescription drug management.        Final diagnoses:   TIA (transient ischemic attack)       ED Disposition  ED Disposition       ED Disposition   Discharge    Condition   Stable    Comment   --               Cherelle Lea, APRN  [address] [facility] [address]  [place] [address]  [phone]          Keshawn Alexandre MD  [address] [facility] [address]  [place] [address]  [phone]               Medication List      No changes were made to your prescriptions during this visit.            Richard Delacruz MD  04/05/24 4701

## 2024-04-06 LAB
QT INTERVAL: 404 MS
QTC INTERVAL: 396 MS

## 2024-04-16 DIAGNOSIS — S49.91XA INJURY OF RIGHT SHOULDER, INITIAL ENCOUNTER: Primary | ICD-10-CM

## 2024-05-14 NOTE — PROGRESS NOTES
New Patient Office Visit      Encounter Date: 05/15/2024   Patient Name: Jamari Munoz  : 1950   MRN: [id number]   PCP: Keshawn Alexandre MD    Referring Provider: No ref. provider found     Chief Complaint:    Chief Complaint   Patient presents with    Follow-up       History of Present Illness: Jamari Munoz is a 73 y.o. male who is here today to establish care.  Patient has past medical history significant for hypertension, hyperlipidemia, bradycardia.  He presented to [facility] ED on 2024 with double vision and difficulty walking.  NIH on presentation to the ED is a 0.  Of note patient does report history of ocular migraine.  CT of the head is negative for acute intercranial abnormality.  CTA of the head and neck is negative for large vessel occlusion or flow-limiting stenosis.  MRI of the brain was negative for acute intracranial process.  Patient was recommended to initiate a baby aspirin daily.  He was recommended to complete an echocardiogram with bubble study on an outpatient basis.    Patient reports to clinic today following his recent ED visit for double vision.  He arrives early to his appointment as he has a 9:00 echocardiogram scheduled.  He reports he has been doing well.  He has had no return of his double vision.  He has had no new or worsening neurologic symptoms.  He has had no recent ocular migraine.  Patient has been taking his aspirin and Lipitor as prescribed without issue or side effect.  I reviewed his diagnostic workup, differential diagnoses and treatment plan.  Although it is difficult to say for sure if this was a transient ischemic attack we will proceed with treating it as such in order to provide him optimal protection against future stroke.  I have encouraged the patient to complete a 14-day Holter monitor as well.  He is in agreement.    Stroke Risk Factors: hyperlipidemia and hypertension      Subjective      Review of Systems:   Review of Systems   Constitutional:  Negative  for activity change, appetite change, chills, diaphoresis, fatigue, fever, unexpected weight gain and unexpected weight loss.   Eyes:  Positive for visual disturbance. Negative for photophobia.   Respiratory:  Negative for cough and shortness of breath.    Cardiovascular:  Negative for chest pain, palpitations and leg swelling.   Gastrointestinal:  Negative for blood in stool, nausea and vomiting.   Musculoskeletal:  Negative for gait problem.   Neurological:  Negative for dizziness, tremors, seizures, syncope, facial asymmetry, speech difficulty, weakness, light-headedness, numbness, headache, memory problem and confusion.       Past Medical History:   Past Medical History:   Diagnosis Date    Arthritis     Ganglion     Head injury 1967    several concussions from football and biking    Hyperlipidemia     Hypertension     Migraine 1975    one now every month or so with Aura       Past Surgical History:   Past Surgical History:   Procedure Laterality Date    CYST REMOVAL      cyst removed left shoulder and left knee    KNEE MENISCAL REPAIR  2005    TONSILLECTOMY AND ADENOIDECTOMY      VASECTOMY         Family History:   Family History   Problem Relation Age of Onset    Diabetes Maternal Grandmother         controlled    Arthritis Paternal Grandmother         not too bad    Osteoarthritis Paternal Grandmother     Coronary artery disease Paternal Grandfather     Diabetes Mother         mostly controlled       Social History:   Social History     Socioeconomic History    Marital status:    Tobacco Use    Smoking status: Never     Passive exposure: Never    Smokeless tobacco: Never   Vaping Use    Vaping status: Never Used   Substance and Sexual Activity    Alcohol use: Yes     Alcohol/week: 14.0 standard drinks of alcohol     Types: 7 Glasses of wine, 7 Cans of beer per week     Comment: beer mostly in summer after cycling; liquor is [place]    Drug use: No    Sexual activity: Yes     Partners: Female     Birth  "control/protection: None       Medications:     Current Outpatient Medications:     aspirin 81 MG EC tablet, Take 1 tablet by mouth Daily., Disp: , Rfl:     atorvastatin (LIPITOR) 20 MG tablet, TAKE 1 TABLET DAILY, Disp: 90 tablet, Rfl: 3    CALCIUM-MAGNESIUM-ZINC PO, Take 7 tablets by mouth Daily., Disp: , Rfl:     enalapril (VASOTEC) 5 MG tablet, TAKE 1 TABLET DAILY, Disp: 90 tablet, Rfl: 3    Glucosamine-Chondroitin 750-600 MG tablet, Take 1 tablet by mouth 2 (Two) Times a Day., Disp: , Rfl:     valACYclovir (VALTREX) 500 MG tablet, TAKE ONE-HALF (1/2) TABLET DAILY, Disp: 45 tablet, Rfl: 3    Allergies:   No Known Allergies    Objective     Physical Exam:  Vital Signs:   Vitals:    05/15/24 0810   BP: 118/76   Pulse: 58   Temp: 98.4 °F (36.9 °C)   SpO2: 94%   Weight: 88.9 kg (196 lb)   Height: 177.8 cm (70\")     Body mass index is 28.12 kg/m².     Physical Exam  Vitals and nursing note reviewed.   Constitutional:       General: He is awake. He is not in acute distress.     Appearance: Normal appearance. He is normal weight. He is not ill-appearing.      Comments: 73-year-old  male   HENT:      Head: Normocephalic and atraumatic.      Nose: Nose normal.      Mouth/Throat:      Mouth: Mucous membranes are moist.   Eyes:      General: Lids are normal.      Extraocular Movements: Extraocular movements intact.      Pupils: Pupils are equal, round, and reactive to light.   Cardiovascular:      Rate and Rhythm: Normal rate and regular rhythm.      Pulses: Normal pulses.   Pulmonary:      Effort: Pulmonary effort is normal. No respiratory distress.   Skin:     General: Skin is warm and dry.   Neurological:      General: No focal deficit present.      Mental Status: He is alert and oriented to person, place, and time. Mental status is at baseline.      Motor: Motor strength is normal.     Coordination: Coordination is intact.   Psychiatric:         Mood and Affect: Mood normal.         Speech: Speech normal.    "      Behavior: Behavior normal.       Neurological Exam  Mental Status  Awake and alert. Oriented to person, place, time and situation. Oriented to person, place, and time. Speech is normal. Language is fluent with no aphasia. Attention and concentration are normal. Fund of knowledge is appropriate for level of education.    Cranial Nerves  CN II: Visual acuity is normal. Visual fields full to confrontation.  CN III, IV, VI: Extraocular movements intact bilaterally. Normal lids and orbits bilaterally. Pupils equal round and reactive to light bilaterally.  CN V: Facial sensation is normal.  CN VII: Full and symmetric facial movement.  CN VIII: Hearing is normal to speech .  CN XI: Shoulder shrug strength is normal.  CN XII: Tongue midline without atrophy or fasciculations.    Motor  Normal muscle bulk throughout. No fasciculations present. Normal muscle tone. Strength is 5/5 throughout all four extremities.    Sensory  Light touch is normal in upper and lower extremities. Pinprick is normal in upper and lower extremities.     Coordination    Finger-to-nose, rapid alternating movements and heel-to-shin normal bilaterally without dysmetria.  No obvious dysmetria .    Gait  Casual gait is normal including stance, stride, and arm swing.     NIH 0     Modified Shawn Score: 0        0  No Symptoms    1 No significant disability. Able to carry out all usual activities, despite some symptoms.    2 Slight disability. Able to look after own affairs without assistance, but unable to carry out all previous activities.    3 Moderate disability. Requires some help, but able to walk unassisted.    4 Moderately severe disability. Unable to attend to own bodily needs without assistance, and unable to walk unassisted.    5 Severe disability. Requires constant nursing care and attention, bedridden, incontinent.    6 Dead       PHQ-9 Depression Screening  Little interest or pleasure in doing things? 0-->not at all   Feeling down,  "depressed, or hopeless? 0-->not at all   Trouble falling or staying asleep, or sleeping too much?     Feeling tired or having little energy?     Poor appetite or overeating?     Feeling bad about yourself - or that you are a failure or have let yourself or your family down?     Trouble concentrating on things, such as reading the newspaper or watching television?     Moving or speaking so slowly that other people could have noticed? Or the opposite - being so fidgety or restless that you have been moving around a lot more than usual?     Thoughts that you would be better off dead, or of hurting yourself in some way?     PHQ-9 Total Score 0   If you checked off any problems, how difficult have these problems made it for you to do your work, take care of things at home, or get along with other people?         STOP-Bang Score  Have you been diagnosed with Sleep Apnea?: no  Snoring?: no  Tired?: no  Observed?: no  Pressure?: yes  Stop Score: 1  Body Mass Index more than 35 kg/m2?: no  Age older than 50 year old?: yes  Neck large? \">17\"/43cm-M, >16\"/41cm-F: no  Gender=Male?: yes  Total Stop-Bang Score: 3       STEADI Fall Risk Clinician Key Questions   Have you fallen in the past year?: No  Do you feel unsteady with walking?: No  Are you worried about falling?: No      Imaging Reviewed:   MRI Brain Without Contrast    Result Date: 4/5/2024  Impression: No acute intracranial process. Negative exam for CVA. Electronically Signed: Jennyfer Rodriguez MD  4/5/2024 1:01 PM EDT  Workstation ID: [id number]    CT CEREBRAL PERFUSION WITH & WITHOUT CONTRAST    Result Date: 4/5/2024  1.No acute abnormality identified within the large arteries of the head or neck. 2.No significant stenosis of the bilateral internal carotid arteries. 3.CT perfusion study shows a geographic region of prolonged Tmax greater than 6 seconds (52 mL) involving the bilateral cerebellum on a single image. This appears artifactual and may be related to streak " artifact on the corresponding source images. No territorial core infarct is demonstrated. Recommend correlation with MRI. Electronically Signed: Magdaleno Lopez MD  4/5/2024 11:09 AM EDT  Workstation ID: [id number]    CT Angiogram Neck    Result Date: 4/5/2024  1.No acute abnormality identified within the large arteries of the head or neck. 2.No significant stenosis of the bilateral internal carotid arteries. 3.CT perfusion study shows a geographic region of prolonged Tmax greater than 6 seconds (52 mL) involving the bilateral cerebellum on a single image. This appears artifactual and may be related to streak artifact on the corresponding source images. No territorial core infarct is demonstrated. Recommend correlation with MRI. Electronically Signed: Magdaleno Lopez MD  4/5/2024 11:09 AM EDT  Workstation ID: [id number]    CT Angiogram Head w AI Analysis of LVO    Result Date: 4/5/2024  1.No acute abnormality identified within the large arteries of the head or neck. 2.No significant stenosis of the bilateral internal carotid arteries. 3.CT perfusion study shows a geographic region of prolonged Tmax greater than 6 seconds (52 mL) involving the bilateral cerebellum on a single image. This appears artifactual and may be related to streak artifact on the corresponding source images. No territorial core infarct is demonstrated. Recommend correlation with MRI. Electronically Signed: Magdaleno Lopez MD  4/5/2024 11:09 AM EDT  Workstation ID: [id number]    CT Head Without Contrast Stroke Protocol    Result Date: 4/5/2024  Impression: No acute intracranial abnormality. Electronically Signed: Judd Alonso MD  4/5/2024 10:55 AM EDT  Workstation ID: [id number]    XR Chest 1 View    Result Date: 4/5/2024  Impression: Negative. Electronically Signed: Jennyfer Rodriguez MD  4/5/2024 9:15 AM EDT  Workstation ID: [id number]      Laboratory Results:   Lab Results   Component Value Date    GLUCOSE 103 (H) 04/05/2024    BUN 17 04/05/2024     CREATININE 0.92 04/05/2024    EGFR 87.8 04/05/2024    BCR 18.5 04/05/2024    K 4.5 04/05/2024    CO2 25.0 04/05/2024    CALCIUM 9.9 04/05/2024    ALBUMIN 4.4 04/05/2024    BILITOT 0.6 04/05/2024    AST 33 04/05/2024    ALT 32 04/05/2024     Lipid Panel          9/5/2023    08:16 3/12/2024    08:40   Lipid Panel   Total Cholesterol 175  158    Triglycerides 95  99    HDL Cholesterol 64  58    VLDL Cholesterol 17  18    LDL Cholesterol  94  82    LDL/HDL Ratio 1.44  1.38             Assessment / Plan      Assessment/Plan:   # Transient episode of blurred vision and Gait Disturbance  Differentials to include TIA versus ocular migraine  -continue ASA 81mg daily  -CT, CTA negative for acute intracranial abnormality   -MRI brain w/o negative for acute ischemia but does show some mild cortical atrophy   -Outpatient echocardiogram with bubble study, ordered to be completed at 9 am this morning   -recommend 14 day holter monitor, ordered   -if ocular migraines become more frequent discussed initiation of Depakote for migraine prophylaxis and also a referral to migraine clinic.   -normal BP goals, <130/80. Today's /76  -Discussed stroke signs and symptoms with the patient including reasons to return to the emergency department  -follow up in stroke clinic in 8 weeks     Discussed the importance of medication compliance and lifestyle modifications (adequate blood pressure control, adequate control of hyperlipidemia, adequate glycemic control, increase physical activity, and healthy diet) to help reduce the risk of future cerebrovascular events.  Also discussed the signs symptoms that would warrant the patient return back to the emergency department including unilateral weakness, unilateral numbness, visual disturbances, loss of balance, speech difficulties, and/or a sudden severe headache.      Follow Up:   Return in about 8 weeks (around 7/10/2024).    MARINA Hillman  [facility]

## 2024-05-15 ENCOUNTER — HOSPITAL ENCOUNTER (OUTPATIENT)
Dept: CARDIOLOGY | Facility: HOSPITAL | Age: 74
Discharge: HOME | End: 2024-05-15
Admitting: NURSE PRACTITIONER
Payer: MEDICARE

## 2024-05-15 ENCOUNTER — OFFICE VISIT (OUTPATIENT)
Dept: NEUROLOGY | Facility: CLINIC | Age: 74
End: 2024-05-15
Payer: MEDICARE

## 2024-05-15 VITALS — WEIGHT: 196 LBS | HEIGHT: 70 IN | BODY MASS INDEX: 28.06 KG/M2

## 2024-05-15 VITALS
TEMPERATURE: 98.4 F | OXYGEN SATURATION: 94 % | SYSTOLIC BLOOD PRESSURE: 118 MMHG | DIASTOLIC BLOOD PRESSURE: 76 MMHG | WEIGHT: 196 LBS | HEIGHT: 70 IN | HEART RATE: 58 BPM | BODY MASS INDEX: 28.06 KG/M2

## 2024-05-15 DIAGNOSIS — I10 BENIGN ESSENTIAL HYPERTENSION: ICD-10-CM

## 2024-05-15 DIAGNOSIS — R68.89 OTHER GENERAL SYMPTOMS AND SIGNS: ICD-10-CM

## 2024-05-15 DIAGNOSIS — R29.818 TRANSIENT NEUROLOGICAL SYMPTOMS: Primary | ICD-10-CM

## 2024-05-15 DIAGNOSIS — H53.2 DIPLOPIA: ICD-10-CM

## 2024-05-15 LAB
BH CV ECHO MEAS - AI P1/2T: 835.5 MSEC
BH CV ECHO MEAS - AO MAX PG: 6 MMHG
BH CV ECHO MEAS - AO MEAN PG: 3 MMHG
BH CV ECHO MEAS - AO ROOT DIAM: 3.7 CM
BH CV ECHO MEAS - AO V2 MAX: 122 CM/SEC
BH CV ECHO MEAS - AO V2 VTI: 24.4 CM
BH CV ECHO MEAS - AVA(I,D): 2.9 CM2
BH CV ECHO MEAS - EDV(CUBED): 88.1 ML
BH CV ECHO MEAS - EDV(MOD-SP2): 145 ML
BH CV ECHO MEAS - EDV(MOD-SP4): 107 ML
BH CV ECHO MEAS - EF(MOD-BP): 61 %
BH CV ECHO MEAS - EF(MOD-SP2): 63.9 %
BH CV ECHO MEAS - EF(MOD-SP4): 59.4 %
BH CV ECHO MEAS - ESV(CUBED): 25.5 ML
BH CV ECHO MEAS - ESV(MOD-SP2): 52.4 ML
BH CV ECHO MEAS - ESV(MOD-SP4): 43.4 ML
BH CV ECHO MEAS - FS: 33.9 %
BH CV ECHO MEAS - IVS/LVPW: 1.12 CM
BH CV ECHO MEAS - IVSD: 1.06 CM
BH CV ECHO MEAS - LA DIMENSION: 4.6 CM
BH CV ECHO MEAS - LAT PEAK E' VEL: 9.9 CM/SEC
BH CV ECHO MEAS - LV DIASTOLIC VOL/BSA (35-75): 51.7 CM2
BH CV ECHO MEAS - LV MASS(C)D: 151.1 GRAMS
BH CV ECHO MEAS - LV MAX PG: 3.9 MMHG
BH CV ECHO MEAS - LV MEAN PG: 2 MMHG
BH CV ECHO MEAS - LV SYSTOLIC VOL/BSA (12-30): 21 CM2
BH CV ECHO MEAS - LV V1 MAX: 98.5 CM/SEC
BH CV ECHO MEAS - LV V1 VTI: 21.9 CM
BH CV ECHO MEAS - LVIDD: 4.5 CM
BH CV ECHO MEAS - LVIDS: 2.9 CM
BH CV ECHO MEAS - LVOT AREA: 3.2 CM2
BH CV ECHO MEAS - LVOT DIAM: 2.02 CM
BH CV ECHO MEAS - LVPWD: 0.95 CM
BH CV ECHO MEAS - MED PEAK E' VEL: 7.2 CM/SEC
BH CV ECHO MEAS - MV A MAX VEL: 61.7 CM/SEC
BH CV ECHO MEAS - MV DEC SLOPE: 282 CM/SEC2
BH CV ECHO MEAS - MV DEC TIME: 0.19 SEC
BH CV ECHO MEAS - MV E MAX VEL: 66 CM/SEC
BH CV ECHO MEAS - MV E/A: 1.07
BH CV ECHO MEAS - MV MAX PG: 2.34 MMHG
BH CV ECHO MEAS - MV MEAN PG: 0.69 MMHG
BH CV ECHO MEAS - MV P1/2T: 70.4 MSEC
BH CV ECHO MEAS - MV V2 VTI: 24.9 CM
BH CV ECHO MEAS - MVA(P1/2T): 3.1 CM2
BH CV ECHO MEAS - MVA(VTI): 2.8 CM2
BH CV ECHO MEAS - PA ACC TIME: 0.13 SEC
BH CV ECHO MEAS - PI END-D VEL: 127.6 CM/SEC
BH CV ECHO MEAS - RAP SYSTOLE: 3 MMHG
BH CV ECHO MEAS - RVSP: 31 MMHG
BH CV ECHO MEAS - SV(LVOT): 70 ML
BH CV ECHO MEAS - SV(MOD-SP2): 92.6 ML
BH CV ECHO MEAS - SV(MOD-SP4): 63.6 ML
BH CV ECHO MEAS - SVI(LVOT): 33.8 ML/M2
BH CV ECHO MEAS - SVI(MOD-SP2): 44.7 ML/M2
BH CV ECHO MEAS - SVI(MOD-SP4): 30.7 ML/M2
BH CV ECHO MEAS - TAPSE (>1.6): 1.7 CM
BH CV ECHO MEAS - TR MAX PG: 28.4 MMHG
BH CV ECHO MEAS - TR MAX VEL: 264.7 CM/SEC
BH CV ECHO MEASUREMENTS AVERAGE E/E' RATIO: 7.72
BH CV ECHO SHUNT ASSESSMENT PERFORMED (HIDDEN SCRIPTING): 1
BH CV XLRA - RV BASE: 3.5 CM
BH CV XLRA - RV LENGTH: 8.1 CM
BH CV XLRA - RV MID: 3.2 CM
BH CV XLRA - TDI S': 12.9 CM/SEC
LEFT ATRIUM VOLUME INDEX: 26.8 ML/M2

## 2024-05-15 PROCEDURE — 3078F DIAST BP <80 MM HG: CPT | Performed by: NURSE PRACTITIONER

## 2024-05-15 PROCEDURE — 93306 TTE W/DOPPLER COMPLETE: CPT

## 2024-05-15 PROCEDURE — 99214 OFFICE O/P EST MOD 30 MIN: CPT | Performed by: NURSE PRACTITIONER

## 2024-05-15 PROCEDURE — 3074F SYST BP LT 130 MM HG: CPT | Performed by: NURSE PRACTITIONER

## 2024-05-15 RX ORDER — ASPIRIN 81 MG/1
81 TABLET ORAL DAILY
COMMUNITY

## 2024-05-22 ENCOUNTER — HOSPITAL ENCOUNTER (OUTPATIENT)
Dept: CARDIOLOGY | Facility: HOSPITAL | Age: 74
Discharge: HOME | End: 2024-05-22
Payer: MEDICARE

## 2024-05-22 ENCOUNTER — OFFICE VISIT (OUTPATIENT)
Dept: CARDIOLOGY | Facility: HOSPITAL | Age: 74
End: 2024-05-22
Payer: MEDICARE

## 2024-05-22 VITALS
BODY MASS INDEX: 28.96 KG/M2 | DIASTOLIC BLOOD PRESSURE: 87 MMHG | HEART RATE: 54 BPM | OXYGEN SATURATION: 94 % | WEIGHT: 202.31 LBS | TEMPERATURE: 97.8 F | RESPIRATION RATE: 16 BRPM | SYSTOLIC BLOOD PRESSURE: 140 MMHG | HEIGHT: 70 IN

## 2024-05-22 DIAGNOSIS — I10 BENIGN ESSENTIAL HYPERTENSION: ICD-10-CM

## 2024-05-22 DIAGNOSIS — H53.2 DIPLOPIA: ICD-10-CM

## 2024-05-22 DIAGNOSIS — R00.1 SINUS BRADYCARDIA: ICD-10-CM

## 2024-05-22 DIAGNOSIS — R00.2 PALPITATIONS: ICD-10-CM

## 2024-05-22 DIAGNOSIS — H53.2 DIPLOPIA: Primary | ICD-10-CM

## 2024-05-22 NOTE — PROGRESS NOTES
[facility] Heart Monitor Documentation    Jamari Munoz  1950  [id number]  05/22/24      [] ZIO XT Patch  Model [id number] Prescribed for N/A Days    Serial Number: (N + 9 Digits) N   Apply-By Date on Box:   USPS Tracking Number:   USPS Tracking        [] [employer] MINI PLUS Mobile Cardiac Telemetry  Model BGMINIPLUS Prescribed for 30 Days    Serial Number: (BGM + 7 Digits) [id number]  Shipped-By Date on Box: 355337  UPS Tracking Number: [id number]  UPS Tracking      [] [employer] MINI Holter Monitor  Model BGMINIEL Prescribed for N/A Days    Serial Number: (7 Digits)   Shipped-By Date on Box:   UPS Tracking Number: [id number]  UPS Tracking        This monitor was applied to the patient's chest and checked for proper functioning.  Mr. Jamari Munoz was instructed in the proper use of this monitor.  He was given the opportunity to ask questions and left the office with the device 's instruction manual.    Audra Benitez MA, 09:02 EDT, 05/22/24                  [facility]

## 2024-05-22 NOTE — PROGRESS NOTES
"Chief Complaint  Establish Care and Hypertension (Transient neurological symptoms)    Subjective    History of Present Illness {  Problem List  Visit  Diagnosis   Encounters  Notes  Medications  Labs  Result Review Imaging  Media :23}       History of Present Illness   73-year-old male presents the office today at the request of neurology for transient neurologic symptoms.  Reports that he experienced diplopia a few weeks ago for about 15 to 20 minutes.  Patient has a past medical history of hypertension, hyperlipidemia and bradycardia.  He presented to [facility] ED on 4/5/2024 with double vision and difficulty walking.  NIH on presentation was 0.  Patient does have a history of ocular migraines.  CT of the head was negative for acute intracranial abnormality.  CT of the head and neck was negative for large vessel occlusion or flow-limiting stenosis.  MRI of the brain was negative for acute intracranial process.  Echo 5/15/2024 showed EF of 61%, left atrial cavity mildly dilated with saline test negative.  Trace AR, trace MR, trace TR. denies chest pain, dyspnea, palpitations, presyncope or syncope.  Objective     Vital Signs:   Vitals:    05/22/24 0837 05/22/24 0838   BP: 147/83 140/87   BP Location: Left arm Left arm   Patient Position: Standing Sitting   Cuff Size: Adult Adult   Pulse: 56 54   Resp:  16   Temp:  97.8 °F (36.6 °C)   TempSrc:  Temporal   SpO2: 94% 94%   Weight:  91.8 kg (202 lb 5 oz)   Height:  177.8 cm (70\")     Body mass index is 29.03 kg/m².  Physical Exam  Vitals and nursing note reviewed.   Constitutional:       Appearance: Normal appearance.   HENT:      Head: Normocephalic.   Eyes:      Pupils: Pupils are equal, round, and reactive to light.   Cardiovascular:      Rate and Rhythm: Normal rate and regular rhythm.      Pulses: Normal pulses.      Heart sounds: Normal heart sounds. No murmur heard.  Pulmonary:      Effort: Pulmonary effort is normal.      Breath " sounds: Normal breath sounds.   Abdominal:      General: Bowel sounds are normal.      Palpations: Abdomen is soft.   Musculoskeletal:         General: Normal range of motion.      Cervical back: Normal range of motion.      Right lower leg: No edema.      Left lower leg: No edema.   Skin:     General: Skin is warm and dry.      Capillary Refill: Capillary refill takes less than 2 seconds.   Neurological:      Mental Status: He is alert and oriented to person, place, and time.   Psychiatric:         Mood and Affect: Mood normal.         Thought Content: Thought content normal.              Result Review  Data Reviewed:{ Labs  Result Review  Imaging  Med Tab  Media :23}   ECG 12 Lead ED Triage Standing Order; Weak / Dizzy / AMS (04/05/2024 08:59)  Adult Transthoracic Echo Complete W/ Cont if Necessary Per Protocol (05/15/2024 09:31)    Lab Results   Component Value Date    GLUCOSE 103 (H) 04/05/2024    CALCIUM 9.9 04/05/2024     (L) 04/05/2024    K 4.5 04/05/2024    CO2 25.0 04/05/2024     04/05/2024    BUN 17 04/05/2024    CREATININE 0.92 04/05/2024    EGFR 87.8 04/05/2024    BCR 18.5 04/05/2024    ANIONGAP 8.0 04/05/2024     Lab Results   Component Value Date    WBC 7.63 04/05/2024    HGB 16.2 04/05/2024    HCT 48.8 04/05/2024    MCV 98.0 (H) 04/05/2024     04/05/2024              Assessment and Plan {CC Problem List  Visit Diagnosis  ROS  Review (Popup)  Health Maintenance  Quality  BestPractice  Medications  SmartSets  SnapShot Encounters  Media :23}   1. Diplopia    - Mobile Cardiac Outpatient Telemetry; Future to rule out afib     2. Sinus bradycardia  Asymptomatic  Patient cycles on a regular basis   - Mobile Cardiac Outpatient Telemetry; Future    3. Benign essential hypertension  Stable on enalapril   Continue to monitor       Follow Up {Instructions Charge Capture  Follow-up Communications :23}   Return in about 5 weeks (around 6/26/2024) for Office visit, Monitor  results.    Patient was given instructions and counseling regarding his condition or for health maintenance advice. Please see specific information pulled into the AVS if appropriate.  Patient was instructed to call the [facility] with any questions, concerns, or worsening symptoms.

## 2024-06-17 ENCOUNTER — DOCUMENTATION (OUTPATIENT)
Dept: CARDIOLOGY | Facility: HOSPITAL | Age: 74
End: 2024-06-17
Payer: MEDICARE

## 2024-06-25 ENCOUNTER — OFFICE VISIT (OUTPATIENT)
Dept: CARDIOLOGY | Facility: HOSPITAL | Age: 74
End: 2024-06-25
Payer: MEDICARE

## 2024-06-25 VITALS
BODY MASS INDEX: 29 KG/M2 | RESPIRATION RATE: 16 BRPM | TEMPERATURE: 97 F | DIASTOLIC BLOOD PRESSURE: 79 MMHG | OXYGEN SATURATION: 95 % | SYSTOLIC BLOOD PRESSURE: 140 MMHG | HEIGHT: 70 IN | WEIGHT: 202.56 LBS | HEART RATE: 54 BPM

## 2024-06-25 DIAGNOSIS — I10 BENIGN ESSENTIAL HYPERTENSION: Primary | ICD-10-CM

## 2024-06-25 DIAGNOSIS — I47.29 NSVT (NONSUSTAINED VENTRICULAR TACHYCARDIA): ICD-10-CM

## 2024-06-25 DIAGNOSIS — R00.1 BRADYCARDIA, SINUS: ICD-10-CM

## 2024-06-25 DIAGNOSIS — H53.2 DIPLOPIA: ICD-10-CM

## 2024-06-25 DIAGNOSIS — R94.31 ABNORMAL ELECTROCARDIOGRAM (ECG) (EKG): ICD-10-CM

## 2024-06-25 PROCEDURE — 1160F RVW MEDS BY RX/DR IN RCRD: CPT | Performed by: NURSE PRACTITIONER

## 2024-06-25 PROCEDURE — 3078F DIAST BP <80 MM HG: CPT | Performed by: NURSE PRACTITIONER

## 2024-06-25 PROCEDURE — 3077F SYST BP >= 140 MM HG: CPT | Performed by: NURSE PRACTITIONER

## 2024-06-25 PROCEDURE — 1159F MED LIST DOCD IN RCRD: CPT | Performed by: NURSE PRACTITIONER

## 2024-06-25 PROCEDURE — 99214 OFFICE O/P EST MOD 30 MIN: CPT | Performed by: NURSE PRACTITIONER

## 2024-06-25 NOTE — PROGRESS NOTES
"Chief Complaint  Palpitations and Follow-up    Subjective    History of Present Illness {  Problem List  Visit  Diagnosis   Encounters  Notes  Medications  Labs  Result Review Imaging  Media :23}       History of Present Illness   73-year-old male presents the office today at the request of neurology for transient neurologic symptoms.  Reports that he experienced diplopia a few weeks ago for about 15 to 20 minutes.  Patient has a past medical history of hypertension, hyperlipidemia and bradycardia.  He presented to [facility] ED on 4/5/2024 with double vision and difficulty walking.  NIH on presentation was 0.  Patient does have a history of ocular migraines.  CT of the head was negative for acute intracranial abnormality.  CT of the head and neck was negative for large vessel occlusion or flow-limiting stenosis.  MRI of the brain was negative for acute intracranial process.  Echo 5/15/2024 showed EF of 61%, left atrial cavity mildly dilated with saline test negative.  Trace AR, trace MR, trace TR. denies chest pain, dyspnea, palpitations, presyncope or syncope.  Notes that he cycles on a regular basis   Objective     Vital Signs:   Vitals:    06/25/24 0856   BP: 140/79   BP Location: Left arm   Patient Position: Sitting   Cuff Size: Adult   Pulse: 54   Resp: 16   Temp: 97 °F (36.1 °C)   TempSrc: Temporal   SpO2: 95%   Weight: 94.8 kg (209 lb)   Height: 177.8 cm (70\")       Body mass index is 29.99 kg/m².  Physical Exam  Vitals and nursing note reviewed.   Constitutional:       Appearance: Normal appearance.   HENT:      Head: Normocephalic.   Eyes:      Pupils: Pupils are equal, round, and reactive to light.   Cardiovascular:      Rate and Rhythm: Normal rate and regular rhythm.      Pulses: Normal pulses.      Heart sounds: Normal heart sounds. No murmur heard.  Pulmonary:      Effort: Pulmonary effort is normal.      Breath sounds: Normal breath sounds.   Abdominal:      General: Bowel " sounds are normal.      Palpations: Abdomen is soft.   Musculoskeletal:         General: Normal range of motion.      Cervical back: Normal range of motion.      Right lower leg: No edema.      Left lower leg: No edema.   Skin:     General: Skin is warm and dry.      Capillary Refill: Capillary refill takes less than 2 seconds.   Neurological:      Mental Status: He is alert and oriented to person, place, and time.   Psychiatric:         Mood and Affect: Mood normal.         Thought Content: Thought content normal.              Result Review  Data Reviewed:{ Labs  Result Review  Imaging  Med Tab  Media :23}   ECG 12 Lead ED Triage Standing Order; Weak / Dizzy / AMS (04/05/2024 08:59)  Adult Transthoracic Echo Complete W/ Cont if Necessary Per Protocol (05/15/2024 09:31)    Lab Results   Component Value Date    GLUCOSE 103 (H) 04/05/2024    CALCIUM 9.9 04/05/2024     (L) 04/05/2024    K 4.5 04/05/2024    CO2 25.0 04/05/2024     04/05/2024    BUN 17 04/05/2024    CREATININE 0.92 04/05/2024    EGFR 87.8 04/05/2024    BCR 18.5 04/05/2024    ANIONGAP 8.0 04/05/2024     Lab Results   Component Value Date    WBC 7.63 04/05/2024    HGB 16.2 04/05/2024    HCT 48.8 04/05/2024    MCV 98.0 (H) 04/05/2024     04/05/2024     MCOT: avg hr 63 bpm, pac/pvc burden less than 1%  5 runs of nonsustained VT with the longest lasting 9 seconds.  1 episode did occur during exercise.         Assessment and Plan {CC Problem List  Visit Diagnosis  ROS  Review (Popup)  Health Maintenance  Quality  BestPractice  Medications  SmartSets  SnapShot Encounters  Media :23}   1. Diplopia  No afib noted on mcot   2. Sinus bradycardia  Asymptomatic  Patient cycles on a regular basis     3. Benign essential hypertension  Stable on enalapril   Continue to monitor   4. NSVT   Walking cardiolite   5. Abnormal EKG   Walking cardiolite   Follow Up {Instructions Charge Capture  Follow-up Communications :23}   Return if  symptoms worsen or fail to improve.    Patient was given instructions and counseling regarding his condition or for health maintenance advice. Please see specific information pulled into the AVS if appropriate.  Patient was instructed to call the [facility] with any questions, concerns, or worsening symptoms.

## 2024-06-26 ENCOUNTER — HOSPITAL ENCOUNTER (OUTPATIENT)
Dept: CARDIOLOGY | Facility: HOSPITAL | Age: 74
Discharge: HOME | End: 2024-06-26
Payer: MEDICARE

## 2024-06-26 VITALS — DIASTOLIC BLOOD PRESSURE: 84 MMHG | HEART RATE: 49 BPM | OXYGEN SATURATION: 98 % | SYSTOLIC BLOOD PRESSURE: 136 MMHG

## 2024-06-26 DIAGNOSIS — R94.31 ABNORMAL ELECTROCARDIOGRAM (ECG) (EKG): ICD-10-CM

## 2024-06-26 DIAGNOSIS — I10 BENIGN ESSENTIAL HYPERTENSION: ICD-10-CM

## 2024-06-26 DIAGNOSIS — I47.29 NSVT (NONSUSTAINED VENTRICULAR TACHYCARDIA): ICD-10-CM

## 2024-06-26 LAB
BH CV REST NUCLEAR ISOTOPE DOSE: 9.9 MCI
BH CV STRESS BP STAGE 1: NORMAL
BH CV STRESS BP STAGE 2: NORMAL
BH CV STRESS BP STAGE 3: NORMAL
BH CV STRESS DURATION MIN STAGE 1: 3
BH CV STRESS DURATION MIN STAGE 2: 3
BH CV STRESS DURATION MIN STAGE 3: 1
BH CV STRESS DURATION SEC STAGE 1: 0
BH CV STRESS DURATION SEC STAGE 2: 0
BH CV STRESS DURATION SEC STAGE 3: 48
BH CV STRESS GRADE STAGE 1: 10
BH CV STRESS GRADE STAGE 2: 12
BH CV STRESS GRADE STAGE 3: 14
BH CV STRESS HR STAGE 1: 87
BH CV STRESS HR STAGE 2: 111
BH CV STRESS HR STAGE 3: 137
BH CV STRESS METS STAGE 1: 5
BH CV STRESS METS STAGE 2: 7.5
BH CV STRESS METS STAGE 3: 10
BH CV STRESS NUCLEAR ISOTOPE DOSE: 33 MCI
BH CV STRESS O2 STAGE 1: 99
BH CV STRESS O2 STAGE 2: 97
BH CV STRESS O2 STAGE 3: 96
BH CV STRESS PROTOCOL 1: NORMAL
BH CV STRESS RECOVERY BP: NORMAL MMHG
BH CV STRESS RECOVERY HR: 61 BPM
BH CV STRESS RECOVERY O2: 97 %
BH CV STRESS SPEED STAGE 1: 1.7
BH CV STRESS SPEED STAGE 2: 2.5
BH CV STRESS SPEED STAGE 3: 3.4
BH CV STRESS STAGE 1: 1
BH CV STRESS STAGE 2: 2
BH CV STRESS STAGE 3: 3
LV EF NUC BP: 74 %
MAXIMAL PREDICTED HEART RATE: 147 BPM
PERCENT MAX PREDICTED HR: 93.2 %
STRESS BASELINE BP: NORMAL MMHG
STRESS BASELINE HR: 49 BPM
STRESS O2 SAT REST: 98 %
STRESS PERCENT HR: 110 %
STRESS POST ESTIMATED WORKLOAD: 9.7 METS
STRESS POST EXERCISE DUR MIN: 7 MIN
STRESS POST EXERCISE DUR SEC: 48 SEC
STRESS POST O2 SAT PEAK: 96 %
STRESS POST PEAK BP: NORMAL MMHG
STRESS POST PEAK HR: 137 BPM
STRESS TARGET HR: 125 BPM

## 2024-06-26 PROCEDURE — A9500 TC99M SESTAMIBI: HCPCS | Performed by: NURSE PRACTITIONER

## 2024-06-26 PROCEDURE — 78452 HT MUSCLE IMAGE SPECT MULT: CPT

## 2024-06-26 PROCEDURE — 93017 CV STRESS TEST TRACING ONLY: CPT

## 2024-06-26 PROCEDURE — 93018 CV STRESS TEST I&R ONLY: CPT | Performed by: INTERNAL MEDICINE

## 2024-06-26 PROCEDURE — 0 TECHNETIUM SESTAMIBI: Performed by: NURSE PRACTITIONER

## 2024-06-26 PROCEDURE — 78452 HT MUSCLE IMAGE SPECT MULT: CPT | Performed by: INTERNAL MEDICINE

## 2024-06-26 RX ADMIN — TECHNETIUM TC 99M SESTAMIBI 1 DOSE: 1 INJECTION INTRAVENOUS at 10:00

## 2024-06-26 RX ADMIN — TECHNETIUM TC 99M SESTAMIBI 1 DOSE: 1 INJECTION INTRAVENOUS at 08:02

## 2024-06-27 ENCOUNTER — TELEPHONE (OUTPATIENT)
Dept: CARDIOLOGY | Facility: HOSPITAL | Age: 74
End: 2024-06-27
Payer: MEDICARE

## 2024-06-27 DIAGNOSIS — E78.2 MIXED HYPERLIPIDEMIA: ICD-10-CM

## 2024-06-27 DIAGNOSIS — I10 BENIGN ESSENTIAL HYPERTENSION: Primary | ICD-10-CM

## 2024-06-27 DIAGNOSIS — I25.84 CALCIFICATION OF NATIVE CORONARY ARTERY: ICD-10-CM

## 2024-06-27 DIAGNOSIS — I47.29 NSVT (NONSUSTAINED VENTRICULAR TACHYCARDIA): ICD-10-CM

## 2024-06-27 DIAGNOSIS — I25.10 CALCIFICATION OF NATIVE CORONARY ARTERY: ICD-10-CM

## 2024-06-27 RX ORDER — ATORVASTATIN CALCIUM 20 MG/1
40 TABLET, FILM COATED ORAL DAILY
Start: 2024-06-27 | End: 2024-06-28 | Stop reason: SDUPTHER

## 2024-06-27 NOTE — TELEPHONE ENCOUNTER
Reviewed stress test with patient that showed no ischemia but dense calcification in proximal LAD.  Increase lipitor to 40  mg daily  Amb referral to cardiology

## 2024-06-28 ENCOUNTER — PATIENT MESSAGE (OUTPATIENT)
Dept: INTERNAL MEDICINE | Facility: CLINIC | Age: 74
End: 2024-06-28
Payer: MEDICARE

## 2024-06-28 DIAGNOSIS — E78.2 MIXED HYPERLIPIDEMIA: ICD-10-CM

## 2024-06-28 RX ORDER — ATORVASTATIN CALCIUM 40 MG/1
40 TABLET, FILM COATED ORAL DAILY
Qty: 90 TABLET | Refills: 1 | Status: SHIPPED | OUTPATIENT
Start: 2024-06-28

## 2024-06-28 NOTE — TELEPHONE ENCOUNTER
From: Jamari Munoz  To: Keshawn Alexandre  Sent: 6/28/2024 9:38 AM EDT  Subject: Stress Test, and rx change     and Dr. Alexandre,  Not sure if you get notified about my recent issues, all in myChart.    Caity San wants me to up my Lipitor from 20 to 40.    My refills are due soon, I have enough to get me through several weeks. Can you notify [facility] to change the Lipitor from 20 to 40?    Thanks,  Jamari

## 2024-07-05 ENCOUNTER — OFFICE VISIT (OUTPATIENT)
Dept: CARDIOLOGY | Facility: CLINIC | Age: 74
End: 2024-07-05
Payer: MEDICARE

## 2024-07-05 VITALS
HEIGHT: 70 IN | BODY MASS INDEX: 28.77 KG/M2 | HEART RATE: 69 BPM | DIASTOLIC BLOOD PRESSURE: 70 MMHG | OXYGEN SATURATION: 96 % | SYSTOLIC BLOOD PRESSURE: 130 MMHG | WEIGHT: 201 LBS

## 2024-07-05 DIAGNOSIS — E78.2 MIXED HYPERLIPIDEMIA: ICD-10-CM

## 2024-07-05 DIAGNOSIS — I10 PRIMARY HYPERTENSION: Primary | ICD-10-CM

## 2024-07-05 NOTE — PROGRESS NOTES
[facility] Cardiology  Consultation H&P  Jamari Munoz  1950  [address]  [facility] [address]     VISIT DATE:  07/05/24    PCP: Keshawn Alexandre MD  [address] [facility] [address]  [facility] [id number]    IDENTIFICATION: A 73 y.o. male semiretired  and first cousin of Jose Maria Rico    PROBLEM LIST:   CAD  6/24 MPS: no ischemia, dense coronary calcification (prox LAD)  6/24 Echo: EF 61%, negative saline test  HTN  IRBBB  HLD  3/24  [id number]  Hx of diplopia, negative neuro imaging 5/2024  OA  Surgical history:  Knee sugery  Tonsillectomy/adenoidectomy   Vasectomy     CC:  Visual TIA    Allergies  No Known Allergies    Current Medications  Current Outpatient Medications   Medication Instructions    aspirin 81 mg, Oral, Daily    atorvastatin (LIPITOR) 40 mg, Oral, Daily    CALCIUM-MAGNESIUM-ZINC PO 7 tablets, Oral, Daily    enalapril (VASOTEC) 5 mg, Oral, Daily    Glucosamine-Chondroitin 750-600 MG tablet 1 tablet, Oral, 2 Times Daily    valACYclovir (VALTREX) 250 mg, Oral, Daily        History of Present Illness   HPI  Jamari Munoz is a 73 y.o. year old male with the above mentioned PMH who presents for consult from Caity San APRN for evaluation of visual TIA.  He stated in April he had episode of diplopia with cardiac and neuroimaging benign.  He did have Holter monitor revealed mild sustained ventricular shaun  Patient exercises vigorously including biking and hiking without issue.  Pt denies any chest pain, dyspnea at rest, dyspnea on exertion, orthopnea, PND, palpitations, lower extremity edema, or claudication. Pt denies history of CHF, DVT, PE, MI, CVA, TIA, or rheumatic fever.       ROS  Review of Systems   Eyes:  Positive for double vision.   All other systems reviewed and are negative.      SOCIAL HX  Social History     Socioeconomic History    Marital status:    Tobacco Use    Smoking status: Never     Passive exposure: Never    Smokeless tobacco: Never  "  Vaping Use    Vaping status: Never Used   Substance and Sexual Activity    Alcohol use: Yes     Alcohol/week: 14.0 standard drinks of alcohol     Types: 7 Glasses of wine, 7 Cans of beer per week     Comment: beer mostly in summer after cycling; liquor is [place]    Drug use: No    Sexual activity: Yes     Partners: Female     Birth control/protection: None       FAMILY HX  Family History   Problem Relation Age of Onset    Diabetes Mother         mostly controlled    Diabetes Maternal Grandmother         controlled    Arthritis Paternal Grandmother         not too bad    Osteoarthritis Paternal Grandmother     Coronary artery disease Paternal Grandfather        Vitals:    07/05/24 1451   BP: 130/70   BP Location: Left arm   Patient Position: Sitting   Cuff Size: Adult   Pulse: 69   SpO2: 96%   Weight: 91.2 kg (201 lb)   Height: 177.8 cm (70\")     Body mass index is 28.84 kg/m².     PHYSICAL EXAMINATION:  Constitutional:       Appearance: Healthy appearance. Not in distress.   Neck:      Vascular: No JVR. JVD normal.   Pulmonary:      Effort: Pulmonary effort is normal.      Breath sounds: Normal breath sounds. No wheezing. No rhonchi. No rales.   Chest:      Chest wall: Not tender to palpatation.   Cardiovascular:      PMI at left midclavicular line. Normal rate. Regular rhythm. Normal S1. Normal S2.       Murmurs: There is no murmur.      No gallop.  No click. No rub.   Pulses:     Intact distal pulses.   Edema:     Peripheral edema absent.   Abdominal:      General: Bowel sounds are normal.      Palpations: Abdomen is soft.      Tenderness: There is no abdominal tenderness.   Musculoskeletal: Normal range of motion.         General: No tenderness. Skin:     General: Skin is warm and dry.   Neurological:      General: No focal deficit present.      Mental Status: Alert and oriented to person, place and time.         Diagnostic Data:  Procedures  Lab Results   Component Value Date    CHOL 158 03/12/2024    TRIG 99 " "03/12/2024    HDL 58 03/12/2024    LDL 82 03/12/2024      Lab Results   Component Value Date    GLUCOSE 103 (H) 04/05/2024    CALCIUM 9.9 04/05/2024     (L) 04/05/2024    K 4.5 04/05/2024    CO2 25.0 04/05/2024     04/05/2024    BUN 17 04/05/2024    CREATININE 0.92 04/05/2024    EGFR 87.8 04/05/2024    BCR 18.5 04/05/2024    ANIONGAP 8.0 04/05/2024      Lab Results   Component Value Date    WBC 7.63 04/05/2024    HGB 16.2 04/05/2024    HCT 48.8 04/05/2024    MCV 98.0 (H) 04/05/2024     04/05/2024     No results found for: \"HGBA1C\"   Lab Results   Component Value Date    TSH 1.830 03/12/2024          Advance Care Planning   ACP discussion was held with the patient during this visit. Patient has an advance directive (not in EMR), copy requested.         ASSESSMENT:     Diagnosis Plan   1. Primary hypertension        2. Mixed hyperlipidemia            PLAN:    Hypertension controlled on enalapril      Mixed dyslipidemia controlled on statin therapy with ideal LDL less than 70 may need Zetia    Mixed coronary disease as manifested by coronary calcification asymptomatic with normal perfusion continue GDMT    Diplopia likely noncardiac recommended he can obtain a [employer] [facility] [facility] mobile device if concern for potential A-fib          Caity San, MARINA, thank you for referring Mr. Munoz for evaluation.  I have forwarded my electronically generated recommendations to you for review.  Please do not hesitate to call with any questions.      Reginaldo Tse MD, FACC    "

## 2024-07-15 ENCOUNTER — OFFICE VISIT (OUTPATIENT)
Dept: NEUROLOGY | Facility: CLINIC | Age: 74
End: 2024-07-15
Payer: MEDICARE

## 2024-07-15 VITALS
BODY MASS INDEX: 28.63 KG/M2 | WEIGHT: 200 LBS | HEART RATE: 55 BPM | HEIGHT: 70 IN | SYSTOLIC BLOOD PRESSURE: 136 MMHG | TEMPERATURE: 98 F | OXYGEN SATURATION: 95 % | DIASTOLIC BLOOD PRESSURE: 78 MMHG

## 2024-07-15 DIAGNOSIS — R29.818 TRANSIENT NEUROLOGICAL SYMPTOMS: Primary | ICD-10-CM

## 2024-07-15 NOTE — PROGRESS NOTES
Follow Up Office Visit      Encounter Date: 07/15/2024   Patient Name: Jamari Munoz  : 1950   MRN: [id number]   PCP: Keshawn Alexandre MD    Chief Complaint:    Chief Complaint   Patient presents with    Follow-up       History of Present Illness: Jamari Munoz is a 73 y.o. male who is here today to establish care.  Patient has past medical history significant for hypertension, hyperlipidemia, bradycardia.  He presented to [facility] ED on 2024 with double vision and difficulty walking.  NIH on presentation to the ED is a 0.  Of note patient does report history of ocular migraine.  CT of the head is negative for acute intercranial abnormality.  CTA of the head and neck is negative for large vessel occlusion or flow-limiting stenosis.  MRI of the brain was negative for acute intracranial process.  Patient was recommended to initiate a baby aspirin daily.  He was recommended to complete an echocardiogram with bubble study on an outpatient basis.     Patient reports to clinic today following his recent ED visit for double vision.  He arrives early to his appointment as he has a 9:00 echocardiogram scheduled.  He reports he has been doing well.  He has had no return of his double vision.  He has had no new or worsening neurologic symptoms.  He has had no recent ocular migraine.  Patient has been taking his aspirin and Lipitor as prescribed without issue or side effect.  I reviewed his diagnostic workup, differential diagnoses and treatment plan.  Although it is difficult to say for sure if this was a transient ischemic attack we will proceed with treating it as such in order to provide him optimal protection against future stroke.  I have encouraged the patient to complete a 14-day Holter monitor as well.  He is in agreement.    Clinic visit 7/15/2024: Patient presents today for routine follow-up.  He reports no new or worsening strokelike symptoms.  He has had 1 ocular migraine episodes since our last visit 2  "months ago.  He reports this was his usual ocular migraine.  He continues to not be interested in preventative treatment.  We discussed should his ocular migraine aura increase in frequency that there are preventative medications that we can initiate and I would refer him to the migraine clinic.  He verbalized understanding.  Will continue treatment of his vascular risk factors due to potential for possible TIA.    Subjective        I have reviewed and the following portions of the patient's history were updated as appropriate: past family history, past medical history, past social history, past surgical history and problem list.    Medications:     Current Outpatient Medications:     aspirin 81 MG EC tablet, Take 1 tablet by mouth Daily., Disp: , Rfl:     atorvastatin (LIPITOR) 40 MG tablet, Take 1 tablet by mouth Daily., Disp: 90 tablet, Rfl: 1    CALCIUM-MAGNESIUM-ZINC PO, Take 7 tablets by mouth Daily., Disp: , Rfl:     enalapril (VASOTEC) 5 MG tablet, TAKE 1 TABLET DAILY, Disp: 90 tablet, Rfl: 3    Glucosamine-Chondroitin 750-600 MG tablet, Take 1 tablet by mouth 2 (Two) Times a Day., Disp: , Rfl:     valACYclovir (VALTREX) 500 MG tablet, TAKE ONE-HALF (1/2) TABLET DAILY, Disp: 45 tablet, Rfl: 3    Allergies:   No Known Allergies    Objective     Physical Exam:   Vital Signs:   Vitals:    07/15/24 0822   BP: 136/78   Pulse: 55   Temp: 98 °F (36.7 °C)   SpO2: 95%   Weight: 90.7 kg (200 lb)   Height: 177.8 cm (70\")     Body mass index is 28.7 kg/m².    Physical Exam  Vitals and nursing note reviewed.   Constitutional:       General: He is awake. He is not in acute distress.     Appearance: Normal appearance. He is normal weight. He is not ill-appearing.      Comments: 73-year-old  male   HENT:      Head: Normocephalic and atraumatic.      Nose: Nose normal.      Mouth/Throat:      Mouth: Mucous membranes are moist.   Eyes:      General: Lids are normal.      Extraocular Movements: Extraocular movements " intact.      Pupils: Pupils are equal, round, and reactive to light.   Cardiovascular:      Rate and Rhythm: Normal rate and regular rhythm.      Pulses: Normal pulses.   Pulmonary:      Effort: Pulmonary effort is normal. No respiratory distress.   Skin:     General: Skin is warm and dry.   Neurological:      General: No focal deficit present.      Mental Status: He is alert and oriented to person, place, and time. Mental status is at baseline.      Motor: Motor strength is normal.     Coordination: Coordination is intact.   Psychiatric:         Mood and Affect: Mood normal.         Speech: Speech normal.         Behavior: Behavior normal.       Neurological Exam  Mental Status  Awake and alert. Oriented to person, place, time and situation. Oriented to person, place, and time. Speech is normal. Language is fluent with no aphasia. Attention and concentration are normal. Fund of knowledge is appropriate for level of education.    Cranial Nerves  CN II: Visual acuity is normal. Visual fields full to confrontation.  CN III, IV, VI: Extraocular movements intact bilaterally. Normal lids and orbits bilaterally. Pupils equal round and reactive to light bilaterally.  CN V: Facial sensation is normal.  CN VII: Full and symmetric facial movement.  CN VIII: Hearing is normal to speech .  CN XI: Shoulder shrug strength is normal.  CN XII: Tongue midline without atrophy or fasciculations.    Motor  Normal muscle bulk throughout. No fasciculations present. Normal muscle tone. Strength is 5/5 throughout all four extremities.    Sensory  Light touch is normal in upper and lower extremities. Pinprick is normal in upper and lower extremities.     Coordination    Finger-to-nose, rapid alternating movements and heel-to-shin normal bilaterally without dysmetria.  No obvious dysmetria .    Gait  Casual gait is normal including stance, stride, and arm swing.     NIH 0  Modified Norman Score: 0        0  No Symptoms    1 No significant  disability. Able to carry out all usual activities, despite some symptoms.    2 Slight disability. Able to look after own affairs without assistance, but unable to carry out all previous activities.    3 Moderate disability. Requires some help, but able to walk unassisted.    4 Moderately severe disability. Unable to attend to own bodily needs without assistance, and unable to walk unassisted.    5 Severe disability. Requires constant nursing care and attention, bedridden, incontinent.    6 Dead      PHQ-9 Depression Screening  Little interest or pleasure in doing things? 0-->not at all   Feeling down, depressed, or hopeless? 0-->not at all   Trouble falling or staying asleep, or sleeping too much?     Feeling tired or having little energy?     Poor appetite or overeating?     Feeling bad about yourself - or that you are a failure or have let yourself or your family down?     Trouble concentrating on things, such as reading the newspaper or watching television?     Moving or speaking so slowly that other people could have noticed? Or the opposite - being so fidgety or restless that you have been moving around a lot more than usual?     Thoughts that you would be better off dead, or of hurting yourself in some way?     PHQ-9 Total Score 0   If you checked off any problems, how difficult have these problems made it for you to do your work, take care of things at home, or get along with other people?         YAIR Fall Risk Clinician Key Questions   Have you fallen in the past year?: No  Do you feel unsteady with walking?: No  Are you worried about falling?: No      Lab Results   Component Value Date    GLUCOSE 103 (H) 04/05/2024    BUN 17 04/05/2024    CREATININE 0.92 04/05/2024    EGFR 87.8 04/05/2024    BCR 18.5 04/05/2024    K 4.5 04/05/2024    CO2 25.0 04/05/2024    CALCIUM 9.9 04/05/2024    ALBUMIN 4.4 04/05/2024    BILITOT 0.6 04/05/2024    AST 33 04/05/2024    ALT 32 04/05/2024     Lipid Panel           9/5/2023    08:16 3/12/2024    08:40   Lipid Panel   Total Cholesterol 175  158    Triglycerides 95  99    HDL Cholesterol 64  58    VLDL Cholesterol 17  18    LDL Cholesterol  94  82    LDL/HDL Ratio 1.44  1.38          Assessment / Plan      Assessment/Plan:   # Transient episode of blurred vision and Gait Disturbance  Differentials to include TIA versus ocular migraine  -continue ASA 81mg daily  -continue Lipitor 40 mg nightly   -CT, CTA negative for acute intracranial abnormality   -MRI brain w/o negative for acute ischemia but does show some mild cortical atrophy    -14 day holter was negative for Afib. Cardiology has ordered him a hike mobile device for ongoing monitoring for PAF.   -If ocular migraines become more frequent discussed initiation of Depakote for migraine prophylaxis and also a referral to migraine clinic. Patient does not wish to pursue this at this time.    -normal BP goals, <130/80. Today's /78  -Discussed stroke signs and symptoms with the patient including reasons to return to the emergency department  -follow up in stroke clinic in 6 months      Discussed the importance of medication compliance and lifestyle modifications (adequate blood pressure control, adequate control of hyperlipidemia, adequate glycemic control, increase physical activity, and healthy diet) to help reduce the risk of future cerebrovascular events.  Also discussed the signs symptoms that would warrant the patient return back to the emergency department including unilateral weakness, unilateral numbness, visual disturbances, loss of balance, speech difficulties, and/or a sudden severe headache.     Follow Up:   Return in about 6 months (around 1/15/2025).    MARINA Hillman  [facility] Stroke

## 2024-09-27 ENCOUNTER — TELEPHONE (OUTPATIENT)
Dept: INTERNAL MEDICINE | Facility: CLINIC | Age: 74
End: 2024-09-27
Payer: MEDICARE

## 2024-09-27 NOTE — TELEPHONE ENCOUNTER
Caller: Jamari Munoz    Relationship: Self    Best call back number: [phone]     What orders are you requesting (i.e. lab or imaging): 6 MONTH BLOOD WORK     In what timeframe would the patient need to come in: PRIOR TO 10/9/24    Where will you receive your lab/imaging services: [facility]    Additional notes:

## 2024-10-01 DIAGNOSIS — E78.2 MIXED HYPERLIPIDEMIA: Primary | ICD-10-CM

## 2024-10-01 DIAGNOSIS — I10 BENIGN ESSENTIAL HYPERTENSION: ICD-10-CM

## 2024-10-07 ENCOUNTER — LAB (OUTPATIENT)
Dept: LAB | Facility: HOSPITAL | Age: 74
End: 2024-10-07
Payer: MEDICARE

## 2024-10-07 DIAGNOSIS — E78.2 MIXED HYPERLIPIDEMIA: ICD-10-CM

## 2024-10-07 DIAGNOSIS — I10 BENIGN ESSENTIAL HYPERTENSION: ICD-10-CM

## 2024-10-07 LAB
ALBUMIN SERPL-MCNC: 4 G/DL (ref 3.5–5.2)
ALBUMIN/GLOB SERPL: 1.7 G/DL
ALP SERPL-CCNC: 56 U/L (ref 39–117)
ALT SERPL W P-5'-P-CCNC: 27 U/L (ref 1–41)
ANION GAP SERPL CALCULATED.3IONS-SCNC: 7.6 MMOL/L (ref 5–15)
AST SERPL-CCNC: 27 U/L (ref 1–40)
BASOPHILS # BLD AUTO: 0.05 10*3/MM3 (ref 0–0.2)
BASOPHILS NFR BLD AUTO: 0.7 % (ref 0–1.5)
BILIRUB SERPL-MCNC: 0.5 MG/DL (ref 0–1.2)
BUN SERPL-MCNC: 19 MG/DL (ref 8–23)
BUN/CREAT SERPL: 18.4 (ref 7–25)
CALCIUM SPEC-SCNC: 9.5 MG/DL (ref 8.6–10.5)
CHLORIDE SERPL-SCNC: 103 MMOL/L (ref 98–107)
CHOLEST SERPL-MCNC: 152 MG/DL (ref 0–200)
CO2 SERPL-SCNC: 26.4 MMOL/L (ref 22–29)
CREAT SERPL-MCNC: 1.03 MG/DL (ref 0.76–1.27)
DEPRECATED RDW RBC AUTO: 43.9 FL (ref 37–54)
EGFRCR SERPLBLD CKD-EPI 2021: 76.2 ML/MIN/1.73
EOSINOPHIL # BLD AUTO: 0.48 10*3/MM3 (ref 0–0.4)
EOSINOPHIL NFR BLD AUTO: 6.7 % (ref 0.3–6.2)
ERYTHROCYTE [DISTWIDTH] IN BLOOD BY AUTOMATED COUNT: 12 % (ref 12.3–15.4)
GLOBULIN UR ELPH-MCNC: 2.3 GM/DL
GLUCOSE SERPL-MCNC: 91 MG/DL (ref 65–99)
HCT VFR BLD AUTO: 46.2 % (ref 37.5–51)
HDLC SERPL-MCNC: 50 MG/DL (ref 40–60)
HGB BLD-MCNC: 15.6 G/DL (ref 13–17.7)
IMM GRANULOCYTES # BLD AUTO: 0.03 10*3/MM3 (ref 0–0.05)
IMM GRANULOCYTES NFR BLD AUTO: 0.4 % (ref 0–0.5)
LDLC SERPL CALC-MCNC: 84 MG/DL (ref 0–100)
LDLC/HDLC SERPL: 1.66 {RATIO}
LYMPHOCYTES # BLD AUTO: 3.04 10*3/MM3 (ref 0.7–3.1)
LYMPHOCYTES NFR BLD AUTO: 42.3 % (ref 19.6–45.3)
MCH RBC QN AUTO: 33.6 PG (ref 26.6–33)
MCHC RBC AUTO-ENTMCNC: 33.8 G/DL (ref 31.5–35.7)
MCV RBC AUTO: 99.6 FL (ref 79–97)
MONOCYTES # BLD AUTO: 0.78 10*3/MM3 (ref 0.1–0.9)
MONOCYTES NFR BLD AUTO: 10.8 % (ref 5–12)
NEUTROPHILS NFR BLD AUTO: 2.81 10*3/MM3 (ref 1.7–7)
NEUTROPHILS NFR BLD AUTO: 39.1 % (ref 42.7–76)
NRBC BLD AUTO-RTO: 0 /100 WBC (ref 0–0.2)
PLATELET # BLD AUTO: 330 10*3/MM3 (ref 140–450)
PMV BLD AUTO: 9.9 FL (ref 6–12)
POTASSIUM SERPL-SCNC: 4.3 MMOL/L (ref 3.5–5.2)
PROT SERPL-MCNC: 6.3 G/DL (ref 6–8.5)
RBC # BLD AUTO: 4.64 10*6/MM3 (ref 4.14–5.8)
SODIUM SERPL-SCNC: 137 MMOL/L (ref 136–145)
TRIGL SERPL-MCNC: 96 MG/DL (ref 0–150)
VLDLC SERPL-MCNC: 18 MG/DL (ref 5–40)
WBC NRBC COR # BLD AUTO: 7.19 10*3/MM3 (ref 3.4–10.8)

## 2024-10-07 PROCEDURE — 80061 LIPID PANEL: CPT

## 2024-10-07 PROCEDURE — 85025 COMPLETE CBC W/AUTO DIFF WBC: CPT

## 2024-10-07 PROCEDURE — 80053 COMPREHEN METABOLIC PANEL: CPT

## 2024-10-09 ENCOUNTER — OFFICE VISIT (OUTPATIENT)
Dept: INTERNAL MEDICINE | Facility: CLINIC | Age: 74
End: 2024-10-09
Payer: MEDICARE

## 2024-10-09 VITALS
DIASTOLIC BLOOD PRESSURE: 84 MMHG | SYSTOLIC BLOOD PRESSURE: 134 MMHG | BODY MASS INDEX: 28.35 KG/M2 | WEIGHT: 198 LBS | HEIGHT: 70 IN | HEART RATE: 60 BPM | OXYGEN SATURATION: 98 %

## 2024-10-09 DIAGNOSIS — I10 BENIGN ESSENTIAL HYPERTENSION: Primary | ICD-10-CM

## 2024-10-09 DIAGNOSIS — E78.2 MIXED HYPERLIPIDEMIA: ICD-10-CM

## 2024-10-09 DIAGNOSIS — G25.81 RLS (RESTLESS LEGS SYNDROME): ICD-10-CM

## 2024-10-09 PROCEDURE — 3079F DIAST BP 80-89 MM HG: CPT | Performed by: INTERNAL MEDICINE

## 2024-10-09 PROCEDURE — 1159F MED LIST DOCD IN RCRD: CPT | Performed by: INTERNAL MEDICINE

## 2024-10-09 PROCEDURE — 1126F AMNT PAIN NOTED NONE PRSNT: CPT | Performed by: INTERNAL MEDICINE

## 2024-10-09 PROCEDURE — 99214 OFFICE O/P EST MOD 30 MIN: CPT | Performed by: INTERNAL MEDICINE

## 2024-10-09 PROCEDURE — 3075F SYST BP GE 130 - 139MM HG: CPT | Performed by: INTERNAL MEDICINE

## 2024-10-09 PROCEDURE — 90662 IIV NO PRSV INCREASED AG IM: CPT | Performed by: INTERNAL MEDICINE

## 2024-10-09 PROCEDURE — 1160F RVW MEDS BY RX/DR IN RCRD: CPT | Performed by: INTERNAL MEDICINE

## 2024-10-09 PROCEDURE — G0008 ADMIN INFLUENZA VIRUS VAC: HCPCS | Performed by: INTERNAL MEDICINE

## 2024-10-09 NOTE — PROGRESS NOTES
[facility]     Jamari Munoz  1950   [id number]      Patient Care Team:  Keshawn Alexandre MD as PCP - General (Internal Medicine)  Scooter Jiménez MD as Consulting Physician (Colon and Rectal Surgery)  Alvino Berrios MD as Consulting Physician (Ophthalmology)  Reginaldo Tse MD as Consulting Physician (Cardiology)  Yanelis Hernandez APRN as Nurse Practitioner (Family Medicine)    Chief Complaint::   Chief Complaint   Patient presents with    Hypertension     6 mo follow up    Restless Legs Syndrome            HPI  History of Present Illness  The patient is a 74-year-old male who presents for an office visit for essential hypertension, mixed hyperlipidemia, and restless leg syndrome.    He reports feeling well overall. He maintains an active lifestyle, including exercise, bicycling, and walking. He recently completed a week-long bike ride in [facility] and has been doing extensive landscaping at home. He plans to travel to [place] for a 10-day bike ride in the [facility] on 03/31/2024.    He experienced a minor cold last week, which lasted for 2 to 3 days. During this time, he took Sudafed and other medications, which exacerbated his restless leg syndrome, leading to a sleepless night until 5:00 AM. He was informed that antihistamines and Tylenol can trigger restless leg symptoms. His restless leg symptoms typically occur once a month but were particularly severe last week. He has been managing his restless legs with calamine and zinc. However, he has been sleeping well for the past 4 to 5 nights, despite waking up twice to urinate. His cold has largely resolved, leaving only a residual cough.    He reports no headaches, dizziness, chest or lung pain, heart pain, or chest pain. He recently received a flu shot.      Patient Active Problem List   Diagnosis    Osteoarthritis    Benign essential hypertension    Low back pain    Carbuncle and furuncle    RLS (restless legs syndrome)     Overweight    Ganglion cyst    Incomplete right bundle branch block    Mixed hyperlipidemia    Herpes simplex type 2 infection    Sinus bradycardia    Medicare annual wellness visit, subsequent    Annual physical exam    Medicare annual wellness visit, subsequent    Elbow swelling, right    Olecranon bursitis of right elbow    Medicare annual wellness visit, subsequent    Left shoulder tendinitis    Chronic pain of left knee    Medicare annual wellness visit, subsequent    Right shoulder injury        Past Medical History:   Diagnosis Date    Arthritis     Ganglion     Head injury 1967    several concussions from football and biking    Hyperlipidemia     Hypertension     Migraine 1975    one now every month or so with Aura       Past Surgical History:   Procedure Laterality Date    CYST REMOVAL      cyst removed left shoulder and left knee    KNEE MENISCAL REPAIR  2005    TONSILLECTOMY AND ADENOIDECTOMY      VASECTOMY         Family History   Problem Relation Age of Onset    Diabetes Mother         mostly controlled    Diabetes Maternal Grandmother         controlled    Arthritis Paternal Grandmother         not too bad    Osteoarthritis Paternal Grandmother     Dementia Paternal Grandmother         some in mid 80s    Coronary artery disease Paternal Grandfather        Social History     Socioeconomic History    Marital status:    Tobacco Use    Smoking status: Never     Passive exposure: Never    Smokeless tobacco: Never   Vaping Use    Vaping status: Never Used   Substance and Sexual Activity    Alcohol use: Yes     Alcohol/week: 14.0 standard drinks of alcohol     Types: 7 Glasses of wine, 7 Cans of beer per week     Comment: beer mostly in summer after cycling; liquor is [place]    Drug use: No    Sexual activity: Yes     Partners: Female     Birth control/protection: None       No Known Allergies    Review of Systems     HEENT: Denies headache or dizzy vision or hearing change  NECK: Denies pain  "stiffness dysphagia  CHEST: Denies cough wheeze or shortness of breath  CARDIAC: Denies chest pain pressure denies palpitations history of hypertension on enalapril 5 mg daily  ABD: Denies nausea vomiting abdominal pain  : Denies dysuria frequency  NEURO: Denies syncope concussion neuropathy  PSYCH: Denies anxiety depression  EXTREM: Complains of restless legs and leg cramping thought to be secondary to Sudafed and Tylenol and antihistamine he has discontinued and they have improved  SKIN: Denies rash    Vital Signs  Vitals:    10/09/24 0839   BP: 134/84   BP Location: Left arm   Patient Position: Sitting   Cuff Size: Adult   Pulse: 60   SpO2: 98%   Weight: 89.8 kg (198 lb)   Height: 177.8 cm (70\")   PainSc: 0-No pain     Body mass index is 28.41 kg/m².        Advance Care Planning   ACP discussion was held with the patient during this visit. Patient has an advance directive (not in EMR), copy requested.       Current Outpatient Medications:     aspirin 81 MG EC tablet, Take 1 tablet by mouth Daily., Disp: , Rfl:     atorvastatin (LIPITOR) 40 MG tablet, Take 1 tablet by mouth Daily., Disp: 90 tablet, Rfl: 1    CALCIUM-MAGNESIUM-ZINC PO, Take 7 tablets by mouth Daily., Disp: , Rfl:     enalapril (VASOTEC) 5 MG tablet, TAKE 1 TABLET DAILY, Disp: 90 tablet, Rfl: 3    Glucosamine-Chondroitin 750-600 MG tablet, Take 1 tablet by mouth 2 (Two) Times a Day., Disp: , Rfl:     valACYclovir (VALTREX) 500 MG tablet, TAKE ONE-HALF (1/2) TABLET DAILY, Disp: 45 tablet, Rfl: 3    Physical Exam     ACE III MINI        Physical Exam  Physical Exam  Sinuses are normal. Eyes, nose, and throat appear normal.  Lungs are clear.  Heart sounds are normal. Heart rate is regular. Heart rhythm is regular. Heart rate is normal.  Abdomen is soft.    Vital Signs  Blood pressure is 136/80.  HEENT: Pupils equal reactive ENT clear no facial asymmetry pharynx is clear  NECK: No mass.  Thyromegaly neck vein distention  CHEST: Clear  CARDIAC: Regular " rhythm with stable blood pressure and heart rate  ABD: Liver spleen normal positive bowel sounds no bruit  : Deferred  NEURO: Intact  PSYCH: Normal  EXTREM: No edema excellent peripheral pulses  Skin: No rash     Results Review:    Recent Results (from the past 672 hour(s))   Comprehensive Metabolic Panel    Collection Time: 10/07/24  8:26 AM    Specimen: Blood   Result Value Ref Range    Glucose 91 65 - 99 mg/dL    BUN 19 8 - 23 mg/dL    Creatinine 1.03 0.76 - 1.27 mg/dL    Sodium 137 136 - 145 mmol/L    Potassium 4.3 3.5 - 5.2 mmol/L    Chloride 103 98 - 107 mmol/L    CO2 26.4 22.0 - 29.0 mmol/L    Calcium 9.5 8.6 - 10.5 mg/dL    Total Protein 6.3 6.0 - 8.5 g/dL    Albumin 4.0 3.5 - 5.2 g/dL    ALT (SGPT) 27 1 - 41 U/L    AST (SGOT) 27 1 - 40 U/L    Alkaline Phosphatase 56 39 - 117 U/L    Total Bilirubin 0.5 0.0 - 1.2 mg/dL    Globulin 2.3 gm/dL    A/G Ratio 1.7 g/dL    BUN/Creatinine Ratio 18.4 7.0 - 25.0    Anion Gap 7.6 5.0 - 15.0 mmol/L    eGFR 76.2 >60.0 mL/min/1.73   Lipid Panel    Collection Time: 10/07/24  8:26 AM    Specimen: Blood   Result Value Ref Range    Total Cholesterol 152 0 - 200 mg/dL    Triglycerides 96 0 - 150 mg/dL    HDL Cholesterol 50 40 - 60 mg/dL    LDL Cholesterol  84 0 - 100 mg/dL    VLDL Cholesterol 18 5 - 40 mg/dL    LDL/HDL Ratio 1.66    CBC Auto Differential    Collection Time: 10/07/24  8:26 AM    Specimen: Blood   Result Value Ref Range    WBC 7.19 3.40 - 10.80 10*3/mm3    RBC 4.64 4.14 - 5.80 10*6/mm3    Hemoglobin 15.6 13.0 - 17.7 g/dL    Hematocrit 46.2 37.5 - 51.0 %    MCV 99.6 (H) 79.0 - 97.0 fL    MCH 33.6 (H) 26.6 - 33.0 pg    MCHC 33.8 31.5 - 35.7 g/dL    RDW 12.0 (L) 12.3 - 15.4 %    RDW-SD 43.9 37.0 - 54.0 fl    MPV 9.9 6.0 - 12.0 fL    Platelets 330 140 - 450 10*3/mm3    Neutrophil % 39.1 (L) 42.7 - 76.0 %    Lymphocyte % 42.3 19.6 - 45.3 %    Monocyte % 10.8 5.0 - 12.0 %    Eosinophil % 6.7 (H) 0.3 - 6.2 %    Basophil % 0.7 0.0 - 1.5 %    Immature Grans % 0.4 0.0 -  "0.5 %    Neutrophils, Absolute 2.81 1.70 - 7.00 10*3/mm3    Lymphocytes, Absolute 3.04 0.70 - 3.10 10*3/mm3    Monocytes, Absolute 0.78 0.10 - 0.90 10*3/mm3    Eosinophils, Absolute 0.48 (H) 0.00 - 0.40 10*3/mm3    Basophils, Absolute 0.05 0.00 - 0.20 10*3/mm3    Immature Grans, Absolute 0.03 0.00 - 0.05 10*3/mm3    nRBC 0.0 0.0 - 0.2 /100 WBC       Procedures lab work of October 7 noted and discussed    Medication Review: Medications reviewed and noted    Patient wellness counseling  Exercise: Continue excellent exercise program including bicycle riding  Diet: Continue with healthy cardiac diet  Screening: Recent lab of October 7 discussed  Social History     Socioeconomic History    Marital status:    Tobacco Use    Smoking status: Never     Passive exposure: Never    Smokeless tobacco: Never   Vaping Use    Vaping status: Never Used   Substance and Sexual Activity    Alcohol use: Yes     Alcohol/week: 14.0 standard drinks of alcohol     Types: 7 Glasses of wine, 7 Cans of beer per week     Comment: beer mostly in summer after cycling; liquor is [place]    Drug use: No    Sexual activity: Yes     Partners: Female     Birth control/protection: None        Assessment/Plan:    Assessment & Plan  1. Essential Hypertension.  Blood pressure was recorded at 136/80 mmHg. He reports feeling good and continues his regular exercise routine, including bicycling and walking. No changes to his current hypertension management plan were discussed.    2. Mixed Hyperlipidemia.  Recent lab results from 10/07/2024 show a total cholesterol level of 152 mg/dL and LDL cholesterol of 84 mg/dL, which are within the target range. No changes to his current hyperlipidemia management plan were discussed.    3. Restless Leg Syndrome.  He experienced exacerbation of symptoms after taking antihistamines for a cold. It was advised to avoid medications ending in \"-mine\" as they can trigger restless leg symptoms. Mirapex was suggested to be " taken in the evening around 9 PM to help manage symptoms. He was also advised to continue using calamine and zinc as needed.    4. Common Cold.  He had a cold last week, which has mostly resolved except for a residual cough. Mucinex was recommended for the cough, to be taken twice daily with plenty of water to enhance its effectiveness.    5. Health Maintenance.  He received a flu shot during this visit.      Problem List Items Addressed This Visit          Cardiac and Vasculature    Benign essential hypertension - Primary    Overview     History of essential hypertension on enalapril 5 mg daily denies headache or cough         Current Assessment & Plan     Essential hypertension with current blood pressure 134/84 heart rate of 60 O2 saturation 98% on enalapril 5 mg daily denies headache denies cough continue therapy  With lab work of October 7, 2024 cholesterol 152 LDL 84 CMP liver functions normal continue therapy         Relevant Medications    enalapril (VASOTEC) 5 MG tablet    Mixed hyperlipidemia    Overview     History of mixed hyperlipidemia on atorvastatin 20 mg daily denies myalgia arthralgia         Current Assessment & Plan      Mixed hyperlipidemia on atorvastatin 40 mg daily denies myalgia arthralgia continue therapy  Lab work of October 7, 2024 normal CMP with normal liver function and normal lipids of 152 cholesterol and LDL of 84 continue therapy         Relevant Medications    atorvastatin (LIPITOR) 40 MG tablet       Neuro    RLS (restless legs syndrome)    Overview     Occasional restless legs calcium magnesium zinc seems to be affected in therapy treating the leg cramping         Current Assessment & Plan     Leg soreness with aching and restlessness patient had consultation with sleep apnea physician who suggested that decongestant Sudafed Tylenol or antihistamines can be aggravating to the legs at night and daytime and patient has discontinued these medicines and his legs are improved  If the  patient has recurrence or significant restless legs we will consider Mirapex low-dose 0.125 at bedtime             Patient Instructions   Fasting lab of CBC CMP and lipid of October 7 lab noted  Continue current therapy  Flu shot given  Continue excellent exercise primarily bicycle riding  Continue healthy cardiac diet  Consider Mirapex if patient has further restless leg problem  Avoid Tylenol and antihistamine as a possible cause of the restless leg  Return visit in 6 months or as needed for AWV     Plan of care reviewed with patient at the conclusion of today's visit. Education was provided regarding diagnosis, management, and any prescribed or recommended OTC medications.Patient verbalizes understanding of and agreement with management plan  10/09/24   08:49 EDT    Patient or patient representative verbalized consent for the use of Ambient Listening during the visit with  Keshawn Alexandre MD for chart documentation. 10/9/2024  16:28 EDT

## 2024-10-09 NOTE — ASSESSMENT & PLAN NOTE
Essential hypertension with current blood pressure 134/84 heart rate of 60 O2 saturation 98% on enalapril 5 mg daily denies headache denies cough continue therapy  With lab work of October 7, 2024 cholesterol 152 LDL 84 CMP liver functions normal continue therapy

## 2024-10-09 NOTE — PATIENT INSTRUCTIONS
Fasting lab of CBC CMP and lipid of October 7 lab noted  Continue current therapy  Flu shot given  Continue excellent exercise primarily bicycle riding  Continue healthy cardiac diet  Consider Mirapex if patient has further restless leg problem  Avoid Tylenol and antihistamine as a possible cause of the restless leg  Return visit in 6 months or as needed for AWV

## 2024-10-09 NOTE — ASSESSMENT & PLAN NOTE
Leg soreness with aching and restlessness patient had consultation with sleep apnea physician who suggested that decongestant Sudafed Tylenol or antihistamines can be aggravating to the legs at night and daytime and patient has discontinued these medicines and his legs are improved  If the patient has recurrence or significant restless legs we will consider Mirapex low-dose 0.125 at bedtime

## 2024-10-09 NOTE — ASSESSMENT & PLAN NOTE
Mixed hyperlipidemia on atorvastatin 40 mg daily denies myalgia arthralgia continue therapy  Lab work of October 7, 2024 normal CMP with normal liver function and normal lipids of 152 cholesterol and LDL of 84 continue therapy

## 2024-12-09 DIAGNOSIS — E78.2 MIXED HYPERLIPIDEMIA: ICD-10-CM

## 2024-12-09 RX ORDER — ATORVASTATIN CALCIUM 40 MG/1
TABLET, FILM COATED ORAL
Qty: 90 TABLET | Refills: 3 | Status: SHIPPED | OUTPATIENT
Start: 2024-12-09

## 2025-01-27 DIAGNOSIS — K62.5 RECTAL BLEEDING: Primary | ICD-10-CM

## 2025-02-04 ENCOUNTER — OFFICE VISIT (OUTPATIENT)
Dept: NEUROLOGY | Facility: CLINIC | Age: 75
End: 2025-02-04
Payer: MEDICARE

## 2025-02-04 VITALS
HEART RATE: 65 BPM | SYSTOLIC BLOOD PRESSURE: 120 MMHG | OXYGEN SATURATION: 96 % | BODY MASS INDEX: 28.35 KG/M2 | HEIGHT: 70 IN | WEIGHT: 198 LBS | DIASTOLIC BLOOD PRESSURE: 76 MMHG | TEMPERATURE: 98.2 F

## 2025-02-04 DIAGNOSIS — R29.818 TRANSIENT NEUROLOGICAL SYMPTOMS: Primary | ICD-10-CM

## 2025-02-04 NOTE — PROGRESS NOTES
Follow Up Office Visit      Encounter Date: 2025   Patient Name: Jamari Munoz  : 1950   MRN: [id number]   PCP: Keshawn Alexandre MD    Chief Complaint:    Chief Complaint   Patient presents with    Follow-up       History of Present Illness: Jamari Munoz is a 73 y.o. male who is here today to establish care.  Patient has past medical history significant for hypertension, hyperlipidemia, bradycardia.  He presented to [facility] ED on 2024 with double vision and difficulty walking.  NIH on presentation to the ED is a 0.  Of note patient does report history of ocular migraine.  CT of the head is negative for acute intercranial abnormality.  CTA of the head and neck is negative for large vessel occlusion or flow-limiting stenosis.  MRI of the brain was negative for acute intracranial process.  Patient was recommended to initiate a baby aspirin daily.  He was recommended to complete an echocardiogram with bubble study on an outpatient basis.     Patient reports to clinic today following his recent ED visit for double vision.  He arrives early to his appointment as he has a 9:00 echocardiogram scheduled.  He reports he has been doing well.  He has had no return of his double vision.  He has had no new or worsening neurologic symptoms.  He has had no recent ocular migraine.  Patient has been taking his aspirin and Lipitor as prescribed without issue or side effect.  I reviewed his diagnostic workup, differential diagnoses and treatment plan.  Although it is difficult to say for sure if this was a transient ischemic attack we will proceed with treating it as such in order to provide him optimal protection against future stroke.  I have encouraged the patient to complete a 14-day Holter monitor as well.  He is in agreement.     Clinic visit 7/15/2024: Patient presents today for routine follow-up.  He reports no new or worsening strokelike symptoms.  He has had 1 ocular migraine episodes since our last visit 2  "months ago.  He reports this was his usual ocular migraine.  He continues to not be interested in preventative treatment.  We discussed should his ocular migraine aura increase in frequency that there are preventative medications that we can initiate and I would refer him to the migraine clinic.  He verbalized understanding.  Will continue treatment of his vascular risk factors due to potential for possible TIA.    Clinic visit 2/4/2025: Patient is doing quite well. He reports 1 migraine over the last 3 months. He does report an increase in his RLS symptoms. He currently takes Elmer-Mag-Zinc supplement that seems to be effective. He does not wish to take a prescription for either his migraines or his RLS at this time. He has been increasing his activity by riding his bike more frequently. He has been taking his ASA 81 and Lipitor 40 mg without issues or side effects.   Subjective        I have reviewed and the following portions of the patient's history were updated as appropriate: past family history, past medical history, past social history, past surgical history and problem list.    Medications:     Current Outpatient Medications:     aspirin 81 MG EC tablet, Take 1 tablet by mouth Daily., Disp: , Rfl:     atorvastatin (LIPITOR) 40 MG tablet, TAKE 1 TABLET DAILY (DOSE INCREASE), Disp: 90 tablet, Rfl: 3    CALCIUM-MAGNESIUM-ZINC PO, Take 7 tablets by mouth Daily., Disp: , Rfl:     enalapril (VASOTEC) 5 MG tablet, TAKE 1 TABLET DAILY, Disp: 90 tablet, Rfl: 3    Glucosamine-Chondroitin 750-600 MG tablet, Take 1 tablet by mouth 2 (Two) Times a Day., Disp: , Rfl:     valACYclovir (VALTREX) 500 MG tablet, TAKE ONE-HALF (1/2) TABLET DAILY, Disp: 45 tablet, Rfl: 3    Allergies:   No Known Allergies    Objective     Physical Exam:   Vital Signs:   Vitals:    02/04/25 0957   BP: 120/76   Pulse: 65   Temp: 98.2 °F (36.8 °C)   SpO2: 96%   Weight: 89.8 kg (198 lb)   Height: 177.8 cm (70\")     Body mass index is 28.41 " kg/m².    Physical Exam  Vitals and nursing note reviewed.   Constitutional:       General: He is not in acute distress.     Appearance: Normal appearance. He is normal weight. He is not ill-appearing.      Comments: 74 year old  male    HENT:      Head: Normocephalic and atraumatic.      Nose: Nose normal.      Mouth/Throat:      Mouth: Mucous membranes are moist.   Eyes:      Extraocular Movements: Extraocular movements intact.      Pupils: Pupils are equal, round, and reactive to light.   Cardiovascular:      Rate and Rhythm: Normal rate and regular rhythm.      Pulses: Normal pulses.   Pulmonary:      Effort: Pulmonary effort is normal. No respiratory distress.   Skin:     General: Skin is warm and dry.   Neurological:      General: No focal deficit present.      Mental Status: He is alert and oriented to person, place, and time. Mental status is at baseline.   Psychiatric:         Mood and Affect: Mood normal.         Behavior: Behavior normal.       NIH 0    Modified Shawn Score: 0        0  No Symptoms    1 No significant disability. Able to carry out all usual activities, despite some symptoms.    2 Slight disability. Able to look after own affairs without assistance, but unable to carry out all previous activities.    3 Moderate disability. Requires some help, but able to walk unassisted.    4 Moderately severe disability. Unable to attend to own bodily needs without assistance, and unable to walk unassisted.    5 Severe disability. Requires constant nursing care and attention, bedridden, incontinent.    6 Dead      PHQ-9 Depression Screening  Little interest or pleasure in doing things? Not at all   Feeling down, depressed, or hopeless? Not at all   PHQ-2 Total Score 0   Trouble falling or staying asleep, or sleeping too much?     Feeling tired or having little energy?     Poor appetite or overeating?     Feeling bad about yourself - or that you are a failure or have let yourself or your family  down?     Trouble concentrating on things, such as reading the newspaper or watching television?     Moving or speaking so slowly that other people could have noticed? Or the opposite - being so fidgety or restless that you have been moving around a lot more than usual?     Thoughts that you would be better off dead, or of hurting yourself in some way?     PHQ-9 Total Score     If you checked off any problems, how difficult have these problems made it for you to do your work, take care of things at home, or get along with other people?         YAIR Fall Risk Clinician Key Questions   Have you fallen in the past year?: No  Do you feel unsteady with walking?: No  Are you worried about falling?: No      Hemoglobin   Date Value Ref Range Status   10/07/2024 15.6 13.0 - 17.7 g/dL Final     Hematocrit   Date Value Ref Range Status   10/07/2024 46.2 37.5 - 51.0 % Final     Platelets   Date Value Ref Range Status   10/07/2024 330 140 - 450 10*3/mm3 Final     LDL Cholesterol    Date Value Ref Range Status   10/07/2024 84 0 - 100 mg/dL Final     AST (SGOT)   Date Value Ref Range Status   10/07/2024 27 1 - 40 U/L Final     ALT (SGPT)   Date Value Ref Range Status   10/07/2024 27 1 - 41 U/L Final         Assessment / Plan      Assessment/Plan:   # Transient episode of blurred vision and Gait Disturbance  -Differentials to include TIA versus ocular migraine. Will continue to treat for both.   -continue ASA 81mg daily  -continue Lipitor 40 mg nightly   -CT, CTA negative for acute intracranial abnormality   -MRI brain w/o negative for acute ischemia but does show some mild cortical atrophy    -14 day holter was negative for Afib. Cardiology has ordered him a [employer] mobile device for ongoing monitoring for PAF. No report of arrhythmia noted at home.   -Again discussed, if ocular migraines become more frequent discussed initiation of Depakote for migraine prophylaxis and also a referral to migraine clinic. Patient does not wish to  pursue this at this time.  He reports only one migraine in 3 months.   -normal BP goals, <130/80. Today's /76  -Continue to be active with riding bike, increased steps, ect.   -Heart healthy diet   -Discussed stroke signs and symptoms with the patient including reasons to return to the emergency department  -follow up in stroke clinic in 6 months      Discussed the importance of medication compliance and lifestyle modifications (adequate blood pressure control, adequate control of hyperlipidemia, adequate glycemic control, increase physical activity, and healthy diet) to help reduce the risk of future cerebrovascular events.  Also discussed the signs symptoms that would warrant the patient return back to the emergency department including unilateral weakness, unilateral numbness, visual disturbances, loss of balance, speech difficulties, and/or a sudden severe headache.     Follow Up:   Return in about 1 year (around 2/4/2026).    MARINA Hillman  [facility] [place] [facility]

## 2025-02-26 PROBLEM — Z98.890 H/O COLONOSCOPY: Status: ACTIVE | Noted: 2025-02-26

## 2025-03-18 DIAGNOSIS — E78.2 MIXED HYPERLIPIDEMIA: Primary | ICD-10-CM

## 2025-03-18 DIAGNOSIS — I10 BENIGN ESSENTIAL HYPERTENSION: ICD-10-CM

## 2025-03-18 DIAGNOSIS — Z12.5 SCREENING PSA (PROSTATE SPECIFIC ANTIGEN): ICD-10-CM

## 2025-03-21 ENCOUNTER — LAB (OUTPATIENT)
Dept: LAB | Facility: HOSPITAL | Age: 75
End: 2025-03-21
Payer: MEDICARE

## 2025-03-21 DIAGNOSIS — Z12.5 SCREENING PSA (PROSTATE SPECIFIC ANTIGEN): ICD-10-CM

## 2025-03-21 DIAGNOSIS — I10 BENIGN ESSENTIAL HYPERTENSION: ICD-10-CM

## 2025-03-21 DIAGNOSIS — E78.2 MIXED HYPERLIPIDEMIA: ICD-10-CM

## 2025-03-21 LAB
ALBUMIN SERPL-MCNC: 4.1 G/DL (ref 3.5–5.2)
ALBUMIN/GLOB SERPL: 1.5 G/DL
ALP SERPL-CCNC: 53 U/L (ref 39–117)
ALT SERPL W P-5'-P-CCNC: 31 U/L (ref 1–41)
ANION GAP SERPL CALCULATED.3IONS-SCNC: 9 MMOL/L (ref 5–15)
AST SERPL-CCNC: 30 U/L (ref 1–40)
BASOPHILS # BLD AUTO: 0.03 10*3/MM3 (ref 0–0.2)
BASOPHILS NFR BLD AUTO: 0.4 % (ref 0–1.5)
BILIRUB SERPL-MCNC: 0.5 MG/DL (ref 0–1.2)
BUN SERPL-MCNC: 25 MG/DL (ref 8–23)
BUN/CREAT SERPL: 20.8 (ref 7–25)
CALCIUM SPEC-SCNC: 10 MG/DL (ref 8.6–10.5)
CHLORIDE SERPL-SCNC: 99 MMOL/L (ref 98–107)
CHOLEST SERPL-MCNC: 189 MG/DL (ref 0–200)
CO2 SERPL-SCNC: 28 MMOL/L (ref 22–29)
CREAT SERPL-MCNC: 1.2 MG/DL (ref 0.76–1.27)
DEPRECATED RDW RBC AUTO: 42.8 FL (ref 37–54)
EGFRCR SERPLBLD CKD-EPI 2021: 63.5 ML/MIN/1.73
EOSINOPHIL # BLD AUTO: 0.38 10*3/MM3 (ref 0–0.4)
EOSINOPHIL NFR BLD AUTO: 4.6 % (ref 0.3–6.2)
ERYTHROCYTE [DISTWIDTH] IN BLOOD BY AUTOMATED COUNT: 11.9 % (ref 12.3–15.4)
GLOBULIN UR ELPH-MCNC: 2.8 GM/DL
GLUCOSE SERPL-MCNC: 86 MG/DL (ref 65–99)
HCT VFR BLD AUTO: 49.5 % (ref 37.5–51)
HDLC SERPL-MCNC: 55 MG/DL (ref 40–60)
HGB BLD-MCNC: 16.8 G/DL (ref 13–17.7)
IMM GRANULOCYTES # BLD AUTO: 0.04 10*3/MM3 (ref 0–0.05)
IMM GRANULOCYTES NFR BLD AUTO: 0.5 % (ref 0–0.5)
LDLC SERPL CALC-MCNC: 109 MG/DL (ref 0–100)
LDLC/HDLC SERPL: 1.91 {RATIO}
LYMPHOCYTES # BLD AUTO: 3.37 10*3/MM3 (ref 0.7–3.1)
LYMPHOCYTES NFR BLD AUTO: 41.2 % (ref 19.6–45.3)
MCH RBC QN AUTO: 33.3 PG (ref 26.6–33)
MCHC RBC AUTO-ENTMCNC: 33.9 G/DL (ref 31.5–35.7)
MCV RBC AUTO: 98.2 FL (ref 79–97)
MONOCYTES # BLD AUTO: 0.91 10*3/MM3 (ref 0.1–0.9)
MONOCYTES NFR BLD AUTO: 11.1 % (ref 5–12)
NEUTROPHILS NFR BLD AUTO: 3.45 10*3/MM3 (ref 1.7–7)
NEUTROPHILS NFR BLD AUTO: 42.2 % (ref 42.7–76)
NRBC BLD AUTO-RTO: 0 /100 WBC (ref 0–0.2)
PLATELET # BLD AUTO: 310 10*3/MM3 (ref 140–450)
PMV BLD AUTO: 10 FL (ref 6–12)
POTASSIUM SERPL-SCNC: 4.8 MMOL/L (ref 3.5–5.2)
PROT SERPL-MCNC: 6.9 G/DL (ref 6–8.5)
PSA SERPL-MCNC: 1.03 NG/ML (ref 0–4)
RBC # BLD AUTO: 5.04 10*6/MM3 (ref 4.14–5.8)
SODIUM SERPL-SCNC: 136 MMOL/L (ref 136–145)
TRIGL SERPL-MCNC: 144 MG/DL (ref 0–150)
TSH SERPL DL<=0.05 MIU/L-ACNC: 2.41 UIU/ML (ref 0.27–4.2)
VLDLC SERPL-MCNC: 25 MG/DL (ref 5–40)
WBC NRBC COR # BLD AUTO: 8.18 10*3/MM3 (ref 3.4–10.8)

## 2025-03-21 PROCEDURE — 85025 COMPLETE CBC W/AUTO DIFF WBC: CPT

## 2025-03-21 PROCEDURE — 80061 LIPID PANEL: CPT

## 2025-03-21 PROCEDURE — G0103 PSA SCREENING: HCPCS

## 2025-03-21 PROCEDURE — 80053 COMPREHEN METABOLIC PANEL: CPT

## 2025-03-21 PROCEDURE — 84443 ASSAY THYROID STIM HORMONE: CPT

## 2025-03-25 ENCOUNTER — OFFICE VISIT (OUTPATIENT)
Dept: INTERNAL MEDICINE | Facility: CLINIC | Age: 75
End: 2025-03-25
Payer: MEDICARE

## 2025-03-25 VITALS
SYSTOLIC BLOOD PRESSURE: 112 MMHG | WEIGHT: 198 LBS | DIASTOLIC BLOOD PRESSURE: 80 MMHG | HEART RATE: 56 BPM | HEIGHT: 70 IN | OXYGEN SATURATION: 96 % | BODY MASS INDEX: 28.35 KG/M2

## 2025-03-25 DIAGNOSIS — Z98.890 H/O COLONOSCOPY: ICD-10-CM

## 2025-03-25 DIAGNOSIS — E78.2 MIXED HYPERLIPIDEMIA: ICD-10-CM

## 2025-03-25 DIAGNOSIS — G25.81 RLS (RESTLESS LEGS SYNDROME): ICD-10-CM

## 2025-03-25 DIAGNOSIS — I10 BENIGN ESSENTIAL HYPERTENSION: Primary | ICD-10-CM

## 2025-03-25 DIAGNOSIS — M54.50 MIDLINE LOW BACK PAIN WITHOUT SCIATICA, UNSPECIFIED CHRONICITY: ICD-10-CM
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
## 2025-03-25 NOTE — ASSESSMENT & PLAN NOTE
Occasional low back discomfort and bilateral knee discomfort currently improved on glucosamine conjoint sulfate twice daily and calcium magnesium zinc tablet daily

## 2025-03-25 NOTE — ASSESSMENT & PLAN NOTE
Essential hypertension with blood pressure 112/80 heart rate of 56 and O2 saturation 96% on enalapril 5 mg daily denies headache denies cough  CMP lab of March 22 normal blood sugar kidney function liver function and electrolyte salts  Continue therapy

## 2025-03-25 NOTE — ASSESSMENT & PLAN NOTE
Mixed hyperlipidemia on atorvastatin 40 mg daily denies myalgia arthralgia   Lab of March 22 revealed cholesterol 189 HDL 55 triglycerides 244 and  with normal liver function  Continue therapy

## 2025-04-09 DIAGNOSIS — B00.9 HERPES SIMPLEX TYPE 2 INFECTION: ICD-10-CM

## 2025-04-09 DIAGNOSIS — I10 BENIGN ESSENTIAL HYPERTENSION: ICD-10-CM

## 2025-04-09 RX ORDER — VALACYCLOVIR HYDROCHLORIDE 500 MG/1
250 TABLET, FILM COATED ORAL DAILY
Qty: 45 TABLET | Refills: 3 | Status: SHIPPED | OUTPATIENT
Start: 2025-04-09

## 2025-04-09 RX ORDER — ENALAPRIL MALEATE 5 MG/1
5 TABLET ORAL DAILY
Qty: 90 TABLET | Refills: 3 | Status: SHIPPED | OUTPATIENT
Start: 2025-04-09

## 2025-06-04 ENCOUNTER — OFFICE VISIT (OUTPATIENT)
Dept: INTERNAL MEDICINE | Facility: CLINIC | Age: 75
End: 2025-06-04
Payer: MEDICARE

## 2025-06-04 VITALS
TEMPERATURE: 97.9 F | WEIGHT: 199.8 LBS | OXYGEN SATURATION: 95 % | HEART RATE: 62 BPM | SYSTOLIC BLOOD PRESSURE: 110 MMHG | BODY MASS INDEX: 28.6 KG/M2 | HEIGHT: 70 IN | DIASTOLIC BLOOD PRESSURE: 82 MMHG

## 2025-06-04 DIAGNOSIS — I20.89 ANGINA OF EFFORT: Primary | ICD-10-CM

## 2025-06-04 RX ORDER — ISOSORBIDE MONONITRATE 20 MG/1
20 TABLET ORAL 2 TIMES DAILY
Qty: 30 TABLET | Refills: 1 | Status: SHIPPED | OUTPATIENT
Start: 2025-06-04

## 2025-06-04 NOTE — PATIENT INSTRUCTIONS
EKG normal  .  Isosorbide 10 mg twice daily at 8 and 3  Do not ride bicycle or any kind of overexertion such as lifting or carrying weight  If develops substernal chest discomfort call 911 and proceed to ER  Appointment with Dr. Tse  Return visit in 2 weeks

## 2025-06-04 NOTE — ASSESSMENT & PLAN NOTE
Today's EKG is sinus rhythm normal and similar to EKG of April 5, 2024  Patient's vital signs are currently stable chest is clear cardiac is sinus rhythm without gallop or rub with blood pressure 110/82 heart rate is 62 O2 saturation 95% he is comfortable  We will continue enalapril 5 mg daily aspirin 81 daily and atorvastatin 40 mg daily  Will add isosorbide mononitrate 20 mg twice daily at 8 AM and 3 PM  Will arrange cardiac consultation as soon as possible  Note patient will be out of town for the next 7 days  And encouraged that if he had any variety of substernal chest pain to call 911 and proceed to emergency  Encouraged no lifting and no bicycle riding until seen by cardiology

## 2025-06-04 NOTE — PROGRESS NOTES
[facility]     Jamari Munoz  1950   [id number]      Patient Care Team:  Keshawn Alexandre MD as PCP - General (Internal Medicine)  Scooter Jiménez MD as Consulting Physician (Colon and Rectal Surgery)  Alvino Berrios MD as Consulting Physician (Ophthalmology)  Reginaldo Tse MD as Consulting Physician (Cardiology)  Yanelis Hernandez APRN as Nurse Practitioner (Family Medicine)    Chief Complaint::   Chief Complaint   Patient presents with    Asthma            HPI  History of Present Illness  The patient presents to the office for asthma, with a history of mixed hyperlipidemia and essential hypertension.    He reports experiencing severe chest pain during strenuous physical activity, particularly when his heart rate exceeds 120 beats per minute. The pain is localized to the anterior chest wall and does not radiate to other areas. He first noticed this symptom in early spring, attributing it to exposure to cold, dry air. The pain does not disturb his sleep and is absent during rest or light exercise. He also reports no associated shortness of breath, sweating, nausea, or neck discomfort. The duration of the pain is directly proportional to the intensity of his exertion, subsiding within a minute upon cessation of activity. He recalls similar episodes on 01/28/2025 and 02/03/2025. He has not experienced these symptoms during a stress test conducted a year ago, even though he was engaging in similar levels of physical activity. He has not been able to maintain his usual exercise routine due to inclement weather and recent relocation. He typically wakes up around 6:30 or 7:00 AM and reports no sinus drainage or irritation, although he does blow his nose each morning. He has not engaged in heavy lifting recently but did strain his shoulder while moving a box. He underwent a CT scan of his chest following a stress test on 06/24/2024, which involved the administration of contrast dye.    He had  an ER visit a year ago in April 2024 for a double vision problem, which resolved quickly. It lasted about 20 minutes.      Patient Active Problem List   Diagnosis    Osteoarthritis    Benign essential hypertension    Low back pain    Carbuncle and furuncle    RLS (restless legs syndrome)    Overweight    Ganglion cyst    Incomplete right bundle branch block    Mixed hyperlipidemia    Herpes simplex type 2 infection    Sinus bradycardia    Medicare annual wellness visit, subsequent    Annual physical exam    Medicare annual wellness visit, subsequent    Elbow swelling, right    Olecranon bursitis of right elbow    Medicare annual wellness visit, subsequent    Left shoulder tendinitis    Chronic pain of left knee    Medicare annual wellness visit, subsequent    Right shoulder injury    H/O colonoscopy    Migraine    Angina of effort        Past Medical History:   Diagnosis Date    Arthritis     Asthma Feb 2025    when riding bike    Ganglion     Head injury 1967    several concussions from football and biking    Hyperlipidemia     Hypertension     Migraine 1975    one now every month or so with Aura       Past Surgical History:   Procedure Laterality Date    COLONOSCOPY  2025    CYST REMOVAL      cyst removed left shoulder and left knee    KNEE MENISCAL REPAIR  2005    TONSILLECTOMY AND ADENOIDECTOMY      VASECTOMY         Family History   Problem Relation Age of Onset    Diabetes Mother         mostly controlled    Diabetes Maternal Grandmother         controlled    Arthritis Paternal Grandmother         not too bad    Osteoarthritis Paternal Grandmother     Dementia Paternal Grandmother         some in mid 80s    Coronary artery disease Paternal Grandfather     Heart attack Paternal Grandfather        Social History     Socioeconomic History    Marital status:    Tobacco Use    Smoking status: Never     Passive exposure: Never    Smokeless tobacco: Never   Vaping Use    Vaping status: Never Used   Substance  "and Sexual Activity    Alcohol use: Yes     Alcohol/week: 14.0 standard drinks of alcohol     Types: 7 Glasses of wine, 7 Cans of beer per week     Comment: beer mostly in summer after cycling; liquor is [employer]    Drug use: No    Sexual activity: Yes     Partners: Female     Birth control/protection: None       No Known Allergies    Review of Systems     HEENT: Denies headache or dizzy vision or hearing  NECK: Denies pain stiffness  CHEST: Denies cough wheeze or shortness of breath  CARDIAC: Complains of substernal chest pain when riding long-distance bicycle particular prograde with resolution after 1 minute.  Denies diaphoresis nausea or radiation of the substernal pain  ABD: Denies nausea vomiting abdominal pain  : Denies dysuria frequency  NEURO: Denies syncope concussion  PSYCH: Denies anxiety depression  EXTREM: Denies arthritic soreness or edema  SKIN: Denies rash    Vital Signs  Vitals:    06/04/25 1236   BP: 110/82   BP Location: Left arm   Patient Position: Sitting   Cuff Size: Adult   Pulse: 62   Temp: 97.9 °F (36.6 °C)   TempSrc: Infrared   SpO2: 95%   Weight: 90.6 kg (199 lb 12.8 oz)   Height: 177.8 cm (70\")   PainSc: 0-No pain     Body mass index is 28.67 kg/m².  BMI is >= 25 and <30. (Overweight) The following options were offered after discussion;: nutrition counseling/recommendations     Advance Care Planning   ACP discussion was held with the patient during this visit. Patient has an advance directive (not in EMR), copy requested.       Current Outpatient Medications:     aspirin 81 MG EC tablet, Take 1 tablet by mouth Daily., Disp: , Rfl:     atorvastatin (LIPITOR) 40 MG tablet, TAKE 1 TABLET DAILY (DOSE INCREASE), Disp: 90 tablet, Rfl: 3    CALCIUM-MAGNESIUM-ZINC PO, Take 1 tablet by mouth Daily., Disp: , Rfl:     enalapril (VASOTEC) 5 MG tablet, TAKE 1 TABLET DAILY, Disp: 90 tablet, Rfl: 3    Glucosamine-Chondroitin 750-600 MG tablet, Take 1 tablet by mouth 2 (Two) Times a Day., Disp: , Rfl: "     valACYclovir (VALTREX) 500 MG tablet, TAKE ONE-HALF (1/2) TABLET DAILY, Disp: 45 tablet, Rfl: 3    isosorbide mononitrate (ISMO,MONOKET) 20 MG tablet, Take 1 tablet by mouth 2 (Two) Times a Day. At 8 am and 3 pm, Disp: 30 tablet, Rfl: 1    Physical Exam     ACE III MINI        Physical Exam  Physical Exam  Respiratory: Clear to auscultation, no wheezing, rales or rhonchi  Cardiovascular: Regular rate and rhythm, no murmurs, rubs, or gallops  HEENT: No facial asymmetry pharynx is clear  NECK: No mass bruit thyromegaly neck vein distention  CHEST: Chest clear chest wall nontender  CARDIAC: Regular rhythm without gallop or rub blood pressure heart rate stable  ABD: Liver spleen normal good bowel sounds  : Deferred  NEURO: Normal  PSYCH: Normal  EXTREM: Normal  Skin: Clear     Results Review:    No results found for this or any previous visit (from the past 4 weeks).      ECG 12 Lead    Date/Time: 6/4/2025 6:04 PM  Performed by: Keshawn Alexandre MD    Authorized by: Keshawn Alexandre MD  Comparison: compared with previous ECG from 4/5/2024  Similar to previous ECG  Comparison to previous ECG: Current EKG is sinus rhythm normal no ischemia and similar to EKG of November 5, 2024  Rhythm: sinus rhythm  Rate: normal  Conduction: conduction normal  ST Segments: ST segments normal  T Waves: T waves normal  QRS axis: normal  Other: no other findings    Clinical impression: normal ECG  Comments: Current EKG sinus rhythm normal no ischemia and similar to EKG of April 5, 2024          Medication Review: Medications reviewed and noted    Patient wellness counseling  Exercise: Regular activity no bicycle riding  Diet: Healthy cardiac diet  Screening: EKG normal  Social History     Socioeconomic History    Marital status:    Tobacco Use    Smoking status: Never     Passive exposure: Never    Smokeless tobacco: Never   Vaping Use    Vaping status: Never Used   Substance and Sexual Activity    Alcohol use: Yes      Alcohol/week: 14.0 standard drinks of alcohol     Types: 7 Glasses of wine, 7 Cans of beer per week     Comment: beer mostly in summer after cycling; liquor is [place]    Drug use: No    Sexual activity: Yes     Partners: Female     Birth control/protection: None        Assessment/Plan:    Assessment & Plan  1. Angina.  - Symptoms are indicative of angina, likely due to dense coronary calcifications in the proximal LAD. This condition is exacerbated by strenuous exercise, leading to insufficient blood flow and subsequent chest pain.  - EKG results are consistent with previous findings from 03/2024, showing no significant ischemia or reduced blood flow at rest.  - Prescription for long-acting nitroglycerin, to be taken twice daily at 8 AM and 3 PM, has been provided. The patient has been advised against cycling.  - An appointment with Dr. Mcmanus will be scheduled for further evaluation.    2. Asthma.  - The patient reports experiencing severe chest pain and tightness during strenuous exercise, which may be related to asthma exacerbation.  - No shortness of breath, nausea, or sweating reported during episodes; symptoms subside with rest.  - Counseling provided on monitoring symptoms and avoiding triggers such as cold air and strenuous activity.  - No changes in asthma medication at this time; further evaluation needed to determine if asthma contributes to symptoms.    3. Essential hypertension.  - Blood pressure recorded at 130/80 mmHg during the visit.  - Review of previous stress test results from 06/2024 indicates normal cardiac function with a pumping fraction of 70%.  - No changes in hypertension management; current medications to be continued.    4. Mixed hyperlipidemia.  - No specific symptoms or concerns related to hyperlipidemia discussed during the visit.  - Previous stress test results indicate low risk for ischemic disease.  - Continue current lipid-lowering therapy; no changes in medication at this  time.    Follow-up  The patient will follow up in a couple of weeks.    Problem List Items Addressed This Visit          Cardiac and Vasculature    Angina of effort - Primary    Overview   Patient is complaining of mid substernal chest discomfort only present when riding bicycle or long distances and pulling up grades in particular with cold air exposure the patient will slow down and stop bicycling with resolution of the discomfort within 1 minute he denies nausea vomiting is mildly short of breath denies diaphoresis denies radiation of pain discomfort, the discomfort only occurs when riding long distance bicycle riding which she does on a regular basis 15 to 20 miles per event  Seen cardiology in the past Dr. Tse  Echocardiogram May 15, 2024 with an EF of 61%  Holter telemetry of July 8, 2024 regular sinus rhythm with rare SVT occasional ectopy  Cardiac perfusion stress testing June 26, 2024 with low risk proximal LAD calcification         Current Assessment & Plan   Today's EKG is sinus rhythm normal and similar to EKG of April 5, 2024  Patient's vital signs are currently stable chest is clear cardiac is sinus rhythm without gallop or rub with blood pressure 110/82 heart rate is 62 O2 saturation 95% he is comfortable  We will continue enalapril 5 mg daily aspirin 81 daily and atorvastatin 40 mg daily  Will add isosorbide mononitrate 20 mg twice daily at 8 AM and 3 PM  Will arrange cardiac consultation as soon as possible  Note patient will be out of town for the next 7 days  And encouraged that if he had any variety of substernal chest pain to call 911 and proceed to emergency  Encouraged no lifting and no bicycle riding until seen by cardiology         Relevant Medications    isosorbide mononitrate (ISMO,MONOKET) 20 MG tablet    Other Relevant Orders    ECG 12 Lead        Patient Instructions   EKG normal  .  Isosorbide 10 mg twice daily at 8 and 3  Do not ride bicycle or any kind of overexertion such as  lifting or carrying weight  If develops substernal chest discomfort call 911 and proceed to ER  Appointment with Dr. Tse  Return visit in 2 weeks     Plan of care reviewed with patient at the conclusion of today's visit. Education was provided regarding diagnosis, management, and any prescribed or recommended OTC medications.Patient verbalizes understanding of and agreement with management plan.         Patient or patient representative verbalized consent for the use of Ambient Listening during the visit with  Keshawn Alexandre MD for chart documentation. 6/4/2025  18:05 EDT

## 2025-06-13 ENCOUNTER — OFFICE VISIT (OUTPATIENT)
Dept: CARDIOLOGY | Facility: CLINIC | Age: 75
End: 2025-06-13
Payer: MEDICARE

## 2025-06-13 VITALS
OXYGEN SATURATION: 94 % | WEIGHT: 198.6 LBS | HEART RATE: 59 BPM | SYSTOLIC BLOOD PRESSURE: 108 MMHG | DIASTOLIC BLOOD PRESSURE: 74 MMHG | BODY MASS INDEX: 28.43 KG/M2 | HEIGHT: 70 IN

## 2025-06-13 DIAGNOSIS — I10 PRIMARY HYPERTENSION: ICD-10-CM

## 2025-06-13 DIAGNOSIS — I25.118 ATHEROSCLEROSIS OF NATIVE CORONARY ARTERY OF NATIVE HEART WITH STABLE ANGINA PECTORIS: Primary | ICD-10-CM

## 2025-06-13 DIAGNOSIS — E78.2 MIXED HYPERLIPIDEMIA: ICD-10-CM

## 2025-06-13 PROCEDURE — 3078F DIAST BP <80 MM HG: CPT | Performed by: INTERNAL MEDICINE

## 2025-06-13 PROCEDURE — 3074F SYST BP LT 130 MM HG: CPT | Performed by: INTERNAL MEDICINE

## 2025-06-13 PROCEDURE — 99214 OFFICE O/P EST MOD 30 MIN: CPT | Performed by: INTERNAL MEDICINE

## 2025-06-13 NOTE — PROGRESS NOTES
[facility]  Office Progress Note  Jamari Munoz  1950  [address]  [facility] [address]     VISIT DATE:  06/13/25    PCP:   Keshawn Alexandre MD  [address]  [facility] [address]    IDENTIFICATION: A 74 y.o. male semiretired  and first cousin of Jose Maria Rico    PROBLEM LIST:   CAD  6/24 MPS: no ischemia, dense coronary calcification (prox LAD)  6/24 Echo: EF 61%, negative saline test  HTN  IRBBB  HLD  3/24  [id number]  3/25  [id number]  Hx of diplopia, negative neuro imaging 5/2024  OA  Surgical history:  Knee sugery  Tonsillectomy/adenoidectomy   Vasectomy     CC:  Chief Complaint   Patient presents with    Angina of effort     F/U        Allergies  No Known Allergies    Current Medications  Current Outpatient Medications   Medication Instructions    aspirin 81 mg, Daily    atorvastatin (LIPITOR) 40 MG tablet TAKE 1 TABLET DAILY (DOSE INCREASE)    CALCIUM-MAGNESIUM-ZINC PO 1 tablet, Daily    enalapril (VASOTEC) 5 mg, Oral, Daily    Glucosamine-Chondroitin 750-600 MG tablet 1 tablet, 2 Times Daily    isosorbide mononitrate (ISMO,MONOKET) 20 mg, Oral, 2 Times Daily, At 8 am and 3 pm    valACYclovir (VALTREX) 250 mg, Oral, Daily        History of Present Illness   HPI  Jamari Munoz is here for follow up per Dr. Alexandre for angina.  He reports over the last several months typically on colder days he develops midsternal chest pain when he begins to increase intensity.  If he slows down or stops the pain abates. Imdur added on 6/4/2025 and he was instructed to not exercise until this appointment.  He does not have any rest symptoms. Stress test one year ago showed normal perfusion and dense LAD calcification.  Blood pressure is well-controlled.    OBJECTIVE:  Vitals:    06/13/25 1302   BP: 108/74   BP Location: Left arm   Patient Position: Sitting   Cuff Size: Adult   Pulse: 59   SpO2: 94%   Weight: 90.1 kg (198 lb 9.6 oz)   Height: 177.8 cm  "(70\")       Body mass index is 28.5 kg/m².     PHYSICAL EXAMINATION:  Vitals reviewed.   Constitutional:       Appearance: Healthy appearance.   Neck:      Vascular: No JVR. JVD normal.   Pulmonary:      Effort: Pulmonary effort is normal.      Breath sounds: Normal breath sounds. No wheezing. No rhonchi. No rales.   Chest:      Chest wall: Not tender to palpatation.   Cardiovascular:      PMI at left midclavicular line. Normal rate. Regular rhythm. Normal S1. Normal S2.       Murmurs: There is no murmur.      No gallop.  No rub.   Pulses:     Intact distal pulses.   Edema:     Peripheral edema absent.   Skin:     General: Skin is warm and dry.   Neurological:      General: No focal deficit present.      Mental Status: Alert and oriented to person, place and time.         Diagnostic Data:  Procedures  Lab Results   Component Value Date    CHOL 189 03/21/2025    TRIG 144 03/21/2025    HDL 55 03/21/2025     (H) 03/21/2025      Lab Results   Component Value Date    GLUCOSE 86 03/21/2025    CALCIUM 10.0 03/21/2025     03/21/2025    K 4.8 03/21/2025    CO2 28.0 03/21/2025    CL 99 03/21/2025    BUN 25 (H) 03/21/2025    CREATININE 1.20 03/21/2025    EGFR 63.5 03/21/2025    BCR 20.8 03/21/2025    ANIONGAP 9.0 03/21/2025      Lab Results   Component Value Date    WBC 8.18 03/21/2025    HGB 16.8 03/21/2025    HCT 49.5 03/21/2025    MCV 98.2 (H) 03/21/2025     03/21/2025     No results found for: \"HGBA1C\"   Lab Results   Component Value Date    TSH 2.410 03/21/2025          Advance Care Planning   ACP discussion was held with the patient during this visit. Patient has an advance directive (not in EMR), copy requested.         ASSESSMENT:     Diagnosis Plan   1. Atherosclerosis of native coronary artery of native heart with stable angina pectoris  Case Request Cath Lab: Left Heart Cath    Basic Metabolic Panel    CBC (No Diff)      2. Primary hypertension        3. Mixed hyperlipidemia              PLAN:  "   Exertional angina (crescendo pattern) reported with dense LAD calcification seen on stress test 1 year ago.  We will proceed with left heart catheterization plus or minus PCI.  Continue aspirin, statin.  Heart rate 59 will not add beta-blocker.    Hypertension controlled on enalapril alone    Mixed dyslipidemia controlled on statin therapy with ideal LDL less than 70 may need Zetia    Scribed for Reginaldo Tse MD by Dee Dee Benjamin, APRN. 6/13/2025  13:41 EDT   Reginaldo Tse MD, FACC

## 2025-06-16 ENCOUNTER — LAB (OUTPATIENT)
Dept: LAB | Facility: HOSPITAL | Age: 75
End: 2025-06-16
Payer: MEDICARE

## 2025-06-16 DIAGNOSIS — I25.118 ATHEROSCLEROSIS OF NATIVE CORONARY ARTERY OF NATIVE HEART WITH STABLE ANGINA PECTORIS: ICD-10-CM

## 2025-06-16 LAB
ANION GAP SERPL CALCULATED.3IONS-SCNC: 8.7 MMOL/L (ref 5–15)
BUN SERPL-MCNC: 16 MG/DL (ref 8–23)
BUN/CREAT SERPL: 17 (ref 7–25)
CALCIUM SPEC-SCNC: 9.6 MG/DL (ref 8.6–10.5)
CHLORIDE SERPL-SCNC: 105 MMOL/L (ref 98–107)
CO2 SERPL-SCNC: 24.3 MMOL/L (ref 22–29)
CREAT SERPL-MCNC: 0.94 MG/DL (ref 0.76–1.27)
DEPRECATED RDW RBC AUTO: 43.8 FL (ref 37–54)
EGFRCR SERPLBLD CKD-EPI 2021: 85.1 ML/MIN/1.73
ERYTHROCYTE [DISTWIDTH] IN BLOOD BY AUTOMATED COUNT: 12.1 % (ref 12.3–15.4)
GLUCOSE SERPL-MCNC: 88 MG/DL (ref 65–99)
HCT VFR BLD AUTO: 45.8 % (ref 37.5–51)
HGB BLD-MCNC: 15.5 G/DL (ref 13–17.7)
MCH RBC QN AUTO: 33.6 PG (ref 26.6–33)
MCHC RBC AUTO-ENTMCNC: 33.8 G/DL (ref 31.5–35.7)
MCV RBC AUTO: 99.3 FL (ref 79–97)
PLATELET # BLD AUTO: 324 10*3/MM3 (ref 140–450)
PMV BLD AUTO: 9.9 FL (ref 6–12)
POTASSIUM SERPL-SCNC: 4.3 MMOL/L (ref 3.5–5.2)
RBC # BLD AUTO: 4.61 10*6/MM3 (ref 4.14–5.8)
SODIUM SERPL-SCNC: 138 MMOL/L (ref 136–145)
WBC NRBC COR # BLD AUTO: 7.28 10*3/MM3 (ref 3.4–10.8)

## 2025-06-16 PROCEDURE — 85027 COMPLETE CBC AUTOMATED: CPT

## 2025-06-16 PROCEDURE — 36415 COLL VENOUS BLD VENIPUNCTURE: CPT

## 2025-06-16 PROCEDURE — 80048 BASIC METABOLIC PNL TOTAL CA: CPT

## 2025-06-17 PROBLEM — I25.119 CORONARY ARTERY DISEASE INVOLVING NATIVE CORONARY ARTERY OF NATIVE HEART WITH ANGINA PECTORIS: Status: ACTIVE | Noted: 2025-06-13

## 2025-06-17 PROBLEM — I20.89 ANGINA OF EFFORT: Status: RESOLVED | Noted: 2025-06-04 | Resolved: 2025-06-17

## 2025-06-17 PROBLEM — I25.110 CORONARY ARTERY DISEASE INVOLVING NATIVE CORONARY ARTERY OF NATIVE HEART WITH UNSTABLE ANGINA PECTORIS: Status: ACTIVE | Noted: 2025-06-13

## 2025-06-17 PROBLEM — E78.5 HYPERLIPIDEMIA LDL GOAL <70: Status: ACTIVE | Noted: 2019-02-07

## 2025-06-18 ENCOUNTER — RESULTS FOLLOW-UP (OUTPATIENT)
Dept: CARDIOLOGY | Facility: HOSPITAL | Age: 75
End: 2025-06-18
Payer: MEDICARE

## 2025-06-18 ENCOUNTER — HOSPITAL ENCOUNTER (INPATIENT)
Facility: HOSPITAL | Age: 75
LOS: 8 days | Discharge: HOME | DRG: 234 | End: 2025-06-26
Attending: INTERNAL MEDICINE | Admitting: INTERNAL MEDICINE
Payer: MEDICARE

## 2025-06-18 ENCOUNTER — APPOINTMENT (OUTPATIENT)
Dept: CARDIOLOGY | Facility: HOSPITAL | Age: 75
DRG: 234 | End: 2025-06-18
Payer: MEDICARE

## 2025-06-18 ENCOUNTER — APPOINTMENT (OUTPATIENT)
Dept: GENERAL RADIOLOGY | Facility: HOSPITAL | Age: 75
DRG: 234 | End: 2025-06-18
Payer: MEDICARE

## 2025-06-18 ENCOUNTER — ANESTHESIA EVENT (OUTPATIENT)
Dept: PERIOP | Facility: HOSPITAL | Age: 75
End: 2025-06-18
Payer: MEDICARE

## 2025-06-18 ENCOUNTER — APPOINTMENT (OUTPATIENT)
Dept: PULMONOLOGY | Facility: HOSPITAL | Age: 75
DRG: 234 | End: 2025-06-18
Payer: MEDICARE

## 2025-06-18 DIAGNOSIS — I25.110 CORONARY ARTERY DISEASE INVOLVING NATIVE CORONARY ARTERY OF NATIVE HEART WITH UNSTABLE ANGINA PECTORIS: ICD-10-CM

## 2025-06-18 DIAGNOSIS — E78.5 HYPERLIPIDEMIA LDL GOAL <70: ICD-10-CM

## 2025-06-18 DIAGNOSIS — E66.3 OVERWEIGHT: ICD-10-CM

## 2025-06-18 DIAGNOSIS — G25.81 RLS (RESTLESS LEGS SYNDROME): ICD-10-CM

## 2025-06-18 DIAGNOSIS — B00.9 HERPES SIMPLEX TYPE 2 INFECTION: ICD-10-CM

## 2025-06-18 DIAGNOSIS — M67.40 GANGLION CYST: ICD-10-CM

## 2025-06-18 DIAGNOSIS — M19.91 PRIMARY OSTEOARTHRITIS, UNSPECIFIED SITE: ICD-10-CM

## 2025-06-18 DIAGNOSIS — M70.21 OLECRANON BURSITIS OF RIGHT ELBOW: ICD-10-CM

## 2025-06-18 DIAGNOSIS — I10 PRIMARY HYPERTENSION: ICD-10-CM

## 2025-06-18 DIAGNOSIS — R93.1 ABNORMAL FINDINGS ON DIAGNOSTIC IMAGING OF HEART AND CORONARY CIRCULATION: ICD-10-CM

## 2025-06-18 DIAGNOSIS — Z00.00 MEDICARE ANNUAL WELLNESS VISIT, SUBSEQUENT: ICD-10-CM

## 2025-06-18 DIAGNOSIS — M77.8 LEFT SHOULDER TENDINITIS: ICD-10-CM

## 2025-06-18 DIAGNOSIS — L02.93 CARBUNCLE AND FURUNCLE: ICD-10-CM

## 2025-06-18 DIAGNOSIS — S49.91XA INJURY OF RIGHT SHOULDER, INITIAL ENCOUNTER: ICD-10-CM

## 2025-06-18 DIAGNOSIS — L02.92 CARBUNCLE AND FURUNCLE: ICD-10-CM

## 2025-06-18 DIAGNOSIS — Z98.890 H/O COLONOSCOPY: ICD-10-CM

## 2025-06-18 DIAGNOSIS — I25.118 ATHEROSCLEROSIS OF NATIVE CORONARY ARTERY OF NATIVE HEART WITH STABLE ANGINA PECTORIS: ICD-10-CM

## 2025-06-18 DIAGNOSIS — M25.562 CHRONIC PAIN OF LEFT KNEE: ICD-10-CM

## 2025-06-18 DIAGNOSIS — I45.10 INCOMPLETE RIGHT BUNDLE BRANCH BLOCK: ICD-10-CM

## 2025-06-18 DIAGNOSIS — G89.29 CHRONIC PAIN OF LEFT KNEE: ICD-10-CM

## 2025-06-18 DIAGNOSIS — I25.110 CORONARY ARTERY DISEASE INVOLVING NATIVE CORONARY ARTERY OF NATIVE HEART WITH UNSTABLE ANGINA PECTORIS: Primary | ICD-10-CM

## 2025-06-18 DIAGNOSIS — M54.50 MIDLINE LOW BACK PAIN WITHOUT SCIATICA, UNSPECIFIED CHRONICITY: ICD-10-CM

## 2025-06-18 PROBLEM — M25.421 ELBOW SWELLING, RIGHT: Status: RESOLVED | Noted: 2022-08-02 | Resolved: 2025-06-18

## 2025-06-18 PROBLEM — R00.1 SINUS BRADYCARDIA: Status: RESOLVED | Noted: 2020-02-11 | Resolved: 2025-06-18

## 2025-06-18 LAB
ABO GROUP BLD: NORMAL
ABO GROUP BLD: NORMAL
AMPHET+METHAMPHET UR QL: NEGATIVE
AMPHETAMINES UR QL: NEGATIVE
BARBITURATES UR QL SCN: NEGATIVE
BASOPHILS # BLD AUTO: 0.03 10*3/MM3 (ref 0–0.2)
BASOPHILS NFR BLD AUTO: 0.4 % (ref 0–1.5)
BENZODIAZ UR QL SCN: NEGATIVE
BH CV UPPER DUPLEX RIGHT RADIAL ART PSV: 22 CM/S
BH CV VAS UPPER ART LT RADIAL ART DIAMETER FOREARM DIST: 0.23 CM
BH CV VAS UPPER ART LT RADIAL ART DIAMETER FOREARM MID: 0.23 CM
BH CV VAS UPPER ART LT RADIAL ART DIAMETER FOREARM PROX: 0.26 CM
BH CV VAS UPPER ART RT RADIAL ART DIAMETER FOREARM DIST: 0.26 CM
BH CV VAS UPPER ART RT RADIAL ART DIAMETER FOREARM MID: 0.21 CM
BH CV VAS UPPER ART RT RADIAL ART DIAMETER FOREARM PROX: 0.33 CM
BH CV XLRA MEAS LEFT DIST CCA EDV: 19.2 CM/SEC
BH CV XLRA MEAS LEFT DIST CCA PSV: 59.5 CM/SEC
BH CV XLRA MEAS LEFT DIST ICA EDV: 40.8 CM/SEC
BH CV XLRA MEAS LEFT DIST ICA PSV: 88.5 CM/SEC
BH CV XLRA MEAS LEFT ICA/CCA RATIO: 0.98
BH CV XLRA MEAS LEFT MID CCA EDV: 28.5 CM/SEC
BH CV XLRA MEAS LEFT MID CCA PSV: 69.3 CM/SEC
BH CV XLRA MEAS LEFT MID ICA EDV: 22.1 CM/SEC
BH CV XLRA MEAS LEFT MID ICA PSV: 58.5 CM/SEC
BH CV XLRA MEAS LEFT PROX CCA EDV: 25.6 CM/SEC
BH CV XLRA MEAS LEFT PROX CCA PSV: 78.1 CM/SEC
BH CV XLRA MEAS LEFT PROX ECA EDV: 9.3 CM/SEC
BH CV XLRA MEAS LEFT PROX ECA PSV: 56 CM/SEC
BH CV XLRA MEAS LEFT PROX ICA EDV: 18.2 CM/SEC
BH CV XLRA MEAS LEFT PROX ICA PSV: 41.3 CM/SEC
BH CV XLRA MEAS LEFT PROX SCLA PSV: 61.4 CM/SEC
BH CV XLRA MEAS LEFT VERTEBRAL A EDV: 18 CM/SEC
BH CV XLRA MEAS LEFT VERTEBRAL A PSV: 35.9 CM/SEC
BH CV XLRA MEAS RIGHT DIST CCA EDV: 16.8 CM/SEC
BH CV XLRA MEAS RIGHT DIST CCA PSV: 48.5 CM/SEC
BH CV XLRA MEAS RIGHT DIST ICA EDV: 28.7 CM/SEC
BH CV XLRA MEAS RIGHT DIST ICA PSV: 69.7 CM/SEC
BH CV XLRA MEAS RIGHT ICA/CCA RATIO: 1.2
BH CV XLRA MEAS RIGHT MID CCA EDV: 20.5 CM/SEC
BH CV XLRA MEAS RIGHT MID CCA PSV: 57.8 CM/SEC
BH CV XLRA MEAS RIGHT MID ICA EDV: 25.2 CM/SEC
BH CV XLRA MEAS RIGHT MID ICA PSV: 58.1 CM/SEC
BH CV XLRA MEAS RIGHT PROX CCA EDV: 15.5 CM/SEC
BH CV XLRA MEAS RIGHT PROX CCA PSV: 71.4 CM/SEC
BH CV XLRA MEAS RIGHT PROX ECA EDV: 7 CM/SEC
BH CV XLRA MEAS RIGHT PROX ECA PSV: 45.9 CM/SEC
BH CV XLRA MEAS RIGHT PROX ICA EDV: 19.3 CM/SEC
BH CV XLRA MEAS RIGHT PROX ICA PSV: 51.5 CM/SEC
BH CV XLRA MEAS RIGHT PROX SCLA PSV: 69.6 CM/SEC
BH CV XLRA MEAS RIGHT VERTEBRAL A EDV: 10.9 CM/SEC
BH CV XLRA MEAS RIGHT VERTEBRAL A PSV: 22.1 CM/SEC
BILIRUB UR QL STRIP: NEGATIVE
BLD GP AB SCN SERPL QL: NEGATIVE
BUPRENORPHINE SERPL-MCNC: NEGATIVE NG/ML
CANNABINOIDS SERPL QL: NEGATIVE
CHOLEST SERPL-MCNC: 154 MG/DL (ref 0–200)
CLARITY UR: CLEAR
COCAINE UR QL: NEGATIVE
COLOR UR: YELLOW
DEPRECATED RDW RBC AUTO: 44.9 FL (ref 37–54)
EOSINOPHIL # BLD AUTO: 0.32 10*3/MM3 (ref 0–0.4)
EOSINOPHIL NFR BLD AUTO: 4.6 % (ref 0.3–6.2)
ERYTHROCYTE [DISTWIDTH] IN BLOOD BY AUTOMATED COUNT: 12.5 % (ref 12.3–15.4)
FENTANYL UR-MCNC: POSITIVE NG/ML
GLUCOSE UR STRIP-MCNC: NEGATIVE MG/DL
HBA1C MFR BLD: 5.7 % (ref 4.8–5.6)
HCT VFR BLD AUTO: 47.1 % (ref 37.5–51)
HDLC SERPL-MCNC: 50 MG/DL (ref 40–60)
HGB BLD-MCNC: 15.8 G/DL (ref 13–17.7)
HGB UR QL STRIP.AUTO: NEGATIVE
IMM GRANULOCYTES # BLD AUTO: 0.03 10*3/MM3 (ref 0–0.05)
IMM GRANULOCYTES NFR BLD AUTO: 0.4 % (ref 0–0.5)
KETONES UR QL STRIP: NEGATIVE
LDLC SERPL CALC-MCNC: 61 MG/DL (ref 0–100)
LDLC/HDLC SERPL: 0.99 {RATIO}
LEUKOCYTE ESTERASE UR QL STRIP.AUTO: NEGATIVE
LYMPHOCYTES # BLD AUTO: 2.84 10*3/MM3 (ref 0.7–3.1)
LYMPHOCYTES NFR BLD AUTO: 41.1 % (ref 19.6–45.3)
Lab: 0.22 CM
Lab: 0.26 CM
MAGNESIUM SERPL-MCNC: 2.1 MG/DL (ref 1.6–2.4)
MCH RBC QN AUTO: 32.6 PG (ref 26.6–33)
MCHC RBC AUTO-ENTMCNC: 33.5 G/DL (ref 31.5–35.7)
MCV RBC AUTO: 97.1 FL (ref 79–97)
METHADONE UR QL SCN: NEGATIVE
MONOCYTES # BLD AUTO: 0.84 10*3/MM3 (ref 0.1–0.9)
MONOCYTES NFR BLD AUTO: 12.2 % (ref 5–12)
NEUTROPHILS NFR BLD AUTO: 2.85 10*3/MM3 (ref 1.7–7)
NEUTROPHILS NFR BLD AUTO: 41.3 % (ref 42.7–76)
NITRITE UR QL STRIP: NEGATIVE
NRBC BLD AUTO-RTO: 0 /100 WBC (ref 0–0.2)
OPIATES UR QL: NEGATIVE
OXYCODONE UR QL SCN: NEGATIVE
PA ADP PRP-ACNC: 162 PRU
PCP UR QL SCN: NEGATIVE
PH UR STRIP.AUTO: 7 [PH] (ref 5–8)
PLATELET # BLD AUTO: 323 10*3/MM3 (ref 140–450)
PMV BLD AUTO: 10 FL (ref 6–12)
PROT UR QL STRIP: NEGATIVE
RBC # BLD AUTO: 4.85 10*6/MM3 (ref 4.14–5.8)
RH BLD: POSITIVE
RH BLD: POSITIVE
SP GR UR STRIP: 1.04 (ref 1–1.03)
T&S EXPIRATION DATE: NORMAL
TRICYCLICS UR QL SCN: NEGATIVE
TRIGL SERPL-MCNC: 272 MG/DL (ref 0–150)
UROBILINOGEN UR QL STRIP: ABNORMAL
VLDLC SERPL-MCNC: 43 MG/DL (ref 5–40)
WBC NRBC COR # BLD AUTO: 6.91 10*3/MM3 (ref 3.4–10.8)

## 2025-06-18 PROCEDURE — 94010 BREATHING CAPACITY TEST: CPT

## 2025-06-18 PROCEDURE — 93930 UPPER EXTREMITY STUDY: CPT

## 2025-06-18 PROCEDURE — 85576 BLOOD PLATELET AGGREGATION: CPT | Performed by: PHYSICIAN ASSISTANT

## 2025-06-18 PROCEDURE — 93880 EXTRACRANIAL BILAT STUDY: CPT | Performed by: INTERNAL MEDICINE

## 2025-06-18 PROCEDURE — G0378 HOSPITAL OBSERVATION PER HR: HCPCS

## 2025-06-18 PROCEDURE — 71045 X-RAY EXAM CHEST 1 VIEW: CPT

## 2025-06-18 PROCEDURE — 25010000002 MIDAZOLAM PER 1 MG: Performed by: INTERNAL MEDICINE

## 2025-06-18 PROCEDURE — B2111ZZ FLUOROSCOPY OF MULTIPLE CORONARY ARTERIES USING LOW OSMOLAR CONTRAST: ICD-10-PCS | Performed by: INTERNAL MEDICINE

## 2025-06-18 PROCEDURE — 99221 1ST HOSP IP/OBS SF/LOW 40: CPT | Performed by: THORACIC SURGERY (CARDIOTHORACIC VASCULAR SURGERY)

## 2025-06-18 PROCEDURE — 86900 BLOOD TYPING SEROLOGIC ABO: CPT

## 2025-06-18 PROCEDURE — 25510000001 IOPAMIDOL PER 1 ML: Performed by: INTERNAL MEDICINE

## 2025-06-18 PROCEDURE — 86901 BLOOD TYPING SEROLOGIC RH(D): CPT

## 2025-06-18 PROCEDURE — S0260 H&P FOR SURGERY: HCPCS | Performed by: INTERNAL MEDICINE

## 2025-06-18 PROCEDURE — 86900 BLOOD TYPING SEROLOGIC ABO: CPT | Performed by: PHYSICIAN ASSISTANT

## 2025-06-18 PROCEDURE — 86901 BLOOD TYPING SEROLOGIC RH(D): CPT | Performed by: PHYSICIAN ASSISTANT

## 2025-06-18 PROCEDURE — 93458 L HRT ARTERY/VENTRICLE ANGIO: CPT | Performed by: INTERNAL MEDICINE

## 2025-06-18 PROCEDURE — 86923 COMPATIBILITY TEST ELECTRIC: CPT

## 2025-06-18 PROCEDURE — C1894 INTRO/SHEATH, NON-LASER: HCPCS | Performed by: INTERNAL MEDICINE

## 2025-06-18 PROCEDURE — 25010000002 NICARDIPINE 2.5 MG/ML SOLUTION: Performed by: INTERNAL MEDICINE

## 2025-06-18 PROCEDURE — 80061 LIPID PANEL: CPT | Performed by: PHYSICIAN ASSISTANT

## 2025-06-18 PROCEDURE — 81003 URINALYSIS AUTO W/O SCOPE: CPT | Performed by: PHYSICIAN ASSISTANT

## 2025-06-18 PROCEDURE — 25810000003 SODIUM CHLORIDE 0.9 % SOLUTION: Performed by: INTERNAL MEDICINE

## 2025-06-18 PROCEDURE — 93306 TTE W/DOPPLER COMPLETE: CPT | Performed by: INTERNAL MEDICINE

## 2025-06-18 PROCEDURE — 80307 DRUG TEST PRSMV CHEM ANLYZR: CPT | Performed by: PHYSICIAN ASSISTANT

## 2025-06-18 PROCEDURE — 93880 EXTRACRANIAL BILAT STUDY: CPT

## 2025-06-18 PROCEDURE — 83735 ASSAY OF MAGNESIUM: CPT | Performed by: INTERNAL MEDICINE

## 2025-06-18 PROCEDURE — 93306 TTE W/DOPPLER COMPLETE: CPT

## 2025-06-18 PROCEDURE — 25010000002 FENTANYL CITRATE (PF) 50 MCG/ML SOLUTION: Performed by: INTERNAL MEDICINE

## 2025-06-18 PROCEDURE — 83036 HEMOGLOBIN GLYCOSYLATED A1C: CPT | Performed by: PHYSICIAN ASSISTANT

## 2025-06-18 PROCEDURE — 86850 RBC ANTIBODY SCREEN: CPT | Performed by: PHYSICIAN ASSISTANT

## 2025-06-18 PROCEDURE — 85025 COMPLETE CBC W/AUTO DIFF WBC: CPT | Performed by: INTERNAL MEDICINE

## 2025-06-18 PROCEDURE — 25010000002 LIDOCAINE PF 1% 1 % SOLUTION: Performed by: INTERNAL MEDICINE

## 2025-06-18 PROCEDURE — C1769 GUIDE WIRE: HCPCS | Performed by: INTERNAL MEDICINE

## 2025-06-18 PROCEDURE — 4A023N7 MEASUREMENT OF CARDIAC SAMPLING AND PRESSURE, LEFT HEART, PERCUTANEOUS APPROACH: ICD-10-PCS | Performed by: INTERNAL MEDICINE

## 2025-06-18 PROCEDURE — 93930 UPPER EXTREMITY STUDY: CPT | Performed by: INTERNAL MEDICINE

## 2025-06-18 PROCEDURE — 25010000002 HEPARIN (PORCINE) PER 1000 UNITS: Performed by: INTERNAL MEDICINE

## 2025-06-18 RX ORDER — FAMOTIDINE 20 MG/1
40 TABLET, FILM COATED ORAL DAILY
Status: DISCONTINUED | OUTPATIENT
Start: 2025-06-18 | End: 2025-06-19

## 2025-06-18 RX ORDER — LIDOCAINE HYDROCHLORIDE 10 MG/ML
INJECTION, SOLUTION EPIDURAL; INFILTRATION; INTRACAUDAL; PERINEURAL
Status: DISCONTINUED | OUTPATIENT
Start: 2025-06-18 | End: 2025-06-18 | Stop reason: HOSPADM

## 2025-06-18 RX ORDER — BISACODYL 10 MG
10 SUPPOSITORY, RECTAL RECTAL DAILY PRN
Status: DISCONTINUED | OUTPATIENT
Start: 2025-06-18 | End: 2025-06-26 | Stop reason: HOSPADM

## 2025-06-18 RX ORDER — ONDANSETRON 2 MG/ML
4 INJECTION INTRAMUSCULAR; INTRAVENOUS EVERY 6 HOURS PRN
Status: DISCONTINUED | OUTPATIENT
Start: 2025-06-18 | End: 2025-06-19

## 2025-06-18 RX ORDER — IPRATROPIUM BROMIDE AND ALBUTEROL SULFATE 2.5; .5 MG/3ML; MG/3ML
3 SOLUTION RESPIRATORY (INHALATION) EVERY 4 HOURS PRN
Status: DISCONTINUED | OUTPATIENT
Start: 2025-06-18 | End: 2025-06-19

## 2025-06-18 RX ORDER — HEPARIN SODIUM 1000 [USP'U]/ML
INJECTION, SOLUTION INTRAVENOUS; SUBCUTANEOUS
Status: DISCONTINUED | OUTPATIENT
Start: 2025-06-18 | End: 2025-06-18 | Stop reason: HOSPADM

## 2025-06-18 RX ORDER — POLYETHYLENE GLYCOL 3350 17 G/17G
17 POWDER, FOR SOLUTION ORAL DAILY PRN
Status: DISCONTINUED | OUTPATIENT
Start: 2025-06-18 | End: 2025-06-26 | Stop reason: HOSPADM

## 2025-06-18 RX ORDER — ASPIRIN 81 MG/1
81 TABLET ORAL ONCE
Status: DISCONTINUED | OUTPATIENT
Start: 2025-06-18 | End: 2025-06-18

## 2025-06-18 RX ORDER — SODIUM CHLORIDE 0.9 % (FLUSH) 0.9 %
10 SYRINGE (ML) INJECTION AS NEEDED
Status: DISCONTINUED | OUTPATIENT
Start: 2025-06-18 | End: 2025-06-26 | Stop reason: HOSPADM

## 2025-06-18 RX ORDER — IOPAMIDOL 755 MG/ML
INJECTION, SOLUTION INTRAVASCULAR
Status: DISCONTINUED | OUTPATIENT
Start: 2025-06-18 | End: 2025-06-18 | Stop reason: HOSPADM

## 2025-06-18 RX ORDER — SODIUM CHLORIDE 9 MG/ML
40 INJECTION, SOLUTION INTRAVENOUS AS NEEDED
Status: DISCONTINUED | OUTPATIENT
Start: 2025-06-18 | End: 2025-06-26 | Stop reason: HOSPADM

## 2025-06-18 RX ORDER — CHLORHEXIDINE GLUCONATE 500 MG/1
CLOTH TOPICAL EVERY 12 HOURS PRN
Status: DISCONTINUED | OUTPATIENT
Start: 2025-06-18 | End: 2025-06-19

## 2025-06-18 RX ORDER — MIDAZOLAM HYDROCHLORIDE 1 MG/ML
INJECTION, SOLUTION INTRAMUSCULAR; INTRAVENOUS
Status: DISCONTINUED | OUTPATIENT
Start: 2025-06-18 | End: 2025-06-18 | Stop reason: HOSPADM

## 2025-06-18 RX ORDER — ACETAMINOPHEN 650 MG/1
650 SUPPOSITORY RECTAL EVERY 4 HOURS PRN
Status: DISCONTINUED | OUTPATIENT
Start: 2025-06-18 | End: 2025-06-19

## 2025-06-18 RX ORDER — CHLORHEXIDINE GLUCONATE ORAL RINSE 1.2 MG/ML
15 SOLUTION DENTAL EVERY 12 HOURS
Status: COMPLETED | OUTPATIENT
Start: 2025-06-18 | End: 2025-06-19

## 2025-06-18 RX ORDER — ACETAMINOPHEN 325 MG/1
650 TABLET ORAL EVERY 4 HOURS PRN
Status: DISCONTINUED | OUTPATIENT
Start: 2025-06-18 | End: 2025-06-19

## 2025-06-18 RX ORDER — ASPIRIN 81 MG/1
324 TABLET, CHEWABLE ORAL DAILY
Status: DISCONTINUED | OUTPATIENT
Start: 2025-06-18 | End: 2025-06-19

## 2025-06-18 RX ORDER — BISACODYL 5 MG/1
5 TABLET, DELAYED RELEASE ORAL DAILY PRN
Status: DISCONTINUED | OUTPATIENT
Start: 2025-06-18 | End: 2025-06-26 | Stop reason: HOSPADM

## 2025-06-18 RX ORDER — HYDROCODONE BITARTRATE AND ACETAMINOPHEN 7.5; 325 MG/1; MG/1
1 TABLET ORAL EVERY 6 HOURS PRN
Refills: 0 | Status: DISCONTINUED | OUTPATIENT
Start: 2025-06-18 | End: 2025-06-19 | Stop reason: SDUPTHER

## 2025-06-18 RX ORDER — ACETAMINOPHEN 160 MG/5ML
650 SOLUTION ORAL EVERY 4 HOURS PRN
Status: DISCONTINUED | OUTPATIENT
Start: 2025-06-18 | End: 2025-06-19

## 2025-06-18 RX ORDER — ATORVASTATIN CALCIUM 40 MG/1
40 TABLET, FILM COATED ORAL NIGHTLY
Status: DISCONTINUED | OUTPATIENT
Start: 2025-06-18 | End: 2025-06-19 | Stop reason: SDUPTHER

## 2025-06-18 RX ORDER — ONDANSETRON 4 MG/1
4 TABLET, ORALLY DISINTEGRATING ORAL EVERY 6 HOURS PRN
Status: DISCONTINUED | OUTPATIENT
Start: 2025-06-18 | End: 2025-06-19

## 2025-06-18 RX ORDER — AMOXICILLIN 250 MG
2 CAPSULE ORAL 2 TIMES DAILY PRN
Status: DISCONTINUED | OUTPATIENT
Start: 2025-06-18 | End: 2025-06-26 | Stop reason: HOSPADM

## 2025-06-18 RX ORDER — GABAPENTIN 300 MG/1
300 CAPSULE ORAL ONCE
Status: DISCONTINUED | OUTPATIENT
Start: 2025-06-19 | End: 2025-06-19

## 2025-06-18 RX ORDER — FENTANYL CITRATE 50 UG/ML
INJECTION, SOLUTION INTRAMUSCULAR; INTRAVENOUS
Status: DISCONTINUED | OUTPATIENT
Start: 2025-06-18 | End: 2025-06-18 | Stop reason: HOSPADM

## 2025-06-18 RX ORDER — NITROGLYCERIN 0.4 MG/1
0.4 TABLET SUBLINGUAL
Status: DISCONTINUED | OUTPATIENT
Start: 2025-06-18 | End: 2025-06-19

## 2025-06-18 RX ADMIN — CHLORHEXIDINE GLUCONATE ORAL RINSE 15 ML: 1.2 SOLUTION DENTAL at 21:29

## 2025-06-18 RX ADMIN — FAMOTIDINE 40 MG: 20 TABLET, FILM COATED ORAL at 18:27

## 2025-06-18 RX ADMIN — METOPROLOL TARTRATE 12.5 MG: 25 TABLET, FILM COATED ORAL at 21:29

## 2025-06-18 RX ADMIN — MUPIROCIN 1 APPLICATION: 20 OINTMENT TOPICAL at 21:29

## 2025-06-18 RX ADMIN — ATORVASTATIN CALCIUM 40 MG: 40 TABLET, FILM COATED ORAL at 21:40

## 2025-06-18 RX ADMIN — ASPIRIN 81 MG 324 MG: 81 TABLET ORAL at 21:29

## 2025-06-18 NOTE — CASE MANAGEMENT/SOCIAL WORK
Discharge Planning Assessment  [facility]     Patient Name: Jamari Munoz  MRN: [id number]  Today's Date: 6/18/2025    Admit Date: 6/18/2025    Plan: Home   Discharge Needs Assessment       Row Name 06/18/25 1547       Living Environment    People in Home spouse    Name(s) of People in Home Haritha Munoz (spouse) [phone]    Current Living Arrangements [facility]    Potentially Unsafe Housing Conditions none    In the past 12 months has the electric, gas, oil, or water company threatened to shut off services in your home? No    Primary Care Provided by self    Family Caregiver if Needed spouse    Family Caregiver Names Haritha Munoz (spouse) [phone]    Quality of Family Relationships helpful;involved;supportive    Able to Return to Prior Arrangements yes       Resource/Environmental Concerns    Resource/Environmental Concerns none    Transportation Concerns none       Transportation Needs    In the past 12 months, has lack of transportation kept you from medical appointments or from getting medications? no    In the past 12 months, has lack of transportation kept you from meetings, work, or from getting things needed for daily living? No       Food Insecurity    Within the past 12 months, you worried that your food would run out before you got the money to buy more. Never true    Within the past 12 months, the food you bought just didn't last and you didn't have money to get more. Never true       Transition Planning    Patient/Family Anticipates Transition to home with family    Patient/Family Anticipated Services at Transition none    Transportation Anticipated family or friend will provide       Discharge Needs Assessment    Readmission Within the Last 30 Days no previous admission in last 30 days    Equipment Currently Used at Home bp cuff;pulse ox    Concerns to be Addressed no discharge needs identified    Do you want help finding or keeping work or a job? I do not need or want help     Do you want help with school or training? For example, starting or completing job training or getting a high school diploma, GED or equivalent No    Anticipated Changes Related to Illness none    Equipment Needed After Discharge bp cuff;pulse ox    Discharge Coordination/Progress With verbal consent, I spoke with Pt and spouse, in room, to initiate discharge plan. Pt is admitted with CAD. [facility] [place] Address on file was verified. Prior to admission, Pt ambulated independently and was independent with ADLs. He has the following DME: BP cuff and pulse oximeter. His PCP is Dr Keshawn Alexandre. He has Medicare and Reaction insurance which has Rx coverage. He uses [email or web address], [facility]. He states prescriptions are affordable. He denies HH/O2/OPPT. His goal is to return home at discharge. His spouse will provide transportation. No discharge needs identified. CM will continue to follow hospital course.                   Discharge Plan       Row Name 06/18/25 1603       Plan    Plan Home    Final Discharge Disposition Code 01 - home or self-care                    Expected Discharge Date and Time       Expected Discharge Date Expected Discharge Time    Jun 24, 2025            Demographic Summary       Row Name 06/18/25 1547       General Information    Arrived From home    Referral Source emergency department    Reason for Consult discharge planning    Preferred Language English    General Information Comments PCP Dr Keshawn Alexandre       Contact Information    Permission Granted to Share Info With     Contact Information Obtained for     Contact Information Comments Haritha Munoz (spouse) [phone]                   Functional Status       Row Name 06/18/25 1547       Functional Status    Usual Activity Tolerance good    Current Activity Tolerance good       Functional Status, IADL    Medications independent    Meal Preparation independent     Housekeeping independent    Laundry independent    Shopping independent                   Psychosocial    No documentation.                  Abuse/Neglect    No documentation.                  Legal    No documentation.                  Substance Abuse    No documentation.                  Patient Forms    No documentation.                     Krysta Urbano RN
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
## 2025-06-18 NOTE — PLAN OF CARE
Goal Outcome Evaluation:              Outcome Evaluation: Pt on RA, NSR/SB on tele, VSS. A&Ox4. No complaints of pain or SOA this shift. Pt NPO at midnight for CABG tomorrow. Wife at bedside, will continue with POC.
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
## 2025-06-18 NOTE — ANESTHESIA PREPROCEDURE EVALUATION
Anesthesia Evaluation     Patient summary reviewed and Nursing notes reviewed   no history of anesthetic complications:   NPO Solid Status: > 8 hours  NPO Liquid Status: > 8 hours           Airway   Mallampati: II  TM distance: >3 FB  Neck ROM: full  No difficulty expected  Dental - normal exam     Pulmonary - normal exam   (+) asthma (exercise induced),  Cardiovascular - normal exam    ECG reviewed  Beta blocker not taken-may be given intraoperatively    (+) hypertension, CAD, dysrhythmias (RBBB), angina with exertion, hyperlipidemia    ROS comment: Cath Conclusion 6/18/25       ·  Severe left main and severe multi-vessel CAD (LAD/diagonal, ramus, RCA)  ·  No left ventriculography performed  ·  Near normal LV filling pressure (LVEDP 13 mmHg)     Echo 5/15/24    Interpretation Summary       ·  Left ventricular systolic function is normal. Calculated left ventricular EF = 61%  ·  The left atrial cavity is mildly dilated.  ·  Saline test results are negative.  ·  Estimated right ventricular systolic pressure from tricuspid regurgitation is normal (<35 mmHg).       BBlocker held for bradycardia      Neuro/Psych  (+) headaches  GI/Hepatic/Renal/Endo - negative ROS     Musculoskeletal     Abdominal    Substance History   (+) alcohol use (14 drinks weekly)     OB/GYN          Other   arthritis,                   Anesthesia Plan    ASA 4     general, [place] and CVL     (Jennifer, YARELI, CVC, post op ICU/vent)  intravenous induction   Postoperative Plan: Expected vent after surgery  Anesthetic plan, risks, benefits, and alternatives have been provided, discussed and informed consent has been obtained with: patient.    Use of blood products discussed with patient  Consented to blood products.      CODE STATUS:    Code Status (Patient has no pulse and is not breathing): CPR (Attempt to Resuscitate)  Medical Interventions (Patient has pulse or is breathing): Full Support
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
## 2025-06-19 ENCOUNTER — ANCILLARY PROCEDURE (OUTPATIENT)
Dept: PERIOP | Facility: HOSPITAL | Age: 75
DRG: 234 | End: 2025-06-19
Payer: MEDICARE

## 2025-06-19 ENCOUNTER — ANESTHESIA EVENT CONVERTED (OUTPATIENT)
Dept: ANESTHESIOLOGY | Facility: HOSPITAL | Age: 75
DRG: 234 | End: 2025-06-19
Payer: MEDICARE

## 2025-06-19 ENCOUNTER — ANESTHESIA (OUTPATIENT)
Dept: PERIOP | Facility: HOSPITAL | Age: 75
End: 2025-06-19
Payer: MEDICARE

## 2025-06-19 ENCOUNTER — APPOINTMENT (OUTPATIENT)
Dept: GENERAL RADIOLOGY | Facility: HOSPITAL | Age: 75
DRG: 234 | End: 2025-06-19
Payer: MEDICARE

## 2025-06-19 LAB
ACT BLD: 101 SECONDS (ref 82–152)
ACT BLD: 112 SECONDS (ref 82–152)
ACT BLD: 118 SECONDS (ref 82–152)
ACT BLD: 446 SECONDS (ref 82–152)
ACT BLD: 469 SECONDS (ref 82–152)
ACT BLD: 487 SECONDS (ref 82–152)
ACT BLD: 550 SECONDS (ref 82–152)
ACT BLD: 567 SECONDS (ref 82–152)
ACT BLD: 608 SECONDS (ref 82–152)
ALBUMIN SERPL-MCNC: 3.2 G/DL (ref 3.5–5.2)
ALBUMIN SERPL-MCNC: 3.6 G/DL (ref 3.5–5.2)
ALBUMIN SERPL-MCNC: 4 G/DL (ref 3.5–5.2)
ALBUMIN/GLOB SERPL: 1.4 G/DL
ALP SERPL-CCNC: 49 U/L (ref 39–117)
ALT SERPL W P-5'-P-CCNC: 23 U/L (ref 1–41)
ANION GAP SERPL CALCULATED.3IONS-SCNC: 10 MMOL/L (ref 5–15)
ANION GAP SERPL CALCULATED.3IONS-SCNC: 8 MMOL/L (ref 5–15)
ANION GAP SERPL CALCULATED.3IONS-SCNC: 8.9 MMOL/L (ref 5–15)
AORTIC DIMENSIONLESS INDEX: 0.83 (DI)
APTT PPP: 28.5 SECONDS (ref 22–39)
APTT PPP: 32.3 SECONDS (ref 22–39)
ARTERIAL PATENCY WRIST A: ABNORMAL
ARTERIAL PATENCY WRIST A: ABNORMAL
ASCENDING AORTA: 3.4 CM
AST SERPL-CCNC: 24 U/L (ref 1–40)
ATMOSPHERIC PRESS: ABNORMAL MM[HG]
ATMOSPHERIC PRESS: ABNORMAL MM[HG]
AV MEAN PRESS GRAD SYS DOP V1V2: 3 MMHG
AV VMAX SYS DOP: 117 CM/SEC
BASE EXCESS BLDA CALC-SCNC: -1 MMOL/L (ref -5–5)
BASE EXCESS BLDA CALC-SCNC: -2.3 MMOL/L (ref 0–2)
BASE EXCESS BLDA CALC-SCNC: -3 MMOL/L (ref -5–5)
BASE EXCESS BLDA CALC-SCNC: -4 MMOL/L (ref -5–5)
BASE EXCESS BLDA CALC-SCNC: -7 MMOL/L (ref -5–5)
BASE EXCESS BLDA CALC-SCNC: 0 MMOL/L (ref -5–5)
BASE EXCESS BLDA CALC-SCNC: 0.4 MMOL/L (ref 0–2)
BASOPHILS # BLD AUTO: 0.03 10*3/MM3 (ref 0–0.2)
BASOPHILS NFR BLD AUTO: 0.4 % (ref 0–1.5)
BDY SITE: ABNORMAL
BDY SITE: ABNORMAL
BH CV ECHO MEAS - AO MAX PG: 5.5 MMHG
BH CV ECHO MEAS - AO ROOT DIAM: 3.6 CM
BH CV ECHO MEAS - AO V2 VTI: 25.6 CM
BH CV ECHO MEAS - AVA(I,D): 2.6 CM2
BH CV ECHO MEAS - EDV(CUBED): 79.5 ML
BH CV ECHO MEAS - EDV(MOD-SP2): 99.7 ML
BH CV ECHO MEAS - EDV(MOD-SP4): 150 ML
BH CV ECHO MEAS - EF(MOD-SP2): 48.3 %
BH CV ECHO MEAS - EF(MOD-SP4): 51 %
BH CV ECHO MEAS - ESV(CUBED): 29.8 ML
BH CV ECHO MEAS - ESV(MOD-SP2): 51.5 ML
BH CV ECHO MEAS - ESV(MOD-SP4): 73.5 ML
BH CV ECHO MEAS - FS: 27.9 %
BH CV ECHO MEAS - IVS/LVPW: 1 CM
BH CV ECHO MEAS - IVSD: 0.9 CM
BH CV ECHO MEAS - LA DIMENSION: 3.8 CM
BH CV ECHO MEAS - LAT PEAK E' VEL: 10.1 CM/SEC
BH CV ECHO MEAS - LV DIASTOLIC VOL/BSA (35-75): 72.6 CM2
BH CV ECHO MEAS - LV MASS(C)D: 123.3 GRAMS
BH CV ECHO MEAS - LV MAX PG: 3.7 MMHG
BH CV ECHO MEAS - LV MEAN PG: 2 MMHG
BH CV ECHO MEAS - LV SYSTOLIC VOL/BSA (12-30): 35.6 CM2
BH CV ECHO MEAS - LV V1 MAX: 96.2 CM/SEC
BH CV ECHO MEAS - LV V1 VTI: 21.3 CM
BH CV ECHO MEAS - LVIDD: 4.3 CM
BH CV ECHO MEAS - LVIDS: 3.1 CM
BH CV ECHO MEAS - LVOT AREA: 3.1 CM2
BH CV ECHO MEAS - LVOT DIAM: 2 CM
BH CV ECHO MEAS - LVPWD: 0.9 CM
BH CV ECHO MEAS - MED PEAK E' VEL: 7.4 CM/SEC
BH CV ECHO MEAS - MV A MAX VEL: 52.6 CM/SEC
BH CV ECHO MEAS - MV DEC SLOPE: 160 CM/SEC2
BH CV ECHO MEAS - MV DEC TIME: 0.21 SEC
BH CV ECHO MEAS - MV E MAX VEL: 50.7 CM/SEC
BH CV ECHO MEAS - MV E/A: 0.96
BH CV ECHO MEAS - MV MAX PG: 2.4 MMHG
BH CV ECHO MEAS - MV MEAN PG: 1 MMHG
BH CV ECHO MEAS - MV P1/2T: 108.9 MSEC
BH CV ECHO MEAS - MV V2 VTI: 26.6 CM
BH CV ECHO MEAS - MVA(P1/2T): 2.02 CM2
BH CV ECHO MEAS - MVA(VTI): 2.5 CM2
BH CV ECHO MEAS - PA ACC TIME: 0.16 SEC
BH CV ECHO MEAS - PA V2 MAX: 112 CM/SEC
BH CV ECHO MEAS - PI END-D VEL: 130 CM/SEC
BH CV ECHO MEAS - SV(LVOT): 66.9 ML
BH CV ECHO MEAS - SV(MOD-SP2): 48.2 ML
BH CV ECHO MEAS - SV(MOD-SP4): 76.5 ML
BH CV ECHO MEAS - SVI(LVOT): 32.4 ML/M2
BH CV ECHO MEAS - SVI(MOD-SP2): 23.3 ML/M2
BH CV ECHO MEAS - SVI(MOD-SP4): 37 ML/M2
BH CV ECHO MEAS - TAPSE (>1.6): 3 CM
BH CV ECHO MEASUREMENTS AVERAGE E/E' RATIO: 5.79
BH CV VAS BP LEFT ARM: NORMAL MMHG
BH CV XLRA - RV BASE: 3.6 CM
BH CV XLRA - RV LENGTH: 8.1 CM
BH CV XLRA - RV MID: 2.6 CM
BH CV XLRA - TDI S': 11.4 CM/SEC
BILIRUB SERPL-MCNC: 0.4 MG/DL (ref 0–1.2)
BODY TEMPERATURE: 37
BODY TEMPERATURE: 37
BUN SERPL-MCNC: 11.4 MG/DL (ref 8–23)
BUN SERPL-MCNC: 11.8 MG/DL (ref 8–23)
BUN SERPL-MCNC: 13.9 MG/DL (ref 8–23)
BUN/CREAT SERPL: 12.8 (ref 7–25)
BUN/CREAT SERPL: 13.7 (ref 7–25)
BUN/CREAT SERPL: 16.2 (ref 7–25)
CA-I BLDA-SCNC: 0.99 MMOL/L (ref 1.2–1.32)
CA-I BLDA-SCNC: 1.02 MMOL/L (ref 1.2–1.32)
CA-I BLDA-SCNC: 1.07 MMOL/L (ref 1.2–1.32)
CA-I BLDA-SCNC: 1.12 MMOL/L (ref 1.2–1.32)
CA-I BLDA-SCNC: 1.13 MMOL/L (ref 1.2–1.32)
CA-I BLDA-SCNC: 1.16 MMOL/L (ref 1.2–1.32)
CA-I BLDA-SCNC: 1.35 MMOL/L (ref 1.2–1.32)
CA-I BLDA-SCNC: 1.39 MMOL/L (ref 1.2–1.32)
CA-I SERPL ISE-MCNC: 1.07 MMOL/L (ref 1.15–1.3)
CALCIUM SPEC-SCNC: 8.6 MG/DL (ref 8.6–10.5)
CALCIUM SPEC-SCNC: 9 MG/DL (ref 8.6–10.5)
CALCIUM SPEC-SCNC: 9 MG/DL (ref 8.6–10.5)
CHLORIDE SERPL-SCNC: 107 MMOL/L (ref 98–107)
CHLORIDE SERPL-SCNC: 109 MMOL/L (ref 98–107)
CHLORIDE SERPL-SCNC: 109 MMOL/L (ref 98–107)
CO2 BLDA-SCNC: 19 MMOL/L (ref 24–29)
CO2 BLDA-SCNC: 23 MMOL/L (ref 24–29)
CO2 BLDA-SCNC: 23 MMOL/L (ref 24–29)
CO2 BLDA-SCNC: 23.2 MMOL/L (ref 22–33)
CO2 BLDA-SCNC: 24.8 MMOL/L (ref 22–33)
CO2 BLDA-SCNC: 25 MMOL/L (ref 24–29)
CO2 SERPL-SCNC: 18 MMOL/L (ref 22–29)
CO2 SERPL-SCNC: 21.1 MMOL/L (ref 22–29)
CO2 SERPL-SCNC: 24 MMOL/L (ref 22–29)
COHGB MFR BLD: 0.8 % (ref 0–2)
COHGB MFR BLD: 1 % (ref 0–2)
CREAT SERPL-MCNC: 0.86 MG/DL (ref 0.76–1.27)
CREAT SERPL-MCNC: 0.86 MG/DL (ref 0.76–1.27)
CREAT SERPL-MCNC: 0.89 MG/DL (ref 0.76–1.27)
DEPRECATED RDW RBC AUTO: 44.9 FL (ref 37–54)
DEPRECATED RDW RBC AUTO: 44.9 FL (ref 37–54)
DEPRECATED RDW RBC AUTO: 45.8 FL (ref 37–54)
EGFRCR SERPLBLD CKD-EPI 2021: 89.9 ML/MIN/1.73
EGFRCR SERPLBLD CKD-EPI 2021: 90.9 ML/MIN/1.73
EGFRCR SERPLBLD CKD-EPI 2021: 90.9 ML/MIN/1.73
EOSINOPHIL # BLD AUTO: 0.4 10*3/MM3 (ref 0–0.4)
EOSINOPHIL NFR BLD AUTO: 4.9 % (ref 0.3–6.2)
EPAP: 0
EPAP: 0
ERYTHROCYTE [DISTWIDTH] IN BLOOD BY AUTOMATED COUNT: 12.4 % (ref 12.3–15.4)
ERYTHROCYTE [DISTWIDTH] IN BLOOD BY AUTOMATED COUNT: 12.6 % (ref 12.3–15.4)
ERYTHROCYTE [DISTWIDTH] IN BLOOD BY AUTOMATED COUNT: 12.7 % (ref 12.3–15.4)
GLOBULIN UR ELPH-MCNC: 2.5 GM/DL
GLUCOSE BLDC GLUCOMTR-MCNC: 101 MG/DL (ref 70–130)
GLUCOSE BLDC GLUCOMTR-MCNC: 103 MG/DL (ref 70–130)
GLUCOSE BLDC GLUCOMTR-MCNC: 104 MG/DL (ref 70–130)
GLUCOSE BLDC GLUCOMTR-MCNC: 107 MG/DL (ref 70–130)
GLUCOSE BLDC GLUCOMTR-MCNC: 111 MG/DL (ref 70–130)
GLUCOSE BLDC GLUCOMTR-MCNC: 111 MG/DL (ref 70–130)
GLUCOSE BLDC GLUCOMTR-MCNC: 113 MG/DL (ref 70–130)
GLUCOSE BLDC GLUCOMTR-MCNC: 116 MG/DL (ref 70–130)
GLUCOSE BLDC GLUCOMTR-MCNC: 117 MG/DL (ref 70–130)
GLUCOSE BLDC GLUCOMTR-MCNC: 118 MG/DL (ref 70–130)
GLUCOSE BLDC GLUCOMTR-MCNC: 120 MG/DL (ref 70–130)
GLUCOSE BLDC GLUCOMTR-MCNC: 132 MG/DL (ref 70–130)
GLUCOSE BLDC GLUCOMTR-MCNC: 141 MG/DL (ref 70–130)
GLUCOSE BLDC GLUCOMTR-MCNC: 148 MG/DL (ref 70–130)
GLUCOSE BLDC GLUCOMTR-MCNC: 94 MG/DL (ref 70–130)
GLUCOSE SERPL-MCNC: 100 MG/DL (ref 65–99)
GLUCOSE SERPL-MCNC: 133 MG/DL (ref 65–99)
GLUCOSE SERPL-MCNC: 162 MG/DL (ref 65–99)
HCO3 BLDA-SCNC: 18.4 MMOL/L (ref 22–26)
HCO3 BLDA-SCNC: 21.4 MMOL/L (ref 22–26)
HCO3 BLDA-SCNC: 22.1 MMOL/L (ref 22–26)
HCO3 BLDA-SCNC: 22.3 MMOL/L (ref 20–26)
HCO3 BLDA-SCNC: 23.5 MMOL/L (ref 20–26)
HCO3 BLDA-SCNC: 23.6 MMOL/L (ref 22–26)
HCO3 BLDA-SCNC: 23.6 MMOL/L (ref 22–26)
HCO3 BLDA-SCNC: 23.9 MMOL/L (ref 22–26)
HCO3 BLDA-SCNC: 23.9 MMOL/L (ref 22–26)
HCO3 BLDA-SCNC: 24 MMOL/L (ref 22–26)
HCT VFR BLD AUTO: 35.6 % (ref 37.5–51)
HCT VFR BLD AUTO: 39.9 % (ref 37.5–51)
HCT VFR BLD AUTO: 45.9 % (ref 37.5–51)
HCT VFR BLD CALC: 37.7 % (ref 38–51)
HCT VFR BLD CALC: 44.4 % (ref 38–51)
HCT VFR BLDA CALC: 30 % (ref 38–51)
HCT VFR BLDA CALC: 35 % (ref 38–51)
HCT VFR BLDA CALC: 35 % (ref 38–51)
HCT VFR BLDA CALC: 36 % (ref 38–51)
HCT VFR BLDA CALC: 41 % (ref 38–51)
HCT VFR BLDA CALC: 47 % (ref 38–51)
HGB BLD-MCNC: 12.2 G/DL (ref 13–17.7)
HGB BLD-MCNC: 13.6 G/DL (ref 13–17.7)
HGB BLD-MCNC: 15.4 G/DL (ref 13–17.7)
HGB BLDA-MCNC: 10.2 G/DL (ref 12–17)
HGB BLDA-MCNC: 11.9 G/DL (ref 12–17)
HGB BLDA-MCNC: 11.9 G/DL (ref 12–17)
HGB BLDA-MCNC: 12.2 G/DL (ref 12–17)
HGB BLDA-MCNC: 12.3 G/DL (ref 13.5–17.5)
HGB BLDA-MCNC: 13.9 G/DL (ref 12–17)
HGB BLDA-MCNC: 14.5 G/DL (ref 13.5–17.5)
HGB BLDA-MCNC: 16 G/DL (ref 12–17)
IMM GRANULOCYTES # BLD AUTO: 0.03 10*3/MM3 (ref 0–0.05)
IMM GRANULOCYTES NFR BLD AUTO: 0.4 % (ref 0–0.5)
INHALED O2 CONCENTRATION: 100 %
INHALED O2 CONCENTRATION: 40 %
INR PPP: 1.05 (ref 0.89–1.12)
INR PPP: 1.45 (ref 0.89–1.12)
IPAP: 0
IPAP: 0
IVRT: 134 MS
LEFT ATRIUM VOLUME INDEX: 19.7 ML/M2
LV EF BIPLANE MOD: 53.3 %
LYMPHOCYTES # BLD AUTO: 3.09 10*3/MM3 (ref 0.7–3.1)
LYMPHOCYTES NFR BLD AUTO: 38.1 % (ref 19.6–45.3)
MAGNESIUM SERPL-MCNC: 1.9 MG/DL (ref 1.6–2.4)
MAGNESIUM SERPL-MCNC: 2 MG/DL (ref 1.6–2.4)
MAGNESIUM SERPL-MCNC: 2 MG/DL (ref 1.6–2.4)
MCH RBC QN AUTO: 32.7 PG (ref 26.6–33)
MCH RBC QN AUTO: 32.9 PG (ref 26.6–33)
MCH RBC QN AUTO: 33.3 PG (ref 26.6–33)
MCHC RBC AUTO-ENTMCNC: 33.6 G/DL (ref 31.5–35.7)
MCHC RBC AUTO-ENTMCNC: 34.1 G/DL (ref 31.5–35.7)
MCHC RBC AUTO-ENTMCNC: 34.3 G/DL (ref 31.5–35.7)
MCV RBC AUTO: 96.4 FL (ref 79–97)
MCV RBC AUTO: 97.3 FL (ref 79–97)
MCV RBC AUTO: 97.5 FL (ref 79–97)
METHGB BLD QL: 0.3 % (ref 0–1.5)
METHGB BLD QL: 0.4 % (ref 0–1.5)
MODALITY: ABNORMAL
MODALITY: ABNORMAL
MONOCYTES # BLD AUTO: 0.78 10*3/MM3 (ref 0.1–0.9)
MONOCYTES NFR BLD AUTO: 9.6 % (ref 5–12)
NEUTROPHILS NFR BLD AUTO: 3.78 10*3/MM3 (ref 1.7–7)
NEUTROPHILS NFR BLD AUTO: 46.6 % (ref 42.7–76)
NRBC BLD AUTO-RTO: 0 /100 WBC (ref 0–0.2)
OXYHGB MFR BLDV: 95.4 % (ref 94–99)
OXYHGB MFR BLDV: ABNORMAL %
PA ADP PRP-ACNC: 159 PRU
PAW @ PEAK INSP FLOW SETTING VENT: 0 CMH2O
PAW @ PEAK INSP FLOW SETTING VENT: 0 CMH2O
PCO2 BLDA: 28.2 MM HG (ref 35–45)
PCO2 BLDA: 31.2 MM HG (ref 35–45)
PCO2 BLDA: 34.8 MM HG (ref 35–45)
PCO2 BLDA: 36.5 MM HG (ref 35–45)
PCO2 BLDA: 37 MM HG (ref 35–45)
PCO2 BLDA: 37.4 MM HG (ref 35–45)
PCO2 BLDA: 37.7 MM HG (ref 35–45)
PCO2 BLDA: 38.6 MM HG (ref 35–45)
PCO2 BLDA: 40 MM HG (ref 35–45)
PCO2 BLDA: 43.4 MM HG (ref 35–45)
PCO2 TEMP ADJ BLD: 28.2 MM HG (ref 35–48)
PCO2 TEMP ADJ BLD: 43.4 MM HG (ref 35–48)
PEEP RESPIRATORY: 5 CM[H2O]
PEEP RESPIRATORY: 5 CM[H2O]
PH BLDA: 7.34 PH UNITS (ref 7.35–7.45)
PH BLDA: 7.38 PH UNITS (ref 7.35–7.6)
PH BLDA: 7.39 PH UNITS (ref 7.35–7.6)
PH BLDA: 7.4 PH UNITS (ref 7.35–7.6)
PH BLDA: 7.4 PH UNITS (ref 7.35–7.6)
PH BLDA: 7.41 PH UNITS (ref 7.35–7.6)
PH BLDA: 7.45 PH UNITS (ref 7.35–7.6)
PH BLDA: 7.51 PH UNITS (ref 7.35–7.45)
PH, TEMP CORRECTED: 7.34 PH UNITS
PH, TEMP CORRECTED: 7.51 PH UNITS
PHOSPHATE SERPL-MCNC: 2.8 MG/DL (ref 2.5–4.5)
PHOSPHATE SERPL-MCNC: 3.4 MG/DL (ref 2.5–4.5)
PLATELET # BLD AUTO: 186 10*3/MM3 (ref 140–450)
PLATELET # BLD AUTO: 196 10*3/MM3 (ref 140–450)
PLATELET # BLD AUTO: 286 10*3/MM3 (ref 140–450)
PMV BLD AUTO: 9.5 FL (ref 6–12)
PMV BLD AUTO: 9.6 FL (ref 6–12)
PMV BLD AUTO: 9.6 FL (ref 6–12)
PO2 BLDA: 234 MM HG (ref 83–108)
PO2 BLDA: 234 MMHG (ref 80–105)
PO2 BLDA: 328 MMHG (ref 80–105)
PO2 BLDA: 395 MMHG (ref 80–105)
PO2 BLDA: 400 MMHG (ref 80–105)
PO2 BLDA: 413 MMHG (ref 80–105)
PO2 BLDA: 429 MMHG (ref 80–105)
PO2 BLDA: 49 MMHG (ref 80–105)
PO2 BLDA: 72 MMHG (ref 80–105)
PO2 BLDA: 89 MM HG (ref 83–108)
PO2 TEMP ADJ BLD: 234 MM HG (ref 83–108)
PO2 TEMP ADJ BLD: 89 MM HG (ref 83–108)
POTASSIUM BLDA-SCNC: 3.7 MMOL/L (ref 3.5–4.9)
POTASSIUM BLDA-SCNC: 4 MMOL/L (ref 3.5–4.9)
POTASSIUM BLDA-SCNC: 4.2 MMOL/L (ref 3.5–4.9)
POTASSIUM BLDA-SCNC: 4.9 MMOL/L (ref 3.5–4.9)
POTASSIUM BLDA-SCNC: 5.3 MMOL/L (ref 3.5–4.9)
POTASSIUM BLDA-SCNC: 5.7 MMOL/L (ref 3.5–4.9)
POTASSIUM BLDA-SCNC: 5.8 MMOL/L (ref 3.5–4.9)
POTASSIUM BLDA-SCNC: 6 MMOL/L (ref 3.5–4.9)
POTASSIUM SERPL-SCNC: 4.3 MMOL/L (ref 3.5–5.2)
POTASSIUM SERPL-SCNC: 4.4 MMOL/L (ref 3.5–5.2)
POTASSIUM SERPL-SCNC: 4.4 MMOL/L (ref 3.5–5.2)
PROT SERPL-MCNC: 6.1 G/DL (ref 6–8.5)
PROTHROMBIN TIME: 14.3 SECONDS (ref 12.2–15.3)
PROTHROMBIN TIME: 18.6 SECONDS (ref 12.2–15.3)
PSV: 10 CMH2O
PSV: 10 CMH2O
RBC # BLD AUTO: 3.66 10*6/MM3 (ref 4.14–5.8)
RBC # BLD AUTO: 4.14 10*6/MM3 (ref 4.14–5.8)
RBC # BLD AUTO: 4.71 10*6/MM3 (ref 4.14–5.8)
SAO2 % BLDA: 100 % (ref 95–98)
SAO2 % BLDA: 85 % (ref 95–98)
SAO2 % BLDA: 94 % (ref 95–98)
SODIUM BLD-SCNC: 132 MMOL/L (ref 138–146)
SODIUM BLD-SCNC: 132 MMOL/L (ref 138–146)
SODIUM BLD-SCNC: 134 MMOL/L (ref 138–146)
SODIUM BLD-SCNC: 136 MMOL/L (ref 138–146)
SODIUM BLD-SCNC: 137 MMOL/L (ref 138–146)
SODIUM BLD-SCNC: 139 MMOL/L (ref 138–146)
SODIUM BLD-SCNC: 139 MMOL/L (ref 138–146)
SODIUM BLD-SCNC: 142 MMOL/L (ref 138–146)
SODIUM SERPL-SCNC: 137 MMOL/L (ref 136–145)
SODIUM SERPL-SCNC: 139 MMOL/L (ref 136–145)
SODIUM SERPL-SCNC: 139 MMOL/L (ref 136–145)
TOTAL RATE: 0 BREATHS/MINUTE
TOTAL RATE: 14 BREATHS/MINUTE
VENTILATOR MODE: ABNORMAL
VENTILATOR MODE: ABNORMAL
VT ON VENT VENT: 0.73 ML
WBC NRBC COR # BLD AUTO: 15.77 10*3/MM3 (ref 3.4–10.8)
WBC NRBC COR # BLD AUTO: 18.01 10*3/MM3 (ref 3.4–10.8)
WBC NRBC COR # BLD AUTO: 8.11 10*3/MM3 (ref 3.4–10.8)

## 2025-06-19 PROCEDURE — C1894 INTRO/SHEATH, NON-LASER: HCPCS | Performed by: THORACIC SURGERY (CARDIOTHORACIC VASCULAR SURGERY)

## 2025-06-19 PROCEDURE — 021209W BYPASS CORONARY ARTERY, THREE ARTERIES FROM AORTA WITH AUTOLOGOUS VENOUS TISSUE, OPEN APPROACH: ICD-10-PCS | Performed by: THORACIC SURGERY (CARDIOTHORACIC VASCULAR SURGERY)

## 2025-06-19 PROCEDURE — 82375 ASSAY CARBOXYHB QUANT: CPT

## 2025-06-19 PROCEDURE — 06BQ4ZZ EXCISION OF LEFT SAPHENOUS VEIN, PERCUTANEOUS ENDOSCOPIC APPROACH: ICD-10-PCS | Performed by: THORACIC SURGERY (CARDIOTHORACIC VASCULAR SURGERY)

## 2025-06-19 PROCEDURE — 85610 PROTHROMBIN TIME: CPT | Performed by: PHYSICIAN ASSISTANT

## 2025-06-19 PROCEDURE — 25010000002 HEPARIN (PORCINE) PER 1000 UNITS: Performed by: THORACIC SURGERY (CARDIOTHORACIC VASCULAR SURGERY)

## 2025-06-19 PROCEDURE — 94002 VENT MGMT INPAT INIT DAY: CPT

## 2025-06-19 PROCEDURE — B24BZZ4 ULTRASONOGRAPHY OF HEART WITH AORTA, TRANSESOPHAGEAL: ICD-10-PCS | Performed by: ANESTHESIOLOGY

## 2025-06-19 PROCEDURE — 94799 UNLISTED PULMONARY SVC/PX: CPT

## 2025-06-19 PROCEDURE — 83735 ASSAY OF MAGNESIUM: CPT | Performed by: INTERNAL MEDICINE

## 2025-06-19 PROCEDURE — 84132 ASSAY OF SERUM POTASSIUM: CPT

## 2025-06-19 PROCEDURE — 85730 THROMBOPLASTIN TIME PARTIAL: CPT | Performed by: PHYSICIAN ASSISTANT

## 2025-06-19 PROCEDURE — 93010 ELECTROCARDIOGRAM REPORT: CPT | Performed by: INTERNAL MEDICINE

## 2025-06-19 PROCEDURE — 25010000002 FENTANYL CITRATE (PF) 50 MCG/ML SOLUTION: Performed by: THORACIC SURGERY (CARDIOTHORACIC VASCULAR SURGERY)

## 2025-06-19 PROCEDURE — 83695 ASSAY OF LIPOPROTEIN(A): CPT | Performed by: INTERNAL MEDICINE

## 2025-06-19 PROCEDURE — 25010000002 CEFAZOLIN PER 500 MG: Performed by: PHYSICIAN ASSISTANT

## 2025-06-19 PROCEDURE — 33519 CABG ARTERY-VEIN THREE: CPT | Performed by: PHYSICIAN ASSISTANT

## 2025-06-19 PROCEDURE — 02100Z9 BYPASS CORONARY ARTERY, ONE ARTERY FROM LEFT INTERNAL MAMMARY, OPEN APPROACH: ICD-10-PCS | Performed by: THORACIC SURGERY (CARDIOTHORACIC VASCULAR SURGERY)

## 2025-06-19 PROCEDURE — 25810000003 LACTATED RINGERS PER 1000 ML: Performed by: ANESTHESIOLOGY

## 2025-06-19 PROCEDURE — 94761 N-INVAS EAR/PLS OXIMETRY MLT: CPT

## 2025-06-19 PROCEDURE — 99232 SBSQ HOSP IP/OBS MODERATE 35: CPT

## 2025-06-19 PROCEDURE — 82803 BLOOD GASES ANY COMBINATION: CPT

## 2025-06-19 PROCEDURE — 25010000002 HEPARIN (PORCINE) PER 1000 UNITS: Performed by: ANESTHESIOLOGY

## 2025-06-19 PROCEDURE — 85014 HEMATOCRIT: CPT

## 2025-06-19 PROCEDURE — 82330 ASSAY OF CALCIUM: CPT

## 2025-06-19 PROCEDURE — A4648 IMPLANTABLE TISSUE MARKER: HCPCS | Performed by: THORACIC SURGERY (CARDIOTHORACIC VASCULAR SURGERY)

## 2025-06-19 PROCEDURE — 84100 ASSAY OF PHOSPHORUS: CPT | Performed by: PHYSICIAN ASSISTANT

## 2025-06-19 PROCEDURE — 25010000002 MIDAZOLAM PER 1 MG: Performed by: ANESTHESIOLOGY

## 2025-06-19 PROCEDURE — 25810000003 SODIUM CHLORIDE 0.9 % SOLUTION: Performed by: ANESTHESIOLOGY

## 2025-06-19 PROCEDURE — C1751 CATH, INF, PER/CENT/MIDLINE: HCPCS | Performed by: ANESTHESIOLOGY

## 2025-06-19 PROCEDURE — 25010000002 PHENYLEPHRINE HCL-NACL 1000-0.9 MCG/10ML-% SOLUTION PREFILLED SYRINGE: Performed by: ANESTHESIOLOGY

## 2025-06-19 PROCEDURE — 33519 CABG ARTERY-VEIN THREE: CPT | Performed by: THORACIC SURGERY (CARDIOTHORACIC VASCULAR SURGERY)

## 2025-06-19 PROCEDURE — 33533 CABG ARTERIAL SINGLE: CPT | Performed by: THORACIC SURGERY (CARDIOTHORACIC VASCULAR SURGERY)

## 2025-06-19 PROCEDURE — 25010000002 MORPHINE PER 10 MG: Performed by: THORACIC SURGERY (CARDIOTHORACIC VASCULAR SURGERY)

## 2025-06-19 PROCEDURE — 93005 ELECTROCARDIOGRAM TRACING: CPT | Performed by: PHYSICIAN ASSISTANT

## 2025-06-19 PROCEDURE — 25010000002 LIDOCAINE PF 1% 1 % SOLUTION: Performed by: ANESTHESIOLOGY

## 2025-06-19 PROCEDURE — 84295 ASSAY OF SERUM SODIUM: CPT

## 2025-06-19 PROCEDURE — 33508 ENDOSCOPIC VEIN HARVEST: CPT | Performed by: THORACIC SURGERY (CARDIOTHORACIC VASCULAR SURGERY)

## 2025-06-19 PROCEDURE — 25010000002 FENTANYL CITRATE (PF) 1000 MCG/20ML SOLUTION: Performed by: ANESTHESIOLOGY

## 2025-06-19 PROCEDURE — 80053 COMPREHEN METABOLIC PANEL: CPT | Performed by: PHYSICIAN ASSISTANT

## 2025-06-19 PROCEDURE — 83735 ASSAY OF MAGNESIUM: CPT | Performed by: PHYSICIAN ASSISTANT

## 2025-06-19 PROCEDURE — 33508 ENDOSCOPIC VEIN HARVEST: CPT | Performed by: PHYSICIAN ASSISTANT

## 2025-06-19 PROCEDURE — 25010000002 PROPOFOL 1000 MG/ML EMULSION: Performed by: ANESTHESIOLOGY

## 2025-06-19 PROCEDURE — 82330 ASSAY OF CALCIUM: CPT | Performed by: PHYSICIAN ASSISTANT

## 2025-06-19 PROCEDURE — 25010000002 PROTAMINE SULFATE PER 10 MG: Performed by: ANESTHESIOLOGY

## 2025-06-19 PROCEDURE — 82805 BLOOD GASES W/O2 SATURATION: CPT

## 2025-06-19 PROCEDURE — 85027 COMPLETE CBC AUTOMATED: CPT | Performed by: PHYSICIAN ASSISTANT

## 2025-06-19 PROCEDURE — 25010000002 ACETAMINOPHEN 10 MG/ML SOLUTION: Performed by: PHYSICIAN ASSISTANT

## 2025-06-19 PROCEDURE — 33533 CABG ARTERIAL SINGLE: CPT | Performed by: PHYSICIAN ASSISTANT

## 2025-06-19 PROCEDURE — 25810000003 SODIUM CHLORIDE PER 500 ML: Performed by: THORACIC SURGERY (CARDIOTHORACIC VASCULAR SURGERY)

## 2025-06-19 PROCEDURE — 71045 X-RAY EXAM CHEST 1 VIEW: CPT

## 2025-06-19 PROCEDURE — 25010000002 ACETAMINOPHEN 10 MG/ML SOLUTION: Performed by: ANESTHESIOLOGY

## 2025-06-19 PROCEDURE — 25810000003 DEXTROSE 5 % WITH KCL 20 MEQ 20 MEQ/L SOLUTION: Performed by: PHYSICIAN ASSISTANT

## 2025-06-19 PROCEDURE — 85347 COAGULATION TIME ACTIVATED: CPT

## 2025-06-19 PROCEDURE — 25010000002 NICARDIPINE 2.5 MG/ML SOLUTION: Performed by: ANESTHESIOLOGY

## 2025-06-19 PROCEDURE — 25010000002 NITROGLYCERIN 200 MCG/ML SOLUTION: Performed by: ANESTHESIOLOGY

## 2025-06-19 PROCEDURE — 82947 ASSAY GLUCOSE BLOOD QUANT: CPT

## 2025-06-19 PROCEDURE — 5A1221Z PERFORMANCE OF CARDIAC OUTPUT, CONTINUOUS: ICD-10-PCS | Performed by: THORACIC SURGERY (CARDIOTHORACIC VASCULAR SURGERY)

## 2025-06-19 PROCEDURE — 25010000002 MAGNESIUM SULFATE 4 GM/100ML SOLUTION: Performed by: THORACIC SURGERY (CARDIOTHORACIC VASCULAR SURGERY)

## 2025-06-19 PROCEDURE — 85025 COMPLETE CBC W/AUTO DIFF WBC: CPT | Performed by: INTERNAL MEDICINE

## 2025-06-19 PROCEDURE — 83050 HGB METHEMOGLOBIN QUAN: CPT

## 2025-06-19 PROCEDURE — 25010000002 PAPAVERINE PER 60 MG: Performed by: THORACIC SURGERY (CARDIOTHORACIC VASCULAR SURGERY)

## 2025-06-19 PROCEDURE — 85576 BLOOD PLATELET AGGREGATION: CPT | Performed by: PHYSICIAN ASSISTANT

## 2025-06-19 PROCEDURE — 25010000002 ALBUMIN HUMAN 5% PER 50 ML: Performed by: PHYSICIAN ASSISTANT

## 2025-06-19 PROCEDURE — P9041 ALBUMIN (HUMAN),5%, 50ML: HCPCS | Performed by: PHYSICIAN ASSISTANT

## 2025-06-19 PROCEDURE — 99291 CRITICAL CARE FIRST HOUR: CPT | Performed by: STUDENT IN AN ORGANIZED HEALTH CARE EDUCATION/TRAINING PROGRAM

## 2025-06-19 PROCEDURE — 25010000002 LIDOCAINE (CARDIAC): Performed by: ANESTHESIOLOGY

## 2025-06-19 DEVICE — DISK-SHAPED STYLE, SILICONE (1 PER STERILE PKG)
Type: IMPLANTABLE DEVICE | Site: HEART | Status: FUNCTIONAL
Brand: SCANLAN® RADIOMARK® GRAFT MARKERS

## 2025-06-19 RX ORDER — PAPAVERINE HYDROCHLORIDE 30 MG/ML
INJECTION INTRAMUSCULAR; INTRAVENOUS AS NEEDED
Status: DISCONTINUED | OUTPATIENT
Start: 2025-06-19 | End: 2025-06-19 | Stop reason: HOSPADM

## 2025-06-19 RX ORDER — FENTANYL CITRATE 50 UG/ML
25 INJECTION, SOLUTION INTRAMUSCULAR; INTRAVENOUS
Refills: 0 | Status: DISCONTINUED | OUTPATIENT
Start: 2025-06-19 | End: 2025-06-21

## 2025-06-19 RX ORDER — DOBUTAMINE HYDROCHLORIDE 100 MG/100ML
2-20 INJECTION INTRAVENOUS CONTINUOUS PRN
Status: DISCONTINUED | OUTPATIENT
Start: 2025-06-19 | End: 2025-06-21

## 2025-06-19 RX ORDER — PROTAMINE SULFATE 10 MG/ML
50 INJECTION, SOLUTION INTRAVENOUS ONCE
Status: DISCONTINUED | OUTPATIENT
Start: 2025-06-19 | End: 2025-06-26 | Stop reason: HOSPADM

## 2025-06-19 RX ORDER — MAGNESIUM HYDROXIDE 1200 MG/15ML
LIQUID ORAL AS NEEDED
Status: DISCONTINUED | OUTPATIENT
Start: 2025-06-19 | End: 2025-06-19 | Stop reason: HOSPADM

## 2025-06-19 RX ORDER — NITROGLYCERIN 0.4 MG/1
0.4 TABLET SUBLINGUAL
Status: DISCONTINUED | OUTPATIENT
Start: 2025-06-19 | End: 2025-06-21

## 2025-06-19 RX ORDER — NOREPINEPHRINE BITARTRATE 0.03 MG/ML
.02-.3 INJECTION, SOLUTION INTRAVENOUS CONTINUOUS PRN
Status: DISCONTINUED | OUTPATIENT
Start: 2025-06-19 | End: 2025-06-21

## 2025-06-19 RX ORDER — EPHEDRINE SULFATE 50 MG/ML
INJECTION INTRAVENOUS AS NEEDED
Status: DISCONTINUED | OUTPATIENT
Start: 2025-06-19 | End: 2025-06-19 | Stop reason: SURG

## 2025-06-19 RX ORDER — NITROGLYCERIN 20 MG/100ML
INJECTION INTRAVENOUS AS NEEDED
Status: DISCONTINUED | OUTPATIENT
Start: 2025-06-19 | End: 2025-06-19 | Stop reason: SURG

## 2025-06-19 RX ORDER — MIDAZOLAM HYDROCHLORIDE 1 MG/ML
INJECTION, SOLUTION INTRAMUSCULAR; INTRAVENOUS AS NEEDED
Status: DISCONTINUED | OUTPATIENT
Start: 2025-06-19 | End: 2025-06-19 | Stop reason: SURG

## 2025-06-19 RX ORDER — FENTANYL CITRATE 0.05 MG/ML
INJECTION, SOLUTION INTRAMUSCULAR; INTRAVENOUS AS NEEDED
Status: DISCONTINUED | OUTPATIENT
Start: 2025-06-19 | End: 2025-06-19 | Stop reason: SURG

## 2025-06-19 RX ORDER — INDOMETHACIN 25 MG/1
CAPSULE ORAL AS NEEDED
Status: DISCONTINUED | OUTPATIENT
Start: 2025-06-19 | End: 2025-06-19 | Stop reason: SURG

## 2025-06-19 RX ORDER — DEXTROSE MONOHYDRATE 25 G/50ML
10-50 INJECTION, SOLUTION INTRAVENOUS
Status: DISCONTINUED | OUTPATIENT
Start: 2025-06-19 | End: 2025-06-20

## 2025-06-19 RX ORDER — PHENYLEPHRINE HCL IN 0.9% NACL 1 MG/10 ML
SYRINGE (ML) INTRAVENOUS AS NEEDED
Status: DISCONTINUED | OUTPATIENT
Start: 2025-06-19 | End: 2025-06-19 | Stop reason: SURG

## 2025-06-19 RX ORDER — NICARDIPINE HYDROCHLORIDE 2.5 MG/ML
INJECTION INTRAVENOUS AS NEEDED
Status: DISCONTINUED | OUTPATIENT
Start: 2025-06-19 | End: 2025-06-19 | Stop reason: SURG

## 2025-06-19 RX ORDER — NICOTINE POLACRILEX 4 MG
15 LOZENGE BUCCAL
Status: DISCONTINUED | OUTPATIENT
Start: 2025-06-19 | End: 2025-06-20

## 2025-06-19 RX ORDER — SODIUM CHLORIDE, SODIUM LACTATE, POTASSIUM CHLORIDE, CALCIUM CHLORIDE 600; 310; 30; 20 MG/100ML; MG/100ML; MG/100ML; MG/100ML
9 INJECTION, SOLUTION INTRAVENOUS CONTINUOUS
Status: DISCONTINUED | OUTPATIENT
Start: 2025-06-20 | End: 2025-06-19

## 2025-06-19 RX ORDER — HYDROCODONE BITARTRATE AND ACETAMINOPHEN 7.5; 325 MG/1; MG/1
1 TABLET ORAL EVERY 4 HOURS PRN
Refills: 0 | Status: DISCONTINUED | OUTPATIENT
Start: 2025-06-19 | End: 2025-06-21

## 2025-06-19 RX ORDER — ACETAMINOPHEN 10 MG/ML
1000 INJECTION, SOLUTION INTRAVENOUS ONCE
Status: COMPLETED | OUTPATIENT
Start: 2025-06-19 | End: 2025-06-19

## 2025-06-19 RX ORDER — ALBUTEROL SULFATE 0.83 MG/ML
2.5 SOLUTION RESPIRATORY (INHALATION) EVERY 4 HOURS PRN
Status: ACTIVE | OUTPATIENT
Start: 2025-06-19 | End: 2025-06-20

## 2025-06-19 RX ORDER — FAMOTIDINE 10 MG/ML
20 INJECTION, SOLUTION INTRAVENOUS ONCE
Status: DISCONTINUED | OUTPATIENT
Start: 2025-06-19 | End: 2025-06-19 | Stop reason: HOSPADM

## 2025-06-19 RX ORDER — INDOMETHACIN 25 MG/1
50 CAPSULE ORAL ONCE AS NEEDED
Status: DISCONTINUED | OUTPATIENT
Start: 2025-06-19 | End: 2025-06-26 | Stop reason: HOSPADM

## 2025-06-19 RX ORDER — SODIUM CHLORIDE 0.9 % (FLUSH) 0.9 %
10 SYRINGE (ML) INJECTION AS NEEDED
Status: DISCONTINUED | OUTPATIENT
Start: 2025-06-19 | End: 2025-06-19 | Stop reason: HOSPADM

## 2025-06-19 RX ORDER — PROTAMINE SULFATE 10 MG/ML
INJECTION, SOLUTION INTRAVENOUS AS NEEDED
Status: DISCONTINUED | OUTPATIENT
Start: 2025-06-19 | End: 2025-06-19 | Stop reason: SURG

## 2025-06-19 RX ORDER — ACETAMINOPHEN 10 MG/ML
INJECTION, SOLUTION INTRAVENOUS AS NEEDED
Status: DISCONTINUED | OUTPATIENT
Start: 2025-06-19 | End: 2025-06-19 | Stop reason: SURG

## 2025-06-19 RX ORDER — ATORVASTATIN CALCIUM 40 MG/1
40 TABLET, FILM COATED ORAL NIGHTLY
Status: DISCONTINUED | OUTPATIENT
Start: 2025-06-19 | End: 2025-06-26 | Stop reason: HOSPADM

## 2025-06-19 RX ORDER — LIDOCAINE HYDROCHLORIDE 10 MG/ML
0.5 INJECTION, SOLUTION EPIDURAL; INFILTRATION; INTRACAUDAL; PERINEURAL ONCE AS NEEDED
Status: COMPLETED | OUTPATIENT
Start: 2025-06-19 | End: 2025-06-19

## 2025-06-19 RX ORDER — GABAPENTIN 100 MG/1
100 CAPSULE ORAL 3 TIMES DAILY
Status: DISCONTINUED | OUTPATIENT
Start: 2025-06-19 | End: 2025-06-26 | Stop reason: HOSPADM

## 2025-06-19 RX ORDER — POTASSIUM CHLORIDE, DEXTROSE MONOHYDRATE 150; 5 MG/100ML; G/100ML
30 INJECTION, SOLUTION INTRAVENOUS CONTINUOUS
Status: ACTIVE | OUTPATIENT
Start: 2025-06-19 | End: 2025-06-20

## 2025-06-19 RX ORDER — SENNOSIDES 8.6 MG
325 CAPSULE ORAL DAILY
Status: DISCONTINUED | OUTPATIENT
Start: 2025-06-20 | End: 2025-06-26 | Stop reason: HOSPADM

## 2025-06-19 RX ORDER — ALBUMIN HUMAN 50 G/1000ML
250 SOLUTION INTRAVENOUS AS NEEDED
Status: DISCONTINUED | OUTPATIENT
Start: 2025-06-19 | End: 2025-06-21

## 2025-06-19 RX ORDER — POTASSIUM CHLORIDE, DEXTROSE MONOHYDRATE 150; 5 MG/100ML; G/100ML
60 INJECTION, SOLUTION INTRAVENOUS CONTINUOUS
Status: ACTIVE | OUTPATIENT
Start: 2025-06-19 | End: 2025-06-20

## 2025-06-19 RX ORDER — IBUPROFEN 600 MG/1
1 TABLET ORAL
Status: DISCONTINUED | OUTPATIENT
Start: 2025-06-19 | End: 2025-06-20

## 2025-06-19 RX ORDER — SODIUM CHLORIDE 0.9 % (FLUSH) 0.9 %
10 SYRINGE (ML) INJECTION EVERY 12 HOURS SCHEDULED
Status: DISCONTINUED | OUTPATIENT
Start: 2025-06-19 | End: 2025-06-19 | Stop reason: HOSPADM

## 2025-06-19 RX ORDER — ASPIRIN 325 MG
325 TABLET ORAL ONCE
Status: COMPLETED | OUTPATIENT
Start: 2025-06-19 | End: 2025-06-19

## 2025-06-19 RX ORDER — DOPAMINE HYDROCHLORIDE 160 MG/100ML
2-20 INJECTION, SOLUTION INTRAVENOUS CONTINUOUS PRN
Status: DISCONTINUED | OUTPATIENT
Start: 2025-06-19 | End: 2025-06-21

## 2025-06-19 RX ORDER — HEPARIN SODIUM 1000 [USP'U]/ML
INJECTION, SOLUTION INTRAVENOUS; SUBCUTANEOUS AS NEEDED
Status: DISCONTINUED | OUTPATIENT
Start: 2025-06-19 | End: 2025-06-19 | Stop reason: SURG

## 2025-06-19 RX ORDER — SODIUM CHLORIDE 9 MG/ML
INJECTION, SOLUTION INTRAVENOUS CONTINUOUS PRN
Status: DISCONTINUED | OUTPATIENT
Start: 2025-06-19 | End: 2025-06-19 | Stop reason: SURG

## 2025-06-19 RX ORDER — MIDAZOLAM HYDROCHLORIDE 1 MG/ML
0.5 INJECTION, SOLUTION INTRAMUSCULAR; INTRAVENOUS
Status: DISCONTINUED | OUTPATIENT
Start: 2025-06-19 | End: 2025-06-19 | Stop reason: HOSPADM

## 2025-06-19 RX ORDER — TRANEXAMIC ACID 100 MG/ML
INJECTION, SOLUTION INTRAVENOUS AS NEEDED
Status: DISCONTINUED | OUTPATIENT
Start: 2025-06-19 | End: 2025-06-19 | Stop reason: SURG

## 2025-06-19 RX ORDER — MAGNESIUM SULFATE HEPTAHYDRATE 40 MG/ML
4 INJECTION, SOLUTION INTRAVENOUS ONCE
Status: COMPLETED | OUTPATIENT
Start: 2025-06-19 | End: 2025-06-19

## 2025-06-19 RX ORDER — ROCURONIUM BROMIDE 10 MG/ML
INJECTION, SOLUTION INTRAVENOUS AS NEEDED
Status: DISCONTINUED | OUTPATIENT
Start: 2025-06-19 | End: 2025-06-19 | Stop reason: SURG

## 2025-06-19 RX ORDER — CALCIUM CHLORIDE 100 MG/ML
INJECTION INTRAVENOUS; INTRAVENTRICULAR AS NEEDED
Status: DISCONTINUED | OUTPATIENT
Start: 2025-06-19 | End: 2025-06-19 | Stop reason: SURG

## 2025-06-19 RX ORDER — AMOXICILLIN 250 MG
2 CAPSULE ORAL 2 TIMES DAILY
Status: DISCONTINUED | OUTPATIENT
Start: 2025-06-19 | End: 2025-06-26 | Stop reason: HOSPADM

## 2025-06-19 RX ORDER — DEXMEDETOMIDINE HYDROCHLORIDE 4 UG/ML
.2-1.5 INJECTION, SOLUTION INTRAVENOUS CONTINUOUS PRN
Status: DISCONTINUED | OUTPATIENT
Start: 2025-06-19 | End: 2025-06-21

## 2025-06-19 RX ORDER — NALOXONE HYDROCHLORIDE 0.4 MG/ML
0.2 INJECTION, SOLUTION INTRAMUSCULAR; INTRAVENOUS; SUBCUTANEOUS AS NEEDED
Status: DISCONTINUED | OUTPATIENT
Start: 2025-06-19 | End: 2025-06-26 | Stop reason: HOSPADM

## 2025-06-19 RX ORDER — NITROGLYCERIN 20 MG/100ML
5-200 INJECTION INTRAVENOUS
Status: ACTIVE | OUTPATIENT
Start: 2025-06-19 | End: 2025-06-20

## 2025-06-19 RX ORDER — OXYCODONE HYDROCHLORIDE 10 MG/1
10 TABLET ORAL EVERY 4 HOURS PRN
Refills: 0 | Status: DISPENSED | OUTPATIENT
Start: 2025-06-19 | End: 2025-06-24

## 2025-06-19 RX ORDER — ONDANSETRON 2 MG/ML
4 INJECTION INTRAMUSCULAR; INTRAVENOUS EVERY 6 HOURS PRN
Status: DISCONTINUED | OUTPATIENT
Start: 2025-06-19 | End: 2025-06-26 | Stop reason: HOSPADM

## 2025-06-19 RX ORDER — ACETAMINOPHEN 500 MG
1000 TABLET ORAL EVERY 8 HOURS
Status: DISCONTINUED | OUTPATIENT
Start: 2025-06-20 | End: 2025-06-26 | Stop reason: HOSPADM

## 2025-06-19 RX ORDER — FAMOTIDINE 20 MG/1
20 TABLET, FILM COATED ORAL ONCE
Status: COMPLETED | OUTPATIENT
Start: 2025-06-19 | End: 2025-06-19

## 2025-06-19 RX ORDER — ETOMIDATE 2 MG/ML
INJECTION INTRAVENOUS AS NEEDED
Status: DISCONTINUED | OUTPATIENT
Start: 2025-06-19 | End: 2025-06-19 | Stop reason: SURG

## 2025-06-19 RX ORDER — ACETAMINOPHEN 500 MG
1000 TABLET ORAL EVERY 8 HOURS
Status: DISCONTINUED | OUTPATIENT
Start: 2025-06-19 | End: 2025-06-19

## 2025-06-19 RX ORDER — SODIUM CHLORIDE 9 MG/ML
INJECTION, SOLUTION INTRAVENOUS AS NEEDED
Status: DISCONTINUED | OUTPATIENT
Start: 2025-06-19 | End: 2025-06-19 | Stop reason: HOSPADM

## 2025-06-19 RX ORDER — MORPHINE SULFATE 2 MG/ML
2 INJECTION, SOLUTION INTRAMUSCULAR; INTRAVENOUS
Status: DISCONTINUED | OUTPATIENT
Start: 2025-06-19 | End: 2025-06-21

## 2025-06-19 RX ORDER — METOPROLOL TARTRATE 1 MG/ML
2.5 INJECTION, SOLUTION INTRAVENOUS EVERY 6 HOURS SCHEDULED
Status: ACTIVE | OUTPATIENT
Start: 2025-06-19 | End: 2025-06-20

## 2025-06-19 RX ADMIN — EPHEDRINE SULFATE 5 MG: 50 INJECTION INTRAVENOUS at 11:14

## 2025-06-19 RX ADMIN — MIDAZOLAM 2 MG: 1 INJECTION INTRAMUSCULAR; INTRAVENOUS at 08:47

## 2025-06-19 RX ADMIN — FENTANYL CITRATE 250 MCG: 0.05 INJECTION, SOLUTION INTRAMUSCULAR; INTRAVENOUS at 08:47

## 2025-06-19 RX ADMIN — CHLORHEXIDINE GLUCONATE ORAL RINSE 15 ML: 1.2 SOLUTION DENTAL at 05:19

## 2025-06-19 RX ADMIN — ATORVASTATIN CALCIUM 40 MG: 40 TABLET, FILM COATED ORAL at 20:32

## 2025-06-19 RX ADMIN — NITROGLYCERIN 60 MCG/MIN: 20 INJECTION INTRAVENOUS at 08:01

## 2025-06-19 RX ADMIN — DEXMEDETOMIDINE HYDROCHLORIDE 0.5 MCG/KG/HR: 4 INJECTION, SOLUTION INTRAVENOUS at 15:14

## 2025-06-19 RX ADMIN — GABAPENTIN 100 MG: 100 CAPSULE ORAL at 20:32

## 2025-06-19 RX ADMIN — FENTANYL CITRATE 250 MCG: 0.05 INJECTION, SOLUTION INTRAMUSCULAR; INTRAVENOUS at 07:00

## 2025-06-19 RX ADMIN — Medication 100 MCG: at 11:26

## 2025-06-19 RX ADMIN — SODIUM CHLORIDE 2000 MG: 900 INJECTION INTRAVENOUS at 11:20

## 2025-06-19 RX ADMIN — MORPHINE SULFATE 2 MG: 2 INJECTION, SOLUTION INTRAMUSCULAR; INTRAVENOUS at 14:52

## 2025-06-19 RX ADMIN — HYDROCODONE BITARTRATE AND ACETAMINOPHEN 1 TABLET: 7.5; 325 TABLET ORAL at 17:03

## 2025-06-19 RX ADMIN — POTASSIUM CHLORIDE AND DEXTROSE MONOHYDRATE 30 ML/HR: 150; 5 INJECTION, SOLUTION INTRAVENOUS at 13:35

## 2025-06-19 RX ADMIN — ACETAMINOPHEN 1000 MG: 10 INJECTION INTRAVENOUS at 18:34

## 2025-06-19 RX ADMIN — OXYCODONE HYDROCHLORIDE 10 MG: 10 TABLET ORAL at 20:32

## 2025-06-19 RX ADMIN — SODIUM CHLORIDE 2 G: 900 INJECTION INTRAVENOUS at 18:02

## 2025-06-19 RX ADMIN — FENTANYL CITRATE 250 MCG: 0.05 INJECTION, SOLUTION INTRAMUSCULAR; INTRAVENOUS at 11:26

## 2025-06-19 RX ADMIN — NICARDIPINE HYDROCHLORIDE 0.2 MG: 25 INJECTION INTRAVENOUS at 08:07

## 2025-06-19 RX ADMIN — NICARDIPINE HYDROCHLORIDE 0.2 MG: 25 INJECTION INTRAVENOUS at 08:05

## 2025-06-19 RX ADMIN — ALBUMIN (HUMAN) 250 ML: 12.5 INJECTION, SOLUTION INTRAVENOUS at 13:36

## 2025-06-19 RX ADMIN — NITROGLYCERIN 100 MCG: 20 INJECTION INTRAVENOUS at 08:06

## 2025-06-19 RX ADMIN — EPHEDRINE SULFATE 5 MG: 50 INJECTION INTRAVENOUS at 12:16

## 2025-06-19 RX ADMIN — ETOMIDATE 30 MG: 2 INJECTION INTRAVENOUS at 07:00

## 2025-06-19 RX ADMIN — SODIUM CHLORIDE: 9 INJECTION, SOLUTION INTRAVENOUS at 07:15

## 2025-06-19 RX ADMIN — FENTANYL CITRATE 25 MCG: 50 INJECTION, SOLUTION INTRAMUSCULAR; INTRAVENOUS at 15:02

## 2025-06-19 RX ADMIN — ROCURONIUM BROMIDE 50 MG: 10 INJECTION INTRAVENOUS at 08:54

## 2025-06-19 RX ADMIN — MORPHINE SULFATE 2 MG: 2 INJECTION, SOLUTION INTRAMUSCULAR; INTRAVENOUS at 20:55

## 2025-06-19 RX ADMIN — MIDAZOLAM 2 MG: 1 INJECTION INTRAMUSCULAR; INTRAVENOUS at 06:57

## 2025-06-19 RX ADMIN — LIDOCAINE HYDROCHLORIDE 70 MG: 20 INJECTION INTRAVENOUS at 07:00

## 2025-06-19 RX ADMIN — Medication 100 MCG: at 11:44

## 2025-06-19 RX ADMIN — NITROGLYCERIN 100 MCG: 20 INJECTION INTRAVENOUS at 08:00

## 2025-06-19 RX ADMIN — NICARDIPINE HYDROCHLORIDE 0.2 MG: 25 INJECTION INTRAVENOUS at 08:03

## 2025-06-19 RX ADMIN — TRANEXAMIC ACID 1000 MG: 1 INJECTION, SOLUTION INTRAVENOUS at 11:28

## 2025-06-19 RX ADMIN — MUPIROCIN 1 APPLICATION: 20 OINTMENT TOPICAL at 13:35

## 2025-06-19 RX ADMIN — ACETAMINOPHEN 1000 MG: 10 INJECTION INTRAVENOUS at 11:33

## 2025-06-19 RX ADMIN — PROTAMINE SULFATE 450 MG: 10 INJECTION, SOLUTION INTRAVENOUS at 11:19

## 2025-06-19 RX ADMIN — ROCURONIUM BROMIDE 100 MG: 10 INJECTION INTRAVENOUS at 07:00

## 2025-06-19 RX ADMIN — OXYCODONE HYDROCHLORIDE 10 MG: 10 TABLET ORAL at 14:44

## 2025-06-19 RX ADMIN — CALCIUM CHLORIDE 0.5 G: 100 INJECTION INTRAVENOUS; INTRAVENTRICULAR at 11:21

## 2025-06-19 RX ADMIN — NICARDIPINE HYDROCHLORIDE 0.2 MG: 25 INJECTION INTRAVENOUS at 11:30

## 2025-06-19 RX ADMIN — FENTANYL CITRATE 250 MCG: 0.05 INJECTION, SOLUTION INTRAMUSCULAR; INTRAVENOUS at 07:57

## 2025-06-19 RX ADMIN — EPHEDRINE SULFATE 10 MG: 50 INJECTION INTRAVENOUS at 07:04

## 2025-06-19 RX ADMIN — EPHEDRINE SULFATE 5 MG: 50 INJECTION INTRAVENOUS at 07:12

## 2025-06-19 RX ADMIN — SODIUM BICARBONATE 50 MEQ: 84 INJECTION INTRAVENOUS at 11:39

## 2025-06-19 RX ADMIN — NICARDIPINE HYDROCHLORIDE 0.2 MG: 25 INJECTION INTRAVENOUS at 11:24

## 2025-06-19 RX ADMIN — GABAPENTIN 100 MG: 100 CAPSULE ORAL at 15:32

## 2025-06-19 RX ADMIN — ALBUMIN (HUMAN) 250 ML: 12.5 INJECTION, SOLUTION INTRAVENOUS at 14:01

## 2025-06-19 RX ADMIN — Medication 100 MCG: at 12:30

## 2025-06-19 RX ADMIN — LIDOCAINE HYDROCHLORIDE 0.5 ML: 10 INJECTION, SOLUTION EPIDURAL; INFILTRATION; INTRACAUDAL; PERINEURAL at 06:26

## 2025-06-19 RX ADMIN — TRANEXAMIC ACID 1000 MG: 1 INJECTION, SOLUTION INTRAVENOUS at 07:37

## 2025-06-19 RX ADMIN — MORPHINE SULFATE 2 MG: 2 INJECTION, SOLUTION INTRAMUSCULAR; INTRAVENOUS at 17:03

## 2025-06-19 RX ADMIN — ASPIRIN 325 MG: 325 TABLET ORAL at 14:14

## 2025-06-19 RX ADMIN — MUPIROCIN 1 APPLICATION: 20 OINTMENT TOPICAL at 22:05

## 2025-06-19 RX ADMIN — FAMOTIDINE 20 MG: 20 TABLET, FILM COATED ORAL at 06:25

## 2025-06-19 RX ADMIN — NITROGLYCERIN 100 MCG: 20 INJECTION INTRAVENOUS at 08:44

## 2025-06-19 RX ADMIN — PROPOFOL 25 MCG/KG/MIN: 10 INJECTION, EMULSION INTRAVENOUS at 11:41

## 2025-06-19 RX ADMIN — SODIUM CHLORIDE, SODIUM LACTATE, POTASSIUM CHLORIDE, CALCIUM CHLORIDE 9 ML/HR: 600; 310; 30; 20 INJECTION, SOLUTION INTRAVENOUS at 06:26

## 2025-06-19 RX ADMIN — MAGNESIUM SULFATE IN WATER FOR 4 G: 40 INJECTION INTRAVENOUS at 18:05

## 2025-06-19 RX ADMIN — DOCUSATE SODIUM 50 MG AND SENNOSIDES 8.6 MG 2 TABLET: 8.6; 5 TABLET, FILM COATED ORAL at 20:31

## 2025-06-19 RX ADMIN — MUPIROCIN 1 APPLICATION: 20 OINTMENT TOPICAL at 05:19

## 2025-06-19 RX ADMIN — MIDAZOLAM 1 MG: 1 INJECTION INTRAMUSCULAR; INTRAVENOUS at 11:27

## 2025-06-19 RX ADMIN — HEPARIN SODIUM 36000 UNITS: 1000 INJECTION INTRAVENOUS; SUBCUTANEOUS at 08:54

## 2025-06-19 RX ADMIN — NITROGLYCERIN 200 MCG: 20 INJECTION INTRAVENOUS at 08:03

## 2025-06-19 RX ADMIN — ALBUMIN (HUMAN) 250 ML: 12.5 INJECTION, SOLUTION INTRAVENOUS at 16:04

## 2025-06-19 RX ADMIN — SODIUM CHLORIDE 2000 MG: 900 INJECTION INTRAVENOUS at 07:20

## 2025-06-19 NOTE — ANESTHESIA PROCEDURE NOTES
Airway  Date/Time: 6/19/2025 7:02 AM  Airway not difficult    General Information and Staff    Patient location during procedure: OR  Anesthesiologist: Pratibha Joseph MD    Indications and Patient Condition  Indications for airway management: airway protection    Preoxygenated: yes    Mask difficulty assessment: 1 - vent by mask    Final Airway Details    Final airway type: endotracheal airway      Successful airway: ETT  Cuffed: yes   Successful intubation technique: video laryngoscopy  Adjuncts used in placement: cricoid pressure, anterior pressure/BURP and Bougie  Endotracheal tube insertion site: oral  Blade: Patel  Blade size: 4  ETT size (mm): 7.5  Cormack-Lehane Classification: grade IIa - partial view of glottis  Placement verified by: chest auscultation and capnometry   Cuff volume (mL): 9  Measured from: teeth  ETT/EBT  to teeth (cm): 22  Number of attempts at approach: 1  Assessment: lips, teeth, and gum same as pre-op and atraumatic intubation

## 2025-06-19 NOTE — BRIEF OP NOTE
CORONARY ARTERY BYPASS GRAFTING, TRANSESOPHAGEAL ECHOCARDIOGRAM WITH ANESTHESIA, ENDOSCOPIC VEIN HARVEST  Progress Note    Jamari Munoz  6/19/2025    Pre-op Diagnosis:   Coronary artery disease involving native coronary artery of native heart with unstable angina pectoris [I25.110]  Abnormal findings on diagnostic imaging of heart and coronary circulation [R93.1]       Post-Op Diagnosis Codes:     * Coronary artery disease involving native coronary artery of native heart with unstable angina pectoris [I25.110]     * Abnormal findings on diagnostic imaging of heart and coronary circulation [R93.1]    Procedure(s):    Procedure(s):  MEDIAN STERNOTOMY, CORONARY ARTERY BYPASS GRAFTING X4 UTILIZING THE LEFT INTERNAL MAMMARY ARTERY GRAFT  TRANSESOPHAGEAL ECHOCARDIOGRAM WITH ANESTHESIA  ENDOSCOPIC VEIN HARVEST OF THE GREATER LEFT SAPHENOUS VEIN    Surgeon(s):  Keshawn Ng MD    Anesthesia: General    Staff:   Circulator: Leila Miller RN; Asya Rubio RN  Perfusionist: Jeremy Rodas  Scrub Person: Patricia Duggan; Shana Hayes; Estuardo Doran  Nursing Assistant: Laurel Thurman Allie  Assistant: Poornima Palumbo PA-C  Assistant: Poornima Palumbo PA-C    Estimated Blood Loss: 200ml    Urine Voided: 575 mL    Specimens:                None      Drains:   Chest Tube 3 Anterior Mediastinal (Active)       Urethral Catheter Silicone;Temperature probe 16 Fr. (Active)       Y Chest Tube 1 and 2 1 Right Pleural 28 Fr. 2 Left Pleural 28 Fr. (Active)       [REMOVED] Y Chest Tube 1, 2, and 3 1 Mediastinal 19 Fr. 2 Mediastinal 19 Fr. 3 Mediastinal 19 Fr. (Removed)       Findings: LIMA-->LAD, GSV-->PDA, OM1, D1.  Ramus less than 1 mm and too small for grafting.  Diffuse lipid laden disease in the coronaries    Complications: None    Assistant: Poornima Palumbo PA-C  was responsible for performing the following activities: Retraction, Suction, Closing, Placing Dressing, and  Harvesting of Vessels and their skilled assistance was necessary for the success of this case.    Keshawn Ng MD     Date: 6/19/2025  Time: 12:16 EDT

## 2025-06-19 NOTE — ANESTHESIA POSTPROCEDURE EVALUATION
Patient: Jamari Munoz    Procedure Summary       Date: 06/19/25 Room / Location:  [facility]  [facility]  [facility]    Anesthesia Start: 0655 Anesthesia Stop: 1248    Procedures:       MEDIAN STERNOTOMY, CORONARY ARTERY BYPASS GRAFTING X4 UTILIZING THE LEFT INTERNAL MAMMARY ARTERY GRAFT (Chest)      TRANSESOPHAGEAL ECHOCARDIOGRAM WITH ANESTHESIA (Chest)      ENDOSCOPIC VEIN HARVEST OF THE GREATER LEFT SAPHENOUS VEIN (Left: Leg Lower) Diagnosis:       Coronary artery disease involving native coronary artery of native heart with unstable angina pectoris      Abnormal findings on diagnostic imaging of heart and coronary circulation      (Coronary artery disease involving native coronary artery of native heart with unstable angina pectoris [I25.110])      (Abnormal findings on diagnostic imaging of heart and coronary circulation [R93.1])    Surgeons: Keshawn Ng MD Provider: Pratibha Joseph MD    Anesthesia Type: general, McGrath, CVL ASA Status: 4            Anesthesia Type: general, McGrath, CVL    Vitals  Vitals Value Taken Time   BP     Temp     Pulse 71 06/19/25 12:47   Resp     SpO2 94 % 06/19/25 12:47   Vitals shown include unfiled device data.        Post Anesthesia Care and Evaluation    Patient location during evaluation: ICU  Patient participation: complete - patient cannot participate (intubated and sedated)  Post-procedure mental status: sedated.  Pain score: 0    Airway patency: patent  Anesthetic complications: No anesthetic complications  PONV Status: none  Cardiovascular status: acceptable and hemodynamically stable  Respiratory status: acceptable, ETT, intubated and ventilator  Hydration status: acceptable    Comments: No apparent anesthesia complications noted

## 2025-06-19 NOTE — H&P
CRITICAL CARE ADMISSION NOTE     Patient's name Jamari Munoz MRN: [id number] : 1950-74 y.o.-male  Admission date 2025  Length of stay 1    REASON FOR CONSULTATION / MAIN COMPLAINT  No chief complaint on file.     HISTORY OF MAIN COMPLAINT    This is a 74-year-old woman with past medical history of coronary disease, hyperlipidemia, hypertension who presents to the hospital for CABG.  Carotid duplex performed the patient in 1825 showed less than 50% bilateral internal carotid artery stenosis and antegrade vertebral flow.  Cardiac catheterization done 2025 showed severe left main stenosis of greater than 80%, 80% proximal to mid LAD stenosis 70% distal LAD stenosis, 70% first diagonal branch stenosis, 80% proximal ramus stenosis 85% distal RCA stenosis.  Patient had a CABG x 4 and presents to us postoperatively.    PAST MEDICAL HISTORY:  Past Medical History:   Diagnosis Date    Arthritis     Asthma 2025    when riding bike    Atherosclerosis of native coronary artery of native heart with stable angina pectoris 2025    Ganglion     Head injury 1967    several concussions from football and biking    Hyperlipidemia     Hypertension     Migraine     one now every month or so with Aura       PAST SURGICAL HISTORY:  Past Surgical History:   Procedure Laterality Date    CARDIAC CATHETERIZATION N/A 2025    Procedure: Left Heart Cath - Right radial access;  Surgeon: Kai Daniel IV, MD;  [facility]: [facility] CATH INVAS[facility];  Service: Cardiovascular;  Laterality: N/A;    COLONOSCOPY      CYST REMOVAL      cyst removed left shoulder and left knee    KNEE MENISCAL REPAIR      TONSILLECTOMY AND ADENOIDECTOMY      VASECTOMY         FAMILY HISTORY:  Family History   Problem Relation Age of Onset    Diabetes Mother         mostly controlled    No Known Problems Brother     No Known Problems Brother     No Known Problems Brother     Diabetes Maternal  Grandmother         controlled    Arthritis Paternal Grandmother         not too bad    Osteoarthritis Paternal Grandmother     Dementia Paternal Grandmother         some in mid 80s    Coronary artery disease Paternal Grandfather     Heart attack Paternal Grandfather        SOCIAL HISTORY:  Social History     Tobacco Use    Smoking status: Never     Passive exposure: Past    Smokeless tobacco: Never   Vaping Use    Vaping status: Never Used   Substance Use Topics    Alcohol use: Yes     Alcohol/week: 14.0 standard drinks of alcohol     Types: 7 Glasses of wine, 7 Cans of beer per week     Comment: beer mostly in summer after cycling; liquor is [place]    Drug use: No       ALLERGIES:  No Known Allergies    HOME MEDS INCLUDE  Current Outpatient Medications   Medication Instructions    aspirin 81 mg, Daily    atorvastatin (LIPITOR) 40 MG tablet TAKE 1 TABLET DAILY (DOSE INCREASE)    CALCIUM-MAGNESIUM-ZINC PO 1 tablet, Daily    enalapril (VASOTEC) 5 mg, Oral, Daily    Glucosamine-Chondroitin 750-600 MG tablet 1 tablet, 2 Times Daily    isosorbide mononitrate (ISMO,MONOKET) 20 mg, Oral, 2 Times Daily, At 8 am and 3 pm    valACYclovir (VALTREX) 250 mg, Oral, Daily       SCHEDULED MEDS:  acetaminophen, 1,000 mg, Intravenous, Once  acetaminophen, 1,000 mg, Oral, Q8H  [START ON 6/20/2025] aspirin, 325 mg, Oral, Daily  aspirin, 325 mg, Oral, Once  atorvastatin, 40 mg, Oral, Nightly  atorvastatin, 40 mg, Oral, Nightly  ceFAZolin, 2 g, Intravenous, Q8H  gabapentin, 100 mg, Oral, TID  metoprolol tartrate, 2.5 mg, Intravenous, Q6H  [START ON 6/20/2025] metoprolol tartrate, 12.5 mg, Oral, Q12H  mupirocin, 1 Application, Each Nare, BID  protamine, 50 mg, Intravenous, Once  senna-docusate sodium, 2 tablet, Oral, BID         CONTINUOUS INFUSIONS:  dexmedetomidine, 0.2-1.5 mcg/kg/hr  dextrose 5 % with KCl 20 mEq, 60 mL/hr  dextrose 5 % with KCl 20 mEq, 30 mL/hr  DOBUTamine, 2-20 mcg/kg/min  DOPamine, 2-20 mcg/kg/min  EPINEPHrine,  "0.02-0.3 mcg/kg/min  insulin, 0-100 Units/hr  [START ON 6/20/2025] lactated ringers, 9 mL/hr, Last Rate: 9 mL/hr (06/19/25 0626)  niCARdipine, 5-15 mg/hr  norepinephrine, 0.02-0.3 mcg/kg/min  phenylephrine, 0.5-3 mcg/kg/min  propofol, 5-50 mcg/kg/min  propofol, 5-50 mcg/kg/min         REVIEW OF SYSTEMS:    Constitutional - neg for fever, chills, night sweats, weight loss.  HEENT - neg for facial swelling, nasal congestion, ear discharge, neg for for discharge and redness.  Respiratory - neg for cough and shortness of breath.   Cardiovascular - neg for chest pain.  Gastrointestinal - neg for nausea, vomiting, abdominal pain and diarrhea.  Genitourinary - neg for dysuria and hematuria.  Musculoskeletal - neg for joint swelling, muscle pains.  Cutaneous - neg for rash.  Neurological - neg for syncope, weakness, gait problems.  Hematological - neg bruising/easy bleeding  Psychiatric/Behavioral - neg anxiety    PHYSICAL EXAMINATION:  BP (!) 164/104 (BP Location: Left arm, Patient Position: Lying)   Pulse 71   Temp 97 °F (36.1 °C) (Temporal)   Resp 14   Ht 177.8 cm (70\")   Wt 88.6 kg (195 lb 5.2 oz)   SpO2 94%   BMI 28.03 kg/m²     Physical Exam  Vitals reviewed.   Constitutional:       Appearance: Normal appearance. He is normal weight. He is ill-appearing.      Interventions: He is sedated and intubated.   HENT:      Head: Normocephalic and atraumatic.      Mouth/Throat:      Mouth: Mucous membranes are moist.   Eyes:      Extraocular Movements: Extraocular movements intact.      Conjunctiva/sclera: Conjunctivae normal.      Pupils: Pupils are equal, round, and reactive to light.   Cardiovascular:      Rate and Rhythm: Normal rate and regular rhythm.      Pulses: Normal pulses.   Pulmonary:      Effort: Pulmonary effort is normal. He is intubated.      Breath sounds: Normal breath sounds.   Abdominal:      General: Abdomen is flat. Bowel sounds are normal.      Palpations: Abdomen is soft.   Skin:     General: " Skin is warm and dry.      Comments: Midline incision   Neurological:      General: No focal deficit present.      Mental Status: He is alert. Mental status is at baseline.           DATA REVIEW:  1. Medical chart reviewed in detail  2. I reviewed the actual EKG rhythm strips and Pulse Oximetry data on the monitors  3. I reviewed today's lab values and the report of the most recent Chest X ray.  4. In addition, I have independently reviewed the actual image of the most recent chest Xray    Hematology:  Results from last 7 days   Lab Units 06/19/25  1112 06/19/25  1045 06/19/25  0903 06/19/25  0654 06/19/25  0407 06/18/25  1536 06/16/25  0920   WBC 10*3/mm3  --   --   --   --  8.11 6.91 7.28   HEMOGLOBIN g/dL  --   --   --   --  15.4 15.8 15.5   HEMOGLOBIN, POC g/dL 12.2 11.9* 13.9   < >  --   --   --    HEMATOCRIT %  --   --   --   --  45.9 47.1 45.8   HEMATOCRIT POC % 36* 35* 41   < >  --   --   --    MCV fL  --   --   --   --  97.5* 97.1* 99.3*   PLATELETS 10*3/mm3  --   --   --   --  286 323 324    < > = values in this interval not displayed.     Chemistry:  Estimated Creatinine Clearance: 84.4 mL/min (by C-G formula based on SCr of 0.86 mg/dL).  Results from last 7 days   Lab Units 06/19/25 0407 06/16/25  0920   SODIUM mmol/L 139 138   POTASSIUM mmol/L 4.4 4.3   CHLORIDE mmol/L 107 105   CO2 mmol/L 24.0 24.3   BUN mg/dL 13.9 16.0   CREATININE mg/dL 0.86 0.94   GLUCOSE mg/dL 100* 88     Results from last 7 days   Lab Units 06/19/25 0407 06/18/25  1538 06/16/25  0920   CALCIUM mg/dL 9.0  --  9.6   MAGNESIUM mg/dL 2.0 2.1  --      Hepatic Panel:  Results from last 7 days   Lab Units 06/19/25 0407   ALBUMIN g/dL 3.6   TOTAL PROTEIN g/dL 6.1   BILIRUBIN mg/dL 0.4   AST (SGOT) U/L 24   ALT (SGPT) U/L 23   ALK PHOS U/L 49     Coagulation Labs:  Results from last 7 days   Lab Units 06/19/25  0406   PROTIME Seconds 14.3   INR  1.05   APTT seconds 32.3      Cardiac Labs:      Biomarkers:      U/A  Results from last 7  "days   Lab Units 06/18/25  1445   COLOR UA  Yellow   CLARITY UA  Clear   PH, URINE  7.0   SPECIFIC GRAVITY, URINE  1.041*   GLUCOSE UA  Negative   KETONES UA  Negative   BILIRUBIN UA  Negative   PROTEIN UA  Negative   BLOOD UA  Negative   LEUKOCYTES UA  Negative   NITRITE UA  Negative   UROBILINOGEN UA  0.2 E.U./dL         Arterial Blood Gases:  Results from last 7 days   Lab Units 06/19/25  1112 06/19/25  1045 06/19/25  0903   PH, ARTERIAL pH units 7.39 7.40 7.38     Microbiology:  No results found for: \"BLOODCX\", \"CULTURES\", \"THROATCX\", \"URINECX\", \"STOOLCX\", \"WOUNDCX\"    Images:  XR Chest 1 View  Result Date: 6/18/2025  Impression: Portable chest radiograph with no evidence of active disease. Electronically Signed: Lloyd Eddy MD  6/18/2025 12:49 PM EDT  Workstation ID: [id number]    Cardiac Catheterization/Vascular Study  Addendum Date: 6/18/2025    Severe left main and severe multi-vessel CAD (LAD/diagonal, ramus, RCA)   No left ventriculography performed   Near normal LV filling pressure (LVEDP 13 mmHg)     Addendum Date: 6/18/2025    Severe left main and severe multi-vessel CAD (LAD/diagonal, ramus, RCA)   No left ventriculography performed   Near normal LV filling pressure (LVEDP 13 mmHg)       Echo:  Results for orders placed during the hospital encounter of 06/18/25    Adult Transthoracic Echo Complete W/ Cont if Necessary Per Protocol    Interpretation Summary    Left ventricular systolic function is normal. Left ventricular ejection fraction appears to be 56 - 60%.    Left ventricular diastolic function is consistent with (grade I) impaired relaxation.    The aortic root measures 3.6 cm.        ASSESSMENT & PLAN     Coronary artery disease involving native coronary artery of native heart with unstable angina pectoris    Primary hypertension    Hyperlipidemia LDL goal <70       *Coronary disease status post CABG x 4  *Hyperlipidemia o  *hypertension    Plan:  Admit to the cardiac ICU  Postoperative " care  Amiodarone protocol in case patient develops atrial fibrillation  Aspirin statin therapy  Plan extubate patient today  Wean sedation as tolerated  Maintain SpO2 greater than 90%    Bowel regimen:  Diet: NPO Diet NPO Type: Strict NPO  GI ppx:   DVT PPX: VTE Prophylaxis:  Mechanical VTE prophylaxis orders are present.       Advance Directives: Code Status (Patient has no pulse and is not breathing): CPR (Attempt to Resuscitate)  Medical Interventions (Patient has pulse or is breathing): Full Support       Dispo: ICU    I have spent a total of 47 critical care minutes in the management of this patient, apart from any time taken to perform necessary procedures. Critical care time includes time reviewing chart, images, report of images, rounding with nurse, respiratory therapist and pharmacist, and discussions with available family at bedside.    Signed: Yogesh Lewis MD  12:54 EDT 6/19/2025

## 2025-06-19 NOTE — PROGRESS NOTES
"[facility] Cardiology  Hospital Progress Note     LOS: 1 day   Patient Care Team:  Keshawn Alexandre MD as PCP - General (Internal Medicine)  Scooter Jiménez MD as Consulting Physician (Colon and Rectal Surgery)  Alvino Berrios MD as Consulting Physician (Ophthalmology)  Reginaldo Tse MD as Consulting Physician (Cardiology)  Yanelis Hernandez APRN as Nurse Practitioner (Family Medicine)  Keshawn Ng MD as Surgeon (Cardiothoracic Surgery)  PCP:  Keshawn Alexandre MD    Chief Complaint: management of CV risk factors    IDENTIFICATION: A 74 y.o. male semiretired  and first cousin of Jose Maria Rico     PROBLEM LIST:   CAD  6/24 Echo: EF 61%, negative saline test  Nuclear stress (6/26/2024): Exercised for 7:48 (6:50 expected).  Small apical infarct.  Dense coronary artery calcification noted on CT attenuation correction images.  LVEF >70%  Cardiac catheterization for unstable angina (6/18/2025): Severe left main and severe multi-vessel CAD (LAD/diagonal, ramus, RCA).  Near normal LV filling pressure (LVEDP 13 mmHg)  CABG x 4 (06/19/2025): LIMA-->LAD, GSV-->PDA, OM1, D1. Ramus less than 1 mm and too small for grafting. Diffuse lipid laden disease in the coronaries ([place])  HTN  IRBBB  HLD  3/24  [id number]  3/25  125-033-  Hx of diplopia, negative neuro imaging 5/2024  OA  Surgical history:  Knee sugery  Tonsillectomy/adenoidectomy   Vasectomy     SUBJECTIVE: intubated. Weaning sedation. Follows commands. No blood products required perioperatively.       OBJECTIVE:     Vital Sign Min/Max for last 24 hours  Temp  Min: 96.1 °F (35.6 °C)  Max: 98.3 °F (36.8 °C)   BP  Min: 95/72  Max: 164/104   Pulse  Min: 46  Max: 80   Resp  Min: 14  Max: 26   SpO2  Min: 93 %  Max: 100 %   No data recorded   Weight  Min: 88.5 kg (195 lb)  Max: 88.6 kg (195 lb 5.2 oz)     Flowsheet Rows      Flowsheet Row First Filed Value   Admission Height 177.8 cm (70\") Documented at 06/18/2025 0636   Admission " Weight 88.5 kg (195 lb 3.2 oz) Documented at 06/18/2025 0636            Telemetry: SR      Intake/Output Summary (Last 24 hours) at 6/19/2025 1539  Last data filed at 6/19/2025 1500  Gross per 24 hour   Intake 2035 ml   Output 1630 ml   Net 405 ml     Intake & Output (last 3 days)         06/16 0701  06/17 0700 06/17 0701  06/18 0700 06/18 0701  06/19 0700 06/19 0701  06/20 0700    I.V. (mL/kg)    1500 (16.9)    Blood    335    IV Piggyback    200    Total Intake(mL/kg)    2035 (23)    Urine (mL/kg/hr)    1450 (1.9)    Chest Tube    180    Total Output    1630    Net    +405                     Physical Exam:  Vitals reviewed.   Constitutional:       Appearance: Not in distress.      Interventions: Sedated and intubated.   Pulmonary:      Effort: Pulmonary effort is normal. Intubated.      Breath sounds: Normal breath sounds.   Cardiovascular:      Normal rate. Regular rhythm. Normal S1. Normal S2.       Murmurs: There is no murmur.      No gallop.  No rub.   Pulses:     Intact distal pulses.   Edema:     Peripheral edema absent.   Skin:     General: Skin is warm and dry.          LABS/DIAGNOSTIC DATA:  Results from last 7 days   Lab Units 06/19/25  1314 06/19/25  1112 06/19/25  1045 06/19/25  0654 06/19/25  0407 06/18/25  1536   WBC 10*3/mm3 18.01*  --   --   --  8.11 6.91   HEMOGLOBIN g/dL 13.6  --   --   --  15.4 15.8   HEMOGLOBIN, POC g/dL  --  12.2 11.9*   < >  --   --    HEMATOCRIT % 39.9  --   --   --  45.9 47.1   HEMATOCRIT POC %  --  36* 35*   < >  --   --    PLATELETS 10*3/mm3 186  --   --   --  286 323    < > = values in this interval not displayed.     Lab Results   Lab Value Date/Time    TROPONINT 11 04/05/2024 0950     Results from last 7 days   Lab Units 06/19/25  1314 06/19/25  0406   INR  1.45* 1.05   APTT seconds 28.5 32.3     Results from last 7 days   Lab Units 06/19/25  1314 06/19/25  0407 06/16/25  0920   SODIUM mmol/L 137 139 138   POTASSIUM mmol/L 4.4 4.4 4.3   CHLORIDE mmol/L 109* 107 105    CO2 mmol/L 18.0* 24.0 24.3   BUN mg/dL 11.8 13.9 16.0   CREATININE mg/dL 0.86 0.86 0.94   CALCIUM mg/dL 9.0 9.0 9.6   BILIRUBIN mg/dL  --  0.4  --    ALK PHOS U/L  --  49  --    ALT (SGPT) U/L  --  23  --    AST (SGOT) U/L  --  24  --    GLUCOSE mg/dL 133* 100* 88     Results from last 7 days   Lab Units 06/18/25  1536   HEMOGLOBIN A1C % 5.70*     Results from last 7 days   Lab Units 06/18/25  1538   CHOLESTEROL mg/dL 154   TRIGLYCERIDES mg/dL 272*   HDL CHOL mg/dL 50   LDL CHOL mg/dL 61               Medication Review:   acetaminophen, 1,000 mg, Intravenous, Once  [START ON 6/20/2025] acetaminophen, 1,000 mg, Oral, Q8H  [START ON 6/20/2025] aspirin, 325 mg, Oral, Daily  atorvastatin, 40 mg, Oral, Nightly  ceFAZolin, 2 g, Intravenous, Q8H  gabapentin, 100 mg, Oral, TID  metoprolol tartrate, 2.5 mg, Intravenous, Q6H  [START ON 6/20/2025] metoprolol tartrate, 12.5 mg, Oral, Q12H  mupirocin, 1 Application, Each Nare, BID  protamine, 50 mg, Intravenous, Once  senna-docusate sodium, 2 tablet, Oral, BID       dexmedetomidine, 0.2-1.5 mcg/kg/hr, Last Rate: 1 mcg/kg/hr (06/19/25 1539)  dextrose 5 % with KCl 20 mEq, 60 mL/hr  dextrose 5 % with KCl 20 mEq, 30 mL/hr, Last Rate: 30 mL/hr (06/19/25 1335)  DOBUTamine, 2-20 mcg/kg/min  DOPamine, 2-20 mcg/kg/min  EPINEPHrine, 0.02-0.3 mcg/kg/min  insulin, 0-100 Units/hr  niCARdipine, 5-15 mg/hr  nitroglycerin, 5-200 mcg/min, Last Rate: 5 mcg/min (06/19/25 1530)  norepinephrine, 0.02-0.3 mcg/kg/min  phenylephrine, 0.5-3 mcg/kg/min  propofol, 5-50 mcg/kg/min  propofol, 5-50 mcg/kg/min, Last Rate: Stopped (06/19/25 1431)           ASSESSMENT:    CAD s/p CABG x 4 6/19/2025    Primary hypertension    Hyperlipidemia LDL goal <70        PLAN:  Continue ASA and statin therapy  Resume beta blocker when HR and BP allow  Continue to monitor for arrhythmias   Wean IV gtts per CTS  Post op management per ICU/CT surgery   CT surgery will be attending post op      MARINA Shepard    06/19/25  15:39 EDT

## 2025-06-19 NOTE — STS RISK SCORE
Procedure Type: Isolated CABG  Perioperative Outcome Estimate %  Operative Mortality 0.742%  Morbidity & Mortality 3.94%  Stroke 0.674%  Renal Failure 0.377%  Reoperation 1.85%  Prolonged Ventilation 2%  Deep Sternal Wound Infection 0.122%  Long Hospital Stay (>14 days) 1.85%  Short Hospital Stay (<6 days) 64%

## 2025-06-19 NOTE — PLAN OF CARE
Goal Outcome Evaluation:  Plan of Care Reviewed With: patient        Progress: no change  Outcome Evaluation: patient resting on RA, dorene on monitor, NPO since midnight for CABG today. No complaints of chest discomfort or SOA overnight. adequate UOP, VSS.

## 2025-06-19 NOTE — ANESTHESIA PROCEDURE NOTES
Arterial Line      Patient reassessed immediately prior to procedure    Patient location during procedure: pre-op  Start time: 6/19/2025 6:38 AM   Line placed for hemodynamic monitoring.  Performed By   Anesthesiologist: Pratibha Joseph MD   Preanesthetic Checklist  Completed: patient identified, IV checked, site marked, risks and benefits discussed, surgical consent, monitors and equipment checked, pre-op evaluation and timeout performed  Arterial Line Prep    Sterile Tech: cap, gloves and sterile barriers  Prep: ChloraPrep  Patient monitoring: blood pressure monitoring, continuous pulse oximetry and EKG  Arterial Line Procedure   Laterality:right  Location:  radial artery  Catheter size: 20 G   Guidance: ultrasound guided and palpation technique  Number of attempts: 1  Successful placement: yes Images: still images not obtained  Post Assessment   Dressing Type: line sutured, occlusive dressing applied, secured with tape, wrist guard applied and biopatch applied.   Complications no  Circ/Move/Sens Assessment: normal and unchanged.   Patient Tolerance: patient tolerated the procedure well with no apparent complications

## 2025-06-19 NOTE — ANESTHESIA PROCEDURE NOTES
Central Line      Patient reassessed immediately prior to procedure    Patient location during procedure: OR  Start time: 6/19/2025 7:15 AM  Indications: vascular access  Staff  Anesthesiologist: Pratibha Joseph MD  Preanesthetic Checklist  Completed: patient identified, IV checked, site marked, risks and benefits discussed, surgical consent, monitors and equipment checked, pre-op evaluation and timeout performed  Central Line Prep  Sterile Tech:cap, gloves, gown, mask and sterile barriers  Prep: chloraprep  Patient monitoring: blood pressure monitoring, continuous pulse oximetry and EKG  Central Line Procedure  Laterality:right  Location:internal jugular  Catheter Type:MAC  Catheter Size:9 Fr  Guidance:ultrasound guided  PROCEDURE NOTE/ULTRASOUND INTERPRETATION.  Using ultrasound guidance the potential vascular sites for insertion of the catheter were visualized to determine the patency of the vessel to be used for vascular access.  After selecting the appropriate site for insertion, the needle was visualized under ultrasound being inserted into the internal jugular vein, followed by ultrasound confirmation of wire and catheter placement. There were no abnormalities seen on ultrasound; an image was taken; and the patient tolerated the procedure with no complications. Images: still images obtained, printed/placed on chart  Assessment  Post procedure:biopatch applied, line sutured, occlusive dressing applied and secured with tape  Assessement:blood return through all ports, free fluid flow, chest x-ray ordered and Parish Test  Complications:no  Patient Tolerance:patient tolerated the procedure well with no apparent complications

## 2025-06-19 NOTE — ANESTHESIA PROCEDURE NOTES
Intra-Op Anesthesia YARELI    Procedure Performed: Intra-Op Anesthesia YARELI       Start Time:  6/19/2025 7:20 AM       End Time:      Preanesthesia Checklist:  Patient identified, IV assessed, risks and benefits discussed, monitors and equipment assessed, procedure being performed at surgeon's request and anesthesia consent obtained.    General Procedure Information  Physician Requesting Echo: Keshawn Ng MD  Location performed:  OR  Intubated  Bite block placed  Heart visualized  Probe Insertion:  Easy  Probe Type:  Multiplane  Modalities:  2D only, continuous wave Doppler and pulse wave Doppler    Echocardiographic and Doppler Measurements    Ventricles    Right Ventricle:  Cavity size normal.  Hypertrophy not present.  Thrombus not present.  Global function normal.    Left Ventricle:  Cavity size normal.  Thrombus not present.  Global Function normal.  Ejection Fraction 60%.          Valves    Aortic Valve:  Annulus normal.  Stenosis not present.  Regurgitation trace.  Leaflets normal.  Leaflet motions normal.      Mitral Valve:  Annulus normal.  Stenosis not present.  Regurgitation mild.  Leaflets normal.  Leaflet motions normal.      Tricuspid Valve:  Annulus normal.  Stenosis not present.  Regurgitation trace.  Leaflet motions normal.    Pulmonic Valve:  Annulus normal.  Stenosis not present.  Regurgitation trace.        Aorta    Ascending Aorta:  Size normal.  Dissection not present.  Plaque thickness less than 3 mm.  Mobile plaque not present.    Aortic Arch:  Size normal.  Dissection not present.  Plaque thickness less than 3 mm.  Mobile plaque not present.    Descending Aorta:  Size normal.  Dissection not present.  Plaque thickness less than 3 mm.  Mobile plaque not present.        Atria    Right Atrium:  Size normal.  Spontaneous echo contrast not present.  Thrombus not present.  Tumor not present.  Device not present.      Left Atrium:  Size normal.  Spontaneous echo contrast not present.  Thrombus not  present.  Tumor not present.  Device not present.    Left atrial appendage normal.      Septa        Ventricular Septum:  Intra-ventricular septum morphology normal.          Other Findings  Pericardium:  normal  Pleural Effusion:  none      Anesthesia Information  Performed Personally  Anesthesiologist:  Pratibha Joseph MD      Echocardiogram Comments:       Findings discussed with Dr. Ng    Prebypass:  Normal RV and LV systolic fxn, EF 60%, trivial TR, mild MR, trace AI, no AS, Aorta grade 2/5 atheroma    Post CABGx4  No changes post bypass: EF preserved, no changes to valves and Aorta intact

## 2025-06-20 ENCOUNTER — APPOINTMENT (OUTPATIENT)
Dept: GENERAL RADIOLOGY | Facility: HOSPITAL | Age: 75
DRG: 234 | End: 2025-06-20
Payer: MEDICARE

## 2025-06-20 LAB
ALBUMIN SERPL-MCNC: 3.8 G/DL (ref 3.5–5.2)
ALBUMIN/GLOB SERPL: 2 G/DL
ALP SERPL-CCNC: 35 U/L (ref 39–117)
ALT SERPL W P-5'-P-CCNC: 21 U/L (ref 1–41)
ANION GAP SERPL CALCULATED.3IONS-SCNC: 12 MMOL/L (ref 5–15)
AST SERPL-CCNC: 54 U/L (ref 1–40)
BASOPHILS # BLD AUTO: 0.03 10*3/MM3 (ref 0–0.2)
BASOPHILS NFR BLD AUTO: 0.3 % (ref 0–1.5)
BH BB BLOOD EXPIRATION DATE: NORMAL
BH BB BLOOD TYPE BARCODE: 6200
BH BB DISPENSE STATUS: NORMAL
BH BB PRODUCT CODE: NORMAL
BH BB UNIT NUMBER: NORMAL
BILIRUB SERPL-MCNC: 0.6 MG/DL (ref 0–1.2)
BUN SERPL-MCNC: 11.2 MG/DL (ref 8–23)
BUN/CREAT SERPL: 11.1 (ref 7–25)
CALCIUM SPEC-SCNC: 8.4 MG/DL (ref 8.6–10.5)
CHLORIDE SERPL-SCNC: 105 MMOL/L (ref 98–107)
CO2 SERPL-SCNC: 19 MMOL/L (ref 22–29)
CREAT SERPL-MCNC: 1.01 MG/DL (ref 0.76–1.27)
CROSSMATCH INTERPRETATION: NORMAL
DEPRECATED RDW RBC AUTO: 49.1 FL (ref 37–54)
EGFRCR SERPLBLD CKD-EPI 2021: 78 ML/MIN/1.73
EOSINOPHIL # BLD AUTO: 0.01 10*3/MM3 (ref 0–0.4)
EOSINOPHIL NFR BLD AUTO: 0.1 % (ref 0.3–6.2)
ERYTHROCYTE [DISTWIDTH] IN BLOOD BY AUTOMATED COUNT: 13.1 % (ref 12.3–15.4)
GLOBULIN UR ELPH-MCNC: 1.9 GM/DL
GLUCOSE BLDC GLUCOMTR-MCNC: 116 MG/DL (ref 70–130)
GLUCOSE BLDC GLUCOMTR-MCNC: 120 MG/DL (ref 70–130)
GLUCOSE BLDC GLUCOMTR-MCNC: 124 MG/DL (ref 70–130)
GLUCOSE BLDC GLUCOMTR-MCNC: 127 MG/DL (ref 70–130)
GLUCOSE BLDC GLUCOMTR-MCNC: 131 MG/DL (ref 70–130)
GLUCOSE BLDC GLUCOMTR-MCNC: 140 MG/DL (ref 70–130)
GLUCOSE BLDC GLUCOMTR-MCNC: 147 MG/DL (ref 70–130)
GLUCOSE BLDC GLUCOMTR-MCNC: 149 MG/DL (ref 70–130)
GLUCOSE BLDC GLUCOMTR-MCNC: 155 MG/DL (ref 70–130)
GLUCOSE BLDC GLUCOMTR-MCNC: 191 MG/DL (ref 70–130)
GLUCOSE SERPL-MCNC: 155 MG/DL (ref 65–99)
HCT VFR BLD AUTO: 40.2 % (ref 37.5–51)
HGB BLD-MCNC: 13 G/DL (ref 13–17.7)
IMM GRANULOCYTES # BLD AUTO: 0.06 10*3/MM3 (ref 0–0.05)
IMM GRANULOCYTES NFR BLD AUTO: 0.5 % (ref 0–0.5)
INR PPP: 1.29 (ref 0.89–1.12)
LPA SERPL-SCNC: 83.7 NMOL/L
LYMPHOCYTES # BLD AUTO: 1.14 10*3/MM3 (ref 0.7–3.1)
LYMPHOCYTES NFR BLD AUTO: 9.6 % (ref 19.6–45.3)
MAGNESIUM SERPL-MCNC: 2.4 MG/DL (ref 1.6–2.4)
MCH RBC QN AUTO: 32.7 PG (ref 26.6–33)
MCHC RBC AUTO-ENTMCNC: 32.3 G/DL (ref 31.5–35.7)
MCV RBC AUTO: 101 FL (ref 79–97)
MONOCYTES # BLD AUTO: 0.85 10*3/MM3 (ref 0.1–0.9)
MONOCYTES NFR BLD AUTO: 7.1 % (ref 5–12)
NEUTROPHILS NFR BLD AUTO: 82.4 % (ref 42.7–76)
NEUTROPHILS NFR BLD AUTO: 9.84 10*3/MM3 (ref 1.7–7)
NRBC BLD AUTO-RTO: 0 /100 WBC (ref 0–0.2)
PLATELET # BLD AUTO: 195 10*3/MM3 (ref 140–450)
PMV BLD AUTO: 9.8 FL (ref 6–12)
POTASSIUM SERPL-SCNC: 4.6 MMOL/L (ref 3.5–5.2)
PROT SERPL-MCNC: 5.7 G/DL (ref 6–8.5)
PROTHROMBIN TIME: 16.9 SECONDS (ref 12.2–15.3)
QT INTERVAL: 372 MS
QTC INTERVAL: 383 MS
RBC # BLD AUTO: 3.98 10*6/MM3 (ref 4.14–5.8)
SODIUM SERPL-SCNC: 136 MMOL/L (ref 136–145)
UNIT  ABO: NORMAL
UNIT  RH: NORMAL
WBC NRBC COR # BLD AUTO: 11.93 10*3/MM3 (ref 3.4–10.8)

## 2025-06-20 PROCEDURE — 93010 ELECTROCARDIOGRAM REPORT: CPT | Performed by: INTERNAL MEDICINE

## 2025-06-20 PROCEDURE — 93005 ELECTROCARDIOGRAM TRACING: CPT | Performed by: PHYSICIAN ASSISTANT

## 2025-06-20 PROCEDURE — 25010000002 MORPHINE PER 10 MG: Performed by: THORACIC SURGERY (CARDIOTHORACIC VASCULAR SURGERY)

## 2025-06-20 PROCEDURE — 85610 PROTHROMBIN TIME: CPT | Performed by: PHYSICIAN ASSISTANT

## 2025-06-20 PROCEDURE — 85025 COMPLETE CBC W/AUTO DIFF WBC: CPT | Performed by: PHYSICIAN ASSISTANT

## 2025-06-20 PROCEDURE — 83735 ASSAY OF MAGNESIUM: CPT | Performed by: PHYSICIAN ASSISTANT

## 2025-06-20 PROCEDURE — 82948 REAGENT STRIP/BLOOD GLUCOSE: CPT

## 2025-06-20 PROCEDURE — 99291 CRITICAL CARE FIRST HOUR: CPT | Performed by: STUDENT IN AN ORGANIZED HEALTH CARE EDUCATION/TRAINING PROGRAM

## 2025-06-20 PROCEDURE — 71045 X-RAY EXAM CHEST 1 VIEW: CPT

## 2025-06-20 PROCEDURE — 97162 PT EVAL MOD COMPLEX 30 MIN: CPT

## 2025-06-20 PROCEDURE — 80053 COMPREHEN METABOLIC PANEL: CPT | Performed by: PHYSICIAN ASSISTANT

## 2025-06-20 PROCEDURE — 25010000002 CEFAZOLIN PER 500 MG: Performed by: PHYSICIAN ASSISTANT

## 2025-06-20 PROCEDURE — 63710000001 INSULIN LISPRO (HUMAN) PER 5 UNITS: Performed by: STUDENT IN AN ORGANIZED HEALTH CARE EDUCATION/TRAINING PROGRAM

## 2025-06-20 PROCEDURE — 99024 POSTOP FOLLOW-UP VISIT: CPT | Performed by: PHYSICIAN ASSISTANT

## 2025-06-20 RX ORDER — IBUPROFEN 600 MG/1
1 TABLET ORAL
Status: DISCONTINUED | OUTPATIENT
Start: 2025-06-20 | End: 2025-06-26 | Stop reason: HOSPADM

## 2025-06-20 RX ORDER — NICOTINE POLACRILEX 4 MG
15 LOZENGE BUCCAL
Status: DISCONTINUED | OUTPATIENT
Start: 2025-06-20 | End: 2025-06-26 | Stop reason: HOSPADM

## 2025-06-20 RX ORDER — INSULIN LISPRO 100 [IU]/ML
2-7 INJECTION, SOLUTION INTRAVENOUS; SUBCUTANEOUS
Status: DISCONTINUED | OUTPATIENT
Start: 2025-06-20 | End: 2025-06-26 | Stop reason: HOSPADM

## 2025-06-20 RX ORDER — DEXTROSE MONOHYDRATE 25 G/50ML
25 INJECTION, SOLUTION INTRAVENOUS
Status: DISCONTINUED | OUTPATIENT
Start: 2025-06-20 | End: 2025-06-26 | Stop reason: HOSPADM

## 2025-06-20 RX ADMIN — HYDROCODONE BITARTRATE AND ACETAMINOPHEN 1 TABLET: 7.5; 325 TABLET ORAL at 14:50

## 2025-06-20 RX ADMIN — OXYCODONE HYDROCHLORIDE 10 MG: 10 TABLET ORAL at 22:08

## 2025-06-20 RX ADMIN — SODIUM CHLORIDE 2 G: 900 INJECTION INTRAVENOUS at 03:05

## 2025-06-20 RX ADMIN — OXYCODONE HYDROCHLORIDE 10 MG: 10 TABLET ORAL at 17:59

## 2025-06-20 RX ADMIN — MORPHINE SULFATE 2 MG: 2 INJECTION, SOLUTION INTRAMUSCULAR; INTRAVENOUS at 05:45

## 2025-06-20 RX ADMIN — MORPHINE SULFATE 2 MG: 2 INJECTION, SOLUTION INTRAMUSCULAR; INTRAVENOUS at 08:15

## 2025-06-20 RX ADMIN — INSULIN LISPRO 2 UNITS: 100 INJECTION, SOLUTION INTRAVENOUS; SUBCUTANEOUS at 18:02

## 2025-06-20 RX ADMIN — Medication 12.5 MG: at 20:33

## 2025-06-20 RX ADMIN — MUPIROCIN 1 APPLICATION: 20 OINTMENT TOPICAL at 08:29

## 2025-06-20 RX ADMIN — OXYCODONE HYDROCHLORIDE 10 MG: 10 TABLET ORAL at 08:15

## 2025-06-20 RX ADMIN — INSULIN HUMAN 1.9 UNITS/HR: 1 INJECTION, SOLUTION INTRAVENOUS at 03:39

## 2025-06-20 RX ADMIN — OXYCODONE HYDROCHLORIDE 10 MG: 10 TABLET ORAL at 12:28

## 2025-06-20 RX ADMIN — ACETAMINOPHEN 500 MG: 500 TABLET ORAL at 03:06

## 2025-06-20 RX ADMIN — MUPIROCIN 1 APPLICATION: 20 OINTMENT TOPICAL at 20:33

## 2025-06-20 RX ADMIN — ATORVASTATIN CALCIUM 40 MG: 40 TABLET, FILM COATED ORAL at 20:33

## 2025-06-20 RX ADMIN — SODIUM CHLORIDE 2 G: 900 INJECTION INTRAVENOUS at 11:19

## 2025-06-20 RX ADMIN — ASPIRIN 325 MG: 325 TABLET, COATED ORAL at 08:29

## 2025-06-20 RX ADMIN — MORPHINE SULFATE 2 MG: 2 INJECTION, SOLUTION INTRAMUSCULAR; INTRAVENOUS at 20:19

## 2025-06-20 RX ADMIN — HYDROCODONE BITARTRATE AND ACETAMINOPHEN 1 TABLET: 7.5; 325 TABLET ORAL at 03:05

## 2025-06-20 RX ADMIN — GABAPENTIN 100 MG: 100 CAPSULE ORAL at 20:33

## 2025-06-20 RX ADMIN — DOCUSATE SODIUM 50 MG AND SENNOSIDES 8.6 MG 2 TABLET: 8.6; 5 TABLET, FILM COATED ORAL at 08:29

## 2025-06-20 RX ADMIN — DOCUSATE SODIUM 50 MG AND SENNOSIDES 8.6 MG 2 TABLET: 8.6; 5 TABLET, FILM COATED ORAL at 20:33

## 2025-06-20 RX ADMIN — MORPHINE SULFATE 2 MG: 2 INJECTION, SOLUTION INTRAMUSCULAR; INTRAVENOUS at 14:50

## 2025-06-20 RX ADMIN — SODIUM CHLORIDE 2 G: 900 INJECTION INTRAVENOUS at 20:33

## 2025-06-20 NOTE — PROGRESS NOTES
Cardiothoracic Surgery Progress Note      POD # 1 s/p CABG x 4     LOS: 2 days      Subjective:  No acute overnight events.  Up to chair.  On 4L NC.  No vasoactive drips.  Denies chest pain or shortness of breath.    Objective:  Vital Signs  Temp:  [96.1 °F (35.6 °C)-100.4 °F (38 °C)] 100.4 °F (38 °C)  Heart Rate:  [62-82] 66  Resp:  [14-29] 24  BP: ()/(64-93) 113/75  Arterial Line BP: ()/(55-77) 107/55  FiO2 (%):  [40 %-100 %] 40 %    Physical Exam:   General Appearance: alert, oriented x 4, appears stated age and cooperative   Lungs: clear to auscultation, respirations regular, respirations even, and respirations unlabored   Heart: regular rhythm & normal rate, normal S1, S2, no murmur, no gallop, no rub, and no click   Abdomen: Soft, nontender, nondistended   Extremities: Warm, well-perfused, moves all 4.  No peripheral edema   Skin: Sternum stable.  Incisions c/d/I.    Output by Drain (mL) 06/19/25 0701 - 06/19/25 1900 06/19/25 1901 - 06/20/25 0700 06/20/25 0701 - 06/20/25 0800 Range Total   Chest Tube 3 Anterior Mediastinal 170 150  320   Urethral Catheter Silicone;Temperature probe 16 Fr. 1025 540  1565   Y Chest Tube 1 and 2 1 Right Pleural 28 Fr. 2 Left Pleural 28 Fr. 110 190  300        Results:    Results from last 7 days   Lab Units 06/20/25  0348   WBC 10*3/mm3 11.93*   HEMOGLOBIN g/dL 13.0   HEMATOCRIT % 40.2   PLATELETS 10*3/mm3 195     Results from last 7 days   Lab Units 06/20/25  0348   SODIUM mmol/L 136   POTASSIUM mmol/L 4.6   CHLORIDE mmol/L 105   CO2 mmol/L 19.0*   BUN mg/dL 11.2   CREATININE mg/dL 1.01   GLUCOSE mg/dL 155*   CALCIUM mg/dL 8.4*     Imaging Results (Last 24 Hours)       Procedure Component Value Units Date/Time    XR Chest 1 View [[id number]] Collected: 06/20/25 0731     Updated: 06/20/25 0735    Narrative:      XR CHEST 1 VW    Date of Exam: 6/20/2025 3:29 AM EDT    Indication: Post-Op Heart Surgery    Comparison: 6/19/2025    Findings:  Status post sternotomy and  CABG. Interval extubation and removal of esophagogastric tube. Stable right IJ central venous catheter. Bilateral chest tubes and mediastinal drainage tube unchanged. No pneumothorax on the right. Small left apical pneumothorax   measures 13 mm. Linear bibasilar atelectasis similar to the prior study.      Impression:      Impression:  1. Interval extubation and removal of esophagogastric tube.  2. Stable right IJ central venous catheter.  3. Chest tubes in place with small left apical pneumothorax measuring 13 mm. No pneumothorax on the right.  4. Stable bibasilar atelectasis.          Electronically Signed: Poli Ramirez MD    6/20/2025 7:32 AM EDT    Workstation ID: [id number]    XR Chest 1 View [[id number]] Collected: 06/19/25 1312     Updated: 06/19/25 1318    Narrative:      XR CHEST 1 VW    Date of Exam: 6/19/2025 12:57 PM EDT    Indication: Post-Op Check Line & Tube Placement    Comparison: 6/18/2025    Findings:  Sternotomy wires are now seen. ET tube is in good position at the inferior margin of the clavicles. NG tube passes below the left hemidiaphragm. Right IJ catheter tip is seen in the mid SVC. Heart and pulmonary vasculature are normal in size. Focal   opacity adjacent to the left hemidiaphragm may represent mild to moderate atelectasis or effusion. Separate smaller area of discoid atelectasis is seen in the left lower-midlung. There is minimal right mid and lower lung atelectasis. No pneumothorax or   pulmonary edema is seen.      Impression:      Impression:    1. Poststernotomy chest radiograph, with ET tube, NG tube, and right IJ catheter apparently in satisfactory position.    2. Bibasilar discoid atelectasis, left greater than right, questionable small left effusion. No pneumothorax or other significant chest disease as seen.      Electronically Signed: Lloyd Eddy MD    6/19/2025 1:15 PM EDT    Workstation ID: [id number]            Chronic Comorbid Conditions relative to CABG  include:  Cardiovascular: Coronary Artery Disease, Hyperlipidemia, Hypertension, and trace AR, trace MR  Respiratory: None  Endocrine: None   Nephrology: None  Hematology: None   Other: None     Assessment:  POD # 1 s/p CABG x 4    CAD s/p CABG x 4 6/19/2025    Primary hypertension    Hyperlipidemia LDL goal <70      Plan:  Continue chest tubes and wires  A-line has been removed  Discontinue Cuevas  Pulmonary toilet  Ambulate  ASA, statin, BB    Keshawn Ng MD  06/20/25  08:00 EDT

## 2025-06-20 NOTE — PLAN OF CARE
Goal Outcome Evaluation:  Plan of Care Reviewed With: patient        Progress: improving  Outcome Evaluation: PT eval is completed. patient presents S/P CABG x4. patient demonstrates impaired bed mobility trnasfers and gait compared to baseline status patient was able to ambulate 150 ft with rolling walker and CGA. anticipate patient  to be able to go home with family assist at D/C    Anticipated Discharge Disposition (PT): home with assist

## 2025-06-20 NOTE — CASE MANAGEMENT/SOCIAL WORK
Continued Stay Note   Carlos Manuel     Patient Name: Jamari Munoz  MRN: [id number]  Today's Date: 6/20/2025    Admit Date: 6/18/2025    Plan: Home   Discharge Plan       Row Name 06/20/25 1417       Plan    Plan Home    Plan Comments I spoke with patient and his spouse in the ICU. He has been up with therapy and plans continue to be to home with assistance. IMM given this date.    Final Discharge Disposition Code 01 - home or self-care                   Discharge Codes    No documentation.                 Expected Discharge Date and Time       Expected Discharge Date Expected Discharge Time    Jun 24, 2025               Bhavna Lazaro RN

## 2025-06-20 NOTE — OP NOTE
DATE OF PROCEDURE: 06/19/2025     PREOPERATIVE DIAGNOSES:  1. Multivessel coronary artery disease including the left main  2. Unstable angina  3. Hypertension  4. Hyperlipidemia     POSTOPERATIVE DIAGNOSES:    1. Multivessel coronary artery disease including the left main  2. Unstable angina  3. Hypertension  4. Hyperlipidemia     PROCEDURES PERFORMED:    1. Coronary artery bypass graft x 4  2. Endoscopic left greater saphenous vein harvest       SURGEON: Keshawn Ng MD       ASSISTANT: Poornima Palumbo PA-C was responsible for performing the following activities: Retraction, Suction, Closing, Placing Dressing, and Harvesting of Vessels and their skilled assistance was necessary for the success of this case.    Circulator: Leila Miller RN; Asya Rubio RN  Perfusionist: Jeremy Rodas  Scrub Person: Patricia Duggan; Shana Hayes; Estuardo Doran  Nursing Assistant: Laurel Thurman; Yadi Evans    ANESTHESIA: General endotracheal anesthesia with Dr. Pratibha Joseph MD     ESTIMATED BLOOD LOSS: 200 mL     CROSSCLAMP TIME: 95 minutes       TOTAL CARDIOPULMONARY BYPASS TIME: 101 minutes      DISTAL BYPASS TARGETS:    1. LIMA to LAD  2. Greater saphenous vein to PDA  3. Greater saphenous vein to OM1  4. Greater saphenous vein to D1    INDICATIONS:  74-year-old  male with a history of hypertension, hyperlipidemia, osteoarthritis and migraines who presented with exertional angina.  The patient is an avid cyclist and developed substernal chest pain around February with long bike rides.  He was found to have multivessel coronary artery disease on cardiac catheterization including severe left main stenosis.  The patient was felt to be a reasonable candidate for surgical revascularization. The risks and benefits of surgery were discussed with the patient including pain, bleeding, infection, renal failure, stroke and death along with the STS risk score. The patient understood these  risks and wished to proceed with surgery.      DESCRIPTION OF PROCEDURE: The patient was taken to the operating room and placed under general endotracheal anesthesia. A central line, radial arterial line, and Cuevas catheter were placed intraoperatively. The patient was prepped and draped in the usual sterile fashion and a timeout was performed verifying the patient's name, procedure, consent, beta blockade administration and antibiotics.  A right femoral arterial line was placed given the radial artery catheter was inconsistently measuring blood pressures.      The left greater saphenous vein was harvested from the groin to below the ankle using endoscopic technique. Subcutaneous tissues were closed with a running 3-0 Vicryl suture and 4-0 Monocryl subcuticular stitch. Simultaneously, a median sternotomy incision was made and electrocautery was utilized to gain access to the sternum. A midline sternotomy was performed after lung desufflation and hemostasis was achieved with electrocautery.    Attention was turned to the left internal mammary artery, which was taken down using electrocautery and small clips. Dissection was performed proximally to the subclavian vein and distally to the bifurcation. The bifurcation was ligated with 2 large clips to each branch and sharply divided. This revealed excellent flow within the mammary artery which was ligated distally using small clips and irrigated with papaverine solution and placed in a soaked Ray-Siddharth sponge in the left pleural space. The pericardium was opened and stay sutures were placed to create a pericardial well. Next, 3-0 Prolene sutures were placed in the ascending aorta and systemic heparin was administered. Additional cannulation sutures were placed in the right atrial appendage, ascending aorta, and right atrium. After verification of satisfactory activated clotting time, the arterial cannula was placed and connected to the cardiopulmonary bypass circuit after  being de-aired. The line was tested and a wrap was performed. The venous cannula was inserted followed by antegrade and retrograde cardioplegia lines. The vein was inspected and found to be of appropriate caliber and quality for bypass grafting. A slit within the pericardial well along the cephalad portion was created for appropriate transition of the mammary pedicle into the mediastinum. Cardiopulmonary bypass was initiated and the patient was allowed to drift in temperature and distal bypass targets were verified. Bypass flow was dropped and the aortic crossclamp was applied. Cardioplegia was administered in an antegrade fashion with immediate cessation of cardiac activity. There was a marginal septal temperature response. The root vent suction was turned on high and additional cardioplegia was given via retrograde with an excellent septal temperature response.  The coronary artery distributions were thoroughly inspected.  It should be noted that the patient had diffuse lipid laden disease on gross inspection that was more impressive visually than on cardiac catheterization.      The distal right coronary artery was heavily diseased on palpation.  The proximal PDA was 2 mm in diameter and an arteriotomy was made on the proximal most portion of this vessel and extended proximally and distally. An end-to-side anastomosis was performed with running 7-0 Prolene suture and tied down. Cardioplegia was given down the anastomosis via hand injection with no evidence of leak and good blood flow. Verification of vein length was obtained by filling the heart and the vein graft was trimmed to the appropriate length. The first obtuse marginal branch was 1.5 mm in diameter and an arteriotomy was made on the proximal portion of this vessel and extended proximally and distally. An end-to-side anastomosis was performed with running 7-0 Prolene suture and tied down. Cardioplegia was given down the anastomosis via hand injection with  no evidence of leak and good blood flow. Verification of vein length was obtained by filling the heart and the vein graft was trimmed to the appropriate length. The ramus coronary artery was very small in caliber measuring under 1 mm and was not an acceptably bypass target.  The first diagonal branch was 1.5 mm in diameter and an arteriotomy was made on the mid portion of this vessel and extended proximally and distally. An end-to-side anastomosis was performed with running 7-0 Prolene suture and tied down. Cardioplegia was given down the anastomosis via hand injection with no evidence of leak and good blood flow. Verification of vein length was obtained by filling the heart and the vein graft was trimmed to the appropriate length. The LAD was inspected and found to have proximal to mid vessel disease on palpation. An arteriotomy was made on the mid/distal LAD and extended proximally and distally on this 2 mm diameter vessel. The internal mammary artery was then prepared for grafting by ligating the 2 venous branches along the pedicle using small clips. The mammary artery was trimmed proximal to the bifurcation and beveled for grafting. An end-to-side anastomosis with an 8-0 Prolene suture was constructed and tied down. The bulldog clamp was removed and there was excellent flow within the vessel and adequate filling of the LAD to the apex and the second diagonal branch. The underlying mammary fascia was secured to the epicardium using two 6-0 Prolene sutures. Three aortotomies were then made on the proximal ascending aorta and end-to-side proximal anastomoses were carried out using 6-0 Prolene suture and labeled with a radiopaque washers. A hot shot was given down the ascending aorta after bulldog clamps were applied to the vein grafts. The veins were de-aired and the patient was placed in steep Trendelenburg position. The root vent was turned on high suction and cardiopulmonary bypass flow was turned down with  crossclamp subsequently removed. Bypass flows were returned to normal and the bulldog clamps were removed. The heart returned to spontaneous sinus rhythm. The proximal and distal anastomoses were then inspected and found to be hemostatic.   The patient was subsequently weaned from cardiopulmonary bypass and decannulation was successfully carried out. All cannulation sites were reinforced with additional 4-0 Prolene suture and inspected for hemostasis. Flow probe examination of bypass grafts revealed excellent flow within the mammary and vein grafts.  Ventricular pacing wires were placed and secured using 0 silk suture. Three chest tubes were then placed within the left and right pleural spaces and mediastinum. These were secured using a 0 Ethibond suture. The sternum was reapproximated with #7 stainless steel wire and the linea alba was closed with a running 0 Vicryl suture. Subcutaneous tissues were closed with a 2-0 Vicryl suture and the inferior aspect of the incision was closed with additional interrupted 2-0 Vicryl sutures in the dermal layer. The skin was reapproximated with a 4-0 Monocryl subcuticular stitch and overlying skin glue was applied to the incision. Gauze and tape were applied to the chest tube sites and the patient was subsequently transported to the cardiac ICU in stable condition, intubated.

## 2025-06-20 NOTE — PROGRESS NOTES
CRITICAL CARE PROGRESS NOTE       Patient's name Jamari Munoz MRN: [id number] : 1950-74 y.o.-male  Admission date 2025  Length of stay 2  ICU LOS 23h    TODAY: No overnight events, extubated.  Feeling well.  Still some slight postoperative pain.      HOSPITAL COURSE:      SCHEDULED MEDS:  acetaminophen, 1,000 mg, Oral, Q8H  aspirin, 325 mg, Oral, Daily  atorvastatin, 40 mg, Oral, Nightly  ceFAZolin, 2 g, Intravenous, Q8H  gabapentin, 100 mg, Oral, TID  insulin lispro, 2-7 Units, Subcutaneous, 4x Daily AC & at Bedtime  metoprolol tartrate, 12.5 mg, Oral, Q12H  mupirocin, 1 Application, Each Nare, BID  protamine, 50 mg, Intravenous, Once  senna-docusate sodium, 2 tablet, Oral, BID         CONTINUOUS INFUSIONS:  dexmedetomidine, 0.2-1.5 mcg/kg/hr, Last Rate: Stopped (25 1611)  dextrose 5 % with KCl 20 mEq, 30 mL/hr, Last Rate: 30 mL/hr (25 1603)  DOBUTamine, 2-20 mcg/kg/min  DOPamine, 2-20 mcg/kg/min  EPINEPHrine, 0.02-0.3 mcg/kg/min  niCARdipine, 5-15 mg/hr  norepinephrine, 0.02-0.3 mcg/kg/min, Last Rate: 0.02 mcg/kg/min (25 1609)  phenylephrine, 0.5-3 mcg/kg/min  propofol, 5-50 mcg/kg/min  propofol, 5-50 mcg/kg/min, Last Rate: Stopped (25 1431)         PHYSICAL EXAMINATION:    Temp:  [96.1 °F (35.6 °C)-100.4 °F (38 °C)] 99.1 °F (37.3 °C)  Heart Rate:  [62-82] 64  Resp:  [14-29] 22  BP: ()/(64-93) 100/66  Arterial Line BP: ()/(55-77) 107/55  FiO2 (%):  [40 %-100 %] 40 %    Intake/Output Summary (Last 24 hours) at 2025 1204  Last data filed at 2025 1000  Gross per 24 hour   Intake 3986.6 ml   Output 2480 ml   Net 1506.6 ml     Mode: PS  FiO2 (%):  [40 %-100 %] 40 %  S RR:  [10-14] 10  S VT:  [730 mL] 730 mL  PEEP/CPAP (cm H2O):  [5 cm H20] 5 cm H20  ME SUP:  [10 cm H20] 10 cm H20  MAP (cm H2O):  [7.3-12] 8.1    Physical Exam  Vitals reviewed.   Constitutional:       Appearance: Normal appearance. He is normal weight. He is ill-appearing.   HENT:       Head: Normocephalic and atraumatic.      Mouth/Throat:      Mouth: Mucous membranes are moist.   Eyes:      Extraocular Movements: Extraocular movements intact.      Conjunctiva/sclera: Conjunctivae normal.      Pupils: Pupils are equal, round, and reactive to light.   Cardiovascular:      Rate and Rhythm: Normal rate and regular rhythm.      Pulses: Normal pulses.   Pulmonary:      Effort: Pulmonary effort is normal.      Breath sounds: Normal breath sounds.   Abdominal:      General: Abdomen is flat. Bowel sounds are normal.      Palpations: Abdomen is soft.   Skin:     General: Skin is warm and dry.      Comments: Midline incision noted   Neurological:      General: No focal deficit present.      Mental Status: He is alert. Mental status is at baseline.           DATA REVIEW:    1. Medical chart reviewed in detail  2. I reviewed the actual EKG rhythm strips and Pulse Oximetry data on the monitors  3. I reviewed today's lab values and the report of the most recent Chest X ray.  4. In addition, I have independently reviewed the actual image of the most recent chest Xray    Hematology:  Results from last 7 days   Lab Units 06/20/25  0348 06/19/25  1632 06/19/25  1314   WBC 10*3/mm3 11.93* 15.77* 18.01*   HEMOGLOBIN g/dL 13.0 12.2* 13.6   HEMATOCRIT % 40.2 35.6* 39.9   PLATELETS 10*3/mm3 195 196 186     Chemistry:  Estimated Creatinine Clearance: 71.9 mL/min (by C-G formula based on SCr of 1.01 mg/dL).  Results from last 7 days   Lab Units 06/20/25  0348 06/19/25  1632   SODIUM mmol/L 136 139   POTASSIUM mmol/L 4.6 4.3   CHLORIDE mmol/L 105 109*   CO2 mmol/L 19.0* 21.1*   BUN mg/dL 11.2 11.4   CREATININE mg/dL 1.01 0.89   GLUCOSE mg/dL 155* 162*     Results from last 7 days   Lab Units 06/20/25  0348 06/19/25  1632 06/19/25  1314   IONIZED CALCIUM mmol/L  --   --  1.07*   CALCIUM mg/dL 8.4* 8.6 9.0   MAGNESIUM mg/dL 2.4 1.9 2.0   PHOSPHORUS mg/dL  --  3.4 2.8     Hepatic Panel:  Results from last 7 days   Lab Units  "06/20/25  0348 06/19/25  1632 06/19/25  1314 06/19/25  0407   ALBUMIN g/dL 3.8 4.0   < > 3.6   TOTAL PROTEIN g/dL 5.7*  --   --  6.1   BILIRUBIN mg/dL 0.6  --   --  0.4   AST (SGOT) U/L 54*  --   --  24   ALT (SGPT) U/L 21  --   --  23   ALK PHOS U/L 35*  --   --  49    < > = values in this interval not displayed.     Coagulation Labs:  Results from last 7 days   Lab Units 06/20/25  0348 06/19/25  1314 06/19/25  0406   PROTIME Seconds 16.9* 18.6* 14.3   INR  1.29* 1.45* 1.05   APTT seconds  --  28.5 32.3      Cardiac Labs:      Biomarkers:      U/A  Results from last 7 days   Lab Units 06/18/25  1445   COLOR UA  Yellow   CLARITY UA  Clear   PH, URINE  7.0   SPECIFIC GRAVITY, URINE  1.041*   GLUCOSE UA  Negative   KETONES UA  Negative   BILIRUBIN UA  Negative   PROTEIN UA  Negative   BLOOD UA  Negative   LEUKOCYTES UA  Negative   NITRITE UA  Negative   UROBILINOGEN UA  0.2 E.U./dL         Arterial Blood Gases:  Results from last 7 days   Lab Units 06/19/25  1635 06/19/25  1308 06/19/25  1112   PH, ARTERIAL pH units 7.342* 7.506* 7.39   PCO2, ARTERIAL mm Hg 43.4 28.2*  --    PO2 ART mm Hg 89.0 234.0*  --    FIO2 % 40 100  --      Microbiology:  No results found for: \"BLOODCX\", \"CULTURES\", \"THROATCX\", \"URINECX\", \"STOOLCX\", \"WOUNDCX\"    Images:  XR Chest 1 View  Result Date: 6/20/2025  Impression: 1. Interval extubation and removal of esophagogastric tube. 2. Stable right IJ central venous catheter. 3. Chest tubes in place with small left apical pneumothorax measuring 13 mm. No pneumothorax on the right. 4. Stable bibasilar atelectasis. Electronically Signed: Poli Ramirez MD  6/20/2025 7:32 AM EDT  Workstation ID: [id number]    XR Chest 1 View  Result Date: 6/19/2025  Impression: 1. Poststernotomy chest radiograph, with ET tube, NG tube, and right IJ catheter apparently in satisfactory position. 2. Bibasilar discoid atelectasis, left greater than right, questionable small left effusion. No pneumothorax or other " significant chest disease as seen. Electronically Signed: Lloyd Eddy MD  6/19/2025 1:15 PM EDT  Workstation ID: [id number]    XR Chest 1 View  Result Date: 6/18/2025  Impression: Portable chest radiograph with no evidence of active disease. Electronically Signed: Lloyd Eddy MD  6/18/2025 12:49 PM EDT  Workstation ID: [id number]      Echo:  Results for orders placed during the hospital encounter of 06/18/25    Adult Transthoracic Echo Complete W/ Cont if Necessary Per Protocol    Interpretation Summary    Left ventricular systolic function is normal. Left ventricular ejection fraction appears to be 56 - 60%.    Left ventricular diastolic function is consistent with (grade I) impaired relaxation.    The aortic root measures 3.6 cm.          ASSESSMENT & PLAN     CAD s/p CABG x 4 6/19/2025    Primary hypertension    Hyperlipidemia LDL goal <70       *Coronary disease status post CABG x 4  POD1  *Hyperlipidemia  *hypertension     Plan:  Admit to the cardiac ICU  Postoperative care  Amiodarone protocol in case patient develops atrial fibrillation  Aspirin statin therapy  Maintain SpO2 greater than 90%  Pulmonary toiletry      Bowel regimen:  Diet: Diet: Liquid, Cardiac, Diabetic; Clear Liquid; Healthy Heart (2-3 Na+); Consistent Carbohydrate; Fluid Consistency: Thin (IDDSI 0)  GI ppx:   DVT PPX: VTE Prophylaxis:  Mechanical VTE prophylaxis orders are present.       Advance Directives: Code Status (Patient has no pulse and is not breathing): CPR (Attempt to Resuscitate)  Medical Interventions (Patient has pulse or is breathing): Full Support       Dispo:    I have spent a total of 44 critical care minutes in the management of this patient, apart from any time taken to perform necessary procedures. Critical care time includes time reviewing chart, images, report of images, rounding with nurse, respiratory therapist and pharmacist, and discussions with available family at bedside.    Signed: Yogesh Lewis MD  12:04 EDT  6/20/2025

## 2025-06-20 NOTE — THERAPY EVALUATION
Patient Name: Jamari Munoz  : 1950    MRN: [id number]                              Today's Date: 2025       Admit Date: 2025    Visit Dx:     ICD-10-CM ICD-9-CM   1. Coronary artery disease involving native coronary artery of native heart with unstable angina pectoris  I25.110 414.01     411.1   2. Atherosclerosis of native coronary artery of native heart with stable angina pectoris  I25.118 414.01     413.9   3. Abnormal findings on diagnostic imaging of heart and coronary circulation  R93.1 794.39     Patient Active Problem List   Diagnosis    Osteoarthritis    Primary hypertension    Low back pain    Carbuncle and furuncle    RLS (restless legs syndrome)    Overweight    Ganglion cyst    Incomplete right bundle branch block    Hyperlipidemia LDL goal <70    Herpes simplex type 2 infection    Medicare annual wellness visit, subsequent    Olecranon bursitis of right elbow    Medicare annual wellness visit, subsequent    Left shoulder tendinitis    Chronic pain of left knee    Medicare annual wellness visit, subsequent    Right shoulder injury    H/O colonoscopy    Migraine    CAD s/p CABG x 4 2025     Past Medical History:   Diagnosis Date    Arthritis     Asthma 2025    when riding bike    Atherosclerosis of native coronary artery of native heart with stable angina pectoris 2025    Ganglion     Head injury 1967    several concussions from football and biking    Hyperlipidemia     Hypertension     Migraine 1975    one now every month or so with Aura     Past Surgical History:   Procedure Laterality Date    CARDIAC CATHETERIZATION N/A 2025    Procedure: Left Heart Cath - Right radial access;  Surgeon: Kai Daniel IV, MD;  Location: [facility] INVASIVE LOCATION;  Service: Cardiovascular;  Laterality: N/A;    COLONOSCOPY      CORONARY ARTERY BYPASS GRAFT N/A 2025    Procedure: MEDIAN STERNOTOMY, CORONARY ARTERY BYPASS GRAFTING X4 UTILIZING THE LEFT  INTERNAL MAMMARY ARTERY GRAFT, ENDOSCOPIC VEIN HARVEST OF THE GREATER LEFT SAPHENOUS VEIN, TRANSESOPHAGEAL ECHOCARDIOGRAM WITH ANESTHESIA;  Surgeon: Keshawn Ng MD;  Location: [facility];  Service: Cardiothoracic;  Laterality: N/A;    CYST REMOVAL      cyst removed left shoulder and left knee    KNEE MENISCAL REPAIR  2005    TONSILLECTOMY AND ADENOIDECTOMY      VASECTOMY        General Information       Row Name 06/20/25 1115          Physical Therapy Time and Intention    Document Type evaluation  -NAIMA     Mode of Treatment physical therapy  -NAIMA       Row Name 06/20/25 1115          General Information    Patient Profile Reviewed yes  -NAIMA     Prior Level of Function independent:;gait;transfer;bed mobility;ADL's  -NAIMA     Existing Precautions/Restrictions cardiac;oxygen therapy device and L/min;sternal  -NAIMA     Barriers to Rehab none identified  -NAIMA       Row Name 06/20/25 1115          Living Environment    Current Living Arrangements home  -NAIMA     People in Home spouse  -NAIMA       Row Name 06/20/25 1115          Stairs Within Home, Primary    Stairs, Within Home, Primary stairs to bed room  -NAIMA     Number of Stairs, Within Home, Primary twelve  -NAIMA     Stair Railings, Within Home, Primary railings safe and in good condition  -NAIMA       Row Name 06/20/25 1115          Cognition    Orientation Status (Cognition) oriented x 4  -NAIMA       Row Name 06/20/25 1115          Safety Issues/Impairments Affecting Functional Mobility    Safety Issues Affecting Function (Mobility) safety precautions follow-through/compliance;insight into deficits/self-awareness  -NAIMA     Impairments Affecting Function (Mobility) balance;endurance/activity tolerance;shortness of breath;strength  -NAIMA               User Key  (r) = Recorded By, (t) = Taken By, (c) = Cosigned By      Initials Name Provider Type    NAIMA Nika Santos PT Physical Therapist                   Mobility       Row Name 06/20/25 1117          Bed Mobility    Comment, (Bed  Mobility) patient is OOB and returns to the chair  -NAIMA       Row Name 06/20/25 1117          Transfers    Comment, (Transfers) patient needs cues for sternal precautions  -NAIMA       Row Name 06/20/25 1117          Bed-Chair Transfer    Bed-Chair [place] (Transfers) minimum assist (75% patient effort)  -NAIMA     Assistive Device (Bed-Chair Transfers) walker, front-wheeled  -NAIMA       Row Name 06/20/25 1117          Sit-Stand Transfer    Sit-Stand [place] (Transfers) minimum assist (75% patient effort)  -NAIMA     Assistive Device (Sit-Stand Transfers) walker, front-wheeled  -NAIMA       Row Name 06/20/25 1117          Gait/Stairs (Locomotion)    [place] Level (Gait) contact guard  -NAIMA     Assistive Device (Gait) walker, front-wheeled  -NAIMA     Distance in Feet (Gait) 150  -NAIMA     Deviations/Abnormal Patterns (Gait) van decreased;stride length decreased  -NAIMA     Comment, (Gait/Stairs) patient has decreased step length but is able to correct with cues. no balance trouble in standing. patient is limited by pain  -NAIMA               User Key  (r) = Recorded By, (t) = Taken By, (c) = Cosigned By      Initials Name Provider Type    Nika Estrada, PT Physical Therapist                   Obj/Interventions       Row Name 06/20/25 1118          Range of Motion Comprehensive    General Range of Motion no range of motion deficits identified  -[facility] Name 06/20/25 1118          Strength Comprehensive (MMT)    General Manual Muscle Testing (MMT) Assessment no strength deficits identified  -[facility] Name 06/20/25 1118          Balance    Balance Assessment sitting static balance;sitting dynamic balance;standing static balance;standing dynamic balance  -NAIMA     Static Sitting Balance independent  -NAIMA     Dynamic Sitting Balance independent  -NAIMA     Position, Sitting Balance unsupported;sitting in chair  -NAIMA     Static Standing Balance contact guard  -NAIMA     Dynamic Standing Balance contact guard  -NAIMA      Position/Device Used, Standing Balance supported;walker, front-wheeled  -NAIMA               User Key  (r) = Recorded By, (t) = Taken By, (c) = Cosigned By      Initials Name Provider Type    Nika Estrada, PT Physical Therapist                   Goals/Plan       Row Name 06/20/25 1121          Bed Mobility Goal 1 (PT)    Activity/Assistive Device (Bed Mobility Goal 1, PT) bed mobility activities, all  -NAIMA     [place] Level/Cues Needed (Bed Mobility Goal 1, PT) independent  -NAIMA     Time Frame (Bed Mobility Goal 1, PT) long term goal (LTG);10 days  -NAIMA     Progress/Outcomes (Bed Mobility Goal 1, PT) goal ongoing  -NAIMA       Row Name 06/20/25 1121          Transfer Goal 1 (PT)    Activity/Assistive Device (Transfer Goal 1, PT) sit-to-stand/stand-to-sit  -NAIMA     [place] Level/Cues Needed (Transfer Goal 1, PT) independent  -NAIMA     Time Frame (Transfer Goal 1, PT) short term goal (STG);5 days  -NAIMA     Progress/Outcome (Transfer Goal 1, PT) goal ongoing  -NAIMA       Row Name 06/20/25 1121          Gait Training Goal 1 (PT)    Activity/Assistive Device (Gait Training Goal 1, PT) gait (walking locomotion)  -NAIMA     [place] Level (Gait Training Goal 1, PT) independent  -NAIMA     Distance (Gait Training Goal 1, PT) 440  -NAIMA     Time Frame (Gait Training Goal 1, PT) long term goal (LTG);10 days  -NAIMA     Progress/Outcome (Gait Training Goal 1, PT) goal ongoing  -NAIMA       Row Name 06/20/25 1121          Stairs Goal 1 (PT)    Activity/Assistive Device (Stairs Goal 1, PT) ascending stairs;descending stairs  -NAIMA     [place] Level/Cues Needed (Stairs Goal 1, PT) independent  -NAIMA     Number of Stairs (Stairs Goal 1, PT) 10  -NAIMA     Time Frame (Stairs Goal 1, PT) long term goal (LTG);10 days  -NAIMA     Progress/Outcome (Stairs Goal 1, PT) goal ongoing  -NAIMA       Row Name 06/20/25 1121          Therapy Assessment/Plan (PT)    Planned Therapy Interventions (PT) balance training;bed mobility training;gait training;home  exercise program;stair training;strengthening;transfer training  -NAIMA               User Key  (r) = Recorded By, (t) = Taken By, (c) = Cosigned By      Initials Name Provider Type    Nika Estrada, PT Physical Therapist                   Clinical Impression       Row Name 06/20/25 1119          Pain    Pretreatment Pain Rating 3/10  -NAIMA     Posttreatment Pain Rating 5/10  -NAIMA     Pain Location chest  -NAIMA     Pain Management Interventions activity modification encouraged;exercise or physical activity utilized;nursing notified;premedicated for activity  -NAIMA     Response to Pain Interventions activity level improved;mobility function improved;activity participation with tolerable pain  -NAIMA       Row Name 06/20/25 1119          Plan of Care Review    Plan of Care Reviewed With patient  -NAIMA     Progress improving  -NAIMA     Outcome Evaluation PT eval is completed. patient presents S/P CABG x4. patient demonstrates impaired bed mobility trnasfers and gait compared to baseline status patient was able to ambulate 150 ft with rolling walker and CGA. anticipate patient  to be able to go home with family assist at D/C  -NAIMA       Row Name 06/20/25 1113          Therapy Assessment/Plan (PT)    Patient/Family Therapy Goals Statement (PT) go home  -NAIMA     Rehab Potential (PT) good  -NAIMA     Criteria for Skilled Interventions Met (PT) yes  -NAIMA     Therapy Frequency (PT) daily  -NAIMA     Predicted Duration of Therapy Intervention (PT) 10 days  -NAIMA       Row Name 06/20/25 1119          Vital Signs    Pre Systolic BP Rehab 119  -NAIMA     Pre Treatment Diastolic BP 73  -NAIMA     Post Systolic BP Rehab 123  -NAIMA     Post Treatment Diastolic BP 80  -NAIMA     Pretreatment Heart Rate (beats/min) 70  -NAIMA     Posttreatment Heart Rate (beats/min) 75  -NAIMA     Pre SpO2 (%) 96  -ANIMA     O2 Delivery Pre Treatment nasal cannula  -NAIMA     Post SpO2 (%) 94  -NAIMA     O2 Delivery Post Treatment nasal cannula  -NAIMA     Pre Patient Position Sitting  -NAIMA      Intra Patient Position Standing  -NAIMA     Post Patient Position Sitting  -NAIMA       Row Name 06/20/25 1119          Positioning and Restraints    Pre-Treatment Position sitting in chair/recliner  -NAIMA     Post Treatment Position chair  -NAIMA     In Chair notified nsg;reclined;sitting;call light within reach;encouraged to call for assist;with family/caregiver;with nsg  -NAIMA               User Key  (r) = Recorded By, (t) = Taken By, (c) = Cosigned By      Initials Name Provider Type    Nika Estrada PT Physical Therapist                   Outcome Measures       Row Name 06/20/25 1122          How much help from another person do you currently need...    Turning from your back to your side while in flat bed without using bedrails? 3  -NAIMA     Moving from lying on back to sitting on the side of a flat bed without bedrails? 3  -NAIMA     Moving to and from a bed to a chair (including a wheelchair)? 3  -NAIMA     Standing up from a chair using your arms (e.g., wheelchair, bedside chair)? 3  -NAIMA     Climbing 3-5 steps with a railing? 3  -NAIMA     To walk in hospital room? 3  -NAIMA     AM-PAC 6 Clicks Score (PT) 18  -NAIMA               User Key  (r) = Recorded By, (t) = Taken By, (c) = Cosigned By      Initials Name Provider Type    Nika Estrada PT Physical Therapist                                 Physical Therapy Education       Title: PT OT SLP Therapies (Not Started)       Topic: Physical Therapy (Not Started)       Point: Mobility training (Not Started)       Learner Progress:  Not documented in this visit.              Point: Home exercise program (Not Started)       Learner Progress:  Not documented in this visit.              Point: Body mechanics (Not Started)       Learner Progress:  Not documented in this visit.              Point: Precautions (Not Started)       Learner Progress:  Not documented in this visit.                                  PT Recommendation and Plan  Planned Therapy Interventions (PT):  balance training, bed mobility training, gait training, home exercise program, stair training, strengthening, transfer training  Progress: improving  Outcome Evaluation: PT eval is completed. patient presents S/P CABG x4. patient demonstrates impaired bed mobility trnasfers and gait compared to baseline status patient was able to ambulate 150 ft with rolling walker and CGA. anticipate patient  to be able to go home with family assist at D/C     Time Calculation:   PT Evaluation Complexity  History, PT Evaluation Complexity: 3 or more personal factors and/or comorbidities  Examination of Body Systems (PT Eval Complexity): total of 4 or more elements  Clinical Presentation (PT Evaluation Complexity): unstable  Clinical Decision Making (PT Evaluation Complexity): moderate complexity  Overall Complexity (PT Evaluation Complexity): moderate complexity     PT Charges       Row Name 06/20/25 1123             Time Calculation    Start Time 0745  -NAIMA      PT Received On 06/20/25  -NAIMA      PT Goal Re-Cert Due Date 06/30/25  -NAIMA         Untimed Charges    PT Eval/Re-eval Minutes 50  -NAIMA         Total Minutes    Untimed Charges Total Minutes 50  -ANIMA       Total Minutes 50  -NAIMA                User Key  (r) = Recorded By, (t) = Taken By, (c) = Cosigned By      Initials Name Provider Type    Nika Estrada, PT Physical Therapist                  Therapy Charges for Today       Code Description Service Date Service Provider Modifiers Qty    56062138446 HC PT EVAL MOD COMPLEXITY 4 6/20/2025 Nika Santos PT GP 1            PT G-Codes  AM-PAC 6 Clicks Score (PT): 18  PT Discharge Summary  Anticipated Discharge Disposition (PT): home with assist    Nika Santos PT  6/20/2025

## 2025-06-21 ENCOUNTER — APPOINTMENT (OUTPATIENT)
Dept: GENERAL RADIOLOGY | Facility: HOSPITAL | Age: 75
DRG: 234 | End: 2025-06-21
Payer: MEDICARE

## 2025-06-21 LAB
ALBUMIN SERPL-MCNC: 3.5 G/DL (ref 3.5–5.2)
ALBUMIN/GLOB SERPL: 1.6 G/DL
ALP SERPL-CCNC: 39 U/L (ref 39–117)
ALT SERPL W P-5'-P-CCNC: 16 U/L (ref 1–41)
ANION GAP SERPL CALCULATED.3IONS-SCNC: 7 MMOL/L (ref 5–15)
AST SERPL-CCNC: 40 U/L (ref 1–40)
BILIRUB SERPL-MCNC: 0.7 MG/DL (ref 0–1.2)
BUN SERPL-MCNC: 11.1 MG/DL (ref 8–23)
BUN/CREAT SERPL: 13.2 (ref 7–25)
CALCIUM SPEC-SCNC: 8.5 MG/DL (ref 8.6–10.5)
CHLORIDE SERPL-SCNC: 102 MMOL/L (ref 98–107)
CO2 SERPL-SCNC: 21 MMOL/L (ref 22–29)
CREAT SERPL-MCNC: 0.84 MG/DL (ref 0.76–1.27)
DEPRECATED RDW RBC AUTO: 46.5 FL (ref 37–54)
EGFRCR SERPLBLD CKD-EPI 2021: 91.5 ML/MIN/1.73
ERYTHROCYTE [DISTWIDTH] IN BLOOD BY AUTOMATED COUNT: 12.8 % (ref 12.3–15.4)
GLOBULIN UR ELPH-MCNC: 2.2 GM/DL
GLUCOSE BLDC GLUCOMTR-MCNC: 133 MG/DL (ref 70–130)
GLUCOSE BLDC GLUCOMTR-MCNC: 135 MG/DL (ref 70–130)
GLUCOSE BLDC GLUCOMTR-MCNC: 138 MG/DL (ref 70–130)
GLUCOSE BLDC GLUCOMTR-MCNC: 148 MG/DL (ref 70–130)
GLUCOSE SERPL-MCNC: 142 MG/DL (ref 65–99)
HCT VFR BLD AUTO: 37 % (ref 37.5–51)
HGB BLD-MCNC: 12.3 G/DL (ref 13–17.7)
MCH RBC QN AUTO: 33 PG (ref 26.6–33)
MCHC RBC AUTO-ENTMCNC: 33.2 G/DL (ref 31.5–35.7)
MCV RBC AUTO: 99.2 FL (ref 79–97)
PLATELET # BLD AUTO: 183 10*3/MM3 (ref 140–450)
PMV BLD AUTO: 9.8 FL (ref 6–12)
POTASSIUM SERPL-SCNC: 4.6 MMOL/L (ref 3.5–5.2)
PROT SERPL-MCNC: 5.7 G/DL (ref 6–8.5)
QT INTERVAL: 338 MS
QT INTERVAL: 378 MS
QTC INTERVAL: 385 MS
QTC INTERVAL: 422 MS
RBC # BLD AUTO: 3.73 10*6/MM3 (ref 4.14–5.8)
SODIUM SERPL-SCNC: 130 MMOL/L (ref 136–145)
WBC NRBC COR # BLD AUTO: 15.88 10*3/MM3 (ref 3.4–10.8)

## 2025-06-21 PROCEDURE — 25010000002 FUROSEMIDE PER 20 MG: Performed by: THORACIC SURGERY (CARDIOTHORACIC VASCULAR SURGERY)

## 2025-06-21 PROCEDURE — 71045 X-RAY EXAM CHEST 1 VIEW: CPT

## 2025-06-21 PROCEDURE — 82948 REAGENT STRIP/BLOOD GLUCOSE: CPT

## 2025-06-21 PROCEDURE — 93010 ELECTROCARDIOGRAM REPORT: CPT | Performed by: INTERNAL MEDICINE

## 2025-06-21 PROCEDURE — 80053 COMPREHEN METABOLIC PANEL: CPT | Performed by: INTERNAL MEDICINE

## 2025-06-21 PROCEDURE — 25010000002 KETOROLAC TROMETHAMINE PER 15 MG: Performed by: PHYSICIAN ASSISTANT

## 2025-06-21 PROCEDURE — 25010000002 MORPHINE PER 10 MG: Performed by: PHYSICIAN ASSISTANT

## 2025-06-21 PROCEDURE — 94799 UNLISTED PULMONARY SVC/PX: CPT

## 2025-06-21 PROCEDURE — 94761 N-INVAS EAR/PLS OXIMETRY MLT: CPT

## 2025-06-21 PROCEDURE — 97530 THERAPEUTIC ACTIVITIES: CPT

## 2025-06-21 PROCEDURE — 25010000002 MORPHINE PER 10 MG: Performed by: THORACIC SURGERY (CARDIOTHORACIC VASCULAR SURGERY)

## 2025-06-21 PROCEDURE — 99232 SBSQ HOSP IP/OBS MODERATE 35: CPT | Performed by: INTERNAL MEDICINE

## 2025-06-21 PROCEDURE — 93005 ELECTROCARDIOGRAM TRACING: CPT | Performed by: PHYSICIAN ASSISTANT

## 2025-06-21 PROCEDURE — 94640 AIRWAY INHALATION TREATMENT: CPT

## 2025-06-21 PROCEDURE — 99291 CRITICAL CARE FIRST HOUR: CPT | Performed by: STUDENT IN AN ORGANIZED HEALTH CARE EDUCATION/TRAINING PROGRAM

## 2025-06-21 PROCEDURE — 25010000002 CEFAZOLIN PER 500 MG: Performed by: PHYSICIAN ASSISTANT

## 2025-06-21 PROCEDURE — 85027 COMPLETE CBC AUTOMATED: CPT | Performed by: PHYSICIAN ASSISTANT

## 2025-06-21 PROCEDURE — 25010000002 MORPHINE PER 10 MG: Performed by: STUDENT IN AN ORGANIZED HEALTH CARE EDUCATION/TRAINING PROGRAM

## 2025-06-21 RX ORDER — FUROSEMIDE 10 MG/ML
40 INJECTION INTRAMUSCULAR; INTRAVENOUS ONCE
Status: COMPLETED | OUTPATIENT
Start: 2025-06-21 | End: 2025-06-21

## 2025-06-21 RX ORDER — METOPROLOL TARTRATE 25 MG/1
25 TABLET, FILM COATED ORAL EVERY 12 HOURS SCHEDULED
Status: DISCONTINUED | OUTPATIENT
Start: 2025-06-21 | End: 2025-06-26 | Stop reason: HOSPADM

## 2025-06-21 RX ORDER — GUAIFENESIN 600 MG/1
1200 TABLET, EXTENDED RELEASE ORAL EVERY 12 HOURS SCHEDULED
Status: DISCONTINUED | OUTPATIENT
Start: 2025-06-21 | End: 2025-06-26 | Stop reason: HOSPADM

## 2025-06-21 RX ORDER — KETOROLAC TROMETHAMINE 15 MG/ML
15 INJECTION, SOLUTION INTRAMUSCULAR; INTRAVENOUS ONCE AS NEEDED
Status: COMPLETED | OUTPATIENT
Start: 2025-06-21 | End: 2025-06-21

## 2025-06-21 RX ORDER — ALBUTEROL SULFATE 0.83 MG/ML
2.5 SOLUTION RESPIRATORY (INHALATION) EVERY 6 HOURS PRN
Status: DISCONTINUED | OUTPATIENT
Start: 2025-06-21 | End: 2025-06-26 | Stop reason: HOSPADM

## 2025-06-21 RX ORDER — HYDROCODONE BITARTRATE AND ACETAMINOPHEN 7.5; 325 MG/1; MG/1
1 TABLET ORAL EVERY 4 HOURS PRN
Refills: 0 | Status: DISCONTINUED | OUTPATIENT
Start: 2025-06-21 | End: 2025-06-22

## 2025-06-21 RX ORDER — SODIUM CHLORIDE FOR INHALATION 7 %
4 VIAL, NEBULIZER (ML) INHALATION
Status: DISCONTINUED | OUTPATIENT
Start: 2025-06-21 | End: 2025-06-26 | Stop reason: HOSPADM

## 2025-06-21 RX ORDER — MORPHINE SULFATE 2 MG/ML
2 INJECTION, SOLUTION INTRAMUSCULAR; INTRAVENOUS EVERY 4 HOURS PRN
Status: DISPENSED | OUTPATIENT
Start: 2025-06-21 | End: 2025-06-26

## 2025-06-21 RX ADMIN — GUAIFENESIN 1200 MG: 600 TABLET, EXTENDED RELEASE ORAL at 20:31

## 2025-06-21 RX ADMIN — MORPHINE SULFATE 2 MG: 2 INJECTION, SOLUTION INTRAMUSCULAR; INTRAVENOUS at 10:32

## 2025-06-21 RX ADMIN — GABAPENTIN 100 MG: 100 CAPSULE ORAL at 09:30

## 2025-06-21 RX ADMIN — DOCUSATE SODIUM 50 MG AND SENNOSIDES 8.6 MG 2 TABLET: 8.6; 5 TABLET, FILM COATED ORAL at 09:31

## 2025-06-21 RX ADMIN — GUAIFENESIN 1200 MG: 600 TABLET, EXTENDED RELEASE ORAL at 10:32

## 2025-06-21 RX ADMIN — GABAPENTIN 100 MG: 100 CAPSULE ORAL at 20:32

## 2025-06-21 RX ADMIN — Medication 10 ML: at 20:33

## 2025-06-21 RX ADMIN — ALBUTEROL SULFATE 2.5 MG: 2.5 SOLUTION RESPIRATORY (INHALATION) at 18:35

## 2025-06-21 RX ADMIN — HYDROCODONE BITARTRATE AND ACETAMINOPHEN 1 TABLET: 7.5; 325 TABLET ORAL at 05:34

## 2025-06-21 RX ADMIN — SODIUM CHLORIDE SOLN NEBU 7% 4 ML: 7 NEBU SOLN at 11:05

## 2025-06-21 RX ADMIN — SODIUM CHLORIDE 2 G: 900 INJECTION INTRAVENOUS at 02:14

## 2025-06-21 RX ADMIN — KETOROLAC TROMETHAMINE 15 MG: 15 INJECTION, SOLUTION INTRAMUSCULAR; INTRAVENOUS at 20:30

## 2025-06-21 RX ADMIN — GABAPENTIN 100 MG: 100 CAPSULE ORAL at 16:02

## 2025-06-21 RX ADMIN — METOPROLOL TARTRATE 25 MG: 25 TABLET, FILM COATED ORAL at 20:31

## 2025-06-21 RX ADMIN — MUPIROCIN 1 APPLICATION: 20 OINTMENT TOPICAL at 20:33

## 2025-06-21 RX ADMIN — DOCUSATE SODIUM 50 MG AND SENNOSIDES 8.6 MG 2 TABLET: 8.6; 5 TABLET, FILM COATED ORAL at 20:33

## 2025-06-21 RX ADMIN — HYDROCODONE BITARTRATE AND ACETAMINOPHEN 1 TABLET: 7.5; 325 TABLET ORAL at 00:23

## 2025-06-21 RX ADMIN — ACETAMINOPHEN 1000 MG: 500 TABLET ORAL at 02:14

## 2025-06-21 RX ADMIN — MUPIROCIN 1 APPLICATION: 20 OINTMENT TOPICAL at 09:31

## 2025-06-21 RX ADMIN — ASPIRIN 325 MG: 325 TABLET, COATED ORAL at 09:30

## 2025-06-21 RX ADMIN — ATORVASTATIN CALCIUM 40 MG: 40 TABLET, FILM COATED ORAL at 20:33

## 2025-06-21 RX ADMIN — OXYCODONE HYDROCHLORIDE 10 MG: 10 TABLET ORAL at 08:35

## 2025-06-21 RX ADMIN — OXYCODONE HYDROCHLORIDE 10 MG: 10 TABLET ORAL at 17:45

## 2025-06-21 RX ADMIN — METOPROLOL TARTRATE 25 MG: 25 TABLET, FILM COATED ORAL at 09:30

## 2025-06-21 RX ADMIN — MORPHINE SULFATE 2 MG: 2 INJECTION, SOLUTION INTRAMUSCULAR; INTRAVENOUS at 22:27

## 2025-06-21 RX ADMIN — FUROSEMIDE 40 MG: 10 INJECTION, SOLUTION INTRAMUSCULAR; INTRAVENOUS at 09:30

## 2025-06-21 RX ADMIN — MORPHINE SULFATE 2 MG: 2 INJECTION, SOLUTION INTRAMUSCULAR; INTRAVENOUS at 05:45

## 2025-06-21 NOTE — PROGRESS NOTES
"[facility] Cardiology  Hospital Progress Note     LOS: 3 days   Patient Care Team:  Keshawn Alexandre MD as PCP - General (Internal Medicine)  Scooter Jiménez MD as Consulting Physician (Colon and Rectal Surgery)  Alvino Berrios MD as Consulting Physician (Ophthalmology)  Reginaldo Tse MD as Consulting Physician (Cardiology)  Yanelis Hernandez APRN as Nurse Practitioner (Family Medicine)  Keshawn Ng MD as Surgeon (Cardiothoracic Surgery)  PCP:  Keshawn Alexandre MD    Chief Complaint: management of CV risk factors    IDENTIFICATION: A 74 y.o. male semiretired  and first cousin of Jose Maria Rico     PROBLEM LIST:   CAD  6/24 Echo: EF 61%, negative saline test  Nuclear stress (6/26/2024): Exercised for 7:48 (6:50 expected).  Small apical infarct.  Dense coronary artery calcification noted on CT attenuation correction images.  LVEF >70%  Cardiac catheterization for unstable angina (6/18/2025): Severe left main and severe multi-vessel CAD (LAD/diagonal, ramus, RCA).  Near normal LV filling pressure (LVEDP 13 mmHg)  CABG x 4 (06/19/2025): LIMA-->LAD, GSV-->PDA, OM1, D1. Ramus less than 1 mm and too small for grafting. Diffuse lipid laden disease in the coronaries ([place])  HTN  IRBBB  HLD  3/24  [id number]  3/25  125-157-  Hx of diplopia, negative neuro imaging 5/2024  OA  Surgical history:  Knee sugery  Tonsillectomy/adenoidectomy   Vasectomy     SUBJECTIVE: Sitting in bedside chair.  Complaining of chest discomfort originating from chest tubes, shallow breathing, increased O2 requirement overnight.  Chest x-ray with low lung volumes.      OBJECTIVE:     Vital Sign Min/Max for last 24 hours  Temp  Min: 98.8 °F (37.1 °C)  Max: 99 °F (37.2 °C)   BP  Min: 100/66  Max: 136/84   Pulse  Min: 64  Max: 89   Resp  Min: 18  Max: 22   SpO2  Min: 87 %  Max: 97 %   No data recorded   No data recorded     Flowsheet Rows      Flowsheet Row First Filed Value   Admission Height 177.8 cm (70\") " Documented at 06/18/2025 0636   Admission Weight 88.5 kg (195 lb 3.2 oz) Documented at 06/18/2025 0636            Telemetry: SR      Intake/Output Summary (Last 24 hours) at 6/21/2025 0840  Last data filed at 6/21/2025 0600  Gross per 24 hour   Intake 474 ml   Output 600 ml   Net -126 ml     Intake & Output (last 3 days)         06/18 0701 06/19 0700 06/19 0701 06/20 0700 06/20 0701 06/21 0700 06/21 0701 06/22 0700    P.O.  250 474     I.V. (mL/kg)  2657.6 (30)      Blood  335      IV Piggyback  1279      Total Intake(mL/kg)  4521.6 (51) 474 (5.3)     Urine (mL/kg/hr)  2240 (1.1) 395 (0.2)     Chest Tube  620 340     Total Output  2860 735     Net  +1661.6 -261                      Physical Exam:  Vitals reviewed.   Constitutional:       General: Awake.      Appearance: Not in distress.   Pulmonary:      Effort: Pulmonary effort is normal.      Breath sounds: Decreased air movement present.   Cardiovascular:      Normal rate. Regular rhythm. Normal S1. Normal S2.       Murmurs: There is no murmur.      No gallop.  No rub.   Pulses:     Intact distal pulses.   Edema:     Peripheral edema absent.   Skin:     General: Skin is warm and dry.   Neurological:      Mental Status: Alert.   Psychiatric:         Behavior: Behavior is cooperative.          LABS/DIAGNOSTIC DATA:  Results from last 7 days   Lab Units 06/21/25  0238 06/20/25  0348 06/19/25  1632   WBC 10*3/mm3 15.88* 11.93* 15.77*   HEMOGLOBIN g/dL 12.3* 13.0 12.2*   HEMATOCRIT % 37.0* 40.2 35.6*   PLATELETS 10*3/mm3 183 195 196     Lab Results   Lab Value Date/Time    TROPONINT 11 04/05/2024 0950     Results from last 7 days   Lab Units 06/20/25  0348 06/19/25  1314 06/19/25  0406   INR  1.29* 1.45* 1.05   APTT seconds  --  28.5 32.3     Results from last 7 days   Lab Units 06/21/25  0238 06/20/25  0348 06/19/25  1632 06/19/25  1314 06/19/25  0407   SODIUM mmol/L 130* 136 139   < > 139   POTASSIUM mmol/L 4.6 4.6 4.3   < > 4.4   CHLORIDE mmol/L 102 105 109*    < > 107   CO2 mmol/L 21.0* 19.0* 21.1*   < > 24.0   BUN mg/dL 11.1 11.2 11.4   < > 13.9   CREATININE mg/dL 0.84 1.01 0.89   < > 0.86   CALCIUM mg/dL 8.5* 8.4* 8.6   < > 9.0   BILIRUBIN mg/dL 0.7 0.6  --   --  0.4   ALK PHOS U/L 39 35*  --   --  49   ALT (SGPT) U/L 16 21  --   --  23   AST (SGOT) U/L 40 54*  --   --  24   GLUCOSE mg/dL 142* 155* 162*   < > 100*    < > = values in this interval not displayed.     Results from last 7 days   Lab Units 06/18/25  1536   HEMOGLOBIN A1C % 5.70*     Results from last 7 days   Lab Units 06/18/25  1538   CHOLESTEROL mg/dL 154   TRIGLYCERIDES mg/dL 272*   HDL CHOL mg/dL 50   LDL CHOL mg/dL 61               Medication Review:   acetaminophen, 1,000 mg, Oral, Q8H  aspirin, 325 mg, Oral, Daily  atorvastatin, 40 mg, Oral, Nightly  furosemide, 40 mg, Intravenous, Once  gabapentin, 100 mg, Oral, TID  insulin lispro, 2-7 Units, Subcutaneous, 4x Daily AC & at Bedtime  metoprolol tartrate, 25 mg, Oral, Q12H  mupirocin, 1 Application, Each Nare, BID  protamine, 50 mg, Intravenous, Once  senna-docusate sodium, 2 tablet, Oral, BID       phenylephrine, 0.5-3 mcg/kg/min  propofol, 5-50 mcg/kg/min           ASSESSMENT:    CAD s/p CABG x 4 6/19/2025    Primary hypertension    Hyperlipidemia LDL goal <70        PLAN:  Continue ASA and statin and beta-blockade therapy  Suspect increasing O2 requirements hypoventilation due to guarding from chest tube induced pain.  Low lung volumes on chest x-ray today.  Receiving Lasix 40 mg IV, chest tube management per CT surgery.      Keshawn Broderick III, MD   06/21/25  08:40 EDT

## 2025-06-21 NOTE — THERAPY TREATMENT NOTE
Patient Name: Jamari Munoz  : 1950    MRN: [id number]                              Today's Date: 2025       Admit Date: 2025    Visit Dx:     ICD-10-CM ICD-9-CM   1. Coronary artery disease involving native coronary artery of native heart with unstable angina pectoris  I25.110 414.01     411.1   2. Atherosclerosis of native coronary artery of native heart with stable angina pectoris  I25.118 414.01     413.9   3. Abnormal findings on diagnostic imaging of heart and coronary circulation  R93.1 794.39     Patient Active Problem List   Diagnosis    Osteoarthritis    Primary hypertension    Low back pain    Carbuncle and furuncle    RLS (restless legs syndrome)    Overweight    Ganglion cyst    Incomplete right bundle branch block    Hyperlipidemia LDL goal <70    Herpes simplex type 2 infection    Medicare annual wellness visit, subsequent    Olecranon bursitis of right elbow    Medicare annual wellness visit, subsequent    Left shoulder tendinitis    Chronic pain of left knee    Medicare annual wellness visit, subsequent    Right shoulder injury    H/O colonoscopy    Migraine    CAD s/p CABG x 4 2025     Past Medical History:   Diagnosis Date    Arthritis     Asthma 2025    when riding bike    Atherosclerosis of native coronary artery of native heart with stable angina pectoris 2025    Ganglion     Head injury 1967    several concussions from football and biking    Hyperlipidemia     Hypertension     Migraine 1975    one now every month or so with Aura     Past Surgical History:   Procedure Laterality Date    CARDIAC CATHETERIZATION N/A 2025    Procedure: Left Heart Cath - Right radial access;  Surgeon: Kai Daniel IV, MD;  Location: [facility] INVASIVE LOCATION;  Service: Cardiovascular;  Laterality: N/A;    COLONOSCOPY      CORONARY ARTERY BYPASS GRAFT N/A 2025    Procedure: MEDIAN STERNOTOMY, CORONARY ARTERY BYPASS GRAFTING X4 UTILIZING THE LEFT  INTERNAL MAMMARY ARTERY GRAFT, ENDOSCOPIC VEIN HARVEST OF THE GREATER LEFT SAPHENOUS VEIN, TRANSESOPHAGEAL ECHOCARDIOGRAM WITH ANESTHESIA;  Surgeon: Keshawn Ng MD;  Location: [facility];  Service: Cardiothoracic;  Laterality: N/A;    CYST REMOVAL      cyst removed left shoulder and left knee    KNEE MENISCAL REPAIR  2005    TONSILLECTOMY AND ADENOIDECTOMY      VASECTOMY        General Information       Row Name 06/21/25 1313          Physical Therapy Time and Intention    Document Type therapy note (daily note)  -AE     Mode of Treatment physical therapy  -AE       Row Name 06/21/25 1313          General Information    Patient Profile Reviewed yes  -AE     Existing Precautions/Restrictions cardiac;oxygen therapy device and L/min;sternal  -AE     Barriers to Rehab medically complex  -AE       Row Name 06/21/25 1313          Cognition    Orientation Status (Cognition) oriented x 4  -AE       Row Name 06/21/25 1313          Safety Issues/Impairments Affecting Functional Mobility    Safety Issues Affecting Function (Mobility) insight into deficits/self-awareness;safety precaution awareness;sequencing abilities;safety precautions follow-through/compliance  -AE     Impairments Affecting Function (Mobility) balance;endurance/activity tolerance;shortness of breath;strength;pain  -AE               User Key  (r) = Recorded By, (t) = Taken By, (c) = Cosigned By      Initials Name Provider Type    AE Juan Martínez, PT Physical Therapist                   Mobility       Row Name 06/21/25 1313          Bed Mobility    Comment, (Bed Mobility) Pt received and left [facility].  -AE       Row Name 06/21/25 1313          Transfers    Comment, (Transfers) VCs for hand placement and sequencing to maintain sternal precautions. Pt reports increased chest/flank pain this session limiting all mobility.  -AE       Row Name 06/21/25 1313          Sit-Stand Transfer    Sit-Stand [place] (Transfers) minimum assist (75% patient  effort);verbal cues;2 person assist  -AE       Row Name 06/21/25 1313          Gait/Stairs (Locomotion)    [place] (Gait) contact guard;2 person assist  -AE     Assistive Device (Gait) other (see comments)  BUE support on tele monitor  -AE     Distance in Feet (Gait) 240  -AE     Deviations/Abnormal Patterns (Gait) van decreased;stride length decreased;bilateral deviations;gait speed decreased  -AE     Bilateral Gait Deviations heel strike decreased  -AE     Comment, (Gait/Stairs) Pt demo step through gait pattern with slowed van and decreased gait speed. Pt required increased cues to improve depth of breathing and to reduce muscle tension while completing transfers and ambulating. Pt continues to be limited by pain and does not take deep breaths d/t this.  -AE               User Key  (r) = Recorded By, (t) = Taken By, (c) = Cosigned By      Initials Name Provider Type    AE Juan Martínez, APURVA Physical Therapist                   Obj/Interventions       Row Name 06/21/25 1322          Balance    Balance Assessment sitting static balance;sitting dynamic balance;standing static balance;standing dynamic balance  -AE     Static Sitting Balance contact guard  -AE     Dynamic Sitting Balance minimal assist  -AE     Position, Sitting Balance unsupported;sitting in chair  -AE     Static Standing Balance contact guard  -AE     Dynamic Standing Balance contact guard  -AE     Position/Device Used, Standing Balance supported  -AE               User Key  (r) = Recorded By, (t) = Taken By, (c) = Cosigned By      Initials Name Provider Type    AE Juan Martínez, APURVA Physical Therapist                   Goals/Plan    No documentation.                  Clinical Impression       Row Name 06/21/25 1323          Pain    Pretreatment Pain Rating 8/10  -AE     Posttreatment Pain Rating 9/10  -AE     Pain Location chest  -AE     Pain Side/Orientation generalized  -AE     Pain Management Interventions activity  modification encouraged;exercise or physical activity utilized;premedicated for activity  -AE     Response to Pain Interventions activity participation with increased pain  -AE       Row Name 06/21/25 1323          Plan of Care Review    Plan of Care Reviewed With patient;spouse  -AE     Progress improving  -AE     Outcome Evaluation Pt continues to present with decreased functional mobility and decreased activity tolerance mainly d/t increased pain. Pt ambulated 240ft with CGA and BUE support. Continue to progress per pt tolerance.  -AE       Row Name 06/21/25 1323          Vital Signs    Pre Systolic BP Rehab 165  -AE     Pre Treatment Diastolic BP 95  -AE     Post Systolic BP Rehab 135  -AE     Post Treatment Diastolic BP 95  -AE     Pretreatment Heart Rate (beats/min) 84  -AE     Posttreatment Heart Rate (beats/min) 93  -AE     Pre SpO2 (%) 96  -AE     O2 Delivery Pre Treatment nasal cannula  -AE     O2 Delivery Intra Treatment nasal cannula  -AE     Post SpO2 (%) 95  -AE     O2 Delivery Post Treatment nasal cannula  -AE     Pre Patient Position Sitting  -AE     Intra Patient Position Standing  -AE     Post Patient Position Sitting  -AE       Row Name 06/21/25 1323          Positioning and Restraints    Pre-Treatment Position sitting in chair/recliner  -AE     Post Treatment Position chair  -AE     In Chair notified nsg;call light within reach;encouraged to call for assist;with family/caregiver;waffle cushion;legs elevated;LUE elevated;RUE elevated;with nsg  -AE               User Key  (r) = Recorded By, (t) = Taken By, (c) = Cosigned By      Initials Name Provider Type    AE Juan Martínez, PT Physical Therapist                   Outcome Measures       Row Name 06/21/25 1321          How much help from another person do you currently need...    Turning from your back to your side while in flat bed without using bedrails? 3  -AE     Moving from lying on back to sitting on the side of a flat bed without  bedrails? 2  -AE     Moving to and from a bed to a chair (including a wheelchair)? 3  -AE     Standing up from a chair using your arms (e.g., wheelchair, bedside chair)? 3  -AE     Climbing 3-5 steps with a railing? 2  -AE     To walk in hospital room? 3  -AE     AM-PAC 6 Clicks Score (PT) 16  -AE     Highest Level of Mobility Goal Stand (1 or More Minutes)-5  -AE       Row Name 06/21/25 1325          Functional Assessment    Outcome Measure Options AM-PAC 6 Clicks Basic Mobility (PT)  -AE               User Key  (r) = Recorded By, (t) = Taken By, (c) = Cosigned By      Initials Name Provider Type    AE Juan Martínez, PT Physical Therapist                                 Physical Therapy Education       Title: PT OT SLP Therapies (In Progress)       Topic: Physical Therapy (In Progress)       Point: Mobility training (In Progress)       Learning Progress Summary            Patient Acceptance, E, NR by AE at 6/21/2025 1029                      Point: Home exercise program (Not Started)       Learner Progress:  Not documented in this visit.              Point: Body mechanics (In Progress)       Learning Progress Summary            Patient Acceptance, E, NR by AE at 6/21/2025 1029                      Point: Precautions (In Progress)       Learning Progress Summary            Patient Acceptance, E, NR by AE at 6/21/2025 1029                                      User Key       Initials Effective Dates Name Provider Type Discipline    AE 09/21/21 -  Juan Martínez PT Physical Therapist PT                  PT Recommendation and Plan     Progress: improving  Outcome Evaluation: Pt continues to present with decreased functional mobility and decreased activity tolerance mainly d/t increased pain. Pt ambulated 240ft with CGA and BUE support. Continue to progress per pt tolerance.     Time Calculation:         PT Charges       Row Name 06/21/25 1326             Time Calculation    Start Time 1029  -AE      PT Received On  06/21/25  -AE      PT Goal Re-Cert Due Date 06/30/25  -AE         Timed Charges    37378 - PT Therapeutic Activity Minutes 25  -AE         Total Minutes    Timed Charges Total Minutes 25  -AE       Total Minutes 25  -AE                User Key  (r) = Recorded By, (t) = Taken By, (c) = Cosigned By      Initials Name Provider Type    AE Juan Martínez, PT Physical Therapist                  Therapy Charges for Today       Code Description Service Date Service Provider Modifiers Qty    81139647773 HC PT THERAPEUTIC ACT EA 15 MIN 6/21/2025 Juan Martínez, PT GP 2            PT G-Codes  Outcome Measure Options: AM-PAC 6 Clicks Basic Mobility (PT)  AM-PAC 6 Clicks Score (PT): 16  PT Discharge Summary  Anticipated Discharge Disposition (PT): home with assist    Juan Martínez PT  6/21/2025

## 2025-06-21 NOTE — PROGRESS NOTES
CRITICAL CARE PROGRESS NOTE       Patient's name Jamari Munoz MRN: [id number] : 1950-74 y.o.-male  Admission date 2025  Length of stay 3  ICU LOS 1d 21h    TODAY: No overnight events, extubated.  Feeling well.  Still some slight postoperative pain which doesn't let him take deep breaths      HOSPITAL COURSE:      SCHEDULED MEDS:  acetaminophen, 1,000 mg, Oral, Q8H  aspirin, 325 mg, Oral, Daily  atorvastatin, 40 mg, Oral, Nightly  gabapentin, 100 mg, Oral, TID  guaiFENesin, 1,200 mg, Oral, Q12H  insulin lispro, 2-7 Units, Subcutaneous, 4x Daily AC & at Bedtime  metoprolol tartrate, 25 mg, Oral, Q12H  mupirocin, 1 Application, Each Nare, BID  protamine, 50 mg, Intravenous, Once  senna-docusate sodium, 2 tablet, Oral, BID         CONTINUOUS INFUSIONS:  phenylephrine, 0.5-3 mcg/kg/min  propofol, 5-50 mcg/kg/min         PHYSICAL EXAMINATION:    Temp:  [98.8 °F (37.1 °C)-99 °F (37.2 °C)] 98.8 °F (37.1 °C)  Heart Rate:  [64-89] 74  Resp:  [18-22] 20  BP: (100-136)/(66-88) 136/84    Intake/Output Summary (Last 24 hours) at 2025 0955  Last data filed at 2025 0600  Gross per 24 hour   Intake 474 ml   Output 600 ml   Net -126 ml          Physical Exam  Vitals reviewed.   Constitutional:       Appearance: Normal appearance. He is normal weight.   HENT:      Head: Normocephalic and atraumatic.      Mouth/Throat:      Mouth: Mucous membranes are moist.   Eyes:      Extraocular Movements: Extraocular movements intact.      Conjunctiva/sclera: Conjunctivae normal.      Pupils: Pupils are equal, round, and reactive to light.   Cardiovascular:      Rate and Rhythm: Normal rate and regular rhythm.      Pulses: Normal pulses.   Pulmonary:      Effort: Pulmonary effort is normal.      Breath sounds: Normal breath sounds.   Abdominal:      General: Abdomen is flat. Bowel sounds are normal.      Palpations: Abdomen is soft.   Skin:     General: Skin is warm and dry.      Comments: Midline incision noted    Neurological:      General: No focal deficit present.      Mental Status: He is alert. Mental status is at baseline.           DATA REVIEW:    1. Medical chart reviewed in detail  2. I reviewed the actual EKG rhythm strips and Pulse Oximetry data on the monitors  3. I reviewed today's lab values and the report of the most recent Chest X ray.  4. In addition, I have independently reviewed the actual image of the most recent chest Xray    Hematology:  Results from last 7 days   Lab Units 06/21/25 0238 06/20/25 0348 06/19/25  1632   WBC 10*3/mm3 15.88* 11.93* 15.77*   HEMOGLOBIN g/dL 12.3* 13.0 12.2*   HEMATOCRIT % 37.0* 40.2 35.6*   PLATELETS 10*3/mm3 183 195 196     Chemistry:  Estimated Creatinine Clearance: 86.4 mL/min (by C-G formula based on SCr of 0.84 mg/dL).  Results from last 7 days   Lab Units 06/21/25 0238 06/20/25  0348   SODIUM mmol/L 130* 136   POTASSIUM mmol/L 4.6 4.6   CHLORIDE mmol/L 102 105   CO2 mmol/L 21.0* 19.0*   BUN mg/dL 11.1 11.2   CREATININE mg/dL 0.84 1.01   GLUCOSE mg/dL 142* 155*     Results from last 7 days   Lab Units 06/21/25 0238 06/20/25 0348 06/19/25  1632 06/19/25  1314   IONIZED CALCIUM mmol/L  --   --   --  1.07*   CALCIUM mg/dL 8.5* 8.4* 8.6 9.0   MAGNESIUM mg/dL  --  2.4 1.9 2.0   PHOSPHORUS mg/dL  --   --  3.4 2.8     Hepatic Panel:  Results from last 7 days   Lab Units 06/21/25 0238 06/20/25  0348 06/19/25  1314 06/19/25  0407   ALBUMIN g/dL 3.5 3.8   < > 3.6   TOTAL PROTEIN g/dL 5.7* 5.7*  --  6.1   BILIRUBIN mg/dL 0.7 0.6  --  0.4   AST (SGOT) U/L 40 54*  --  24   ALT (SGPT) U/L 16 21  --  23   ALK PHOS U/L 39 35*  --  49    < > = values in this interval not displayed.     Coagulation Labs:  Results from last 7 days   Lab Units 06/20/25  0348 06/19/25  1314 06/19/25  0406   PROTIME Seconds 16.9* 18.6* 14.3   INR  1.29* 1.45* 1.05   APTT seconds  --  28.5 32.3      Cardiac Labs:      Biomarkers:      U/A  Results from last 7 days   Lab Units 06/18/25  1445   COLOR UA   "Yellow   CLARITY UA  Clear   PH, URINE  7.0   SPECIFIC GRAVITY, URINE  1.041*   GLUCOSE UA  Negative   KETONES UA  Negative   BILIRUBIN UA  Negative   PROTEIN UA  Negative   BLOOD UA  Negative   LEUKOCYTES UA  Negative   NITRITE UA  Negative   UROBILINOGEN UA  0.2 E.U./dL         Arterial Blood Gases:  Results from last 7 days   Lab Units 06/19/25  1635 06/19/25  1308 06/19/25  1112   PH, ARTERIAL pH units 7.342* 7.506* 7.39   PCO2, ARTERIAL mm Hg 43.4 28.2*  --    PO2 ART mm Hg 89.0 234.0*  --    FIO2 % 40 100  --      Microbiology:  No results found for: \"BLOODCX\", \"CULTURES\", \"THROATCX\", \"URINECX\", \"STOOLCX\", \"WOUNDCX\"    Images:  XR Chest 1 View  Result Date: 6/21/2025  Persistent atelectasis with no definite pneumothorax. Electronically Signed: Charles Navarro MD  6/21/2025 6:30 AM EDT  Workstation ID: [id number]    XR Chest 1 View  Result Date: 6/20/2025  Impression: 1. Interval extubation and removal of esophagogastric tube. 2. Stable right IJ central venous catheter. 3. Chest tubes in place with small left apical pneumothorax measuring 13 mm. No pneumothorax on the right. 4. Stable bibasilar atelectasis. Electronically Signed: Poli Ramirez MD  6/20/2025 7:32 AM EDT  Workstation ID: [id number]    XR Chest 1 View  Result Date: 6/19/2025  Impression: 1. Poststernotomy chest radiograph, with ET tube, NG tube, and right IJ catheter apparently in satisfactory position. 2. Bibasilar discoid atelectasis, left greater than right, questionable small left effusion. No pneumothorax or other significant chest disease as seen. Electronically Signed: Lloyd Eddy MD  6/19/2025 1:15 PM EDT  Workstation ID: [id number]      Echo:  Results for orders placed during the hospital encounter of 06/18/25    Adult Transthoracic Echo Complete W/ Cont if Necessary Per Protocol    Interpretation Summary    Left ventricular systolic function is normal. Left ventricular ejection fraction appears to be 56 - 60%.    Left ventricular diastolic " function is consistent with (grade I) impaired relaxation.    The aortic root measures 3.6 cm.          ASSESSMENT & PLAN     CAD s/p CABG x 4 6/19/2025    Primary hypertension    Hyperlipidemia LDL goal <70       *Coronary disease status post CABG x 4  POD1  *Hyperlipidemia  *hypertension     Plan:  Postoperative care  Amiodarone protocol in case patient develops atrial fibrillation  Aspirin statin therapy  Maintain SpO2 greater than 90%  Pulmonary toiletry  Pain control  Patient to be transferred to telemetry    Bowel regimen:  Diet: Diet: Cardiac, Diabetic, Regular/House; Healthy Heart (2-3 Na+); Consistent Carbohydrate; Fluid Consistency: Thin (IDDSI 0)  GI ppx:   DVT PPX: VTE Prophylaxis:  Mechanical VTE prophylaxis orders are present.       Advance Directives: Code Status (Patient has no pulse and is not breathing): CPR (Attempt to Resuscitate)  Medical Interventions (Patient has pulse or is breathing): Full Support       Dispo: Telemetry    I have spent a total of 44 critical care minutes in the management of this patient, apart from any time taken to perform necessary procedures. Critical care time includes time reviewing chart, images, report of images, rounding with nurse, respiratory therapist and pharmacist, and discussions with available family at bedside.    Signed: Yogesh Lewis MD  09:55 EDT 6/21/2025

## 2025-06-21 NOTE — PLAN OF CARE
Goal Outcome Evaluation:           Progress: improving  Outcome Evaluation: Well rested in bed this shift. Pain medication given multiple timesx throughout the night, but reports he is feeling better in general.  Decreased urinary output tonight however Renal function labs appear WDL.  Remains on o2 at 4L. Minimal drainage through chest tubes.

## 2025-06-21 NOTE — PLAN OF CARE
Goal Outcome Evaluation:  Plan of Care Reviewed With: patient, spouse        Progress: improving  Outcome Evaluation: Pt continues to present with decreased functional mobility and decreased activity tolerance mainly d/t increased pain. Pt ambulated 240ft with CGA and BUE support. Continue to progress per pt tolerance.    Anticipated Discharge Disposition (PT): home with assist

## 2025-06-21 NOTE — PROGRESS NOTES
Cardiothoracic Surgery Progress Note      POD # 2 s/p CABGx4     LOS: 3 days      Subjective:  Feeling better, some chest pain at tube site, difficulty coughing up phlegm    Objective:  Vital Signs  Temp:  [98.8 °F (37.1 °C)-99 °F (37.2 °C)] 98.8 °F (37.1 °C)  Heart Rate:  [64-89] 74  Resp:  [18-22] 20  BP: (100-136)/(66-88) 136/84    Physical Exam:   General Appearance: alert, appears stated age and cooperative   Lungs: clear to auscultation, respirations regular, respirations even, and respirations unlabored   Heart: regular rhythm & normal rate, normal S1, S2, no murmur, no gallop, no rub, and no click   Skin: Incision c/d/i     Results:    Results from last 7 days   Lab Units 06/21/25  0238   WBC 10*3/mm3 15.88*   HEMOGLOBIN g/dL 12.3*   HEMATOCRIT % 37.0*   PLATELETS 10*3/mm3 183     Results from last 7 days   Lab Units 06/21/25  0238   SODIUM mmol/L 130*   POTASSIUM mmol/L 4.6   CHLORIDE mmol/L 102   CO2 mmol/L 21.0*   BUN mg/dL 11.1   CREATININE mg/dL 0.84   GLUCOSE mg/dL 142*   CALCIUM mg/dL 8.5*       Assessment:  POD #2 s/p CABG x 4    CAD s/p CABG x 4 6/19/2025    Primary hypertension    Hyperlipidemia LDL goal <70    Plan:  D/c tubes  Cont Asa/statin/BB  downgrade    Thom Bone MD  06/21/25  08:14 EDT

## 2025-06-22 ENCOUNTER — APPOINTMENT (OUTPATIENT)
Dept: GENERAL RADIOLOGY | Facility: HOSPITAL | Age: 75
DRG: 234 | End: 2025-06-22
Payer: MEDICARE

## 2025-06-22 LAB
ANION GAP SERPL CALCULATED.3IONS-SCNC: 8 MMOL/L (ref 5–15)
BUN SERPL-MCNC: 19.4 MG/DL (ref 8–23)
BUN/CREAT SERPL: 23.7 (ref 7–25)
CALCIUM SPEC-SCNC: 9 MG/DL (ref 8.6–10.5)
CHLORIDE SERPL-SCNC: 95 MMOL/L (ref 98–107)
CO2 SERPL-SCNC: 28 MMOL/L (ref 22–29)
CREAT SERPL-MCNC: 0.82 MG/DL (ref 0.76–1.27)
DEPRECATED RDW RBC AUTO: 45.5 FL (ref 37–54)
EGFRCR SERPLBLD CKD-EPI 2021: 92.2 ML/MIN/1.73
ERYTHROCYTE [DISTWIDTH] IN BLOOD BY AUTOMATED COUNT: 12.4 % (ref 12.3–15.4)
GLUCOSE BLDC GLUCOMTR-MCNC: 112 MG/DL (ref 70–130)
GLUCOSE BLDC GLUCOMTR-MCNC: 117 MG/DL (ref 70–130)
GLUCOSE BLDC GLUCOMTR-MCNC: 123 MG/DL (ref 70–130)
GLUCOSE BLDC GLUCOMTR-MCNC: 142 MG/DL (ref 70–130)
GLUCOSE SERPL-MCNC: 105 MG/DL (ref 65–99)
HCT VFR BLD AUTO: 36.5 % (ref 37.5–51)
HGB BLD-MCNC: 12.1 G/DL (ref 13–17.7)
MCH RBC QN AUTO: 33.1 PG (ref 26.6–33)
MCHC RBC AUTO-ENTMCNC: 33.2 G/DL (ref 31.5–35.7)
MCV RBC AUTO: 99.7 FL (ref 79–97)
PLATELET # BLD AUTO: 164 10*3/MM3 (ref 140–450)
PMV BLD AUTO: 10.1 FL (ref 6–12)
POTASSIUM SERPL-SCNC: 4.5 MMOL/L (ref 3.5–5.2)
RBC # BLD AUTO: 3.66 10*6/MM3 (ref 4.14–5.8)
SODIUM SERPL-SCNC: 131 MMOL/L (ref 136–145)
WBC NRBC COR # BLD AUTO: 14.83 10*3/MM3 (ref 3.4–10.8)

## 2025-06-22 PROCEDURE — 25010000002 ONDANSETRON PER 1 MG: Performed by: PHYSICIAN ASSISTANT

## 2025-06-22 PROCEDURE — 94799 UNLISTED PULMONARY SVC/PX: CPT

## 2025-06-22 PROCEDURE — 94761 N-INVAS EAR/PLS OXIMETRY MLT: CPT

## 2025-06-22 PROCEDURE — 99024 POSTOP FOLLOW-UP VISIT: CPT | Performed by: PHYSICIAN ASSISTANT

## 2025-06-22 PROCEDURE — 82948 REAGENT STRIP/BLOOD GLUCOSE: CPT

## 2025-06-22 PROCEDURE — 94664 DEMO&/EVAL PT USE INHALER: CPT

## 2025-06-22 PROCEDURE — 99232 SBSQ HOSP IP/OBS MODERATE 35: CPT | Performed by: INTERNAL MEDICINE

## 2025-06-22 PROCEDURE — 85027 COMPLETE CBC AUTOMATED: CPT | Performed by: PHYSICIAN ASSISTANT

## 2025-06-22 PROCEDURE — 80048 BASIC METABOLIC PNL TOTAL CA: CPT | Performed by: PHYSICIAN ASSISTANT

## 2025-06-22 PROCEDURE — 25010000002 FUROSEMIDE PER 20 MG: Performed by: THORACIC SURGERY (CARDIOTHORACIC VASCULAR SURGERY)

## 2025-06-22 PROCEDURE — 71045 X-RAY EXAM CHEST 1 VIEW: CPT

## 2025-06-22 RX ORDER — FUROSEMIDE 10 MG/ML
40 INJECTION INTRAMUSCULAR; INTRAVENOUS ONCE
Status: COMPLETED | OUTPATIENT
Start: 2025-06-22 | End: 2025-06-22

## 2025-06-22 RX ADMIN — GUAIFENESIN 1200 MG: 600 TABLET, EXTENDED RELEASE ORAL at 08:30

## 2025-06-22 RX ADMIN — GABAPENTIN 100 MG: 100 CAPSULE ORAL at 20:39

## 2025-06-22 RX ADMIN — ATORVASTATIN CALCIUM 40 MG: 40 TABLET, FILM COATED ORAL at 20:39

## 2025-06-22 RX ADMIN — OXYCODONE HYDROCHLORIDE 10 MG: 10 TABLET ORAL at 08:32

## 2025-06-22 RX ADMIN — ALBUTEROL SULFATE 2.5 MG: 2.5 SOLUTION RESPIRATORY (INHALATION) at 20:21

## 2025-06-22 RX ADMIN — METOPROLOL TARTRATE 25 MG: 25 TABLET, FILM COATED ORAL at 20:40

## 2025-06-22 RX ADMIN — ASPIRIN 325 MG: 325 TABLET, COATED ORAL at 08:36

## 2025-06-22 RX ADMIN — OXYCODONE HYDROCHLORIDE 10 MG: 10 TABLET ORAL at 04:39

## 2025-06-22 RX ADMIN — GABAPENTIN 100 MG: 100 CAPSULE ORAL at 08:32

## 2025-06-22 RX ADMIN — GABAPENTIN 100 MG: 100 CAPSULE ORAL at 16:53

## 2025-06-22 RX ADMIN — ONDANSETRON 4 MG: 2 INJECTION INTRAMUSCULAR; INTRAVENOUS at 18:23

## 2025-06-22 RX ADMIN — SODIUM CHLORIDE SOLN NEBU 7% 4 ML: 7 NEBU SOLN at 06:50

## 2025-06-22 RX ADMIN — METOPROLOL TARTRATE 25 MG: 25 TABLET, FILM COATED ORAL at 08:32

## 2025-06-22 RX ADMIN — SODIUM CHLORIDE SOLN NEBU 7% 4 ML: 7 NEBU SOLN at 20:21

## 2025-06-22 RX ADMIN — GUAIFENESIN 1200 MG: 600 TABLET, EXTENDED RELEASE ORAL at 20:39

## 2025-06-22 RX ADMIN — DOCUSATE SODIUM 50 MG AND SENNOSIDES 8.6 MG 2 TABLET: 8.6; 5 TABLET, FILM COATED ORAL at 20:40

## 2025-06-22 RX ADMIN — DOCUSATE SODIUM 50 MG AND SENNOSIDES 8.6 MG 2 TABLET: 8.6; 5 TABLET, FILM COATED ORAL at 08:35

## 2025-06-22 RX ADMIN — ACETAMINOPHEN 1000 MG: 500 TABLET ORAL at 02:28

## 2025-06-22 RX ADMIN — FUROSEMIDE 40 MG: 10 INJECTION, SOLUTION INTRAMUSCULAR; INTRAVENOUS at 08:32

## 2025-06-22 RX ADMIN — BISACODYL 5 MG: 5 TABLET, COATED ORAL at 16:53

## 2025-06-22 RX ADMIN — MUPIROCIN 1 APPLICATION: 20 OINTMENT TOPICAL at 08:30

## 2025-06-22 RX ADMIN — MUPIROCIN 1 APPLICATION: 20 OINTMENT TOPICAL at 20:40

## 2025-06-22 RX ADMIN — Medication 10 ML: at 20:41

## 2025-06-22 NOTE — PROGRESS NOTES
Cardiothoracic Surgery Progress Note      POD # 3 s/p CABGx4     LOS: 4 days      Subjective:  No new complaint.  Postop pain with deep breaths with chest tubes    Objective:  Vital Signs  Temp:  [97.7 °F (36.5 °C)-98.4 °F (36.9 °C)] 98.1 °F (36.7 °C)  Heart Rate:  [71-99] 74  Resp:  [16-22] 16  BP: (105-165)/(73-95) 105/73    Physical Exam:   General Appearance: alert, appears stated age and cooperative   Lungs: Decreased at bases otherwise clear to auscultation   Heart: Regular rate and rhythm   Skin: Incision c/d/i      Chest tubes: 286 mL / 24 hours (140+146)   Results:    Results from last 7 days   Lab Units 06/22/25  0502   WBC 10*3/mm3 14.83*   HEMOGLOBIN g/dL 12.1*   HEMATOCRIT % 36.5*   PLATELETS 10*3/mm3 164     Results from last 7 days   Lab Units 06/22/25  0503   SODIUM mmol/L 131*   POTASSIUM mmol/L 4.5   CHLORIDE mmol/L 95*   CO2 mmol/L 28.0   BUN mg/dL 19.4   CREATININE mg/dL 0.82   GLUCOSE mg/dL 105*   CALCIUM mg/dL 9.0       Assessment:  POD #3 s/p CABG x 4    CAD s/p CABG x 4 6/19/2025    Primary hypertension    Hyperlipidemia LDL goal <70    Plan:   D/c pleural tubes   Cont Asa/statin/BB  Possible d/c home in am  Aggressive pulmonary toilet increase activity levels.    Check chest x-ray  Watch white blood cell count repeat CBC in the morning      TARIQ Reyes  06/22/25  08:07 EDT

## 2025-06-22 NOTE — PLAN OF CARE
Problem: Adult Inpatient Plan of Care  Goal: Plan of Care Review  Outcome: Progressing  Flowsheets (Taken 6/22/2025 0622)  Progress: improving     Problem: Adult Inpatient Plan of Care  Goal: Absence of Hospital-Acquired Illness or Injury  Intervention: Identify and Manage Fall Risk  Recent Flowsheet Documentation  Taken 6/22/2025 0600 by Tommy Hernandez RN  Safety Promotion/Fall Prevention:   safety round/check completed   room organization consistent   nonskid shoes/slippers when out of bed   muscle strengthening facilitated   mobility aid in reach   lighting adjusted   fall prevention program maintained   assistive device/personal items within reach   clutter free environment maintained   gait belt  Taken 6/22/2025 0400 by Tommy Hernandez RN  Safety Promotion/Fall Prevention:   safety round/check completed   room organization consistent   nonskid shoes/slippers when out of bed   muscle strengthening facilitated   mobility aid in reach   lighting adjusted   fall prevention program maintained   assistive device/personal items within reach   clutter free environment maintained   gait belt  Taken 6/22/2025 0228 by Tommy Hernandez RN  Safety Promotion/Fall Prevention:   safety round/check completed   room organization consistent   nonskid shoes/slippers when out of bed   muscle strengthening facilitated   mobility aid in reach   lighting adjusted   fall prevention program maintained   assistive device/personal items within reach   clutter free environment maintained   gait belt  Taken 6/22/2025 0000 by Tommy Hernandez RN  Safety Promotion/Fall Prevention:   safety round/check completed   room organization consistent   nonskid shoes/slippers when out of bed   muscle strengthening facilitated   mobility aid in reach   lighting adjusted   fall prevention program maintained   assistive device/personal items within reach   clutter free environment maintained   gait belt  Taken 6/21/2025 2227 by Tommy Hernandez,  RN  Safety Promotion/Fall Prevention:   safety round/check completed   room organization consistent   nonskid shoes/slippers when out of bed   muscle strengthening facilitated   mobility aid in reach   lighting adjusted   fall prevention program maintained   assistive device/personal items within reach   clutter free environment maintained   gait belt  Taken 6/21/2025 1942 by Tommy Hernandez RN  Safety Promotion/Fall Prevention:   safety round/check completed   room organization consistent   nonskid shoes/slippers when out of bed   muscle strengthening facilitated   mobility aid in reach   lighting adjusted   gait belt   fall prevention program maintained   clutter free environment maintained   assistive device/personal items within reach     Problem: Adult Inpatient Plan of Care  Goal: Absence of Hospital-Acquired Illness or Injury  Intervention: Prevent Skin Injury  Recent Flowsheet Documentation  Taken 6/22/2025 0600 by Tommy Hernandez RN  Body Position:   supine   position changed independently  Skin Protection:   incontinence pads utilized   silicone foam dressing in place   transparent dressing maintained  Taken 6/22/2025 0400 by Tommy Hernandez RN  Body Position:   supine   position changed independently  Skin Protection:   incontinence pads utilized   silicone foam dressing in place   transparent dressing maintained  Taken 6/22/2025 0228 by Tommy Hernandez RN  Body Position:   supine   position changed independently  Skin Protection:   incontinence pads utilized   silicone foam dressing in place   transparent dressing maintained  Taken 6/22/2025 0000 by Tommy Hernandez RN  Body Position: position changed independently  Skin Protection:   incontinence pads utilized   silicone foam dressing in place   transparent dressing maintained  Taken 6/21/2025 2227 by Tommy Hernandez RN  Body Position:   supine   position changed independently  Skin Protection:   incontinence pads utilized   silicone foam  dressing in place   transparent dressing maintained  Taken 6/21/2025 1942 by Tommy Hernandez RN  Body Position:   upper extremity elevated   lower extremity elevated   position changed independently  Skin Protection:   incontinence pads utilized   transparent dressing maintained   pulse oximeter probe site changed   silicone foam dressing in place     Problem: Adult Inpatient Plan of Care  Goal: Absence of Hospital-Acquired Illness or Injury  Intervention: Prevent Infection  Recent Flowsheet Documentation  Taken 6/22/2025 0600 by Tommy Hernandez RN  Infection Prevention:   hand hygiene promoted   rest/sleep promoted   single patient room provided  Taken 6/22/2025 0400 by Tommy Hernandez RN  Infection Prevention:   hand hygiene promoted   rest/sleep promoted   single patient room provided  Taken 6/22/2025 0228 by Tommy Hernandez RN  Infection Prevention:   hand hygiene promoted   rest/sleep promoted   single patient room provided  Taken 6/22/2025 0000 by Tommy Hernandez RN  Infection Prevention:   hand hygiene promoted   rest/sleep promoted   single patient room provided  Taken 6/21/2025 2227 by Tommy Hernandez RN  Infection Prevention:   hand hygiene promoted   rest/sleep promoted   single patient room provided   Goal Outcome Evaluation:           Progress: improving

## 2025-06-22 NOTE — PROGRESS NOTES
"[facility] at [facility] - Progress Note    Jamari Munoz  1950  [id number]     LOS: 4 days     Patient Care Team:  Keshawn Alexandre MD as PCP - General (Internal Medicine)  Scooter Jiménez MD as Consulting Physician (Colon and Rectal Surgery)  Alvino Berrios MD as Consulting Physician (Ophthalmology)  Reginaldo Tse MD as Consulting Physician (Cardiology)  Yanelis Hernandez APRN as Nurse Practitioner (Family Medicine)  Keshawn Ng MD as Surgeon (Cardiothoracic Surgery)    06/22/25    Chief Complaint: Follow-up hypertension hyperlipidemia     Subjective: Up in chair, chest tubes removed earlier today      acetaminophen, 1,000 mg, Oral, Q8H  aspirin, 325 mg, Oral, Daily  atorvastatin, 40 mg, Oral, Nightly  gabapentin, 100 mg, Oral, TID  guaiFENesin, 1,200 mg, Oral, Q12H  insulin lispro, 2-7 Units, Subcutaneous, 4x Daily AC & at Bedtime  metoprolol tartrate, 25 mg, Oral, Q12H  mupirocin, 1 Application, Each Nare, BID  protamine, 50 mg, Intravenous, Once  senna-docusate sodium, 2 tablet, Oral, BID  sodium chloride, 4 mL, Nebulization, BID - RT        Objective:  Vitals:   height is 177.8 cm (70\") and weight is 92.2 kg (203 lb 4.8 oz). His oral temperature is 97.8 °F (36.6 °C). His blood pressure is 109/65 and his pulse is 72. His respiration is 19 and oxygen saturation is 92%.     Intake/Output Summary (Last 24 hours) at 6/22/2025 1210  Last data filed at 6/22/2025 0448  Gross per 24 hour   Intake 720 ml   Output 886 ml   Net -166 ml       Physical Exam:  CV-regular rate and rhythm  Lungs diminished in the bases       Results from last 7 days   Lab Units 06/22/25  0502   WBC 10*3/mm3 14.83*   HEMOGLOBIN g/dL 12.1*   HEMATOCRIT % 36.5*   PLATELETS 10*3/mm3 164     Results from last 7 days   Lab Units 06/22/25  0503 06/21/25  0238   SODIUM mmol/L 131* 130*   POTASSIUM mmol/L 4.5 4.6   CHLORIDE mmol/L 95* 102   CO2 mmol/L 28.0 21.0*   BUN mg/dL 19.4 11.1   CREATININE mg/dL 0.82 0.84 "   CALCIUM mg/dL 9.0 8.5*   BILIRUBIN mg/dL  --  0.7   ALK PHOS U/L  --  39   ALT (SGPT) U/L  --  16   AST (SGOT) U/L  --  40   GLUCOSE mg/dL 105* 142*     Results from last 7 days   Lab Units 06/20/25  0348 06/19/25  1314 06/19/25  0406   INR  1.29* 1.45* 1.05         Results from last 7 days   Lab Units 06/18/25  1538   CHOLESTEROL mg/dL 154   TRIGLYCERIDES mg/dL 272*   HDL CHOL mg/dL 50   LDL CHOL mg/dL 61       Tele: Normal sinus rhythm    CXR-Persistent bibasilar atelectasis with small bilateral pleural effusions.  Small left apical pneumothorax measuring 12 mm no pneumothorax on the right.       Problem List:  CAD s/p CABG x 4 6/19/2025    Primary hypertension    Hyperlipidemia LDL goal <70       Plan:  Continue aspirin, statin and beta-blockade therapy.  Agree with 40 mg of IV Lasix  Encouraged pulmonary toilet       Scribed for Keshawn Broderick MD by Ghada Gary RN. 6/22/2025  12:20 EDT

## 2025-06-23 ENCOUNTER — TRANSCRIBE ORDERS (OUTPATIENT)
Dept: CARDIAC REHAB | Facility: HOSPITAL | Age: 75
End: 2025-06-23
Payer: MEDICARE

## 2025-06-23 ENCOUNTER — APPOINTMENT (OUTPATIENT)
Dept: GENERAL RADIOLOGY | Facility: HOSPITAL | Age: 75
DRG: 234 | End: 2025-06-23
Payer: MEDICARE

## 2025-06-23 DIAGNOSIS — Z95.1 S/P CABG (CORONARY ARTERY BYPASS GRAFT): Primary | ICD-10-CM

## 2025-06-23 LAB
ANION GAP SERPL CALCULATED.3IONS-SCNC: 9 MMOL/L (ref 5–15)
BUN SERPL-MCNC: 17.8 MG/DL (ref 8–23)
BUN/CREAT SERPL: 22.3 (ref 7–25)
CALCIUM SPEC-SCNC: 9 MG/DL (ref 8.6–10.5)
CHLORIDE SERPL-SCNC: 97 MMOL/L (ref 98–107)
CO2 SERPL-SCNC: 29 MMOL/L (ref 22–29)
CREAT SERPL-MCNC: 0.8 MG/DL (ref 0.76–1.27)
DEPRECATED RDW RBC AUTO: 44.8 FL (ref 37–54)
EGFRCR SERPLBLD CKD-EPI 2021: 92.9 ML/MIN/1.73
ERYTHROCYTE [DISTWIDTH] IN BLOOD BY AUTOMATED COUNT: 12.4 % (ref 12.3–15.4)
GLUCOSE BLDC GLUCOMTR-MCNC: 102 MG/DL (ref 70–130)
GLUCOSE BLDC GLUCOMTR-MCNC: 115 MG/DL (ref 70–130)
GLUCOSE BLDC GLUCOMTR-MCNC: 132 MG/DL (ref 70–130)
GLUCOSE BLDC GLUCOMTR-MCNC: 99 MG/DL (ref 70–130)
GLUCOSE SERPL-MCNC: 107 MG/DL (ref 65–99)
HCT VFR BLD AUTO: 34.3 % (ref 37.5–51)
HGB BLD-MCNC: 11.4 G/DL (ref 13–17.7)
MCH RBC QN AUTO: 32.9 PG (ref 26.6–33)
MCHC RBC AUTO-ENTMCNC: 33.2 G/DL (ref 31.5–35.7)
MCV RBC AUTO: 98.8 FL (ref 79–97)
PLATELET # BLD AUTO: 195 10*3/MM3 (ref 140–450)
PMV BLD AUTO: 10.6 FL (ref 6–12)
POTASSIUM SERPL-SCNC: 4.2 MMOL/L (ref 3.5–5.2)
RBC # BLD AUTO: 3.47 10*6/MM3 (ref 4.14–5.8)
SODIUM SERPL-SCNC: 135 MMOL/L (ref 136–145)
WBC NRBC COR # BLD AUTO: 10.96 10*3/MM3 (ref 3.4–10.8)

## 2025-06-23 PROCEDURE — 99233 SBSQ HOSP IP/OBS HIGH 50: CPT

## 2025-06-23 PROCEDURE — 80048 BASIC METABOLIC PNL TOTAL CA: CPT | Performed by: PHYSICIAN ASSISTANT

## 2025-06-23 PROCEDURE — 71045 X-RAY EXAM CHEST 1 VIEW: CPT

## 2025-06-23 PROCEDURE — 94799 UNLISTED PULMONARY SVC/PX: CPT

## 2025-06-23 PROCEDURE — 94761 N-INVAS EAR/PLS OXIMETRY MLT: CPT

## 2025-06-23 PROCEDURE — 99024 POSTOP FOLLOW-UP VISIT: CPT

## 2025-06-23 PROCEDURE — 94664 DEMO&/EVAL PT USE INHALER: CPT

## 2025-06-23 PROCEDURE — 25010000002 HEPARIN (PORCINE) PER 1000 UNITS

## 2025-06-23 PROCEDURE — 85027 COMPLETE CBC AUTOMATED: CPT | Performed by: PHYSICIAN ASSISTANT

## 2025-06-23 PROCEDURE — 25010000002 BUMETANIDE PER 0.5 MG

## 2025-06-23 PROCEDURE — 99232 SBSQ HOSP IP/OBS MODERATE 35: CPT | Performed by: NURSE PRACTITIONER

## 2025-06-23 PROCEDURE — 25010000002 MORPHINE PER 10 MG: Performed by: PHYSICIAN ASSISTANT

## 2025-06-23 PROCEDURE — 82948 REAGENT STRIP/BLOOD GLUCOSE: CPT

## 2025-06-23 RX ORDER — BUMETANIDE 0.25 MG/ML
2 INJECTION, SOLUTION INTRAMUSCULAR; INTRAVENOUS ONCE
Status: COMPLETED | OUTPATIENT
Start: 2025-06-23 | End: 2025-06-23

## 2025-06-23 RX ORDER — HEPARIN SODIUM 5000 [USP'U]/ML
5000 INJECTION, SOLUTION INTRAVENOUS; SUBCUTANEOUS EVERY 8 HOURS SCHEDULED
Status: DISCONTINUED | OUTPATIENT
Start: 2025-06-23 | End: 2025-06-26 | Stop reason: HOSPADM

## 2025-06-23 RX ADMIN — DOCUSATE SODIUM 50 MG AND SENNOSIDES 8.6 MG 2 TABLET: 8.6; 5 TABLET, FILM COATED ORAL at 21:21

## 2025-06-23 RX ADMIN — DOCUSATE SODIUM 50 MG AND SENNOSIDES 8.6 MG 2 TABLET: 8.6; 5 TABLET, FILM COATED ORAL at 08:40

## 2025-06-23 RX ADMIN — Medication 10 ML: at 21:26

## 2025-06-23 RX ADMIN — BUMETANIDE 2 MG: 0.25 INJECTION INTRAMUSCULAR; INTRAVENOUS at 09:46

## 2025-06-23 RX ADMIN — MUPIROCIN 1 APPLICATION: 20 OINTMENT TOPICAL at 08:40

## 2025-06-23 RX ADMIN — GABAPENTIN 100 MG: 100 CAPSULE ORAL at 17:54

## 2025-06-23 RX ADMIN — BUMETANIDE 2 MG: 0.25 INJECTION INTRAMUSCULAR; INTRAVENOUS at 14:19

## 2025-06-23 RX ADMIN — GUAIFENESIN 1200 MG: 600 TABLET, EXTENDED RELEASE ORAL at 08:39

## 2025-06-23 RX ADMIN — OXYCODONE HYDROCHLORIDE 10 MG: 10 TABLET ORAL at 08:39

## 2025-06-23 RX ADMIN — MORPHINE SULFATE 2 MG: 2 INJECTION, SOLUTION INTRAMUSCULAR; INTRAVENOUS at 11:52

## 2025-06-23 RX ADMIN — MUPIROCIN 1 APPLICATION: 20 OINTMENT TOPICAL at 21:22

## 2025-06-23 RX ADMIN — OXYCODONE HYDROCHLORIDE 10 MG: 10 TABLET ORAL at 14:20

## 2025-06-23 RX ADMIN — HEPARIN SODIUM 5000 UNITS: 5000 INJECTION INTRAVENOUS; SUBCUTANEOUS at 14:20

## 2025-06-23 RX ADMIN — GUAIFENESIN 1200 MG: 600 TABLET, EXTENDED RELEASE ORAL at 21:20

## 2025-06-23 RX ADMIN — METOPROLOL TARTRATE 25 MG: 25 TABLET, FILM COATED ORAL at 08:41

## 2025-06-23 RX ADMIN — OXYCODONE HYDROCHLORIDE 10 MG: 10 TABLET ORAL at 01:02

## 2025-06-23 RX ADMIN — HEPARIN SODIUM 5000 UNITS: 5000 INJECTION INTRAVENOUS; SUBCUTANEOUS at 21:23

## 2025-06-23 RX ADMIN — ASPIRIN 325 MG: 325 TABLET, COATED ORAL at 08:39

## 2025-06-23 RX ADMIN — SODIUM CHLORIDE SOLN NEBU 7% 4 ML: 7 NEBU SOLN at 18:34

## 2025-06-23 RX ADMIN — ATORVASTATIN CALCIUM 40 MG: 40 TABLET, FILM COATED ORAL at 21:21

## 2025-06-23 RX ADMIN — GABAPENTIN 100 MG: 100 CAPSULE ORAL at 21:22

## 2025-06-23 RX ADMIN — SODIUM CHLORIDE SOLN NEBU 7% 4 ML: 7 NEBU SOLN at 09:09

## 2025-06-23 RX ADMIN — GABAPENTIN 100 MG: 100 CAPSULE ORAL at 08:41

## 2025-06-23 NOTE — PROGRESS NOTES
"  [facility] at [facility]  PROGRESS NOTE    Date of Admission: 6/18/2025  Date of Service: 06/23/25    Primary Care Physician: Keshawn Alexandre MD    Chief Complaint: Coronary artery disease status post CABG  Hypertension  Problem List:   CAD s/p CABG x 4 6/19/2025    Primary hypertension    Hyperlipidemia LDL goal <70      Subjective      HPI: Patient is lying in bed denies chest pain.  O2 sats have been decreased 88-90% on O2 at 3.5 liters via NC.  He has been trying to take in deep breaths and use his incentive spirometer.      Objective   Vitals: /73 (BP Location: Right arm, Patient Position: Lying)   Pulse 77   Temp 98.2 °F (36.8 °C) (Oral)   Resp 18   Ht 177.8 cm (70\")   Wt 91.6 kg (202 lb)   SpO2 95%   BMI 28.98 kg/m²     Physical Exam:  GENERAL: Alert, cooperative, in no acute distress.   HEENT: Normocephalic, no jugular venous distention  HEART: Regular rhythm, normal rate, and no murmurs, gallops, or rubs.   LUNGS: Clear to auscultation bilaterally. No wheezing, rales or rhonchi.  ABDOMEN: Soft, bowel sounds present, nontender   NEUROLOGIC: No focal abnormalities involving strength or sensation are noted.   EXTREMITIES: No clubbing, cyanosis, or edema noted.     Results:  Results from last 7 days   Lab Units 06/23/25  0422 06/22/25  0502 06/21/25  0238   WBC 10*3/mm3 10.96* 14.83* 15.88*   HEMOGLOBIN g/dL 11.4* 12.1* 12.3*   HEMATOCRIT % 34.3* 36.5* 37.0*   PLATELETS 10*3/mm3 195 164 183     Results from last 7 days   Lab Units 06/23/25  0422 06/22/25  0503 06/21/25  0238   SODIUM mmol/L 135* 131* 130*   POTASSIUM mmol/L 4.2 4.5 4.6   CHLORIDE mmol/L 97* 95* 102   CO2 mmol/L 29.0 28.0 21.0*   BUN mg/dL 17.8 19.4 11.1   CREATININE mg/dL 0.80 0.82 0.84   GLUCOSE mg/dL 107* 105* 142*      Lab Results   Component Value Date    CHOL 154 06/18/2025    TRIG 272 (H) 06/18/2025    HDL 50 06/18/2025    LDL 61 06/18/2025    AST 40 06/21/2025    ALT 16 06/21/2025     Results from " last 7 days   Lab Units 06/18/25  1536   HEMOGLOBIN A1C % 5.70*     Results from last 7 days   Lab Units 06/18/25  1538   CHOLESTEROL mg/dL 154   TRIGLYCERIDES mg/dL 272*   HDL CHOL mg/dL 50   LDL CHOL mg/dL 61             Results from last 7 days   Lab Units 06/20/25  0348 06/19/25  1314 06/19/25  0406   PROTIME Seconds 16.9* 18.6* 14.3   INR  1.29* 1.45* 1.05   APTT seconds  --  28.5 32.3                 Intake/Output Summary (Last 24 hours) at 6/23/2025 0821  Last data filed at 6/22/2025 2300  Gross per 24 hour   Intake --   Output 600 ml   Net -600 ml       I personally reviewed the patient's EKG/Telemetry data      EKG 6/21/2025: Normal sinus rhythm ST elevation consider pericarditis    Radiology Data: Chest x-ray 6/23/2025: No pneumothorax.  Hyperinflated lungs with persistent bibasilar airspace disease likely atelectasis    Current Medications:  acetaminophen, 1,000 mg, Oral, Q8H  aspirin, 325 mg, Oral, Daily  atorvastatin, 40 mg, Oral, Nightly  gabapentin, 100 mg, Oral, TID  guaiFENesin, 1,200 mg, Oral, Q12H  insulin lispro, 2-7 Units, Subcutaneous, 4x Daily AC & at Bedtime  metoprolol tartrate, 25 mg, Oral, Q12H  mupirocin, 1 Application, Each Nare, BID  protamine, 50 mg, Intravenous, Once  senna-docusate sodium, 2 tablet, Oral, BID  sodium chloride, 4 mL, Nebulization, BID - RT      propofol, 5-50 mcg/kg/min        Assessment:   Coronary artery disease  Status post CABG 6/19/2025 with LIMA to LAD, SVG to PDA, SVG to OM1 and SVG to D1  Aspirin 325 mg daily  Hypertension  Metoprolol tartrate 25 mg twice daily  Current b/p 127/76  Hyperlipidemia  Lipitor 40 mg daily      Plan:   Administration of IV Bumex today  Continue aspirin, statin and beta-blocker  Ambulate with physical therapy      MARINA Cortes

## 2025-06-23 NOTE — PROGRESS NOTES
Pt. Referred for Phase II Cardiac Rehab. Staff discussed benefits of exercise, program protocol, and educational material provided. Teach back verified.  Patient scheduled for orientation at [facility] on Tuesday, July 15th 2025 at 9:00am.

## 2025-06-23 NOTE — PROGRESS NOTES
[facility] Medicine Services  PROGRESS NOTE    Patient Name: Jamari Munoz  : 1950  MRN: [id number]    Date of Admission: 2025  Primary Care Physician: Keshawn Alexandre MD    Subjective   Subjective     CC:  CAD s/p CABG    HPI:  No significant overnight events, patient is doing well this morning, weaning oxygen, remains on 3.55 L via nasal cannula, no complaints otherwise.  Continue to use incentive spirometry, no significant pain being endorsed at this time.  Good UOP as well.  UOP approximately 1.7 L this morning      Objective   Objective     Vital Signs:   Temp:  [97.7 °F (36.5 °C)-98.6 °F (37 °C)] 98.6 °F (37 °C)  Heart Rate:  [71-94] 83  Resp:  [18-20] 20  BP: (109-127)/(65-88) 127/76  Flow (L/min) (Oxygen Therapy):  [3.5-15] 3.5     Physical Exam:  Constitutional: No acute distress, awake, alert  HENT: NCAT, mucous membranes moist  Respiratory: Clear to auscultation bilaterally, respiratory effort normal   Cardiovascular: RRR, no murmurs, rubs, or gallops, well-healing sternotomy site  Gastrointestinal: Positive bowel sounds, soft, nontender, nondistended  Musculoskeletal: 1+ bilateral ankle edema  Psychiatric: Appropriate affect, cooperative  Neurologic: Oriented x 3, strength symmetric in all extremities, Cranial Nerves grossly intact to confrontation, speech clear  Skin: No rashes     Results Reviewed:  LAB RESULTS:      Lab 25  0422 25  0502 25  0238 25  0348 25  1632 25  1314 25  0654 25  0407 25  0406 25  1536   WBC 10.96* 14.83* 15.88* 11.93* 15.77* 18.01*  --  8.11  --  6.91   HEMOGLOBIN 11.4* 12.1* 12.3* 13.0 12.2* 13.6  --  15.4  --  15.8   HEMOGLOBIN, POC  --   --   --   --   --   --    < >  --   --   --    HEMATOCRIT 34.3* 36.5* 37.0* 40.2 35.6* 39.9  --  45.9  --  47.1   HEMATOCRIT POC  --   --   --   --   --   --    < >  --   --   --    PLATELETS 195 164 183 195 196 186  --  286  --  323    NEUTROS ABS  --   --   --  9.84*  --   --   --  3.78  --  2.85   IMMATURE GRANS (ABS)  --   --   --  0.06*  --   --   --  0.03  --  0.03   LYMPHS ABS  --   --   --  1.14  --   --   --  3.09  --  2.84   MONOS ABS  --   --   --  0.85  --   --   --  0.78  --  0.84   EOS ABS  --   --   --  0.01  --   --   --  0.40  --  0.32   MCV 98.8* 99.7* 99.2* 101.0* 97.3* 96.4  --  97.5*  --  97.1*   PROTIME  --   --   --  16.9*  --  18.6*  --   --  14.3  --    APTT  --   --   --   --   --  28.5  --   --  32.3  --     < > = values in this interval not displayed.         Lab 06/23/25  0422 06/22/25  0503 06/21/25  0238 06/20/25  0348 06/19/25  1632 06/19/25  1314 06/19/25  0407 06/19/25  0407 06/18/25  1538 06/18/25  1536   SODIUM 135* 131* 130* 136 139 137   < > 139  --   --    POTASSIUM 4.2 4.5 4.6 4.6 4.3 4.4   < > 4.4  --   --    CHLORIDE 97* 95* 102 105 109* 109*   < > 107  --   --    CO2 29.0 28.0 21.0* 19.0* 21.1* 18.0*   < > 24.0  --   --    ANION GAP 9.0 8.0 7.0 12.0 8.9 10.0   < > 8.0  --   --    BUN 17.8 19.4 11.1 11.2 11.4 11.8   < > 13.9  --   --    CREATININE 0.80 0.82 0.84 1.01 0.89 0.86   < > 0.86  --   --    EGFR 92.9 92.2 91.5 78.0 89.9 90.9   < > 90.9  --   --    GLUCOSE 107* 105* 142* 155* 162* 133*   < > 100*  --   --    CALCIUM 9.0 9.0 8.5* 8.4* 8.6 9.0   < > 9.0  --   --    IONIZED CALCIUM  --   --   --   --   --  1.07*  --   --   --   --    MAGNESIUM  --   --   --  2.4 1.9 2.0  --  2.0 2.1  --    PHOSPHORUS  --   --   --   --  3.4 2.8  --   --   --   --    HEMOGLOBIN A1C  --   --   --   --   --   --   --   --   --  5.70*    < > = values in this interval not displayed.         Lab 06/21/25  0238 06/20/25  0348 06/19/25  1632 06/19/25  1314 06/19/25  0407   TOTAL PROTEIN 5.7* 5.7*  --   --  6.1   ALBUMIN 3.5 3.8 4.0 3.2* 3.6   GLOBULIN 2.2 1.9  --   --  2.5   ALT (SGPT) 16 21  --   --  23   AST (SGOT) 40 54*  --   --  24   BILIRUBIN 0.7 0.6  --   --  0.4   ALK PHOS 39 35*  --   --  49         Lab  06/20/25  0348 06/19/25  1314 06/19/25  0406   PROTIME 16.9* 18.6* 14.3   INR 1.29* 1.45* 1.05         Lab 06/18/25  1538   CHOLESTEROL 154   LDL CHOL 61   HDL CHOL 50   TRIGLYCERIDES 272*         Lab 06/18/25  1842 06/18/25  1536   ABO TYPING A A   RH TYPING Positive Positive   ANTIBODY SCREEN  --  Negative         Lab 06/19/25  1635 06/19/25  1308 06/19/25  1112   PH, ARTERIAL 7.342* 7.506* 7.39   PCO2, ARTERIAL 43.4 28.2*  --    PO2 ART 89.0 234.0*  --    FIO2 40 100  --    HCO3 ART 23.5 22.3  --    BASE EXCESS ART -2.3* 0.4  --    CARBOXYHEMOGLOBIN 1.0 0.8  --      Brief Urine Lab Results  (Last result in the past 365 days)        Color   Clarity   Blood   Leuk Est   Nitrite   Protein   CREAT   Urine HCG        06/18/25 1445 Yellow   Clear   Negative   Negative   Negative   Negative                   Microbiology Results Abnormal       None            XR Chest 1 View  Result Date: 6/23/2025  XR CHEST 1 VW Date of Exam: 6/23/2025 4:03 AM EDT Indication: Chest tube removal post op Comparison: 6/23/2025 Findings: Status post sternotomy and CABG. There is linear bibasilar airspace disease similar to the prior study most compatible with atelectasis. No significant effusion. No discrete pneumothorax.     Impression: Impression: 1. No discrete pneumothorax. 2. Hypoinflated lungs with persistent bibasilar airspace disease likely atelectasis. Electronically Signed: Poli Ramirez MD  6/23/2025 7:33 AM EDT  Workstation ID: [id number]    XR Chest 1 View  Result Date: 6/22/2025  XR CHEST 1 VW Date of Exam: 6/22/2025 8:35 AM EDT Indication: Chest tube removal post op Comparison: 6/21/2025 Findings: Status post sternotomy. Removal of right internal jugular central venous catheter. Removal of chest tubes. Mild linear bibasilar atelectasis again noted left greater than right similar to the prior study. Small bilateral effusions. No pneumothorax on the  right. Small left apical pneumothorax measures 12 mm.     Impression:  Impression: 1. Removal of chest tubes. Small left apical pneumothorax measures 12 mm. No pneumothorax on the right. 2. Removal of right IJ central venous catheter. 3. Persistent bibasilar atelectasis with small bilateral pleural effusions. Electronically Signed: Poli Ramirez MD  6/22/2025 8:51 AM EDT  Workstation ID: [id number]      Results for orders placed during the hospital encounter of 06/18/25    Adult Transthoracic Echo Complete W/ Cont if Necessary Per Protocol    Interpretation Summary    Left ventricular systolic function is normal. Left ventricular ejection fraction appears to be 56 - 60%.    Left ventricular diastolic function is consistent with (grade I) impaired relaxation.    The aortic root measures 3.6 cm.      Current medications:  Scheduled Meds:acetaminophen, 1,000 mg, Oral, Q8H  aspirin, 325 mg, Oral, Daily  atorvastatin, 40 mg, Oral, Nightly  bumetanide, 2 mg, Intravenous, Once  bumetanide, 2 mg, Intravenous, Once  gabapentin, 100 mg, Oral, TID  guaiFENesin, 1,200 mg, Oral, Q12H  insulin lispro, 2-7 Units, Subcutaneous, 4x Daily AC & at Bedtime  metoprolol tartrate, 25 mg, Oral, Q12H  mupirocin, 1 Application, Each Nare, BID  protamine, 50 mg, Intravenous, Once  senna-docusate sodium, 2 tablet, Oral, BID  sodium chloride, 4 mL, Nebulization, BID - RT      Continuous Infusions:propofol, 5-50 mcg/kg/min      PRN Meds:.  Albuterol Sulfate NEB Orderable    senna-docusate sodium **AND** polyethylene glycol **AND** bisacodyl **AND** bisacodyl    Calcium Replacement - Follow Nurse / BPA Driven Protocol    dextrose    dextrose    glucagon (human recombinant)    Magnesium Cardiology Dose Replacement - Follow Nurse / BPA Driven Protocol    Morphine    naloxone    ondansetron    oxyCODONE    Phosphorus Replacement - Follow Nurse / BPA Driven Protocol    Potassium Replacement - Follow Nurse / BPA Driven Protocol    propofol    sodium bicarbonate    sodium chloride    sodium chloride    Assessment & Plan    Assessment & Plan     Active Hospital Problems    Diagnosis  POA    **CAD s/p CABG x 4 6/19/2025 [I25.110]  Yes    Hyperlipidemia LDL goal <70 [E78.5]  Yes    Primary hypertension [I10]  Yes      Resolved Hospital Problems    Diagnosis Date Resolved POA    Abnormal findings on diagnostic imaging of heart and coronary circulation [R93.1] 06/18/2025 Yes        Brief Hospital Course to date:  Jamari Munoz is a 74 y.o. male who has been experiencing midsternal chest discomfort with exertion (more notable on colder days) which is relieved by rest. The patient notices this when riding his bike, which he does for exercise on a regular basis. When he saw Dr. Alexandre earlier this month, Dr. Alexandre instructed him not to exert himself and the patient has not had angina at rest or with normal activity.  Underwent CABG x 4 6/19/2025.  Currently out of the ICU on telemetry doing well.    Coronary disease  S/p CABG x 4 6/19/2025  Patient currently CABG x 4 (6/19/2025) with LIMA to LAD, SVG to PDA, SVG to OM1 and SVG to D1  Currently on aspirin 325 mg daily  Metoprolol tartrate 25 mg twice daily  Normotensive  Diuresing with IV Bumex  Doing well with PT  Hospital medicine on board as consult  Blood sugars are well-controlled at this time  Evidence of hyperinflated lungs with persistent bibasilar airspace disease likely atelectasis  Encourage aggressive I-S    HTN  Remains on metoprolol 25 mg twice daily    HLD  Remains on Lipitor 40 mg daily    Leukocytosis  Likely reactive in the setting of recent surgery    Anemia  Hemoglobin 11.4 this a.m., likely postop anemia  Was 15.5 on admission      Expected Discharge Location and Transportation: Home with home health PT  Expected Discharge 6/25/2025  Expected Discharge Date: 6/24/2025; Expected Discharge Time: 12:00 PM     VTE Prophylaxis:  Mechanical VTE prophylaxis orders are present.     Total time spent: Time Spent: Time Spent: 55 minutes  Time spent includes time reviewing  chart, face-to-face time, counseling patient/family/caregiver, ordering medications/tests/procedures, communicating with other health care professionals, documenting clinical information in the electronic health record, and coordination of care.      AM-PAC 6 Clicks Score (PT): 16 (06/22/25 1933)    CODE STATUS:   Code Status and Medical Interventions: CPR (Attempt to Resuscitate); Full Support   Ordered at: 06/19/25 1253     Code Status (Patient has no pulse and is not breathing):    CPR (Attempt to Resuscitate)     Medical Interventions (Patient has pulse or is breathing):    Full Support       Raquel Russell MD  06/23/25

## 2025-06-23 NOTE — PROGRESS NOTES
[facility] Cardiothoracic Surgery In-Patient Progress Note     LOS: 5 days     POD # 4 s/p CABG x 4    Subjective  Reports dyspnea with activity. Denies chest pain. On 4L saturations 90-92%.     Objective  Vital Signs  Temp:  [97.7 °F (36.5 °C)-98.4 °F (36.9 °C)] 98.2 °F (36.8 °C)  Heart Rate:  [71-94] 77  Resp:  [18-20] 18  BP: (109-127)/(65-88) 127/73    Physical Exam:   General Appearance: alert, appears stated age and cooperative   Lungs: Diminished bases bilaterally   Heart: regular rhythm & normal rate, normal S1, S2, no murmur, no gallop, no rub, and no click   Skin: Incision c/d/I   Sternum: Stable   Ext: Trace edema bilaterally     Results     Results from last 7 days   Lab Units 06/23/25  0422   WBC 10*3/mm3 10.96*   HEMOGLOBIN g/dL 11.4*   HEMATOCRIT % 34.3*   PLATELETS 10*3/mm3 195     Results from last 7 days   Lab Units 06/23/25  0422   SODIUM mmol/L 135*   POTASSIUM mmol/L 4.2   CHLORIDE mmol/L 97*   CO2 mmol/L 29.0   BUN mg/dL 17.8   CREATININE mg/dL 0.80   GLUCOSE mg/dL 107*   CALCIUM mg/dL 9.0     Imaging Results (Last 24 Hours)       Procedure Component Value Units Date/Time    XR Chest 1 View [[id number]] Collected: 06/23/25 0732     Updated: 06/23/25 0736    Narrative:      XR CHEST 1 VW    Date of Exam: 6/23/2025 4:03 AM EDT    Indication: Chest tube removal  post op    Comparison: 6/23/2025    Findings:  Status post sternotomy and CABG. There is linear bibasilar airspace disease similar to the prior study most compatible with atelectasis. No significant effusion. No discrete pneumothorax.      Impression:      Impression:  1. No discrete pneumothorax.  2. Hypoinflated lungs with persistent bibasilar airspace disease likely atelectasis.          Electronically Signed: Poli Ramirez MD    6/23/2025 7:33 AM EDT    Workstation ID: [id number]    XR Chest 1 View [[id number]] Collected: 06/22/25 0849     Updated: 06/22/25 0854    Narrative:      XR CHEST 1 VW    Date of Exam: 6/22/2025 8:35 AM  EDT    Indication: Chest tube removal  post op    Comparison: 6/21/2025    Findings:  Status post sternotomy. Removal of right internal jugular central venous catheter. Removal of chest tubes. Mild linear bibasilar atelectasis again noted left greater than right similar to the prior study. Small bilateral effusions. No pneumothorax on the   right. Small left apical pneumothorax measures 12 mm.      Impression:      Impression:  1. Removal of chest tubes. Small left apical pneumothorax measures 12 mm. No pneumothorax on the right.  2. Removal of right IJ central venous catheter.  3. Persistent bibasilar atelectasis with small bilateral pleural effusions.          Electronically Signed: Poli Ramirez MD    6/22/2025 8:51 AM EDT    Workstation ID: [id number]          Chronic Comorbid Conditions relative to CABG include:  Cardiovascular: Coronary Artery Disease, Hyperlipidemia, Hypertension, and trace AR, trace MR  Respiratory: None  Endocrine: None   Nephrology: None  Hematology: None   Other: None     Assessment:  POD # 4 s/p CABG x 4    Plan   Diuresis-Bumex 2 mg BID today  Wean oxygen as tolerated  Ambulate  PT/OT  Pulmonary Toilet: IS q1 hr while awake  ASA, statin, BB  Sub Q heparin DVT prophylaxis  Hopefully home in 48 hours    Keshawn Ng MD  06/23/25  07:54 EDT

## 2025-06-23 NOTE — PLAN OF CARE
Problem: Adult Inpatient Plan of Care  Goal: Absence of Hospital-Acquired Illness or Injury  Intervention: Identify and Manage Fall Risk  Recent Flowsheet Documentation  Taken 6/23/2025 0400 by Tommy Hernandez RN  Safety Promotion/Fall Prevention:   safety round/check completed   room organization consistent   nonskid shoes/slippers when out of bed   muscle strengthening facilitated   mobility aid in reach   lighting adjusted   fall prevention program maintained   assistive device/personal items within reach   clutter free environment maintained   gait belt  Taken 6/23/2025 0200 by Tommy Hernandez RN  Safety Promotion/Fall Prevention:   safety round/check completed   room organization consistent   nonskid shoes/slippers when out of bed   muscle strengthening facilitated   mobility aid in reach   lighting adjusted   fall prevention program maintained   assistive device/personal items within reach   clutter free environment maintained   gait belt  Taken 6/23/2025 0000 by Tommy Hernandez RN  Safety Promotion/Fall Prevention:   safety round/check completed   room organization consistent   nonskid shoes/slippers when out of bed   muscle strengthening facilitated   mobility aid in reach   lighting adjusted   fall prevention program maintained   assistive device/personal items within reach   clutter free environment maintained   gait belt  Taken 6/22/2025 2200 by Tommy Hernandez RN  Safety Promotion/Fall Prevention:   safety round/check completed   room organization consistent   nonskid shoes/slippers when out of bed   muscle strengthening facilitated   mobility aid in reach   lighting adjusted   fall prevention program maintained   assistive device/personal items within reach   clutter free environment maintained   gait belt  Taken 6/22/2025 1933 by Tommy Hernandez RN  Safety Promotion/Fall Prevention:   safety round/check completed   room organization consistent   nonskid shoes/slippers when out of bed    muscle strengthening facilitated   mobility aid in reach   lighting adjusted   gait belt   fall prevention program maintained   clutter free environment maintained   assistive device/personal items within reach     Problem: Adult Inpatient Plan of Care  Goal: Absence of Hospital-Acquired Illness or Injury  Intervention: Prevent Infection  Recent Flowsheet Documentation  Taken 6/23/2025 0400 by Tommy Hernandez RN  Infection Prevention:   hand hygiene promoted   rest/sleep promoted   single patient room provided  Taken 6/23/2025 0200 by Tommy Hernandez RN  Infection Prevention:   hand hygiene promoted   rest/sleep promoted   single patient room provided  Taken 6/23/2025 0000 by Tommy Hernandez RN  Infection Prevention:   hand hygiene promoted   rest/sleep promoted   single patient room provided  Taken 6/22/2025 2200 by Tommy Hernandez RN  Infection Prevention:   hand hygiene promoted   rest/sleep promoted   single patient room provided     Problem: Adult Inpatient Plan of Care  Goal: Optimal Comfort and Wellbeing  Outcome: Progressing  Intervention: Provide Person-Centered Care  Recent Flowsheet Documentation  Taken 6/23/2025 0200 by Tommy Hernandez RN  Trust Relationship/Rapport:   care explained   choices provided   emotional support provided   empathic listening provided   questions answered   questions encouraged   reassurance provided   thoughts/feelings acknowledged  Taken 6/22/2025 2200 by Tommy Hernandez RN  Trust Relationship/Rapport:   care explained   choices provided   emotional support provided   empathic listening provided   questions answered   questions encouraged   reassurance provided   thoughts/feelings acknowledged  Taken 6/22/2025 1933 by Tommy Hernandez RN  Trust Relationship/Rapport:   care explained   choices provided   emotional support provided   questions answered   empathic listening provided   questions encouraged   reassurance provided   thoughts/feelings acknowledged   Goal  Outcome Evaluation:  Plan of Care Reviewed With: patient        Progress: improving  Outcome Evaluation: Pt reports pain is a lot better. Still on 6L NC and cannot sustain above 92%. Ambulated well last night with walker and gait belt with no reports of SOA. VSS. Continuing to progress.

## 2025-06-23 NOTE — CASE MANAGEMENT/SOCIAL WORK
Continued Stay Note   [place]     Patient Name: Jamari Munoz  MRN: [id number]  Today's Date: 6/23/2025    Admit Date: 6/18/2025    Plan: Home pending medical course   Discharge Plan       Row Name 06/23/25 1056       Plan    Plan Home pending medical course    Patient/Family in Agreement with Plan yes    Plan Comments I met with the patient at the bedside. Encouraged IS use. Patient on 4L O2 here but none at home. MD updated in MDR by RN. Patient participated with therapy and walked 240ft on 6-21-25. CM following for discharge needs.    Final Discharge Disposition Code 01 - home or self-care                   Discharge Codes    No documentation.                 Expected Discharge Date and Time       Expected Discharge Date Expected Discharge Time    Jun 24, 2025               Heather Waller RN

## 2025-06-23 NOTE — PLAN OF CARE
Problem: Adult Inpatient Plan of Care  Goal: Plan of Care Review  Outcome: Progressing  Flowsheets (Taken 6/23/2025 1909)  Progress: improving  Goal: Patient-Specific Goal (Individualized)  Outcome: Progressing  Goal: Absence of Hospital-Acquired Illness or Injury  Outcome: Progressing  Intervention: Identify and Manage Fall Risk  Recent Flowsheet Documentation  Taken 6/23/2025 1800 by Juliane Oropeza RN  Safety Promotion/Fall Prevention:   assistive device/personal items within reach   activity supervised   fall prevention program maintained   lighting adjusted   mobility aid in reach   muscle strengthening facilitated   nonskid shoes/slippers when out of bed   room organization consistent   safety round/check completed  Taken 6/23/2025 1600 by Juliane Oropeza RN  Safety Promotion/Fall Prevention:   assistive device/personal items within reach   activity supervised   fall prevention program maintained   lighting adjusted   mobility aid in reach   muscle strengthening facilitated   nonskid shoes/slippers when out of bed   room organization consistent   safety round/check completed  Taken 6/23/2025 1400 by Juliane Oropeza RN  Safety Promotion/Fall Prevention:   assistive device/personal items within reach   activity supervised   fall prevention program maintained   lighting adjusted   mobility aid in reach   muscle strengthening facilitated   nonskid shoes/slippers when out of bed   room organization consistent   safety round/check completed  Taken 6/23/2025 1200 by Juliane Oropeza RN  Safety Promotion/Fall Prevention:   assistive device/personal items within reach   activity supervised   fall prevention program maintained   lighting adjusted   mobility aid in reach   muscle strengthening facilitated   nonskid shoes/slippers when out of bed   room organization consistent   safety round/check completed  Taken 6/23/2025 1000 by Juliane Oropeza RN  Safety Promotion/Fall Prevention:   assistive device/personal  items within reach   activity supervised   fall prevention program maintained   lighting adjusted   mobility aid in reach   muscle strengthening facilitated   nonskid shoes/slippers when out of bed   room organization consistent   safety round/check completed  Taken 6/23/2025 0800 by Juliane Oropeza RN  Safety Promotion/Fall Prevention:   activity supervised   assistive device/personal items within reach   clutter free environment maintained   fall prevention program maintained   gait belt   lighting adjusted   mobility aid in reach   nonskid shoes/slippers when out of bed   room organization consistent   safety round/check completed   toileting scheduled  Intervention: Prevent Skin Injury  Recent Flowsheet Documentation  Taken 6/23/2025 1800 by Juliane Oropeza RN  Body Position: position changed independently  Skin Protection:   skin sealant/moisture barrier applied   transparent dressing maintained  Taken 6/23/2025 1600 by Juliane Oropeza RN  Body Position: position changed independently  Skin Protection:   skin sealant/moisture barrier applied   transparent dressing maintained  Taken 6/23/2025 1400 by Juliane Oropeza RN  Body Position: position changed independently  Skin Protection:   skin sealant/moisture barrier applied   transparent dressing maintained  Taken 6/23/2025 1200 by Juliane Oropeza RN  Body Position: position changed independently  Skin Protection:   skin sealant/moisture barrier applied   transparent dressing maintained  Taken 6/23/2025 1000 by Juliane Oropeza RN  Body Position: position changed independently  Skin Protection:   skin sealant/moisture barrier applied   transparent dressing maintained  Taken 6/23/2025 0800 by Juliane Oropeza RN  Body Position: position changed independently  Skin Protection:   transparent dressing maintained   skin sealant/moisture barrier applied  Intervention: Prevent and Manage VTE (Venous Thromboembolism) Risk  Recent Flowsheet Documentation  Taken  6/23/2025 0800 by Juliane Oropeza RN  VTE Prevention/Management:   bilateral   SCDs (sequential compression devices) off  Intervention: Prevent Infection  Recent Flowsheet Documentation  Taken 6/23/2025 1800 by Juliane Oropeza RN  Infection Prevention:   cohorting utilized   hand hygiene promoted   rest/sleep promoted  Taken 6/23/2025 1600 by Juliane Oropeza RN  Infection Prevention:   cohorting utilized   hand hygiene promoted   rest/sleep promoted  Taken 6/23/2025 1400 by Juliane Oropeza RN  Infection Prevention:   cohorting utilized   hand hygiene promoted   rest/sleep promoted  Taken 6/23/2025 1200 by Juliane Oropeza RN  Infection Prevention:   cohorting utilized   hand hygiene promoted   rest/sleep promoted  Taken 6/23/2025 1000 by Juliane Oropeza RN  Infection Prevention:   cohorting utilized   hand hygiene promoted   rest/sleep promoted  Taken 6/23/2025 0800 by Juliane Oropeza RN  Infection Prevention:   cohorting utilized   hand hygiene promoted  Goal: Optimal Comfort and Wellbeing  Outcome: Progressing  Intervention: Monitor Pain and Promote Comfort  Recent Flowsheet Documentation  Taken 6/23/2025 1450 by Juliane Oropeza RN  Pain Management Interventions:   care clustered   pain medication given  Taken 6/23/2025 1419 by Juliane Oropeza RN  Pain Management Interventions: pain medication given  Taken 6/23/2025 1222 by Juliane Oropeza RN  Pain Management Interventions: care clustered  Taken 6/23/2025 1200 by Juliane Oropeza RN  Pain Management Interventions: care clustered  Taken 6/23/2025 1152 by Juliane Oropeza RN  Pain Management Interventions:   care clustered   pain medication given  Taken 6/23/2025 0939 by Juliane Oropeza RN  Pain Management Interventions:   care clustered   pain medication given  Taken 6/23/2025 0909 by Juliane Oropeza RN  Pain Management Interventions:   care clustered   pain medication given  Taken 6/23/2025 0839 by Juliane Oropeza RN  Pain Management Interventions:    care clustered   pain medication given  Taken 6/23/2025 0800 by Juliane Oropeza RN  Pain Management Interventions:   care clustered   position adjusted  Intervention: Provide Person-Centered Care  Recent Flowsheet Documentation  Taken 6/23/2025 1800 by Juliane Oropeza RN  Trust Relationship/Rapport:   care explained   choices provided   emotional support provided   empathic listening provided   questions answered   thoughts/feelings acknowledged  Taken 6/23/2025 1600 by Juliane Oropeza RN  Trust Relationship/Rapport:   care explained   choices provided   emotional support provided   empathic listening provided   questions answered   thoughts/feelings acknowledged  Taken 6/23/2025 1400 by uJliane Oropeza RN  Trust Relationship/Rapport:   care explained   choices provided   emotional support provided   empathic listening provided   questions answered   thoughts/feelings acknowledged  Taken 6/23/2025 1200 by Juliane Oropeza RN  Trust Relationship/Rapport:   care explained   choices provided   emotional support provided   empathic listening provided   questions answered   thoughts/feelings acknowledged  Taken 6/23/2025 1000 by Juliane Oropeza RN  Trust Relationship/Rapport:   care explained   choices provided   emotional support provided   empathic listening provided   questions answered   thoughts/feelings acknowledged  Taken 6/23/2025 0800 by Juliane Oropeza RN  Trust Relationship/Rapport:   care explained   choices provided   emotional support provided   empathic listening provided   questions answered   questions encouraged   thoughts/feelings acknowledged  Goal: Readiness for Transition of Care  Outcome: Progressing     Problem: Cardiovascular Surgery  Goal: Improved Activity Tolerance  Outcome: Progressing  Intervention: Optimize Tolerance for Activity  Recent Flowsheet Documentation  Taken 6/23/2025 1800 by Juliane Oropeza RN  Environmental Support: calm environment promoted  Self-Care Promotion:  independence encouraged  Taken 6/23/2025 1600 by Juliane Oropeza RN  Environmental Support: calm environment promoted  Self-Care Promotion: independence encouraged  Taken 6/23/2025 1400 by Juliane Oropeza RN  Environmental Support: calm environment promoted  Self-Care Promotion: independence encouraged  Taken 6/23/2025 1200 by Juliane Oropeza RN  Environmental Support: calm environment promoted  Self-Care Promotion: independence encouraged  Taken 6/23/2025 1000 by Juliane Oropeza RN  Environmental Support: calm environment promoted  Self-Care Promotion: independence encouraged  Taken 6/23/2025 0800 by Juliane Oropeza RN  Environmental Support: calm environment promoted  Self-Care Promotion: independence encouraged  Goal: Optimal Coping with Heart Surgery  Outcome: Progressing  Intervention: Support Psychosocial Response to Surgery  Recent Flowsheet Documentation  Taken 6/23/2025 1800 by Juliane Oropeza RN  Supportive Measures: active listening utilized  Family/Support System Care: self-care encouraged  Taken 6/23/2025 1600 by Juliane Oropeza RN  Supportive Measures: active listening utilized  Family/Support System Care: self-care encouraged  Taken 6/23/2025 1400 by Juliane Oropeza RN  Supportive Measures: active listening utilized  Family/Support System Care: self-care encouraged  Taken 6/23/2025 1200 by Juliane Oropeza RN  Supportive Measures: active listening utilized  Family/Support System Care: self-care encouraged  Taken 6/23/2025 1000 by Juliane Oropeza RN  Supportive Measures: active listening utilized  Family/Support System Care: self-care encouraged  Taken 6/23/2025 0800 by Juliane Oropeza RN  Supportive Measures: active listening utilized  Family/Support System Care: self-care encouraged  Goal: Absence of Bleeding  Outcome: Progressing  Intervention: Monitor and Manage Bleeding  Recent Flowsheet Documentation  Taken 6/23/2025 1800 by Juliane Oropeza RN  Bleeding Management: dressing  monitored  Taken 6/23/2025 1600 by Juliane Oropeza RN  Bleeding Management: dressing monitored  Taken 6/23/2025 1400 by Juliane Oropeza RN  Bleeding Management: dressing monitored  Taken 6/23/2025 1200 by Juliane Oropeza RN  Bleeding Management: dressing monitored  Taken 6/23/2025 1000 by Juliane Oropeza RN  Bleeding Management: dressing monitored  Taken 6/23/2025 0800 by Juliane Oropeza RN  Bleeding Management: dressing monitored  Goal: Effective Bowel Elimination  Outcome: Progressing  Intervention: Enhance Bowel Motility and Elimination  Recent Flowsheet Documentation  Taken 6/23/2025 1600 by Juliane Oropeza RN  Bowel Motility Enhancement: ambulation promoted  Taken 6/23/2025 1400 by Juliane Oropeza RN  Bowel Motility Enhancement: ambulation promoted  Taken 6/23/2025 1200 by Juliane Oropeza RN  Bowel Motility Enhancement: ambulation promoted  Taken 6/23/2025 1000 by Juliane Oropeza RN  Bowel Motility Enhancement: ambulation promoted  Taken 6/23/2025 0800 by Juliane Oropeza RN  Bowel Motility Enhancement: ambulation promoted  Goal: Effective Cardiac Function  Outcome: Progressing  Intervention: Optimize Cardiac Output and Blood Flow  Recent Flowsheet Documentation  Taken 6/23/2025 1800 by Juliane Oropeza RN  Stabilization Measures: legs elevated  Taken 6/23/2025 1600 by Juliane Oropeza RN  Stabilization Measures: legs elevated  Taken 6/23/2025 1400 by Juliane Oropeza RN  Stabilization Measures: legs elevated  Taken 6/23/2025 1200 by Juliane Oropeza RN  Stabilization Measures: legs elevated  Taken 6/23/2025 1000 by Juliane Oropeza RN  Stabilization Measures: legs elevated  Taken 6/23/2025 0800 by Juliane Oropeza RN  Stabilization Measures: legs elevated  Goal: Optimal Cerebral Tissue Perfusion  Outcome: Progressing  Intervention: Protect and Optimize Cerebral Perfusion  Recent Flowsheet Documentation  Taken 6/23/2025 1800 by Juliane Oropeza RN  Glycemic Management: blood glucose monitored  Sensory  Stimulation Regulation:   care clustered   television on  Head of Bed (HOB) Positioning: other (see comments)  Cerebral Perfusion Promotion: blood pressure monitored  Taken 6/23/2025 1600 by Juliane Oropeza RN  Glycemic Management: blood glucose monitored  Sensory Stimulation Regulation:   care clustered   television on  Head of Bed (HOB) Positioning: other (see comments)  Cerebral Perfusion Promotion: blood pressure monitored  Taken 6/23/2025 1400 by Juliane Oropeza RN  Glycemic Management: blood glucose monitored  Sensory Stimulation Regulation:   care clustered   television on  Head of Bed (HOB) Positioning: other (see comments)  Cerebral Perfusion Promotion: blood pressure monitored  Taken 6/23/2025 1200 by Juliane Oropeza RN  Glycemic Management: blood glucose monitored  Sensory Stimulation Regulation:   care clustered   television on  Head of Bed (HOB) Positioning: other (see comments)  Cerebral Perfusion Promotion: blood pressure monitored  Taken 6/23/2025 1000 by Juliane Oropeza RN  Glycemic Management: blood glucose monitored  Sensory Stimulation Regulation:   care clustered   television on  Head of Bed (HOB) Positioning: (assisted patient up in chair) other (see comments)  Cerebral Perfusion Promotion: blood pressure monitored  Taken 6/23/2025 0800 by Juliane Oropeza RN  Glycemic Management: blood glucose monitored  Sensory Stimulation Regulation: care clustered  Head of Bed (HOB) Positioning: HOB elevated  Cerebral Perfusion Promotion: blood pressure monitored  Goal: Fluid and Electrolyte Balance  Outcome: Progressing  Goal: Blood Glucose Level Within Target Range  Outcome: Progressing  Intervention: Optimize Glycemic Control  Recent Flowsheet Documentation  Taken 6/23/2025 1800 by Juliane Oropeza RN  Glycemic Management: blood glucose monitored  Taken 6/23/2025 1600 by Juliane Oropeza RN  Glycemic Management: blood glucose monitored  Taken 6/23/2025 1400 by Juliane Oropeza RN  Glycemic  Management: blood glucose monitored  Taken 6/23/2025 1200 by Juliane Oropeza RN  Glycemic Management: blood glucose monitored  Taken 6/23/2025 1000 by Juliane Oropeza RN  Glycemic Management: blood glucose monitored  Taken 6/23/2025 0800 by Juliane Oropeza RN  Glycemic Management: blood glucose monitored  Goal: Absence of Infection Signs and Symptoms  Outcome: Progressing  Intervention: Prevent or Manage Infection  Recent Flowsheet Documentation  Taken 6/23/2025 1800 by Juliane Oropeza RN  Infection Prevention:   cohorting utilized   hand hygiene promoted   rest/sleep promoted  Taken 6/23/2025 1600 by Juliane Oropeza RN  Infection Prevention:   cohorting utilized   hand hygiene promoted   rest/sleep promoted  Taken 6/23/2025 1400 by Juliane Oropeza RN  Infection Prevention:   cohorting utilized   hand hygiene promoted   rest/sleep promoted  Taken 6/23/2025 1200 by Juliane Oropeza RN  Infection Prevention:   cohorting utilized   hand hygiene promoted   rest/sleep promoted  Taken 6/23/2025 1000 by Juliane Oropeza RN  Infection Prevention:   cohorting utilized   hand hygiene promoted   rest/sleep promoted  Taken 6/23/2025 0800 by Juliane Oropeza RN  Infection Prevention:   cohorting utilized   hand hygiene promoted  Goal: Anesthesia/Sedation Recovery  Outcome: Progressing  Intervention: Optimize Anesthesia Recovery  Recent Flowsheet Documentation  Taken 6/23/2025 1800 by Juliane Oropeza RN  Stabilization Measures: legs elevated  Patient Tolerance (IS):   good   no adverse signs/symptoms present  Safety Promotion/Fall Prevention:   assistive device/personal items within reach   activity supervised   fall prevention program maintained   lighting adjusted   mobility aid in reach   muscle strengthening facilitated   nonskid shoes/slippers when out of bed   room organization consistent   safety round/check completed  Administration (IS):   instruction provided, follow-up   mouthpiece utilized   proper technique  demonstrated   self-administered  Reorientation Measures: clock in view  Level Incentive Spirometer (mL): 2000  Incentive Spirometer Predicted Level (mL): 1500  Number of Repetitions (IS): 4  Taken 6/23/2025 1700 by Juliane Oropeza RN  Patient Tolerance (IS):   good   no adverse signs/symptoms present  Administration (IS):   instruction provided, follow-up   mouthpiece utilized   proper technique demonstrated   self-administered  Level Incentive Spirometer (mL): 2500  Incentive Spirometer Predicted Level (mL): 2500  Number of Repetitions (IS): 5  Taken 6/23/2025 1600 by Juliane Oropeza RN  Stabilization Measures: legs elevated  Patient Tolerance (IS):   good   no adverse signs/symptoms present  Safety Promotion/Fall Prevention:   assistive device/personal items within reach   activity supervised   fall prevention program maintained   lighting adjusted   mobility aid in reach   muscle strengthening facilitated   nonskid shoes/slippers when out of bed   room organization consistent   safety round/check completed  Administration (IS):   instruction provided, follow-up   mouthpiece utilized   proper technique demonstrated   self-administered  Reorientation Measures: clock in view  Level Incentive Spirometer (mL): 2000  Incentive Spirometer Predicted Level (mL): 1500  Number of Repetitions (IS): 4  Taken 6/23/2025 1500 by Juliane Oropeza RN  Patient Tolerance (IS):   good   no adverse signs/symptoms present  Administration (IS):   instruction provided, follow-up   mouthpiece utilized   proper technique demonstrated   self-administered  Level Incentive Spirometer (mL): 2000  Incentive Spirometer Predicted Level (mL): 2500  Number of Repetitions (IS): 5  Taken 6/23/2025 1400 by Juliane Oropeza RN  Stabilization Measures: legs elevated  Patient Tolerance (IS): good  Safety Promotion/Fall Prevention:   assistive device/personal items within reach   activity supervised   fall prevention program maintained   lighting  adjusted   mobility aid in reach   muscle strengthening facilitated   nonskid shoes/slippers when out of bed   room organization consistent   safety round/check completed  Administration (IS):   instruction provided, follow-up   mouthpiece utilized   proper technique demonstrated   self-administered  Reorientation Measures: clock in view  Level Incentive Spirometer (mL): 2000  Incentive Spirometer Predicted Level (mL): 1500  Number of Repetitions (IS): 4  Taken 6/23/2025 1300 by Juliane Oropeza RN  Patient Tolerance (IS): good  Administration (IS):   instruction provided, follow-up   mouthpiece utilized   proper technique demonstrated   self-administered  Level Incentive Spirometer (mL): 2000  Incentive Spirometer Predicted Level (mL): 1500  Number of Repetitions (IS): 4  Taken 6/23/2025 1200 by Juliane Oropeza RN  Stabilization Measures: legs elevated  Patient Tolerance (IS):   good   no adverse signs/symptoms present  Safety Promotion/Fall Prevention:   assistive device/personal items within reach   activity supervised   fall prevention program maintained   lighting adjusted   mobility aid in reach   muscle strengthening facilitated   nonskid shoes/slippers when out of bed   room organization consistent   safety round/check completed  Administration (IS):   instruction provided, follow-up   mouthpiece utilized   proper technique demonstrated   self-administered  Reorientation Measures: clock in view  Level Incentive Spirometer (mL): 2000  Incentive Spirometer Predicted Level (mL): 1500  Number of Repetitions (IS): 4  Taken 6/23/2025 1000 by Juliane Oropeza RN  Stabilization Measures: legs elevated  Patient Tolerance (IS):   good   no adverse signs/symptoms present  Safety Promotion/Fall Prevention:   assistive device/personal items within reach   activity supervised   fall prevention program maintained   lighting adjusted   mobility aid in reach   muscle strengthening facilitated   nonskid shoes/slippers when  out of bed   room organization consistent   safety round/check completed  Administration (IS):   instruction provided, follow-up   mouthpiece utilized   proper technique demonstrated   self-administered  Reorientation Measures: clock in view  Level Incentive Spirometer (mL): 2000  Incentive Spirometer Predicted Level (mL): 1500  Number of Repetitions (IS): 4  Taken 6/23/2025 0843 by Juliane Oropeza RN  Patient Tolerance (IS): fair  Administration (IS):   instruction provided, follow-up   mouthpiece utilized   self-administered  Incentive Spirometer Predicted Level (mL): 500  Taken 6/23/2025 0800 by Juliane Oropeza RN  Stabilization Measures: legs elevated  Patient Tolerance (IS): fair  Safety Promotion/Fall Prevention:   activity supervised   assistive device/personal items within reach   clutter free environment maintained   fall prevention program maintained   gait belt   lighting adjusted   mobility aid in reach   nonskid shoes/slippers when out of bed   room organization consistent   safety round/check completed   toileting scheduled  Administration (IS):   instruction provided, follow-up   mouthpiece utilized   proper technique demonstrated   self-administered  Reorientation Measures: clock in view  Level Incentive Spirometer (mL): 500  Incentive Spirometer Predicted Level (mL): 1000  Number of Repetitions (IS): 2  Goal: Acceptable Pain Control  Outcome: Progressing  Intervention: Prevent or Manage Pain  Recent Flowsheet Documentation  Taken 6/23/2025 1800 by Juliane Oropeza RN  Diversional Activities: television  Taken 6/23/2025 1600 by Juliane Oropeza RN  Diversional Activities: television  Taken 6/23/2025 1450 by Juliane Oropeza RN  Pain Management Interventions:   care clustered   pain medication given  Taken 6/23/2025 1419 by Juliane Oropeza RN  Pain Management Interventions: pain medication given  Taken 6/23/2025 1400 by Juliane Oropeza RN  Diversional Activities: television  Taken 6/23/2025 1222  by Juliane Oropeza RN  Pain Management Interventions: care clustered  Taken 6/23/2025 1200 by Juliane Oropeza RN  Pain Management Interventions: care clustered  Diversional Activities: television  Taken 6/23/2025 1152 by Juliane Oropeza RN  Pain Management Interventions:   care clustered   pain medication given  Taken 6/23/2025 1000 by Juliane Oropeza RN  Diversional Activities: television  Taken 6/23/2025 0939 by Juliane Oropeza RN  Pain Management Interventions:   care clustered   pain medication given  Taken 6/23/2025 0909 by Juliane Oropeza RN  Pain Management Interventions:   care clustered   pain medication given  Taken 6/23/2025 0839 by Juliane Oropeza RN  Pain Management Interventions:   care clustered   pain medication given  Taken 6/23/2025 0800 by Juliane Oropeza RN  Pain Management Interventions:   care clustered   position adjusted  Diversional Activities: television  Goal: Nausea and Vomiting Relief  Outcome: Progressing  Goal: Effective Urinary Elimination  Outcome: Progressing  Intervention: Monitor and Manage Urinary Retention  Recent Flowsheet Documentation  Taken 6/23/2025 1800 by Juliane Oropeza RN  Urinary Elimination Promotion:   toileting device within reach   toileting offered   toileting scheduled  Taken 6/23/2025 1600 by Juliane Oropeza RN  Urinary Elimination Promotion:   toileting device within reach   toileting offered   toileting scheduled  Taken 6/23/2025 1400 by Juliane Oropeza RN  Urinary Elimination Promotion:   toileting device within reach   toileting offered   toileting scheduled  Taken 6/23/2025 1200 by Juliane Oropeza RN  Urinary Elimination Promotion:   toileting device within reach   toileting offered   toileting scheduled  Taken 6/23/2025 1000 by Juliane Oropeza RN  Urinary Elimination Promotion:   toileting device within reach   toileting offered   toileting scheduled  Taken 6/23/2025 0800 by Juliane Oropeza RN  Urinary Elimination Promotion:   toileting  device within reach   toileting offered  Goal: Effective Oxygenation and Ventilation  Outcome: Progressing  Intervention: Promote Airway Secretion Clearance  Recent Flowsheet Documentation  Taken 6/23/2025 1800 by Juliane Oropeza RN  Patient Tolerance (IS):   good   no adverse signs/symptoms present  Administration (IS):   instruction provided, follow-up   mouthpiece utilized   proper technique demonstrated   self-administered  Airway/Ventilation Management: position adjusted  Level Incentive Spirometer (mL): 2000  Cough And Deep Breathing: done independently per patient  Incentive Spirometer Predicted Level (mL): 1500  Number of Repetitions (IS): 4  Taken 6/23/2025 1700 by Juliane Oropeza RN  Patient Tolerance (IS):   good   no adverse signs/symptoms present  Administration (IS):   instruction provided, follow-up   mouthpiece utilized   proper technique demonstrated   self-administered  Level Incentive Spirometer (mL): 2500  Incentive Spirometer Predicted Level (mL): 2500  Number of Repetitions (IS): 5  Taken 6/23/2025 1600 by Juliane Oropeza RN  Patient Tolerance (IS):   good   no adverse signs/symptoms present  Administration (IS):   instruction provided, follow-up   mouthpiece utilized   proper technique demonstrated   self-administered  Airway/Ventilation Management: position adjusted  Level Incentive Spirometer (mL): 2000  Cough And Deep Breathing: done independently per patient  Incentive Spirometer Predicted Level (mL): 1500  Number of Repetitions (IS): 4  Taken 6/23/2025 1500 by Juliane Oropeza RN  Patient Tolerance (IS):   good   no adverse signs/symptoms present  Administration (IS):   instruction provided, follow-up   mouthpiece utilized   proper technique demonstrated   self-administered  Level Incentive Spirometer (mL): 2000  Incentive Spirometer Predicted Level (mL): 2500  Number of Repetitions (IS): 5  Taken 6/23/2025 1400 by Juliane Oropeza RN  Patient Tolerance (IS): good  Administration  (IS):   instruction provided, follow-up   mouthpiece utilized   proper technique demonstrated   self-administered  Airway/Ventilation Management: position adjusted  Level Incentive Spirometer (mL): 2000  Cough And Deep Breathing: done independently per patient  Incentive Spirometer Predicted Level (mL): 1500  Number of Repetitions (IS): 4  Taken 6/23/2025 1300 by Juliane Oropeza RN  Patient Tolerance (IS): good  Administration (IS):   instruction provided, follow-up   mouthpiece utilized   proper technique demonstrated   self-administered  Level Incentive Spirometer (mL): 2000  Incentive Spirometer Predicted Level (mL): 1500  Number of Repetitions (IS): 4  Taken 6/23/2025 1200 by Juliane Oropeza RN  Patient Tolerance (IS):   good   no adverse signs/symptoms present  Administration (IS):   instruction provided, follow-up   mouthpiece utilized   proper technique demonstrated   self-administered  Airway/Ventilation Management: position adjusted  Level Incentive Spirometer (mL): 2000  Cough And Deep Breathing: done independently per patient  Incentive Spirometer Predicted Level (mL): 1500  Number of Repetitions (IS): 4  Taken 6/23/2025 1000 by Juliane Oropeza RN  Patient Tolerance (IS):   good   no adverse signs/symptoms present  Administration (IS):   instruction provided, follow-up   mouthpiece utilized   proper technique demonstrated   self-administered  Airway/Ventilation Management: position adjusted  Level Incentive Spirometer (mL): 2000  Cough And Deep Breathing: done independently per patient  Incentive Spirometer Predicted Level (mL): 1500  Number of Repetitions (IS): 4  Taken 6/23/2025 0843 by Juliane Oropeza RN  Patient Tolerance (IS): fair  Administration (IS):   instruction provided, follow-up   mouthpiece utilized   self-administered  Incentive Spirometer Predicted Level (mL): 500  Taken 6/23/2025 0800 by Juliane Oropeza RN  Patient Tolerance (IS): fair  Administration (IS):   instruction provided,  follow-up   mouthpiece utilized   proper technique demonstrated   self-administered  Airway/Ventilation Management: humidification applied  Level Incentive Spirometer (mL): 500  Cough And Deep Breathing: done independently per patient  Incentive Spirometer Predicted Level (mL): 1000  Number of Repetitions (IS): 2     Problem: Skin Injury Risk Increased  Goal: Skin Health and Integrity  Outcome: Progressing  Intervention: Optimize Skin Protection  Recent Flowsheet Documentation  Taken 6/23/2025 1800 by Juliane Oropeza RN  Activity Management: ambulated outside room  Pressure Reduction Techniques: frequent weight shift encouraged  Head of Bed (HOB) Positioning: other (see comments)  Pressure Reduction Devices:   chair cushion utilized   elbow protectors utilized   heel offloading device utilized   positioning supports utilized   pressure-redistributing mattress utilized   specialty bed utilized  Skin Protection:   skin sealant/moisture barrier applied   transparent dressing maintained  Taken 6/23/2025 1600 by Juliane Oropeza RN  Pressure Reduction Techniques: frequent weight shift encouraged  Head of Bed (HOB) Positioning: other (see comments)  Pressure Reduction Devices:   chair cushion utilized   elbow protectors utilized   heel offloading device utilized   positioning supports utilized   pressure-redistributing mattress utilized   specialty bed utilized  Skin Protection:   skin sealant/moisture barrier applied   transparent dressing maintained  Taken 6/23/2025 1400 by Juliane Oropeza RN  Pressure Reduction Techniques: frequent weight shift encouraged  Head of Bed (HOB) Positioning: other (see comments)  Pressure Reduction Devices:   chair cushion utilized   elbow protectors utilized   heel offloading device utilized   positioning supports utilized   pressure-redistributing mattress utilized   specialty bed utilized  Skin Protection:   skin sealant/moisture barrier applied   transparent dressing maintained  Taken  6/23/2025 1200 by Juliane Oropeza RN  Pressure Reduction Techniques: frequent weight shift encouraged  Head of Bed (HOB) Positioning: other (see comments)  Pressure Reduction Devices:   chair cushion utilized   elbow protectors utilized   heel offloading device utilized   positioning supports utilized   pressure-redistributing mattress utilized   specialty bed utilized  Skin Protection:   skin sealant/moisture barrier applied   transparent dressing maintained  Taken 6/23/2025 1000 by Juliane Oropeza RN  Pressure Reduction Techniques: frequent weight shift encouraged  Head of Bed (HOB) Positioning: (assisted patient up in chair) other (see comments)  Pressure Reduction Devices:   chair cushion utilized   elbow protectors utilized   heel offloading device utilized   positioning supports utilized   pressure-redistributing mattress utilized   specialty bed utilized  Skin Protection:   skin sealant/moisture barrier applied   transparent dressing maintained  Taken 6/23/2025 0800 by Juliane Oropeza RN  Activity Management: activity encouraged  Pressure Reduction Techniques:   frequent weight shift encouraged   heels elevated off bed  Head of Bed (HOB) Positioning: HOB elevated  Pressure Reduction Devices:   chair cushion utilized   positioning supports utilized   pressure-redistributing mattress utilized  Skin Protection:   transparent dressing maintained   skin sealant/moisture barrier applied  Intervention: Promote and Optimize Oral Intake  Recent Flowsheet Documentation  Taken 6/23/2025 0800 by Juliane Oropeza RN  Nutrition Interventions: supplemental drinks provided     Problem: Mechanical Ventilation Invasive  Goal: Effective Communication  Outcome: Progressing  Intervention: Ensure Effective Communication  Recent Flowsheet Documentation  Taken 6/23/2025 1800 by Juliane Oropeza RN  Trust Relationship/Rapport:   care explained   choices provided   emotional support provided   empathic listening provided    questions answered   thoughts/feelings acknowledged  Diversional Activities: television  Family/Support System Care: self-care encouraged  Communication Enhancement Strategies: call light answered in person  Taken 6/23/2025 1600 by Juliane Oropeza RN  Trust Relationship/Rapport:   care explained   choices provided   emotional support provided   empathic listening provided   questions answered   thoughts/feelings acknowledged  Diversional Activities: television  Family/Support System Care: self-care encouraged  Communication Enhancement Strategies: call light answered in person  Taken 6/23/2025 1400 by Juliane Oropeza, RN  Trust Relationship/Rapport:   care explained   choices provided   emotional support provided   empathic listening provided   questions answered   thoughts/feelings acknowledged  Diversional Activities: television  Family/Support System Care: self-care encouraged  Communication Enhancement Strategies: call light answered in person  Taken 6/23/2025 1200 by Juliane Oropeza, RN  Trust Relationship/Rapport:   care explained   choices provided   emotional support provided   empathic listening provided   questions answered   thoughts/feelings acknowledged  Diversional Activities: television  Family/Support System Care: self-care encouraged  Communication Enhancement Strategies: call light answered in person  Taken 6/23/2025 1000 by Juliane Oropeza, RN  Trust Relationship/Rapport:   care explained   choices provided   emotional support provided   empathic listening provided   questions answered   thoughts/feelings acknowledged  Diversional Activities: television  Family/Support System Care: self-care encouraged  Communication Enhancement Strategies: call light answered in person  Taken 6/23/2025 0800 by Juliane Oropeza, RN  Trust Relationship/Rapport:   care explained   choices provided   emotional support provided   empathic listening provided   questions answered   questions encouraged    thoughts/feelings acknowledged  Diversional Activities: television  Family/Support System Care: self-care encouraged  Communication Enhancement Strategies: call light answered in person  Goal: Optimal Device Function  Outcome: Progressing  Intervention: Optimize Device Care and Function  Recent Flowsheet Documentation  Taken 6/23/2025 1800 by Juliane Oropeza RN  Airway/Ventilation Management: position adjusted  Airway Safety Measures: oxygen flowmeter at bedside  Taken 6/23/2025 1600 by Juliane Oropeza RN  Airway/Ventilation Management: position adjusted  Airway Safety Measures: oxygen flowmeter at bedside  Taken 6/23/2025 1400 by Juliane Oropeza RN  Airway/Ventilation Management: position adjusted  Airway Safety Measures: oxygen flowmeter at bedside  Taken 6/23/2025 1200 by Juliane Oropeza RN  Airway/Ventilation Management: position adjusted  Airway Safety Measures: oxygen flowmeter at bedside  Taken 6/23/2025 1000 by Juliane Oropeza RN  Airway/Ventilation Management: position adjusted  Airway Safety Measures: oxygen flowmeter at bedside  Taken 6/23/2025 0800 by Juliane Oropeza RN  Airway/Ventilation Management: humidification applied  Airway Safety Measures: oxygen flowmeter at bedside  Goal: Mechanical Ventilation Liberation  Outcome: Progressing  Intervention: Promote Extubation and Mechanical Ventilation Liberation  Recent Flowsheet Documentation  Taken 6/23/2025 1800 by Juliane Oropeza RN  Environmental Support: calm environment promoted  Sleep/Rest Enhancement: natural light exposure provided  Medication Review/Management: medications reviewed  Taken 6/23/2025 1600 by Juliane Oropeza RN  Environmental Support: calm environment promoted  Sleep/Rest Enhancement: natural light exposure provided  Medication Review/Management: medications reviewed  Taken 6/23/2025 1400 by Juliane Oropeza RN  Environmental Support: calm environment promoted  Sleep/Rest Enhancement: natural light exposure  provided  Medication Review/Management: medications reviewed  Taken 6/23/2025 1222 by Juliane Oropeza RN  Sleep/Rest Enhancement: natural light exposure provided  Taken 6/23/2025 1200 by Juliane Oropeza RN  Environmental Support: calm environment promoted  Sleep/Rest Enhancement: natural light exposure provided  Medication Review/Management: medications reviewed  Taken 6/23/2025 1000 by Juliane Oropeza RN  Environmental Support: calm environment promoted  Sleep/Rest Enhancement: natural light exposure provided  Medication Review/Management: medications reviewed  Taken 6/23/2025 0800 by Juliane Oropeza RN  Environmental Support: calm environment promoted  Sleep/Rest Enhancement: natural light exposure provided  Medication Review/Management: medications reviewed  Goal: Optimal Nutrition Delivery  Outcome: Progressing  Goal: Absence of Device-Related Skin and Tissue Injury  Outcome: Progressing  Intervention: Maintain Skin and Tissue Health  Recent Flowsheet Documentation  Taken 6/23/2025 1800 by Juliane Oropeza RN  Device Skin Pressure Protection:   positioning supports utilized   tubing/devices free from skin contact   skin-to-skin areas padded  Taken 6/23/2025 1600 by Juliane Oropeza RN  Device Skin Pressure Protection:   positioning supports utilized   tubing/devices free from skin contact   skin-to-skin areas padded  Taken 6/23/2025 1400 by Juliane Oropeza RN  Device Skin Pressure Protection:   positioning supports utilized   tubing/devices free from skin contact   skin-to-skin areas padded  Taken 6/23/2025 1200 by Juliane Oropeza RN  Device Skin Pressure Protection:   positioning supports utilized   tubing/devices free from skin contact   skin-to-skin areas padded  Taken 6/23/2025 1000 by Juliane Oropeza RN  Device Skin Pressure Protection:   positioning supports utilized   tubing/devices free from skin contact   skin-to-skin areas padded  Taken 6/23/2025 0800 by Juliane Oropeza RN  Device Skin Pressure  Protection:   positioning supports utilized   pressure points protected   skin-to-device areas padded   skin-to-skin areas padded   tubing/devices free from skin contact  Goal: Absence of Ventilator-Induced Lung Injury  Outcome: Progressing  Intervention: Prevent Ventilator-Associated Pneumonia  Recent Flowsheet Documentation  Taken 6/23/2025 1800 by Juliane Oropeza RN  Head of Bed ([facility]) Positioning: other (see comments)  Taken 6/23/2025 1600 by Juliane Oropeza RN  Head of Bed ([facility]) Positioning: other (see comments)  Taken 6/23/2025 1400 by Juliane Oropeza RN  Head of Bed ([facility]) Positioning: other (see comments)  Taken 6/23/2025 1200 by Juliane Oropeza RN  Head of Bed ([facility]) Positioning: other (see comments)  Taken 6/23/2025 1000 by Juliane Oropeza RN  Head of Bed ([facility]) Positioning: (assisted patient up in chair) other (see comments)  Taken 6/23/2025 0800 by Juliane Oropeza RN  Head of Bed ([facility]) Positioning: HOB elevated     Problem: Fall Injury Risk  Goal: Absence of Fall and Fall-Related Injury  Outcome: Progressing  Intervention: Identify and Manage Contributors  Recent Flowsheet Documentation  Taken 6/23/2025 1800 by Juliane Oropeza RN  Medication Review/Management: medications reviewed  Self-Care Promotion: independence encouraged  Taken 6/23/2025 1600 by Juliane Oropeza RN  Medication Review/Management: medications reviewed  Self-Care Promotion: independence encouraged  Taken 6/23/2025 1400 by Juliane Oropeza RN  Medication Review/Management: medications reviewed  Self-Care Promotion: independence encouraged  Taken 6/23/2025 1200 by Juliane Oropeza RN  Medication Review/Management: medications reviewed  Self-Care Promotion: independence encouraged  Taken 6/23/2025 1000 by Juliane Oropeza RN  Medication Review/Management: medications reviewed  Self-Care Promotion: independence encouraged  Taken 6/23/2025 0800 by Juliane Oropeza RN  Medication Review/Management: medications reviewed  Self-Care  Promotion: independence encouraged  Intervention: Promote Injury-Free Environment  Recent Flowsheet Documentation  Taken 6/23/2025 1800 by Juliane Oropeza RN  Safety Promotion/Fall Prevention:   assistive device/personal items within reach   activity supervised   fall prevention program maintained   lighting adjusted   mobility aid in reach   muscle strengthening facilitated   nonskid shoes/slippers when out of bed   room organization consistent   safety round/check completed  Taken 6/23/2025 1600 by Juliane Oropeza RN  Safety Promotion/Fall Prevention:   assistive device/personal items within reach   activity supervised   fall prevention program maintained   lighting adjusted   mobility aid in reach   muscle strengthening facilitated   nonskid shoes/slippers when out of bed   room organization consistent   safety round/check completed  Taken 6/23/2025 1400 by Juliane Oropeza RN  Safety Promotion/Fall Prevention:   assistive device/personal items within reach   activity supervised   fall prevention program maintained   lighting adjusted   mobility aid in reach   muscle strengthening facilitated   nonskid shoes/slippers when out of bed   room organization consistent   safety round/check completed  Taken 6/23/2025 1200 by Juliane Oropeza RN  Safety Promotion/Fall Prevention:   assistive device/personal items within reach   activity supervised   fall prevention program maintained   lighting adjusted   mobility aid in reach   muscle strengthening facilitated   nonskid shoes/slippers when out of bed   room organization consistent   safety round/check completed  Taken 6/23/2025 1000 by Juliane Oropeza RN  Safety Promotion/Fall Prevention:   assistive device/personal items within reach   activity supervised   fall prevention program maintained   lighting adjusted   mobility aid in reach   muscle strengthening facilitated   nonskid shoes/slippers when out of bed   room organization consistent   safety round/check  completed  Taken 6/23/2025 0800 by Juliane Oropeza RN  Safety Promotion/Fall Prevention:   activity supervised   assistive device/personal items within reach   clutter free environment maintained   fall prevention program maintained   gait belt   lighting adjusted   mobility aid in reach   nonskid shoes/slippers when out of bed   room organization consistent   safety round/check completed   toileting scheduled   Goal Outcome Evaluation:           Progress: improving

## 2025-06-24 ENCOUNTER — APPOINTMENT (OUTPATIENT)
Dept: GENERAL RADIOLOGY | Facility: HOSPITAL | Age: 75
DRG: 234 | End: 2025-06-24
Payer: MEDICARE

## 2025-06-24 ENCOUNTER — APPOINTMENT (OUTPATIENT)
Dept: CARDIOLOGY | Facility: HOSPITAL | Age: 75
DRG: 234 | End: 2025-06-24
Payer: MEDICARE

## 2025-06-24 LAB
ANION GAP SERPL CALCULATED.3IONS-SCNC: 9 MMOL/L (ref 5–15)
BUN SERPL-MCNC: 20.8 MG/DL (ref 8–23)
BUN/CREAT SERPL: 22.1 (ref 7–25)
CALCIUM SPEC-SCNC: 8.7 MG/DL (ref 8.6–10.5)
CHLORIDE SERPL-SCNC: 95 MMOL/L (ref 98–107)
CO2 SERPL-SCNC: 30 MMOL/L (ref 22–29)
CREAT SERPL-MCNC: 0.94 MG/DL (ref 0.76–1.27)
DEPRECATED RDW RBC AUTO: 43.5 FL (ref 37–54)
EGFRCR SERPLBLD CKD-EPI 2021: 85.1 ML/MIN/1.73
ERYTHROCYTE [DISTWIDTH] IN BLOOD BY AUTOMATED COUNT: 12.4 % (ref 12.3–15.4)
GLUCOSE BLDC GLUCOMTR-MCNC: 101 MG/DL (ref 70–130)
GLUCOSE BLDC GLUCOMTR-MCNC: 115 MG/DL (ref 70–130)
GLUCOSE BLDC GLUCOMTR-MCNC: 120 MG/DL (ref 70–130)
GLUCOSE BLDC GLUCOMTR-MCNC: 135 MG/DL (ref 70–130)
GLUCOSE SERPL-MCNC: 100 MG/DL (ref 65–99)
HCT VFR BLD AUTO: 33.2 % (ref 37.5–51)
HGB BLD-MCNC: 11.4 G/DL (ref 13–17.7)
MAGNESIUM SERPL-MCNC: 1.8 MG/DL (ref 1.6–2.4)
MCH RBC QN AUTO: 33.1 PG (ref 26.6–33)
MCHC RBC AUTO-ENTMCNC: 34.3 G/DL (ref 31.5–35.7)
MCV RBC AUTO: 96.5 FL (ref 79–97)
PLATELET # BLD AUTO: 225 10*3/MM3 (ref 140–450)
PMV BLD AUTO: 9.9 FL (ref 6–12)
POTASSIUM SERPL-SCNC: 3.4 MMOL/L (ref 3.5–5.2)
POTASSIUM SERPL-SCNC: 4.3 MMOL/L (ref 3.5–5.2)
RBC # BLD AUTO: 3.44 10*6/MM3 (ref 4.14–5.8)
SODIUM SERPL-SCNC: 134 MMOL/L (ref 136–145)
WBC NRBC COR # BLD AUTO: 10.08 10*3/MM3 (ref 3.4–10.8)

## 2025-06-24 PROCEDURE — 93308 TTE F-UP OR LMTD: CPT | Performed by: INTERNAL MEDICINE

## 2025-06-24 PROCEDURE — 85027 COMPLETE CBC AUTOMATED: CPT | Performed by: PHYSICIAN ASSISTANT

## 2025-06-24 PROCEDURE — 25010000002 MAGNESIUM SULFATE 4 GM/100ML SOLUTION: Performed by: THORACIC SURGERY (CARDIOTHORACIC VASCULAR SURGERY)

## 2025-06-24 PROCEDURE — 94664 DEMO&/EVAL PT USE INHALER: CPT

## 2025-06-24 PROCEDURE — 99232 SBSQ HOSP IP/OBS MODERATE 35: CPT | Performed by: INTERNAL MEDICINE

## 2025-06-24 PROCEDURE — 93325 DOPPLER ECHO COLOR FLOW MAPG: CPT | Performed by: INTERNAL MEDICINE

## 2025-06-24 PROCEDURE — 94010 BREATHING CAPACITY TEST: CPT | Performed by: INTERNAL MEDICINE

## 2025-06-24 PROCEDURE — 94761 N-INVAS EAR/PLS OXIMETRY MLT: CPT

## 2025-06-24 PROCEDURE — 84132 ASSAY OF SERUM POTASSIUM: CPT

## 2025-06-24 PROCEDURE — 71045 X-RAY EXAM CHEST 1 VIEW: CPT

## 2025-06-24 PROCEDURE — 80048 BASIC METABOLIC PNL TOTAL CA: CPT

## 2025-06-24 PROCEDURE — 93321 DOPPLER ECHO F-UP/LMTD STD: CPT | Performed by: INTERNAL MEDICINE

## 2025-06-24 PROCEDURE — 83735 ASSAY OF MAGNESIUM: CPT

## 2025-06-24 PROCEDURE — 82948 REAGENT STRIP/BLOOD GLUCOSE: CPT

## 2025-06-24 PROCEDURE — 93321 DOPPLER ECHO F-UP/LMTD STD: CPT

## 2025-06-24 PROCEDURE — 93325 DOPPLER ECHO COLOR FLOW MAPG: CPT

## 2025-06-24 PROCEDURE — 94799 UNLISTED PULMONARY SVC/PX: CPT

## 2025-06-24 PROCEDURE — 25010000002 HEPARIN (PORCINE) PER 1000 UNITS

## 2025-06-24 PROCEDURE — 99233 SBSQ HOSP IP/OBS HIGH 50: CPT | Performed by: NURSE PRACTITIONER

## 2025-06-24 PROCEDURE — 93308 TTE F-UP OR LMTD: CPT

## 2025-06-24 PROCEDURE — 25010000002 BUMETANIDE PER 0.5 MG

## 2025-06-24 PROCEDURE — 99024 POSTOP FOLLOW-UP VISIT: CPT

## 2025-06-24 RX ORDER — BUMETANIDE 0.25 MG/ML
2 INJECTION, SOLUTION INTRAMUSCULAR; INTRAVENOUS ONCE
Status: COMPLETED | OUTPATIENT
Start: 2025-06-24 | End: 2025-06-24

## 2025-06-24 RX ORDER — MAGNESIUM SULFATE HEPTAHYDRATE 40 MG/ML
4 INJECTION, SOLUTION INTRAVENOUS ONCE
Status: COMPLETED | OUTPATIENT
Start: 2025-06-24 | End: 2025-06-24

## 2025-06-24 RX ORDER — POTASSIUM CHLORIDE 1500 MG/1
40 TABLET, EXTENDED RELEASE ORAL EVERY 4 HOURS
Status: COMPLETED | OUTPATIENT
Start: 2025-06-24 | End: 2025-06-24

## 2025-06-24 RX ADMIN — DOCUSATE SODIUM 50 MG AND SENNOSIDES 8.6 MG 2 TABLET: 8.6; 5 TABLET, FILM COATED ORAL at 08:52

## 2025-06-24 RX ADMIN — SODIUM CHLORIDE SOLN NEBU 7% 4 ML: 7 NEBU SOLN at 08:44

## 2025-06-24 RX ADMIN — DOCUSATE SODIUM 50 MG AND SENNOSIDES 8.6 MG 2 TABLET: 8.6; 5 TABLET, FILM COATED ORAL at 20:28

## 2025-06-24 RX ADMIN — MAGNESIUM SULFATE IN WATER FOR 4 G: 40 INJECTION INTRAVENOUS at 08:53

## 2025-06-24 RX ADMIN — POTASSIUM CHLORIDE 40 MEQ: 1500 TABLET, EXTENDED RELEASE ORAL at 10:20

## 2025-06-24 RX ADMIN — GUAIFENESIN 1200 MG: 600 TABLET, EXTENDED RELEASE ORAL at 20:28

## 2025-06-24 RX ADMIN — SODIUM CHLORIDE SOLN NEBU 7% 4 ML: 7 NEBU SOLN at 20:08

## 2025-06-24 RX ADMIN — METOPROLOL TARTRATE 25 MG: 25 TABLET, FILM COATED ORAL at 20:29

## 2025-06-24 RX ADMIN — GABAPENTIN 100 MG: 100 CAPSULE ORAL at 20:29

## 2025-06-24 RX ADMIN — Medication 10 ML: at 08:54

## 2025-06-24 RX ADMIN — GUAIFENESIN 1200 MG: 600 TABLET, EXTENDED RELEASE ORAL at 08:52

## 2025-06-24 RX ADMIN — BUMETANIDE 2 MG: 0.25 INJECTION INTRAMUSCULAR; INTRAVENOUS at 10:20

## 2025-06-24 RX ADMIN — HEPARIN SODIUM 5000 UNITS: 5000 INJECTION INTRAVENOUS; SUBCUTANEOUS at 20:29

## 2025-06-24 RX ADMIN — ASPIRIN 325 MG: 325 TABLET, COATED ORAL at 08:53

## 2025-06-24 RX ADMIN — GABAPENTIN 100 MG: 100 CAPSULE ORAL at 08:53

## 2025-06-24 RX ADMIN — POTASSIUM CHLORIDE 40 MEQ: 1500 TABLET, EXTENDED RELEASE ORAL at 05:46

## 2025-06-24 RX ADMIN — HEPARIN SODIUM 5000 UNITS: 5000 INJECTION INTRAVENOUS; SUBCUTANEOUS at 05:19

## 2025-06-24 RX ADMIN — GABAPENTIN 100 MG: 100 CAPSULE ORAL at 16:01

## 2025-06-24 RX ADMIN — Medication 10 ML: at 20:28

## 2025-06-24 RX ADMIN — ATORVASTATIN CALCIUM 40 MG: 40 TABLET, FILM COATED ORAL at 20:28

## 2025-06-24 RX ADMIN — METOPROLOL TARTRATE 25 MG: 25 TABLET, FILM COATED ORAL at 08:53

## 2025-06-24 RX ADMIN — HEPARIN SODIUM 5000 UNITS: 5000 INJECTION INTRAVENOUS; SUBCUTANEOUS at 14:44

## 2025-06-24 NOTE — PROGRESS NOTES
[facility] Medicine Services  PROGRESS NOTE    Patient Name: Jamari Munoz  : 1950  MRN: [id number]    Date of Admission: 2025  Primary Care Physician: Keshawn Alexandre MD    Subjective   Subjective     CC:  CAD s/p CABG    HPI:  No new issues overnight, denies pain  BM overnight    Objective   Objective     Vital Signs:   Temp:  [98 °F (36.7 °C)-99.1 °F (37.3 °C)] 99.1 °F (37.3 °C)  Heart Rate:  [74-97] 83  Resp:  [18-22] 20  BP: ()/(69-79) 128/78  Flow (L/min) (Oxygen Therapy):  [3-6] 3.5     Physical Exam:  Constitutional: No acute distress, awake, alert  HENT: NCAT, mucous membranes moist  Respiratory: Clear to auscultation bilaterally, respiratory effort normal   Cardiovascular: RRR, no murmurs, rubs, or gallops  Gastrointestinal: Positive bowel sounds, soft, nontender, nondistended  Musculoskeletal: No bilateral ankle edema  Psychiatric: Appropriate affect, cooperative  Neurologic: Oriented x 3, JORGE, speech clear  Skin: No rashes noted      Results Reviewed:  LAB RESULTS:      Lab 25  0451 25  0422 25  0502 25  0238 25  0348 25  1632 25  1314 25  0654 25  0407 25  0406 25  1536   WBC 10.08 10.96* 14.83* 15.88* 11.93*   < > 18.01*  --  8.11  --  6.91   HEMOGLOBIN 11.4* 11.4* 12.1* 12.3* 13.0   < > 13.6  --  15.4  --  15.8   HEMOGLOBIN, POC  --   --   --   --   --   --   --    < >  --   --   --    HEMATOCRIT 33.2* 34.3* 36.5* 37.0* 40.2   < > 39.9  --  45.9  --  47.1   HEMATOCRIT POC  --   --   --   --   --   --   --    < >  --   --   --    PLATELETS 225 195 164 183 195   < > 186  --  286  --  323   NEUTROS ABS  --   --   --   --  9.84*  --   --   --  3.78  --  2.85   IMMATURE GRANS (ABS)  --   --   --   --  0.06*  --   --   --  0.03  --  0.03   LYMPHS ABS  --   --   --   --  1.14  --   --   --  3.09  --  2.84   MONOS ABS  --   --   --   --  0.85  --   --   --  0.78  --  0.84   EOS ABS  --   --    --   --  0.01  --   --   --  0.40  --  0.32   MCV 96.5 98.8* 99.7* 99.2* 101.0*   < > 96.4  --  97.5*  --  97.1*   PROTIME  --   --   --   --  16.9*  --  18.6*  --   --  14.3  --    APTT  --   --   --   --   --   --  28.5  --   --  32.3  --     < > = values in this interval not displayed.         Lab 06/24/25  0451 06/23/25  0422 06/22/25  0503 06/21/25  0238 06/20/25  0348 06/19/25  1632 06/19/25  1314 06/19/25  0407 06/18/25  1538 06/18/25  1536   SODIUM 134* 135* 131* 130* 136 139 137 139   < >  --    POTASSIUM 3.4* 4.2 4.5 4.6 4.6 4.3 4.4 4.4   < >  --    CHLORIDE 95* 97* 95* 102 105 109* 109* 107   < >  --    CO2 30.0* 29.0 28.0 21.0* 19.0* 21.1* 18.0* 24.0   < >  --    ANION GAP 9.0 9.0 8.0 7.0 12.0 8.9 10.0 8.0   < >  --    BUN 20.8 17.8 19.4 11.1 11.2 11.4 11.8 13.9   < >  --    CREATININE 0.94 0.80 0.82 0.84 1.01 0.89 0.86 0.86   < >  --    EGFR 85.1 92.9 92.2 91.5 78.0 89.9 90.9 90.9   < >  --    GLUCOSE 100* 107* 105* 142* 155* 162* 133* 100*   < >  --    CALCIUM 8.7 9.0 9.0 8.5* 8.4* 8.6 9.0 9.0   < >  --    IONIZED CALCIUM  --   --   --   --   --   --  1.07*  --   --   --    MAGNESIUM 1.8  --   --   --  2.4 1.9 2.0 2.0   < >  --    PHOSPHORUS  --   --   --   --   --  3.4 2.8  --   --   --    HEMOGLOBIN A1C  --   --   --   --   --   --   --   --   --  5.70*    < > = values in this interval not displayed.         Lab 06/21/25  0238 06/20/25  0348 06/19/25  1632 06/19/25  1314 06/19/25  0407   TOTAL PROTEIN 5.7* 5.7*  --   --  6.1   ALBUMIN 3.5 3.8 4.0 3.2* 3.6   GLOBULIN 2.2 1.9  --   --  2.5   ALT (SGPT) 16 21  --   --  23   AST (SGOT) 40 54*  --   --  24   BILIRUBIN 0.7 0.6  --   --  0.4   ALK PHOS 39 35*  --   --  49         Lab 06/20/25  0348 06/19/25  1314 06/19/25  0406   PROTIME 16.9* 18.6* 14.3   INR 1.29* 1.45* 1.05         Lab 06/18/25  1538   CHOLESTEROL 154   LDL CHOL 61   HDL CHOL 50   TRIGLYCERIDES 272*         Lab 06/18/25  1842 06/18/25  1536   ABO TYPING A A   RH TYPING Positive Positive    ANTIBODY SCREEN  --  Negative         Lab 06/19/25  1635 06/19/25  1308 06/19/25  1112   PH, ARTERIAL 7.342* 7.506* 7.39   PCO2, ARTERIAL 43.4 28.2*  --    PO2 ART 89.0 234.0*  --    FIO2 40 100  --    HCO3 ART 23.5 22.3  --    BASE EXCESS ART -2.3* 0.4  --    CARBOXYHEMOGLOBIN 1.0 0.8  --      Brief Urine Lab Results  (Last result in the past 365 days)        Color   Clarity   Blood   Leuk Est   Nitrite   Protein   CREAT   Urine HCG        06/18/25 1445 Yellow   Clear   Negative   Negative   Negative   Negative                   Microbiology Results Abnormal       None            XR Chest 1 View  Result Date: 6/24/2025  XR CHEST 1 VW Date of Exam: 6/24/2025 2:47 AM EDT Indication: Chest tube removal post op Comparison: 6/23/2025 Findings: Status post sternotomy and CABG. Stable linear bands of atelectasis at the lower lobes. Negative for pneumothorax. Small left pleural effusion similar to the prior study. No measurable right pleural effusion.     Impression: Impression: 1. Negative for pneumothorax. 2. Stable linear bibasilar atelectasis. 3. Small left pleural effusion. Electronically Signed: Poli Ramirez MD  6/24/2025 7:38 AM EDT  Workstation ID: [id number]    XR Chest 1 View  Result Date: 6/23/2025  XR CHEST 1 VW Date of Exam: 6/23/2025 4:03 AM EDT Indication: Chest tube removal post op Comparison: 6/23/2025 Findings: Status post sternotomy and CABG. There is linear bibasilar airspace disease similar to the prior study most compatible with atelectasis. No significant effusion. No discrete pneumothorax.     Impression: Impression: 1. No discrete pneumothorax. 2. Hypoinflated lungs with persistent bibasilar airspace disease likely atelectasis. Electronically Signed: Poli Ramirez MD  6/23/2025 7:33 AM EDT  Workstation ID: [id number]      Results for orders placed during the hospital encounter of 06/18/25    Adult Transthoracic Echo Complete W/ Cont if Necessary Per Protocol    Interpretation Summary    Left  ventricular systolic function is normal. Left ventricular ejection fraction appears to be 56 - 60%.    Left ventricular diastolic function is consistent with (grade I) impaired relaxation.    The aortic root measures 3.6 cm.      Current medications:  Scheduled Meds:acetaminophen, 1,000 mg, Oral, Q8H  aspirin, 325 mg, Oral, Daily  atorvastatin, 40 mg, Oral, Nightly  gabapentin, 100 mg, Oral, TID  guaiFENesin, 1,200 mg, Oral, Q12H  heparin (porcine), 5,000 Units, Subcutaneous, Q8H  insulin lispro, 2-7 Units, Subcutaneous, 4x Daily AC & at Bedtime  magnesium sulfate, 4 g, Intravenous, Once  metoprolol tartrate, 25 mg, Oral, Q12H  potassium chloride ER, 40 mEq, Oral, Q4H  protamine, 50 mg, Intravenous, Once  senna-docusate sodium, 2 tablet, Oral, BID  sodium chloride, 4 mL, Nebulization, BID - RT      Continuous Infusions:propofol, 5-50 mcg/kg/min      PRN Meds:.  Albuterol Sulfate NEB Orderable    senna-docusate sodium **AND** polyethylene glycol **AND** bisacodyl **AND** bisacodyl    Calcium Replacement - Follow Nurse / BPA Driven Protocol    dextrose    dextrose    glucagon (human recombinant)    Magnesium Cardiology Dose Replacement - Follow Nurse / BPA Driven Protocol    Morphine    naloxone    ondansetron    oxyCODONE    Phosphorus Replacement - Follow Nurse / BPA Driven Protocol    Potassium Replacement - Follow Nurse / BPA Driven Protocol    propofol    sodium bicarbonate    sodium chloride    sodium chloride    Assessment & Plan   Assessment & Plan     Active Hospital Problems    Diagnosis  POA    **CAD s/p CABG x 4 6/19/2025 [I25.110]  Yes    Hyperlipidemia LDL goal <70 [E78.5]  Yes    Primary hypertension [I10]  Yes      Resolved Hospital Problems    Diagnosis Date Resolved POA    Abnormal findings on diagnostic imaging of heart and coronary circulation [R93.1] 06/18/2025 Yes        Brief Hospital Course to date:  Jamari Munoz is a 74 y.o. male who has been experiencing midsternal chest discomfort  with exertion (more notable on colder days) which is relieved by rest. The patient notices this when riding his bike, which he does for exercise on a regular basis. When he saw Dr. Alexandre earlier this month, Dr. Alexandre instructed him not to exert himself and the patient has not had angina at rest or with normal activity.  Underwent CABG x 4 6/19/2025.  Currently out of the ICU on telemetry doing well.    Coronary disease  S/p CABG x 4 6/19/2025  Patient currently CABG x 4 (6/19/2025) with LIMA to LAD, SVG to PDA, SVG to OM1 and SVG to D1  Currently on aspirin 325 mg daily  Metoprolol tartrate 25 mg twice daily  Normotensive  S/p IV Bumex  Doing well with PT  Hospital medicine on board as consult  Blood sugars are well-controlled at this time  Evidence of hyperinflated lungs with persistent bibasilar airspace disease likely atelectasis, oxygen weaned 3.5L  Encourage aggressive I-S    HTN  Remains on metoprolol 25 mg twice daily    HLD  Remains on Lipitor 40 mg daily    Leukocytosis, resolved  Likely reactive in the setting of recent surgery    Anemia  Hemoglobin stable, likely postop anemia  Was 15.5 on admission      Expected Discharge Location and Transportation: Home with home health PT  Expected Discharge 6/25/2025  Expected Discharge Date: 6/24/2025; Expected Discharge Time: 12:00 PM     VTE Prophylaxis:  Pharmacologic & mechanical VTE prophylaxis orders are present.     Total time spent: Time Spent: Time Spent: 55 minutes  Time spent includes time reviewing chart, face-to-face time, counseling patient/family/caregiver, ordering medications/tests/procedures, communicating with other health care professionals, documenting clinical information in the electronic health record, and coordination of care.      AM-PAC 6 Clicks Score (PT): 18 (06/23/25 0800)    CODE STATUS:   Code Status and Medical Interventions: CPR (Attempt to Resuscitate); Full Support   Ordered at: 06/19/25 1253     Code Status (Patient has no pulse  and is not breathing):    CPR (Attempt to Resuscitate)     Medical Interventions (Patient has pulse or is breathing):    Full Support       Joy Mckenna, APRN  06/24/25

## 2025-06-24 NOTE — PLAN OF CARE
Problem: Adult Inpatient Plan of Care  Goal: Plan of Care Review  Outcome: Progressing  Goal: Patient-Specific Goal (Individualized)  Outcome: Progressing  Goal: Absence of Hospital-Acquired Illness or Injury  Outcome: Progressing  Intervention: Identify and Manage Fall Risk  Recent Flowsheet Documentation  Taken 6/24/2025 1600 by Juliane Oropeza RN  Safety Promotion/Fall Prevention:   assistive device/personal items within reach   activity supervised   fall prevention program maintained   lighting adjusted   mobility aid in reach   muscle strengthening facilitated   nonskid shoes/slippers when out of bed   room organization consistent   safety round/check completed  Taken 6/24/2025 1400 by Juliane Oropeza RN  Safety Promotion/Fall Prevention:   assistive device/personal items within reach   activity supervised   fall prevention program maintained   lighting adjusted   mobility aid in reach   muscle strengthening facilitated   nonskid shoes/slippers when out of bed   room organization consistent   safety round/check completed  Taken 6/24/2025 1200 by Juliane Oropeza RN  Safety Promotion/Fall Prevention:   assistive device/personal items within reach   activity supervised   fall prevention program maintained   lighting adjusted   mobility aid in reach   muscle strengthening facilitated   nonskid shoes/slippers when out of bed   room organization consistent   safety round/check completed  Taken 6/24/2025 1000 by Juliane Oropeza RN  Safety Promotion/Fall Prevention:   assistive device/personal items within reach   activity supervised   fall prevention program maintained   lighting adjusted   mobility aid in reach   muscle strengthening facilitated   nonskid shoes/slippers when out of bed   room organization consistent   safety round/check completed  Taken 6/24/2025 0800 by Juliane Oropeza RN  Safety Promotion/Fall Prevention:   assistive device/personal items within reach   activity supervised   fall prevention  program maintained   lighting adjusted   mobility aid in reach   muscle strengthening facilitated   nonskid shoes/slippers when out of bed   room organization consistent   safety round/check completed  Intervention: Prevent Skin Injury  Recent Flowsheet Documentation  Taken 6/24/2025 1600 by Juliane Oropeza RN  Body Position: position changed independently  Skin Protection:   skin sealant/moisture barrier applied   transparent dressing maintained  Taken 6/24/2025 1400 by Juliane Oropeza RN  Body Position: position changed independently  Skin Protection:   skin sealant/moisture barrier applied   transparent dressing maintained  Taken 6/24/2025 1200 by Juliane Oropeza RN  Body Position: legs elevated  Skin Protection:   skin sealant/moisture barrier applied   transparent dressing maintained  Taken 6/24/2025 1000 by Juliane Oropeza RN  Body Position: position changed independently  Skin Protection:   skin sealant/moisture barrier applied   transparent dressing maintained  Taken 6/24/2025 0800 by Juliane Oropeza RN  Body Position: position changed independently  Skin Protection:   skin sealant/moisture barrier applied   transparent dressing maintained  Intervention: Prevent and Manage VTE (Venous Thromboembolism) Risk  Recent Flowsheet Documentation  Taken 6/24/2025 0800 by Juliane Oropeza RN  VTE Prevention/Management:   bilateral   foot pump device on  Intervention: Prevent Infection  Recent Flowsheet Documentation  Taken 6/24/2025 1600 by Juliane Oropeza RN  Infection Prevention:   cohorting utilized   hand hygiene promoted   rest/sleep promoted  Taken 6/24/2025 1400 by Juliane Oropeza RN  Infection Prevention:   cohorting utilized   hand hygiene promoted   rest/sleep promoted  Taken 6/24/2025 1200 by Juliane Oropeza RN  Infection Prevention:   cohorting utilized   hand hygiene promoted   rest/sleep promoted  Taken 6/24/2025 1000 by Juliane Oropeza RN  Infection Prevention:   cohorting utilized   hand  hygiene promoted   rest/sleep promoted  Taken 6/24/2025 0800 by Juliane Oropeza RN  Infection Prevention:   cohorting utilized   hand hygiene promoted   rest/sleep promoted  Goal: Optimal Comfort and Wellbeing  Outcome: Progressing  Intervention: Monitor Pain and Promote Comfort  Recent Flowsheet Documentation  Taken 6/24/2025 0853 by Juliane Oropeza RN  Pain Management Interventions:   care clustered   position adjusted  Intervention: Provide Person-Centered Care  Recent Flowsheet Documentation  Taken 6/24/2025 1600 by Juliane Oropeza RN  Trust Relationship/Rapport:   care explained   choices provided   emotional support provided   empathic listening provided   questions answered   thoughts/feelings acknowledged  Taken 6/24/2025 1400 by Juliane Oropeza RN  Trust Relationship/Rapport:   care explained   choices provided   emotional support provided   empathic listening provided   questions answered   thoughts/feelings acknowledged  Taken 6/24/2025 1200 by Juliane Oropeza RN  Trust Relationship/Rapport:   care explained   choices provided   emotional support provided   empathic listening provided   questions answered   thoughts/feelings acknowledged  Taken 6/24/2025 1000 by Juliane Oropeza RN  Trust Relationship/Rapport:   care explained   choices provided   emotional support provided   empathic listening provided   questions answered   thoughts/feelings acknowledged  Taken 6/24/2025 0800 by Juliane Oropeza RN  Trust Relationship/Rapport:   care explained   choices provided   emotional support provided   empathic listening provided   questions answered   thoughts/feelings acknowledged  Goal: Readiness for Transition of Care  Outcome: Progressing     Problem: Cardiovascular Surgery  Goal: Improved Activity Tolerance  Outcome: Progressing  Intervention: Optimize Tolerance for Activity  Recent Flowsheet Documentation  Taken 6/24/2025 1600 by Juliane Oropeza RN  Environmental Support: calm environment  promoted  Self-Care Promotion: independence encouraged  Taken 6/24/2025 1400 by Juliane Oropeza RN  Environmental Support: calm environment promoted  Self-Care Promotion: independence encouraged  Taken 6/24/2025 1200 by Juliane Oropeza RN  Environmental Support: calm environment promoted  Self-Care Promotion: independence encouraged  Taken 6/24/2025 1000 by Juliane Oropeza RN  Environmental Support: calm environment promoted  Self-Care Promotion: independence encouraged  Taken 6/24/2025 0800 by Juliane Oropeza RN  Environmental Support: calm environment promoted  Self-Care Promotion: independence encouraged  Goal: Optimal Coping with Heart Surgery  Outcome: Progressing  Intervention: Support Psychosocial Response to Surgery  Recent Flowsheet Documentation  Taken 6/24/2025 1600 by Juliane Oropeza RN  Supportive Measures: active listening utilized  Family/Support System Care: self-care encouraged  Taken 6/24/2025 1400 by Juliane Oropeza RN  Supportive Measures: active listening utilized  Family/Support System Care: self-care encouraged  Taken 6/24/2025 1200 by Juliane Oropeza RN  Supportive Measures: active listening utilized  Family/Support System Care: self-care encouraged  Taken 6/24/2025 1000 by Juliane Oropeza RN  Supportive Measures: active listening utilized  Family/Support System Care: self-care encouraged  Taken 6/24/2025 0800 by Juliane Oropeza RN  Supportive Measures: active listening utilized  Family/Support System Care: self-care encouraged  Goal: Absence of Bleeding  Outcome: Progressing  Intervention: Monitor and Manage Bleeding  Recent Flowsheet Documentation  Taken 6/24/2025 1600 by Juliane Oropeza RN  Bleeding Management: dressing monitored  Taken 6/24/2025 1400 by Juliane Oropeza RN  Bleeding Management: dressing monitored  Taken 6/24/2025 1200 by Juliane Oropeza RN  Bleeding Management: dressing monitored  Taken 6/24/2025 1000 by Juliane Oropeza RN  Bleeding Management: dressing  monitored  Taken 6/24/2025 0800 by Juliane Oropeza RN  Bleeding Management: dressing monitored  Goal: Effective Bowel Elimination  Outcome: Progressing  Intervention: Enhance Bowel Motility and Elimination  Recent Flowsheet Documentation  Taken 6/24/2025 0800 by Juliane Oropeza RN  Bowel Motility Enhancement: ambulation promoted  Goal: Effective Cardiac Function  Outcome: Progressing  Intervention: Optimize Cardiac Output and Blood Flow  Recent Flowsheet Documentation  Taken 6/24/2025 1600 by Juliane Oropeza RN  Stabilization Measures: legs elevated  Taken 6/24/2025 1400 by Juliane Oropeza RN  Stabilization Measures: legs elevated  Taken 6/24/2025 1200 by Juliane Oropeza RN  Stabilization Measures: legs elevated  Taken 6/24/2025 1000 by Juliane Oropeza RN  Stabilization Measures: legs elevated  Taken 6/24/2025 0800 by Juliane Oropeza RN  Stabilization Measures: legs elevated  Goal: Optimal Cerebral Tissue Perfusion  Outcome: Progressing  Intervention: Protect and Optimize Cerebral Perfusion  Recent Flowsheet Documentation  Taken 6/24/2025 1600 by Juliane Oropeza RN  Glycemic Management: blood glucose monitored  Sensory Stimulation Regulation:   care clustered   television on  Head of Bed (HOB) Positioning: other (see comments)  Cerebral Perfusion Promotion: blood pressure monitored  Taken 6/24/2025 1400 by Juliane Oropeza RN  Glycemic Management: blood glucose monitored  Sensory Stimulation Regulation:   care clustered   television on  Head of Bed (HOB) Positioning: other (see comments)  Cerebral Perfusion Promotion: blood pressure monitored  Taken 6/24/2025 1200 by Juliane Oropeza RN  Glycemic Management: blood glucose monitored  Sensory Stimulation Regulation:   care clustered   television on  Head of Bed (HOB) Positioning: other (see comments)  Cerebral Perfusion Promotion: blood pressure monitored  Taken 6/24/2025 1000 by Juliane Oropeza RN  Glycemic Management: blood glucose monitored  Sensory  Stimulation Regulation:   care clustered   television on  Head of Bed (HOB) Positioning: other (see comments)  Cerebral Perfusion Promotion: blood pressure monitored  Taken 6/24/2025 0800 by Juliane Oropeza RN  Glycemic Management: blood glucose monitored  Sensory Stimulation Regulation:   care clustered   television on  Head of Bed (HOB) Positioning: other (see comments)  Cerebral Perfusion Promotion: blood pressure monitored  Goal: Fluid and Electrolyte Balance  Outcome: Progressing  Goal: Blood Glucose Level Within Target Range  Outcome: Progressing  Intervention: Optimize Glycemic Control  Recent Flowsheet Documentation  Taken 6/24/2025 1600 by Juliane Oropeza RN  Glycemic Management: blood glucose monitored  Taken 6/24/2025 1400 by Juliane Oropeza RN  Glycemic Management: blood glucose monitored  Taken 6/24/2025 1200 by Juliane Oropeza RN  Glycemic Management: blood glucose monitored  Taken 6/24/2025 1000 by Juliane Oropeza RN  Glycemic Management: blood glucose monitored  Taken 6/24/2025 0800 by Juliane Oropeza RN  Glycemic Management: blood glucose monitored  Goal: Absence of Infection Signs and Symptoms  Outcome: Progressing  Intervention: Prevent or Manage Infection  Recent Flowsheet Documentation  Taken 6/24/2025 1600 by Juliane Oropeza RN  Infection Prevention:   cohorting utilized   hand hygiene promoted   rest/sleep promoted  Taken 6/24/2025 1400 by Juliane Oropeza RN  Infection Prevention:   cohorting utilized   hand hygiene promoted   rest/sleep promoted  Taken 6/24/2025 1200 by Juliane Oropeza RN  Infection Prevention:   cohorting utilized   hand hygiene promoted   rest/sleep promoted  Taken 6/24/2025 1000 by Juliane Oropeza RN  Infection Prevention:   cohorting utilized   hand hygiene promoted   rest/sleep promoted  Taken 6/24/2025 0800 by Juliane Oropeza RN  Infection Prevention:   cohorting utilized   hand hygiene promoted   rest/sleep promoted  Goal: Anesthesia/Sedation  Recovery  Outcome: Progressing  Intervention: Optimize Anesthesia Recovery  Recent Flowsheet Documentation  Taken 6/24/2025 1600 by Juliane Oropeza RN  Stabilization Measures: legs elevated  Patient Tolerance (IS):   good   no adverse signs/symptoms present  Safety Promotion/Fall Prevention:   assistive device/personal items within reach   activity supervised   fall prevention program maintained   lighting adjusted   mobility aid in reach   muscle strengthening facilitated   nonskid shoes/slippers when out of bed   room organization consistent   safety round/check completed  Administration (IS):   instruction provided, follow-up   mouthpiece utilized   proper technique demonstrated   self-administered  Reorientation Measures: clock in view  Level Incentive Spirometer (mL): 2000  Incentive Spirometer Predicted Level (mL): 1500  Number of Repetitions (IS): 4  Taken 6/24/2025 1446 by Juliane Oropeza RN  Patient Tolerance (IS):   good   no adverse signs/symptoms present  Administration (IS):   instruction provided, follow-up   mouthpiece utilized   proper technique demonstrated   self-administered  Level Incentive Spirometer (mL): 2500  Incentive Spirometer Predicted Level (mL): 2500  Number of Repetitions (IS): 5  Taken 6/24/2025 1400 by Juliane Oropeza RN  Stabilization Measures: legs elevated  Patient Tolerance (IS):   good   no adverse signs/symptoms present  Safety Promotion/Fall Prevention:   assistive device/personal items within reach   activity supervised   fall prevention program maintained   lighting adjusted   mobility aid in reach   muscle strengthening facilitated   nonskid shoes/slippers when out of bed   room organization consistent   safety round/check completed  Administration (IS):   instruction provided, follow-up   mouthpiece utilized   proper technique demonstrated   self-administered  Reorientation Measures: clock in view  Level Incentive Spirometer (mL): 2000  Incentive Spirometer Predicted  Level (mL): 1500  Number of Repetitions (IS): 4  Taken 6/24/2025 1200 by Juliane Oropeza RN  Stabilization Measures: legs elevated  Patient Tolerance (IS):   good   no adverse signs/symptoms present  Safety Promotion/Fall Prevention:   assistive device/personal items within reach   activity supervised   fall prevention program maintained   lighting adjusted   mobility aid in reach   muscle strengthening facilitated   nonskid shoes/slippers when out of bed   room organization consistent   safety round/check completed  Administration (IS):   instruction provided, follow-up   mouthpiece utilized   proper technique demonstrated   self-administered  Reorientation Measures: clock in view  Level Incentive Spirometer (mL): 2500  Incentive Spirometer Predicted Level (mL): 2500  Number of Repetitions (IS): 5  Taken 6/24/2025 1000 by Juliane Oropeza RN  Stabilization Measures: legs elevated  Patient Tolerance (IS):   good   no adverse signs/symptoms present  Safety Promotion/Fall Prevention:   assistive device/personal items within reach   activity supervised   fall prevention program maintained   lighting adjusted   mobility aid in reach   muscle strengthening facilitated   nonskid shoes/slippers when out of bed   room organization consistent   safety round/check completed  Administration (IS):   instruction provided, follow-up   mouthpiece utilized   proper technique demonstrated   self-administered  Reorientation Measures: clock in view  Level Incentive Spirometer (mL): 2500  Incentive Spirometer Predicted Level (mL): 2500  Number of Repetitions (IS): 5  Taken 6/24/2025 0900 by Juliane Oropeza RN  Patient Tolerance (IS):   good   no adverse signs/symptoms present  Administration (IS):   instruction provided, follow-up   mouthpiece utilized   proper technique demonstrated   self-administered  Level Incentive Spirometer (mL): 1500  Incentive Spirometer Predicted Level (mL): 2500  Number of Repetitions (IS): 3  Taken  6/24/2025 0800 by Juliane Oropeza RN  Stabilization Measures: legs elevated  Patient Tolerance (IS):   good   no adverse signs/symptoms present  Safety Promotion/Fall Prevention:   assistive device/personal items within reach   activity supervised   fall prevention program maintained   lighting adjusted   mobility aid in reach   muscle strengthening facilitated   nonskid shoes/slippers when out of bed   room organization consistent   safety round/check completed  Administration (IS):   instruction provided, follow-up   mouthpiece utilized   proper technique demonstrated   self-administered  Reorientation Measures: clock in view  Level Incentive Spirometer (mL): 1000  Incentive Spirometer Predicted Level (mL): 1500  Number of Repetitions (IS): 2  Goal: Acceptable Pain Control  Outcome: Progressing  Intervention: Prevent or Manage Pain  Recent Flowsheet Documentation  Taken 6/24/2025 1600 by Juliane Oropzea RN  Diversional Activities: television  Taken 6/24/2025 1400 by Juliane Oropeza RN  Diversional Activities: television  Taken 6/24/2025 1200 by Juliane Oropeza RN  Diversional Activities: television  Taken 6/24/2025 1000 by Juliane Oropeza RN  Diversional Activities: television  Taken 6/24/2025 0853 by Juliane Oropeza RN  Pain Management Interventions:   care clustered   position adjusted  Taken 6/24/2025 0800 by Juliane Oropeza RN  Diversional Activities: television  Goal: Nausea and Vomiting Relief  Outcome: Progressing  Goal: Effective Urinary Elimination  Outcome: Progressing  Intervention: Monitor and Manage Urinary Retention  Recent Flowsheet Documentation  Taken 6/24/2025 1600 by Juliane Oropeza RN  Urinary Elimination Promotion:   toileting device within reach   toileting offered   toileting scheduled  Taken 6/24/2025 1400 by Juliane Oropeza RN  Urinary Elimination Promotion:   toileting device within reach   toileting offered   toileting scheduled  Taken 6/24/2025 1200 by Juliane Oropeza  RN  Urinary Elimination Promotion:   toileting device within reach   toileting offered   toileting scheduled  Taken 6/24/2025 1000 by Juliane Oropeza RN  Urinary Elimination Promotion:   toileting device within reach   toileting offered   toileting scheduled  Taken 6/24/2025 0800 by Juliane Oropeza RN  Urinary Elimination Promotion:   toileting device within reach   toileting offered   toileting scheduled  Goal: Effective Oxygenation and Ventilation  Outcome: Progressing  Intervention: Promote Airway Secretion Clearance  Recent Flowsheet Documentation  Taken 6/24/2025 1600 by Juliane Oropeza RN  Patient Tolerance (IS):   good   no adverse signs/symptoms present  Administration (IS):   instruction provided, follow-up   mouthpiece utilized   proper technique demonstrated   self-administered  Airway/Ventilation Management: position adjusted  Level Incentive Spirometer (mL): 2000  Cough And Deep Breathing: done independently per patient  Incentive Spirometer Predicted Level (mL): 1500  Number of Repetitions (IS): 4  Taken 6/24/2025 1446 by Juliane Oropeza RN  Patient Tolerance (IS):   good   no adverse signs/symptoms present  Administration (IS):   instruction provided, follow-up   mouthpiece utilized   proper technique demonstrated   self-administered  Level Incentive Spirometer (mL): 2500  Incentive Spirometer Predicted Level (mL): 2500  Number of Repetitions (IS): 5  Taken 6/24/2025 1400 by Juliane Oropeza RN  Patient Tolerance (IS):   good   no adverse signs/symptoms present  Administration (IS):   instruction provided, follow-up   mouthpiece utilized   proper technique demonstrated   self-administered  Airway/Ventilation Management: position adjusted  Level Incentive Spirometer (mL): 2000  Cough And Deep Breathing: done independently per patient  Incentive Spirometer Predicted Level (mL): 1500  Number of Repetitions (IS): 4  Taken 6/24/2025 1200 by Juliane Oropeza RN  Patient Tolerance (IS):   good   no  adverse signs/symptoms present  Administration (IS):   instruction provided, follow-up   mouthpiece utilized   proper technique demonstrated   self-administered  Airway/Ventilation Management: position adjusted  Level Incentive Spirometer (mL): 2500  Cough And Deep Breathing: done independently per patient  Incentive Spirometer Predicted Level (mL): 2500  Number of Repetitions (IS): 5  Taken 6/24/2025 1000 by Juliane Oropeza RN  Patient Tolerance (IS):   good   no adverse signs/symptoms present  Administration (IS):   instruction provided, follow-up   mouthpiece utilized   proper technique demonstrated   self-administered  Airway/Ventilation Management: position adjusted  Level Incentive Spirometer (mL): 2500  Cough And Deep Breathing: done independently per patient  Incentive Spirometer Predicted Level (mL): 2500  Number of Repetitions (IS): 5  Taken 6/24/2025 0900 by Juliane Oropeza RN  Patient Tolerance (IS):   good   no adverse signs/symptoms present  Administration (IS):   instruction provided, follow-up   mouthpiece utilized   proper technique demonstrated   self-administered  Level Incentive Spirometer (mL): 1500  Incentive Spirometer Predicted Level (mL): 2500  Number of Repetitions (IS): 3  Taken 6/24/2025 0800 by Juliane Oropeza RN  Patient Tolerance (IS):   good   no adverse signs/symptoms present  Administration (IS):   instruction provided, follow-up   mouthpiece utilized   proper technique demonstrated   self-administered  Airway/Ventilation Management: position adjusted  Level Incentive Spirometer (mL): 1000  Cough And Deep Breathing: done independently per patient  Incentive Spirometer Predicted Level (mL): 1500  Number of Repetitions (IS): 2     Problem: Skin Injury Risk Increased  Goal: Skin Health and Integrity  Outcome: Progressing  Intervention: Optimize Skin Protection  Recent Flowsheet Documentation  Taken 6/24/2025 1600 by Juliane Oropeza RN  Activity Management: up in chair  Pressure  Reduction Techniques: frequent weight shift encouraged  Head of Bed (HOB) Positioning: other (see comments)  Pressure Reduction Devices:   chair cushion utilized   elbow protectors utilized   heel offloading device utilized   positioning supports utilized   pressure-redistributing mattress utilized   specialty bed utilized  Skin Protection:   skin sealant/moisture barrier applied   transparent dressing maintained  Taken 6/24/2025 1400 by Juliane Oropeza RN  Activity Management: up in chair  Pressure Reduction Techniques: frequent weight shift encouraged  Head of Bed (HOB) Positioning: other (see comments)  Pressure Reduction Devices:   chair cushion utilized   elbow protectors utilized   heel offloading device utilized   positioning supports utilized   pressure-redistributing mattress utilized   specialty bed utilized  Skin Protection:   skin sealant/moisture barrier applied   transparent dressing maintained  Taken 6/24/2025 1200 by Juliane Oropeza RN  Activity Management: up in chair  Pressure Reduction Techniques: frequent weight shift encouraged  Head of Bed (HOB) Positioning: other (see comments)  Pressure Reduction Devices:   chair cushion utilized   elbow protectors utilized   heel offloading device utilized   positioning supports utilized   pressure-redistributing mattress utilized   specialty bed utilized  Skin Protection:   skin sealant/moisture barrier applied   transparent dressing maintained  Taken 6/24/2025 1000 by Juliane Oropeza RN  Activity Management: up in chair  Pressure Reduction Techniques: frequent weight shift encouraged  Head of Bed (HOB) Positioning: other (see comments)  Pressure Reduction Devices:   chair cushion utilized   elbow protectors utilized   heel offloading device utilized   positioning supports utilized   pressure-redistributing mattress utilized   specialty bed utilized  Skin Protection:   skin sealant/moisture barrier applied   transparent dressing maintained  Taken  6/24/2025 0800 by Juliane Oropeza RN  Activity Management: activity encouraged  Pressure Reduction Techniques: frequent weight shift encouraged  Head of Bed (HOB) Positioning: other (see comments)  Pressure Reduction Devices:   chair cushion utilized   elbow protectors utilized   heel offloading device utilized   positioning supports utilized   pressure-redistributing mattress utilized   specialty bed utilized  Skin Protection:   skin sealant/moisture barrier applied   transparent dressing maintained  Intervention: Promote and Optimize Oral Intake  Recent Flowsheet Documentation  Taken 6/24/2025 0800 by Juliane Oropeza RN  Nutrition Interventions: supplemental drinks provided     Problem: Mechanical Ventilation Invasive  Goal: Effective Communication  Outcome: Progressing  Intervention: Ensure Effective Communication  Recent Flowsheet Documentation  Taken 6/24/2025 1600 by Juliane Oropeza RN  Trust Relationship/Rapport:   care explained   choices provided   emotional support provided   empathic listening provided   questions answered   thoughts/feelings acknowledged  Diversional Activities: television  Family/Support System Care: self-care encouraged  Communication Enhancement Strategies: call light answered in person  Taken 6/24/2025 1400 by Juliane Oropeza RN  Trust Relationship/Rapport:   care explained   choices provided   emotional support provided   empathic listening provided   questions answered   thoughts/feelings acknowledged  Diversional Activities: television  Family/Support System Care: self-care encouraged  Communication Enhancement Strategies: call light answered in person  Taken 6/24/2025 1200 by Juliane Oropeza RN  Trust Relationship/Rapport:   care explained   choices provided   emotional support provided   empathic listening provided   questions answered   thoughts/feelings acknowledged  Diversional Activities: television  Family/Support System Care: self-care encouraged  Communication  Enhancement Strategies: call light answered in person  Taken 6/24/2025 1000 by Juliane Oropeza RN  Trust Relationship/Rapport:   care explained   choices provided   emotional support provided   empathic listening provided   questions answered   thoughts/feelings acknowledged  Diversional Activities: television  Family/Support System Care: self-care encouraged  Communication Enhancement Strategies: call light answered in person  Taken 6/24/2025 0800 by Juliane Oropeza RN  Trust Relationship/Rapport:   care explained   choices provided   emotional support provided   empathic listening provided   questions answered   thoughts/feelings acknowledged  Diversional Activities: television  Family/Support System Care: self-care encouraged  Communication Enhancement Strategies: call light answered in person  Goal: Optimal Device Function  Outcome: Progressing  Intervention: Optimize Device Care and Function  Recent Flowsheet Documentation  Taken 6/24/2025 1600 by Juliane Oropeza RN  Airway/Ventilation Management: position adjusted  Airway Safety Measures: oxygen flowmeter at bedside  Taken 6/24/2025 1400 by Juliane Oropeza RN  Airway/Ventilation Management: position adjusted  Airway Safety Measures: oxygen flowmeter at bedside  Taken 6/24/2025 1200 by Juliane Oropeza RN  Airway/Ventilation Management: position adjusted  Airway Safety Measures: oxygen flowmeter at bedside  Taken 6/24/2025 1000 by Juliane Oropeza RN  Airway/Ventilation Management: position adjusted  Airway Safety Measures: oxygen flowmeter at bedside  Taken 6/24/2025 0800 by Juliane Oropeza RN  Airway/Ventilation Management: position adjusted  Airway Safety Measures: oxygen flowmeter at bedside  Goal: Mechanical Ventilation Liberation  Outcome: Progressing  Intervention: Promote Extubation and Mechanical Ventilation Liberation  Recent Flowsheet Documentation  Taken 6/24/2025 1600 by Juliane Oropeza RN  Environmental Support: calm environment  promoted  Sleep/Rest Enhancement: natural light exposure provided  Medication Review/Management: medications reviewed  Taken 6/24/2025 1400 by Juliane Oropeza RN  Environmental Support: calm environment promoted  Sleep/Rest Enhancement: natural light exposure provided  Medication Review/Management: medications reviewed  Taken 6/24/2025 1200 by Juliane Oropeza RN  Environmental Support: calm environment promoted  Sleep/Rest Enhancement: natural light exposure provided  Medication Review/Management: medications reviewed  Taken 6/24/2025 1000 by Juliane Oropeza RN  Environmental Support: calm environment promoted  Sleep/Rest Enhancement: natural light exposure provided  Medication Review/Management: medications reviewed  Taken 6/24/2025 0800 by Juliane Oropeza RN  Environmental Support: calm environment promoted  Sleep/Rest Enhancement: natural light exposure provided  Medication Review/Management: medications reviewed  Goal: Optimal Nutrition Delivery  Outcome: Progressing  Goal: Absence of Device-Related Skin and Tissue Injury  Outcome: Progressing  Intervention: Maintain Skin and Tissue Health  Recent Flowsheet Documentation  Taken 6/24/2025 1600 by Juliane Oropeza RN  Device Skin Pressure Protection:   positioning supports utilized   tubing/devices free from skin contact   skin-to-skin areas padded  Taken 6/24/2025 1400 by Juliane Oropeza RN  Device Skin Pressure Protection:   positioning supports utilized   tubing/devices free from skin contact   skin-to-skin areas padded  Taken 6/24/2025 1200 by Juliane Oropeza RN  Device Skin Pressure Protection:   positioning supports utilized   tubing/devices free from skin contact   skin-to-skin areas padded  Taken 6/24/2025 1000 by Juliane Oropeza RN  Device Skin Pressure Protection:   positioning supports utilized   tubing/devices free from skin contact   skin-to-skin areas padded  Taken 6/24/2025 0800 by Juliane Oropeza RN  Device Skin Pressure Protection:    positioning supports utilized   tubing/devices free from skin contact   skin-to-skin areas padded  Goal: Absence of Ventilator-Induced Lung Injury  Outcome: Progressing  Intervention: Prevent Ventilator-Associated Pneumonia  Recent Flowsheet Documentation  Taken 6/24/2025 1600 by Juliane Oropeza RN  Head of Bed (HOB) Positioning: other (see comments)  Taken 6/24/2025 1400 by Juliane Oropeza RN  Head of Bed (HOB) Positioning: other (see comments)  Taken 6/24/2025 1200 by Juliane Oropeza RN  Head of Bed (HOB) Positioning: other (see comments)  Taken 6/24/2025 1000 by Juliane Oropeza RN  Head of Bed (HOB) Positioning: other (see comments)  Taken 6/24/2025 0800 by Juliane Oropeza RN  Head of Bed ([facility]) Positioning: other (see comments)  VAP Prevention Bundle: HOB elevation maintained  VAP Prevention Measures: completed     Problem: Fall Injury Risk  Goal: Absence of Fall and Fall-Related Injury  Outcome: Progressing  Intervention: Identify and Manage Contributors  Recent Flowsheet Documentation  Taken 6/24/2025 1600 by Juliane Oropeza RN  Medication Review/Management: medications reviewed  Self-Care Promotion: independence encouraged  Taken 6/24/2025 1400 by Juliane Oropeza RN  Medication Review/Management: medications reviewed  Self-Care Promotion: independence encouraged  Taken 6/24/2025 1200 by Juliane Oropeza RN  Medication Review/Management: medications reviewed  Self-Care Promotion: independence encouraged  Taken 6/24/2025 1000 by Juliane Oropeza RN  Medication Review/Management: medications reviewed  Self-Care Promotion: independence encouraged  Taken 6/24/2025 0800 by Juliane Oropeza RN  Medication Review/Management: medications reviewed  Self-Care Promotion: independence encouraged  Intervention: Promote Injury-Free Environment  Recent Flowsheet Documentation  Taken 6/24/2025 1600 by Juliane Oropeza RN  Safety Promotion/Fall Prevention:   assistive device/personal items within reach   activity  supervised   fall prevention program maintained   lighting adjusted   mobility aid in reach   muscle strengthening facilitated   nonskid shoes/slippers when out of bed   room organization consistent   safety round/check completed  Taken 6/24/2025 1400 by Juliane Oropeza RN  Safety Promotion/Fall Prevention:   assistive device/personal items within reach   activity supervised   fall prevention program maintained   lighting adjusted   mobility aid in reach   muscle strengthening facilitated   nonskid shoes/slippers when out of bed   room organization consistent   safety round/check completed  Taken 6/24/2025 1200 by Juliane Oropeza RN  Safety Promotion/Fall Prevention:   assistive device/personal items within reach   activity supervised   fall prevention program maintained   lighting adjusted   mobility aid in reach   muscle strengthening facilitated   nonskid shoes/slippers when out of bed   room organization consistent   safety round/check completed  Taken 6/24/2025 1000 by Juliane Oropeza RN  Safety Promotion/Fall Prevention:   assistive device/personal items within reach   activity supervised   fall prevention program maintained   lighting adjusted   mobility aid in reach   muscle strengthening facilitated   nonskid shoes/slippers when out of bed   room organization consistent   safety round/check completed  Taken 6/24/2025 0800 by Juliane Oropeza, RN  Safety Promotion/Fall Prevention:   assistive device/personal items within reach   activity supervised   fall prevention program maintained   lighting adjusted   mobility aid in reach   muscle strengthening facilitated   nonskid shoes/slippers when out of bed   room organization consistent   safety round/check completed   Goal Outcome Evaluation:           Progress: improving

## 2025-06-24 NOTE — PROGRESS NOTES
[facility] Cardiothoracic Surgery In-Patient Progress Note     LOS: 6 days     POD # 5 s/p CABG x 4    Subjective  Denies chest pain.  Does endorse exertional dyspnea.  On 3.5L with saturations 90-91% while in room.    Objective  Vital Signs  Temp:  [98 °F (36.7 °C)-98.6 °F (37 °C)] 98.4 °F (36.9 °C)  Heart Rate:  [74-97] 83  Resp:  [18-22] 18  BP: ()/(69-79) 130/79    Physical Exam:   General Appearance: alert, appears stated age and cooperative   Lungs: Diminished bases bilaterally   Heart: regular rhythm & normal rate, normal S1, S2, no murmur, no gallop, no rub, and no click   Skin: Incision c/d/I   Sternum: Stable   Ext: Trace edema bilaterally     Results     Results from last 7 days   Lab Units 06/24/25  0451   WBC 10*3/mm3 10.08   HEMOGLOBIN g/dL 11.4*   HEMATOCRIT % 33.2*   PLATELETS 10*3/mm3 225     Results from last 7 days   Lab Units 06/24/25  0451   SODIUM mmol/L 134*   POTASSIUM mmol/L 3.4*   CHLORIDE mmol/L 95*   CO2 mmol/L 30.0*   BUN mg/dL 20.8   CREATININE mg/dL 0.94   GLUCOSE mg/dL 100*   CALCIUM mg/dL 8.7     Imaging Results (Last 24 Hours)       Procedure Component Value Units Date/Time    XR Chest 1 View - In process [[id number]] Resulted: 06/24/25 0247     Updated: 06/24/25 0554    This result has not been signed. Information might be incomplete.      XR Chest 1 View [[id number]] Collected: 06/23/25 0732     Updated: 06/23/25 0736    Narrative:      XR CHEST 1 VW    Date of Exam: 6/23/2025 4:03 AM EDT    Indication: Chest tube removal  post op    Comparison: 6/23/2025    Findings:  Status post sternotomy and CABG. There is linear bibasilar airspace disease similar to the prior study most compatible with atelectasis. No significant effusion. No discrete pneumothorax.      Impression:      Impression:  1. No discrete pneumothorax.  2. Hypoinflated lungs with persistent bibasilar airspace disease likely atelectasis.          Electronically Signed: Poli Ramirez MD    6/23/2025 7:33 AM EDT     Workstation ID: [id number]          Chronic Comorbid Conditions relative to CABG include:  Cardiovascular: Coronary Artery Disease, Hyperlipidemia, Hypertension, and trace AR, trace MR  Respiratory: None  Endocrine: None   Nephrology: None  Hematology: None   Other: None     Assessment:  POD # 5 s/p CABG x 4    Plan   Diuresis  Echo  Wean oxygen as tolerated  Ambulate  PT/OT  Pulmonary Toilet: IS q1 hr while awake  ASA, statin, BB  Home once oxygen requirements are down  Sub Q heparin DVT prophylaxis      Keshawn Ng MD  06/24/25  07:24 EDT

## 2025-06-24 NOTE — PROGRESS NOTES
"  [facility]  PROGRESS NOTE    Date of Admission: 6/18/2025  Date of Service: 06/24/25    Primary Care Physician: Keshawn Alexandre MD    Chief Complaint: Coronary artery disease status post CABG  Hypertension  Problem List:   CAD s/p CABG x 4 6/19/2025    Primary hypertension    Hyperlipidemia LDL goal <70      Subjective      HPI: Diuresis continues.  Remains on NC O2  BM X 2  Walking in halls      Objective   Vitals: /73 (BP Location: Left arm, Patient Position: Sitting)   Pulse 83   Temp 97.8 °F (36.6 °C) (Oral)   Resp 18   Ht 177.8 cm (70\")   Wt 91.3 kg (201 lb 4.5 oz)   SpO2 91%   BMI 28.88 kg/m²     Physical Exam:  GENERAL: Alert, cooperative, in no acute distress.   HEENT: Normocephalic, no jugular venous distention  HEART: Regular rhythm, normal rate, and no murmurs, gallops, or rubs.   LUNGS:diminished bases  ABDOMEN: Soft, bowel sounds present, nontender   NEUROLOGIC: No focal abnormalities involving strength or sensation are noted.   EXTREMITIES: No clubbing, cyanosis, or edema noted.     Results:  Results from last 7 days   Lab Units 06/24/25  0451 06/23/25  0422 06/22/25  0502   WBC 10*3/mm3 10.08 10.96* 14.83*   HEMOGLOBIN g/dL 11.4* 11.4* 12.1*   HEMATOCRIT % 33.2* 34.3* 36.5*   PLATELETS 10*3/mm3 225 195 164     Results from last 7 days   Lab Units 06/24/25  1333 06/24/25  0451 06/23/25  0422 06/22/25  0503   SODIUM mmol/L  --  134* 135* 131*   POTASSIUM mmol/L 4.3 3.4* 4.2 4.5   CHLORIDE mmol/L  --  95* 97* 95*   CO2 mmol/L  --  30.0* 29.0 28.0   BUN mg/dL  --  20.8 17.8 19.4   CREATININE mg/dL  --  0.94 0.80 0.82   GLUCOSE mg/dL  --  100* 107* 105*      Lab Results   Component Value Date    CHOL 154 06/18/2025    TRIG 272 (H) 06/18/2025    HDL 50 06/18/2025    LDL 61 06/18/2025    AST 40 06/21/2025    ALT 16 06/21/2025     Results from last 7 days   Lab Units 06/18/25  1536   HEMOGLOBIN A1C % 5.70*     Results from last 7 days   Lab Units " 06/18/25  1538   CHOLESTEROL mg/dL 154   TRIGLYCERIDES mg/dL 272*   HDL CHOL mg/dL 50   LDL CHOL mg/dL 61             Results from last 7 days   Lab Units 06/20/25  0348 06/19/25  1314 06/19/25  0406   PROTIME Seconds 16.9* 18.6* 14.3   INR  1.29* 1.45* 1.05   APTT seconds  --  28.5 32.3                 Intake/Output Summary (Last 24 hours) at 6/24/2025 1546  Last data filed at 6/24/2025 1446  Gross per 24 hour   Intake 1400 ml   Output 3330 ml   Net -1930 ml       I personally reviewed the patient's EKG/Telemetry data      EKG 6/21/2025: Normal sinus rhythm ST elevation consider pericarditis    Radiology Data: Chest x-ray 6/23/2025: No pneumothorax.  Hyperinflated lungs with persistent bibasilar airspace disease likely atelectasis    Current Medications:  acetaminophen, 1,000 mg, Oral, Q8H  aspirin, 325 mg, Oral, Daily  atorvastatin, 40 mg, Oral, Nightly  gabapentin, 100 mg, Oral, TID  guaiFENesin, 1,200 mg, Oral, Q12H  heparin (porcine), 5,000 Units, Subcutaneous, Q8H  insulin lispro, 2-7 Units, Subcutaneous, 4x Daily AC & at Bedtime  metoprolol tartrate, 25 mg, Oral, Q12H  protamine, 50 mg, Intravenous, Once  senna-docusate sodium, 2 tablet, Oral, BID  sodium chloride, 4 mL, Nebulization, BID - RT      propofol, 5-50 mcg/kg/min        Assessment:   Coronary artery disease  Status post CABG 6/19/2025 with LIMA to LAD, SVG to PDA, SVG to OM1 and SVG to D1  Aspirin 325 mg daily  Hypertension  Metoprolol tartrate 25 mg twice daily  Current b/p 127/76  Hyperlipidemia  Lipitor 40 mg daily      Plan:   Administration of IV Bumex again today, wean O2  Continue aspirin, statin and beta-blocker  Ambulate with physical therapy      Reginaldo Tse MD

## 2025-06-24 NOTE — PLAN OF CARE
Goal Outcome Evaluation:   Patient forgetful at time, reinforced information already provided especially about medications he is taking and hospital safety protocols. Patient minimally participated in care and takes a lot of encouragement to ambulate or other activities on night shift.

## 2025-06-25 ENCOUNTER — APPOINTMENT (OUTPATIENT)
Dept: GENERAL RADIOLOGY | Facility: HOSPITAL | Age: 75
DRG: 234 | End: 2025-06-25
Payer: MEDICARE

## 2025-06-25 LAB
ANION GAP SERPL CALCULATED.3IONS-SCNC: 9.1 MMOL/L (ref 5–15)
BUN SERPL-MCNC: 19.8 MG/DL (ref 8–23)
BUN/CREAT SERPL: 23 (ref 7–25)
CALCIUM SPEC-SCNC: 9.4 MG/DL (ref 8.6–10.5)
CHLORIDE SERPL-SCNC: 99 MMOL/L (ref 98–107)
CO2 SERPL-SCNC: 29.9 MMOL/L (ref 22–29)
CREAT SERPL-MCNC: 0.86 MG/DL (ref 0.76–1.27)
DEPRECATED RDW RBC AUTO: 45.3 FL (ref 37–54)
EGFRCR SERPLBLD CKD-EPI 2021: 90.9 ML/MIN/1.73
ERYTHROCYTE [DISTWIDTH] IN BLOOD BY AUTOMATED COUNT: 12.7 % (ref 12.3–15.4)
GLUCOSE BLDC GLUCOMTR-MCNC: 107 MG/DL (ref 70–130)
GLUCOSE BLDC GLUCOMTR-MCNC: 110 MG/DL (ref 70–130)
GLUCOSE BLDC GLUCOMTR-MCNC: 111 MG/DL (ref 70–130)
GLUCOSE BLDC GLUCOMTR-MCNC: 126 MG/DL (ref 70–130)
GLUCOSE SERPL-MCNC: 101 MG/DL (ref 65–99)
HCT VFR BLD AUTO: 34.4 % (ref 37.5–51)
HGB BLD-MCNC: 11.7 G/DL (ref 13–17.7)
MAGNESIUM SERPL-MCNC: 2.3 MG/DL (ref 1.6–2.4)
MCH RBC QN AUTO: 33.1 PG (ref 26.6–33)
MCHC RBC AUTO-ENTMCNC: 34 G/DL (ref 31.5–35.7)
MCV RBC AUTO: 97.2 FL (ref 79–97)
PLATELET # BLD AUTO: 246 10*3/MM3 (ref 140–450)
PMV BLD AUTO: 9.7 FL (ref 6–12)
POTASSIUM SERPL-SCNC: 4.2 MMOL/L (ref 3.5–5.2)
RBC # BLD AUTO: 3.54 10*6/MM3 (ref 4.14–5.8)
SODIUM SERPL-SCNC: 138 MMOL/L (ref 136–145)
WBC NRBC COR # BLD AUTO: 9.39 10*3/MM3 (ref 3.4–10.8)

## 2025-06-25 PROCEDURE — 97530 THERAPEUTIC ACTIVITIES: CPT

## 2025-06-25 PROCEDURE — 94799 UNLISTED PULMONARY SVC/PX: CPT

## 2025-06-25 PROCEDURE — 99232 SBSQ HOSP IP/OBS MODERATE 35: CPT

## 2025-06-25 PROCEDURE — 71045 X-RAY EXAM CHEST 1 VIEW: CPT

## 2025-06-25 PROCEDURE — 25010000002 HEPARIN (PORCINE) PER 1000 UNITS

## 2025-06-25 PROCEDURE — 94761 N-INVAS EAR/PLS OXIMETRY MLT: CPT

## 2025-06-25 PROCEDURE — 85027 COMPLETE CBC AUTOMATED: CPT

## 2025-06-25 PROCEDURE — 99024 POSTOP FOLLOW-UP VISIT: CPT

## 2025-06-25 PROCEDURE — 82948 REAGENT STRIP/BLOOD GLUCOSE: CPT

## 2025-06-25 PROCEDURE — 80048 BASIC METABOLIC PNL TOTAL CA: CPT

## 2025-06-25 PROCEDURE — 83735 ASSAY OF MAGNESIUM: CPT

## 2025-06-25 PROCEDURE — 25010000002 BUMETANIDE PER 0.5 MG

## 2025-06-25 RX ORDER — BUMETANIDE 0.25 MG/ML
2 INJECTION, SOLUTION INTRAMUSCULAR; INTRAVENOUS ONCE
Status: COMPLETED | OUTPATIENT
Start: 2025-06-25 | End: 2025-06-25

## 2025-06-25 RX ADMIN — GABAPENTIN 100 MG: 100 CAPSULE ORAL at 15:06

## 2025-06-25 RX ADMIN — METOPROLOL TARTRATE 25 MG: 25 TABLET, FILM COATED ORAL at 08:57

## 2025-06-25 RX ADMIN — ATORVASTATIN CALCIUM 40 MG: 40 TABLET, FILM COATED ORAL at 20:26

## 2025-06-25 RX ADMIN — GABAPENTIN 100 MG: 100 CAPSULE ORAL at 08:57

## 2025-06-25 RX ADMIN — SODIUM CHLORIDE SOLN NEBU 7% 4 ML: 7 NEBU SOLN at 21:39

## 2025-06-25 RX ADMIN — DOCUSATE SODIUM 50 MG AND SENNOSIDES 8.6 MG 2 TABLET: 8.6; 5 TABLET, FILM COATED ORAL at 08:57

## 2025-06-25 RX ADMIN — BUMETANIDE 2 MG: 0.25 INJECTION INTRAMUSCULAR; INTRAVENOUS at 15:06

## 2025-06-25 RX ADMIN — GUAIFENESIN 1200 MG: 600 TABLET, EXTENDED RELEASE ORAL at 08:57

## 2025-06-25 RX ADMIN — HEPARIN SODIUM 5000 UNITS: 5000 INJECTION INTRAVENOUS; SUBCUTANEOUS at 20:27

## 2025-06-25 RX ADMIN — HEPARIN SODIUM 5000 UNITS: 5000 INJECTION INTRAVENOUS; SUBCUTANEOUS at 05:07

## 2025-06-25 RX ADMIN — GUAIFENESIN 1200 MG: 600 TABLET, EXTENDED RELEASE ORAL at 20:26

## 2025-06-25 RX ADMIN — METOPROLOL TARTRATE 25 MG: 25 TABLET, FILM COATED ORAL at 20:26

## 2025-06-25 RX ADMIN — ASPIRIN 325 MG: 325 TABLET, COATED ORAL at 08:58

## 2025-06-25 RX ADMIN — HEPARIN SODIUM 5000 UNITS: 5000 INJECTION INTRAVENOUS; SUBCUTANEOUS at 15:06

## 2025-06-25 RX ADMIN — BUMETANIDE 2 MG: 0.25 INJECTION INTRAMUSCULAR; INTRAVENOUS at 11:40

## 2025-06-25 RX ADMIN — SODIUM CHLORIDE SOLN NEBU 7% 4 ML: 7 NEBU SOLN at 09:12

## 2025-06-25 RX ADMIN — DOCUSATE SODIUM 50 MG AND SENNOSIDES 8.6 MG 2 TABLET: 8.6; 5 TABLET, FILM COATED ORAL at 20:26

## 2025-06-25 RX ADMIN — GABAPENTIN 100 MG: 100 CAPSULE ORAL at 20:26

## 2025-06-25 NOTE — CASE MANAGEMENT/SOCIAL WORK
Continued Stay Note   Carlos Manuel     Patient Name: Jamari Munoz  MRN: [id number]  Today's Date: 6/25/2025    Admit Date: 6/18/2025    Plan: Home with home health   Discharge Plan       Row Name 06/25/25 8270       Plan    Plan Home with home health    Patient/Family in Agreement with Plan yes    Plan Comments I met with the patient and his wife at the bedside. Patient now on room air. He tells me he feels much better today. I encouraged continued use of the IS again. He is open to home health at discharge for nursing and therapy. Patient and wife had no preference. Will make referrals. Wife at bedside is able to transport him at discharge. IMM given. CM following.    Final Discharge Disposition Code 06 - home with home health care                   Discharge Codes    No documentation.                 Expected Discharge Date and Time       Expected Discharge Date Expected Discharge Time    Jun 26, 2025               Heather Waller RN

## 2025-06-25 NOTE — THERAPY TREATMENT NOTE
Patient Name: Jamari Munoz  : 1950    MRN: [id number]                              Today's Date: 2025       Admit Date: 2025    Visit Dx:     ICD-10-CM ICD-9-CM   1. Coronary artery disease involving native coronary artery of native heart with unstable angina pectoris  I25.110 414.01     411.1   2. Atherosclerosis of native coronary artery of native heart with stable angina pectoris  I25.118 414.01     413.9   3. Abnormal findings on diagnostic imaging of heart and coronary circulation  R93.1 794.39   4. CAD s/p CABG x 4 2025  I25.110 414.01     411.1   5. H/O colonoscopy  Z98.890 V45.89   6. Injury of right shoulder, initial encounter  S49.91XA 959.2   7. Medicare annual wellness visit, subsequent  Z00.00 V70.0   8. Chronic pain of left knee  M25.562 719.46    G89.29 338.29   9. Left shoulder tendinitis  M77.8 726.10   10. Olecranon bursitis of right elbow  M70.21 726.33   11. Herpes simplex type 2 infection  B00.9 054.9   12. Hyperlipidemia LDL goal <70  E78.5 272.4   13. Incomplete right bundle branch block  I45.10 426.4   14. Ganglion cyst  M67.40 727.43   15. Overweight  E66.3 278.02   16. RLS (restless legs syndrome)  G25.81 333.94   17. Carbuncle and furuncle  L02.92 680.9    L02.93    18. Midline low back pain without sciatica, unspecified chronicity  M54.50 724.2   19. Primary hypertension  I10 401.9   20. Primary osteoarthritis, unspecified site  M19.91 715.10     Patient Active Problem List   Diagnosis    Osteoarthritis    Primary hypertension    Low back pain    Carbuncle and furuncle    RLS (restless legs syndrome)    Overweight    Ganglion cyst    Incomplete right bundle branch block    Hyperlipidemia LDL goal <70    Herpes simplex type 2 infection    Medicare annual wellness visit, subsequent    Olecranon bursitis of right elbow    Medicare annual wellness visit, subsequent    Left shoulder tendinitis    Chronic pain of left knee    Medicare annual wellness visit,  subsequent    Right shoulder injury    H/O colonoscopy    Migraine    CAD s/p CABG x 4 6/19/2025     Past Medical History:   Diagnosis Date    Arthritis     Asthma Feb 2025    when riding bike    Atherosclerosis of native coronary artery of native heart with stable angina pectoris 6/13/2025    Ganglion     Head injury 1967    several concussions from football and biking    Hyperlipidemia     Hypertension     Migraine 1975    one now every month or so with Aura     Past Surgical History:   Procedure Laterality Date    CARDIAC CATHETERIZATION N/A 6/18/2025    Procedure: Left Heart Cath - Right radial access;  Surgeon: Kai aDniel IV, MD;  Location: [facility] CATH INVASIVE LOCATION;  Service: Cardiovascular;  Laterality: N/A;    COLONOSCOPY  2025    CORONARY ARTERY BYPASS GRAFT N/A 6/19/2025    Procedure: MEDIAN STERNOTOMY, CORONARY ARTERY BYPASS GRAFTING X4 UTILIZING THE LEFT INTERNAL MAMMARY ARTERY GRAFT, ENDOSCOPIC VEIN HARVEST OF THE GREATER LEFT SAPHENOUS VEIN, TRANSESOPHAGEAL ECHOCARDIOGRAM WITH ANESTHESIA;  Surgeon: Keshawn Ng MD;  Location: [facility] OR;  Service: Cardiothoracic;  Laterality: N/A;    CYST REMOVAL      cyst removed left shoulder and left knee    KNEE MENISCAL REPAIR  2005    TONSILLECTOMY AND ADENOIDECTOMY      VASECTOMY        General Information       Row Name 06/25/25 1628          Physical Therapy Time and Intention    Document Type therapy note (daily note)  -MB     Mode of Treatment physical therapy  -MB       Row Name 06/25/25 1620          General Information    Patient Profile Reviewed yes  Patient agreeable to PT theron. Patient reports he walked the full loop in the hallway earlier with nursing staff.  -MB     Existing Precautions/Restrictions cardiac;fall;sternal  -MB     Barriers to Rehab medically complex  -MB       Row Name 06/25/25 1628          Cognition    Orientation Status (Cognition) oriented x 4  -MB       Row Name 06/25/25 1628          Safety  Issues/Impairments Affecting Functional Mobility    Safety Issues Affecting Function (Mobility) safety precaution awareness;safety precautions follow-through/compliance  -MB     Impairments Affecting Function (Mobility) balance;endurance/activity tolerance;shortness of breath;strength  -MB               User Key  (r) = Recorded By, (t) = Taken By, (c) = Cosigned By      Initials Name Provider Type    Inge Tobin, PT Physical Therapist                   Mobility       Row Name 06/25/25 1630          Sit-Stand Transfer    Sit-Stand [place] (Transfers) contact guard;1 person assist;verbal cues  -MB     Comment, (Sit-Stand Transfer) Patient requiring verbal cues for adherence of sternal precautions during sit to/from stand transitions. Patient utilizing rocking/momentum to move to stand.  -MB       Row Name 06/25/25 1630          Gait/Stairs (Locomotion)    [place] Level (Gait) contact guard;1 person assist;verbal cues  -MB     Assistive Device (Gait) walker, front-wheeled  -MB     Patient was able to Ambulate yes  -MB     Distance in Feet (Gait) 600  -MB     Deviations/Abnormal Patterns (Gait) van decreased;gait speed decreased  patient ambulating too far away from RW  -MB     Comment, (Gait/Stairs) Patient completed all ambulation on room air. Patient was provided verbal cues for pacing/energy conservation. Patient with increased work of breathing noted toward end of ambulation trial with SpO2 assessed and noted to decrease briefly to 87%. Patient was provided verbal cues for pursed lip breathing with SpO2 quickly returning to 90s. RN notified.  -MB               User Key  (r) = Recorded By, (t) = Taken By, (c) = Cosigned By      Initials Name Provider Type    Inge Tobin, APURVA Physical Therapist                   Obj/Interventions       Row Name 06/25/25 1636          Balance    Balance Assessment sitting static balance;standing static balance;standing dynamic balance  -MB     Static Sitting  Balance standby assist  -MB     Position, Sitting Balance unsupported;sitting in chair  -MB     Static Standing Balance contact guard  -MB     Dynamic Standing Balance contact guard  -MB     Position/Device Used, Standing Balance supported;walker, front-wheeled  -MB               User Key  (r) = Recorded By, (t) = Taken By, (c) = Cosigned By      Initials Name Provider Type    Inge Tobin, PT Physical Therapist                   Goals/Plan    No documentation.                  Clinical Impression       Row Name 06/25/25 1634          Pain    Pretreatment Pain Rating 0/10 - no pain  -MB     Posttreatment Pain Rating 0/10 - no pain  -MB       Row Name 06/25/25 1634          Plan of Care Review    Plan of Care Reviewed With patient;spouse  -MB     Progress improving  -MB     Outcome Evaluation Patient participated in PT session x 38 minutes total during which patient worked on transfers and ambulation. Patient requiring less assist for transfers this date and was able to ambulate further total distance (able to complete a full hallway lap) with assist x 1 and RW. Patient completed all activity on room air. Patient's SpO2 at rest fluctuated between 86% and 92% with RN aware and reporting they are trying to wean patient's O2. During ambulation SpO2 was initially 96%, but decreased to 87% briefly towards end of ambulation trial. SpO2 quickly improved with brief standing therapeutic rest and cues for pursed lip breathing. Patient is overall making good progress with no pain this date. Patient remains appropriate for home with assist and home health PT/OT when medically appropriate for discharge.  -MB       Row Name 06/25/25 163          Vital Signs    Pre Systolic BP Rehab 121  -MB     Pre Treatment Diastolic BP 77  -MB     Pretreatment Heart Rate (beats/min) 90  -MB     Intratreatment Heart Rate (beats/min) 111  -MB     Pre SpO2 (%) 86  fluctuating between 86% and 92%; RN notified  -MB     O2 Delivery Pre Treatment  room air  -MB     Intra SpO2 (%) 87  lowest noted during ambulation; fluctuated between 87% and 96%  -MB     O2 Delivery Intra Treatment room air  -MB     Post SpO2 (%) 95  -MB     O2 Delivery Post Treatment room air  -MB     Pre Patient Position Sitting  -MB     Intra Patient Position Standing  -MB     Post Patient Position Sitting  -MB       Row Name 06/25/25 1634          Positioning and Restraints    Pre-Treatment Position sitting in chair/recliner  -MB     Post Treatment Position chair  -MB     In Chair notified nsg;reclined;sitting;call light within reach;encouraged to call for assist;exit alarm on;with family/caregiver;legs elevated  -MB               User Key  (r) = Recorded By, (t) = Taken By, (c) = Cosigned By      Initials Name Provider Type    Inge Tobin, PT Physical Therapist                   Outcome Measures       Row Name 06/25/25 1643 06/25/25 0800       How much help from another person do you currently need...    Turning from your back to your side while in flat bed without using bedrails? 4  -MB 4  -AF    Moving from lying on back to sitting on the side of a flat bed without bedrails? 3  -MB 3  -AF    Moving to and from a bed to a chair (including a wheelchair)? 3  -MB 3  -AF    Standing up from a chair using your arms (e.g., wheelchair, bedside chair)? 3  -MB 3  -AF    Climbing 3-5 steps with a railing? 3  anticipated  -MB 3  -AF    To walk in hospital room? 3  -MB 3  -AF    AM-PAC 6 Clicks Score (PT) 19  -MB 19  -AF    Highest Level of Mobility Goal Walk 10 Steps or More-6  -MB Walk 10 Steps or More-6  -AF      Row Name 06/25/25 1643          Functional Assessment    Outcome Measure Options AM-PAC 6 Clicks Basic Mobility (PT)  -MB               User Key  (r) = Recorded By, (t) = Taken By, (c) = Cosigned By      Initials Name Provider Type    Juliane Vega RN Registered Nurse    Inge Tobin, PT Physical Therapist                                 Physical Therapy Education        Title: PT OT SLP Therapies (In Progress)       Topic: Physical Therapy (In Progress)       Point: Mobility training (Done)       Learning Progress Summary            Patient Acceptance, E, VU by MB at 6/25/2025 1640    Acceptance, E, NR by AE at 6/21/2025 1029   Family Acceptance, E, VU by MB at 6/25/2025 1640                      Point: Home exercise program (Done)       Learning Progress Summary            Patient Acceptance, E, VU by MB at 6/25/2025 1640   Family Acceptance, E, VU by MB at 6/25/2025 1640                      Point: Body mechanics (In Progress)       Learning Progress Summary            Patient Acceptance, E, NR by AE at 6/21/2025 1029                      Point: Precautions (Done)       Learning Progress Summary            Patient Acceptance, E, VU by MB at 6/25/2025 1640    Acceptance, E, NR by AE at 6/21/2025 1029   Family Acceptance, E, VU by MB at 6/25/2025 1640                                      User Key       Initials Effective Dates Name Provider Type Discipline    AE 09/21/21 -  Juan Martínez, PT Physical Therapist PT    MB 05/30/25 -  Inge Matthew PT Physical Therapist PT                  PT Recommendation and Plan     Progress: improving  Outcome Evaluation: Patient participated in PT session x 38 minutes total during which patient worked on transfers and ambulation. Patient requiring less assist for transfers this date and was able to ambulate further total distance (able to complete a full hallway lap) with assist x 1 and RW. Patient completed all activity on room air. Patient's SpO2 at rest fluctuated between 86% and 92% with RN aware and reporting they are trying to wean patient's O2. During ambulation SpO2 was initially 96%, but decreased to 87% briefly towards end of ambulation trial. SpO2 quickly improved with brief standing therapeutic rest and cues for pursed lip breathing. Patient is overall making good progress with no pain this date. Patient remains appropriate for  home with assist and home health PT/OT when medically appropriate for discharge.     Time Calculation:         PT Charges       Row Name 06/25/25 1641             Time Calculation    Start Time 1553  -MB      PT Received On 06/25/25  -MB         Timed Charges    20821 - PT Therapeutic Activity Minutes 38  -MB         Total Minutes    Timed Charges Total Minutes 38  -MB       Total Minutes 38  -MB                User Key  (r) = Recorded By, (t) = Taken By, (c) = Cosigned By      Initials Name Provider Type    Inge Tobin, PT Physical Therapist                  Therapy Charges for Today       Code Description Service Date Service Provider Modifiers Qty    99291872786  PT THERAPEUTIC ACT EA 15 MIN 6/25/2025 Inge Matthew, PT GP 3            PT G-Codes  Outcome Measure Options: AM-PAC 6 Clicks Basic Mobility (PT)  AM-PAC 6 Clicks Score (PT): 19  PT Discharge Summary  Anticipated Discharge Disposition (PT): home with assist, home with home health    Inge Matthew, APURVA  6/25/2025

## 2025-06-25 NOTE — PROGRESS NOTES
[facility] Medicine Services  PROGRESS NOTE    Patient Name: Jamari Munoz  : 1950  MRN: [id number]    Date of Admission: 2025  Primary Care Physician: Keshawn Alexandre MD    Subjective   Subjective     CC:  CAD s/p CABG    HPI:  No significant overnight events, patient denying any pain, sitting at the side of the bed, no complaints, talking in full conversations.  Possible discharge today, nothing to add from hospital standpoint    Objective   Objective     Vital Signs:   Temp:  [97.8 °F (36.6 °C)-98.1 °F (36.7 °C)] 98.1 °F (36.7 °C)  Heart Rate:  [69-86] 71  Resp:  [17-20] 20  BP: (115-134)/(73-80) 123/78  Flow (L/min) (Oxygen Therapy):  [1-4] 1     Physical Exam:  Constitutional: No acute distress, awake, alert  HENT: NCAT, mucous membranes moist  Respiratory: Clear to auscultation bilaterally, respiratory effort normal   Cardiovascular: RRR, no murmurs, rubs, or gallops  Gastrointestinal: Positive bowel sounds, soft, nontender, nondistended  Musculoskeletal: No bilateral ankle edema  Psychiatric: Appropriate affect, cooperative  Neurologic: Oriented x 3, JORGE, speech clear  Skin: No rashes noted      Results Reviewed:  LAB RESULTS:      Lab 25  0503 25  0451 25  0422 25  0502 25  0238 25  0348 25  1632 25  1314 25  0654 25  0407 25  0406 25  1536   WBC 9.39 10.08 10.96* 14.83* 15.88* 11.93*   < > 18.01*  --  8.11  --  6.91   HEMOGLOBIN 11.7* 11.4* 11.4* 12.1* 12.3* 13.0   < > 13.6  --  15.4  --  15.8   HEMOGLOBIN, POC  --   --   --   --   --   --   --   --    < >  --   --   --    HEMATOCRIT 34.4* 33.2* 34.3* 36.5* 37.0* 40.2   < > 39.9  --  45.9  --  47.1   HEMATOCRIT POC  --   --   --   --   --   --   --   --    < >  --   --   --    PLATELETS 246 225 195 164 183 195   < > 186  --  286  --  323   NEUTROS ABS  --   --   --   --   --  9.84*  --   --   --  3.78  --  2.85   IMMATURE GRANS (ABS)  --   --    --   --   --  0.06*  --   --   --  0.03  --  0.03   LYMPHS ABS  --   --   --   --   --  1.14  --   --   --  3.09  --  2.84   MONOS ABS  --   --   --   --   --  0.85  --   --   --  0.78  --  0.84   EOS ABS  --   --   --   --   --  0.01  --   --   --  0.40  --  0.32   MCV 97.2* 96.5 98.8* 99.7* 99.2* 101.0*   < > 96.4  --  97.5*  --  97.1*   PROTIME  --   --   --   --   --  16.9*  --  18.6*  --   --  14.3  --    APTT  --   --   --   --   --   --   --  28.5  --   --  32.3  --     < > = values in this interval not displayed.         Lab 06/25/25  0503 06/24/25  1333 06/24/25  0451 06/23/25  0422 06/22/25  0503 06/21/25  0238 06/20/25  0348 06/19/25  1632 06/19/25  1314 06/18/25  1538 06/18/25  1536   SODIUM 138  --  134* 135* 131* 130* 136 139 137   < >  --    POTASSIUM 4.2 4.3 3.4* 4.2 4.5 4.6 4.6 4.3 4.4   < >  --    CHLORIDE 99  --  95* 97* 95* 102 105 109* 109*   < >  --    CO2 29.9*  --  30.0* 29.0 28.0 21.0* 19.0* 21.1* 18.0*   < >  --    ANION GAP 9.1  --  9.0 9.0 8.0 7.0 12.0 8.9 10.0   < >  --    BUN 19.8  --  20.8 17.8 19.4 11.1 11.2 11.4 11.8   < >  --    CREATININE 0.86  --  0.94 0.80 0.82 0.84 1.01 0.89 0.86   < >  --    EGFR 90.9  --  85.1 92.9 92.2 91.5 78.0 89.9 90.9   < >  --    GLUCOSE 101*  --  100* 107* 105* 142* 155* 162* 133*   < >  --    CALCIUM 9.4  --  8.7 9.0 9.0 8.5* 8.4* 8.6 9.0   < >  --    IONIZED CALCIUM  --   --   --   --   --   --   --   --  1.07*  --   --    MAGNESIUM 2.3  --  1.8  --   --   --  2.4 1.9 2.0   < >  --    PHOSPHORUS  --   --   --   --   --   --   --  3.4 2.8  --   --    HEMOGLOBIN A1C  --   --   --   --   --   --   --   --   --   --  5.70*    < > = values in this interval not displayed.         Lab 06/21/25  0238 06/20/25  0348 06/19/25  1632 06/19/25  1314 06/19/25  0407   TOTAL PROTEIN 5.7* 5.7*  --   --  6.1   ALBUMIN 3.5 3.8 4.0 3.2* 3.6   GLOBULIN 2.2 1.9  --   --  2.5   ALT (SGPT) 16 21  --   --  23   AST (SGOT) 40 54*  --   --  24   BILIRUBIN 0.7 0.6  --   --   0.4   ALK PHOS 39 35*  --   --  49         Lab 06/20/25  0348 06/19/25  1314 06/19/25  0406   PROTIME 16.9* 18.6* 14.3   INR 1.29* 1.45* 1.05         Lab 06/18/25  1538   CHOLESTEROL 154   LDL CHOL 61   HDL CHOL 50   TRIGLYCERIDES 272*         Lab 06/18/25  1842 06/18/25  1536   ABO TYPING A A   RH TYPING Positive Positive   ANTIBODY SCREEN  --  Negative         Lab 06/19/25  1635 06/19/25  1308 06/19/25  1112   PH, ARTERIAL 7.342* 7.506* 7.39   PCO2, ARTERIAL 43.4 28.2*  --    PO2 ART 89.0 234.0*  --    FIO2 40 100  --    HCO3 ART 23.5 22.3  --    BASE EXCESS ART -2.3* 0.4  --    CARBOXYHEMOGLOBIN 1.0 0.8  --      Brief Urine Lab Results  (Last result in the past 365 days)        Color   Clarity   Blood   Leuk Est   Nitrite   Protein   CREAT   Urine HCG        06/18/25 1445 Yellow   Clear   Negative   Negative   Negative   Negative                   Microbiology Results Abnormal       None            XR Chest 1 View  Result Date: 6/25/2025  XR CHEST 1 VW Date of Exam: 6/25/2025 1:54 AM EDT Indication: Chest tube removal post op Comparison: 6/24/2025 Findings: Heart remains mildly enlarged status post CABG. There is continued bibasilar atelectasis, and there is probably a small left effusion. The lungs are otherwise clear. No pneumothorax.     Impression: Bibasilar atelectasis and small left pleural effusion. No pneumothorax. Electronically Signed: Charles Navarro MD  6/25/2025 4:24 AM EDT  Workstation ID: [id number]    XR Chest 1 View  Result Date: 6/24/2025  XR CHEST 1 VW Date of Exam: 6/24/2025 2:47 AM EDT Indication: Chest tube removal post op Comparison: 6/23/2025 Findings: Status post sternotomy and CABG. Stable linear bands of atelectasis at the lower lobes. Negative for pneumothorax. Small left pleural effusion similar to the prior study. No measurable right pleural effusion.     Impression: Impression: 1. Negative for pneumothorax. 2. Stable linear bibasilar atelectasis. 3. Small left pleural effusion.  Electronically Signed: Poli Ramirez MD  6/24/2025 7:38 AM EDT  Workstation ID: [id number]      Results for orders placed during the hospital encounter of 06/18/25    Adult Transthoracic Echo Complete W/ Cont if Necessary Per Protocol    Interpretation Summary    Left ventricular systolic function is normal. Left ventricular ejection fraction appears to be 56 - 60%.    Left ventricular diastolic function is consistent with (grade I) impaired relaxation.    The aortic root measures 3.6 cm.      Current medications:  Scheduled Meds:acetaminophen, 1,000 mg, Oral, Q8H  aspirin, 325 mg, Oral, Daily  atorvastatin, 40 mg, Oral, Nightly  gabapentin, 100 mg, Oral, TID  guaiFENesin, 1,200 mg, Oral, Q12H  heparin (porcine), 5,000 Units, Subcutaneous, Q8H  insulin lispro, 2-7 Units, Subcutaneous, 4x Daily AC & at Bedtime  metoprolol tartrate, 25 mg, Oral, Q12H  protamine, 50 mg, Intravenous, Once  senna-docusate sodium, 2 tablet, Oral, BID  sodium chloride, 4 mL, Nebulization, BID - RT      Continuous Infusions:     PRN Meds:.  Albuterol Sulfate NEB Orderable    senna-docusate sodium **AND** polyethylene glycol **AND** bisacodyl **AND** bisacodyl    Calcium Replacement - Follow Nurse / BPA Driven Protocol    dextrose    dextrose    glucagon (human recombinant)    Magnesium Cardiology Dose Replacement - Follow Nurse / BPA Driven Protocol    Morphine    naloxone    ondansetron    Phosphorus Replacement - Follow Nurse / BPA Driven Protocol    Potassium Replacement - Follow Nurse / BPA Driven Protocol    sodium bicarbonate    sodium chloride    sodium chloride    Assessment & Plan   Assessment & Plan     Active Hospital Problems    Diagnosis  POA    **CAD s/p CABG x 4 6/19/2025 [I25.110]  Yes    Hyperlipidemia LDL goal <70 [E78.5]  Yes    Primary hypertension [I10]  Yes      Resolved Hospital Problems    Diagnosis Date Resolved POA    Abnormal findings on diagnostic imaging of heart and coronary circulation [R93.1] 06/18/2025 Yes         Brief Hospital Course to date:  Jamari Munoz is a 74 y.o. male who has been experiencing midsternal chest discomfort with exertion (more notable on colder days) which is relieved by rest. The patient notices this when riding his bike, which he does for exercise on a regular basis. When he saw Dr. Alexandre earlier this month, Dr. Alexandre instructed him not to exert himself and the patient has not had angina at rest or with normal activity.  Underwent CABG x 4 6/19/2025.  Currently out of the ICU on telemetry doing well.    Coronary disease  S/p CABG x 4 6/19/2025  Patient currently CABG x 4 (6/19/2025) with LIMA to LAD, SVG to PDA, SVG to OM1 and SVG to D1  Currently on aspirin 325 mg daily  Metoprolol tartrate 25 mg twice daily  Normotensive  S/p IV Bumex  Doing well with PT  Hospital medicine on board as consult  Blood sugars are well-controlled at this time  Evidence of hyperinflated lungs with persistent bibasilar airspace disease likely atelectasis, oxygen weaned 3.5L  Encourage aggressive I-S  Is stable for discharge from hospital standpoint    HTN  Remains on metoprolol 25 mg twice daily    HLD  Remains on Lipitor 40 mg daily    Leukocytosis, resolved  Likely reactive in the setting of recent surgery    Anemia  Hemoglobin stable, likely postop anemia  Was 15.5 on admission, 11.7 today      Expected Discharge Location and Transportation: Home with home health PT  Expected Discharge 6/25/2025  Expected Discharge Date: 6/24/2025; Expected Discharge Time: 12:00 PM     VTE Prophylaxis:  Pharmacologic & mechanical VTE prophylaxis orders are present.    Total time spent: Time Spent: Time Spent: 40 minutes  Time spent includes time reviewing chart, face-to-face time, counseling patient/family/caregiver, ordering medications/tests/procedures, communicating with other health care professionals, documenting clinical information in the electronic health record, and coordination of care.       AM-PAC 6 Clicks Score  (PT): 19 (06/24/25 2000)    CODE STATUS:   Code Status and Medical Interventions: CPR (Attempt to Resuscitate); Full Support   Ordered at: 06/19/25 1253     Code Status (Patient has no pulse and is not breathing):    CPR (Attempt to Resuscitate)     Medical Interventions (Patient has pulse or is breathing):    Full Support       Raquel Russell MD  06/25/25

## 2025-06-25 NOTE — PLAN OF CARE
Goal Outcome Evaluation:  Plan of Care Reviewed With: patient, spouse        Progress: improving  Outcome Evaluation: Patient participated in PT session x 38 minutes total during which patient worked on transfers and ambulation. Patient requiring less assist for transfers this date and was able to ambulate further total distance (able to complete a full hallway lap) with assist x 1 and RW. Patient completed all activity on room air. Patient's SpO2 at rest fluctuated between 86% and 92% with RN aware and reporting they are trying to wean patient's O2. During ambulation SpO2 was initially 96%, but decreased to 87% briefly towards end of ambulation trial. SpO2 quickly improved with brief standing therapeutic rest and cues for pursed lip breathing. Patient is overall making good progress with no pain this date. Patient remains appropriate for home with assist and home health PT/OT when medically appropriate for discharge.    Anticipated Discharge Disposition (PT): home with assist, home with home health

## 2025-06-25 NOTE — CASE MANAGEMENT/SOCIAL WORK
Continued Stay Note  [facility]     Patient Name: Jamari Munoz  MRN: [id number]  Today's Date: 6/25/2025    Admit Date: 6/18/2025    Plan: Home with home health   Discharge Plan       Row Name 06/25/25 1610       Plan    Plan Comments [facility] has accepted the patient's home health referral. Order in Epic. Patient and wife agreeable to having this agency. CM following.      Row Name 06/25/25 1450       Plan    Plan Home with home health    Patient/Family in Agreement with Plan yes    Plan Comments I met with the patient and his wife at the bedside. Patient now on room air. He tells me he feels much better today. I encouraged continued use of the IS again. He is open to home health at discharge for nursing and therapy. Patient and wife had no preference. Will make referrals. Wife at bedside is able to transport him at discharge. IMM given. CM following.    Final Discharge Disposition Code 06 - home with home health care                   Discharge Codes    No documentation.                 Expected Discharge Date and Time       Expected Discharge Date Expected Discharge Time    Jun 26, 2025               Heather Waller RN

## 2025-06-25 NOTE — PROGRESS NOTES
[facility] Cardiothoracic Surgery In-Patient Progress Note     LOS: 7 days     POD # 6 s/p CABG x 4    Subjective  Oxygen weaned down to 2L.  States his dyspnea has significantly improved.  Denies chest pain.    Objective  Vital Signs  Temp:  [97.8 °F (36.6 °C)-99.1 °F (37.3 °C)] 97.8 °F (36.6 °C)  Heart Rate:  [69-86] 71  Resp:  [17-20] 20  BP: (115-134)/(73-80) 115/78    Physical Exam:   General Appearance: alert, appears stated age and cooperative   Lungs: Diminished bases bilaterally   Heart: regular rhythm & normal rate, normal S1, S2, no murmur, no gallop, no rub, and no click   Skin: Incision c/d/I   Sternum: Stable   Ext: Trace edema bilaterally     Results     Results from last 7 days   Lab Units 06/25/25  0503   WBC 10*3/mm3 9.39   HEMOGLOBIN g/dL 11.7*   HEMATOCRIT % 34.4*   PLATELETS 10*3/mm3 246     Results from last 7 days   Lab Units 06/25/25  0503   SODIUM mmol/L 138   POTASSIUM mmol/L 4.2   CHLORIDE mmol/L 99   CO2 mmol/L 29.9*   BUN mg/dL 19.8   CREATININE mg/dL 0.86   GLUCOSE mg/dL 101*   CALCIUM mg/dL 9.4     Imaging Results (Last 24 Hours)       Procedure Component Value Units Date/Time    XR Chest 1 View [[id number]] Collected: 06/25/25 0423     Updated: 06/25/25 0427    Narrative:      XR CHEST 1 VW    Date of Exam: 6/25/2025 1:54 AM EDT    Indication: Chest tube removal  post op    Comparison: 6/24/2025    Findings:  Heart remains mildly enlarged status post CABG. There is continued bibasilar atelectasis, and there is probably a small left effusion. The lungs are otherwise clear. No pneumothorax.      Impression:      Bibasilar atelectasis and small left pleural effusion. No pneumothorax.        Electronically Signed: Charles Navarro MD    6/25/2025 4:24 AM EDT    Workstation ID: [id number]          Chronic Comorbid Conditions relative to CABG include:  Cardiovascular: Coronary Artery Disease, Hyperlipidemia, Hypertension, and trace AR, trace MR  Respiratory: None  Endocrine: None    Nephrology: None  Hematology: None   Other: None     Assessment:  POD # 6 s/p CABG x 4    Plan   Diuresis-Bumex 2 mg IV x 2 doses today  Echo results pending  Wean oxygen as tolerated  Ambulate  PT/OT  Pulmonary Toilet: IS q1 hr while awake  ASA, statin, BB, subcu heparin for DVT prophylaxis  Likely home in 24 hours    Keshawn Ng MD  06/25/25  08:05 EDT

## 2025-06-25 NOTE — PLAN OF CARE
Problem: Adult Inpatient Plan of Care  Goal: Plan of Care Review  Outcome: Progressing  Flowsheets (Taken 6/25/2025 0424)  Progress: improving     Problem: Adult Inpatient Plan of Care  Goal: Absence of Hospital-Acquired Illness or Injury  Intervention: Identify and Manage Fall Risk  Recent Flowsheet Documentation  Taken 6/25/2025 0420 by Alecia Belle RN  Safety Promotion/Fall Prevention:   activity supervised   assistive device/personal items within reach   safety round/check completed  Taken 6/25/2025 0200 by Alecia Belle RN  Safety Promotion/Fall Prevention:   activity supervised   assistive device/personal items within reach   safety round/check completed  Taken 6/25/2025 0000 by Alecia Belle RN  Safety Promotion/Fall Prevention:   activity supervised   assistive device/personal items within reach   safety round/check completed  Taken 6/24/2025 2200 by Alecia Belle RN  Safety Promotion/Fall Prevention:   activity supervised   assistive device/personal items within reach   safety round/check completed  Taken 6/24/2025 2000 by Alecia Belle RN  Safety Promotion/Fall Prevention:   activity supervised   assistive device/personal items within reach   safety round/check completed     Problem: Adult Inpatient Plan of Care  Goal: Absence of Hospital-Acquired Illness or Injury  Intervention: Prevent Skin Injury  Recent Flowsheet Documentation  Taken 6/25/2025 0420 by Alecia Belle RN  Body Position: position changed independently  Skin Protection: incontinence pads utilized  Taken 6/25/2025 0200 by Alecia Belle RN  Body Position: position changed independently  Skin Protection: incontinence pads utilized  Taken 6/25/2025 0000 by Alecia Belle RN  Body Position: position changed independently  Skin Protection: incontinence pads utilized  Taken 6/24/2025 2200 by Alecia Belle RN  Body Position: position changed independently  Skin Protection: incontinence pads utilized  Taken 6/24/2025 2000 by Alecia Belle RN  Body  Position: position changed independently  Skin Protection: incontinence pads utilized     Problem: Cardiovascular Surgery  Goal: Optimal Cerebral Tissue Perfusion  Intervention: Protect and Optimize Cerebral Perfusion  Recent Flowsheet Documentation  Taken 6/25/2025 0420 by Alecia Belle RN  Head of Bed (HOB) Positioning: HOB elevated  Taken 6/25/2025 0200 by Alecia Belle RN  Head of Bed (HOB) Positioning: HOB elevated  Taken 6/25/2025 0000 by Alecia Belle RN  Head of Bed (HOB) Positioning: HOB elevated  Taken 6/24/2025 2200 by Alecia Belle RN  Head of Bed (HOB) Positioning: HOB elevated  Taken 6/24/2025 2000 by Alecia Belle RN  Head of Bed (HOB) Positioning: HOB elevated     Problem: Cardiovascular Surgery  Goal: Effective Oxygenation and Ventilation  Intervention: Promote Airway Secretion Clearance  Recent Flowsheet Documentation  Taken 6/25/2025 0420 by Alecia Belle RN  Patient Tolerance (IS):   good   no adverse signs/symptoms present  Taken 6/25/2025 0200 by Alecia Belle RN  Patient Tolerance (IS):   good   no adverse signs/symptoms present  Taken 6/25/2025 0000 by Alecia Belle RN  Patient Tolerance (IS):   good   no adverse signs/symptoms present  Taken 6/24/2025 2200 by Alecia Belle RN  Patient Tolerance (IS):   good   no adverse signs/symptoms present  Taken 6/24/2025 2000 by Alecia Belle RN  Patient Tolerance (IS):   good   no adverse signs/symptoms present     Problem: Skin Injury Risk Increased  Goal: Skin Health and Integrity  Intervention: Optimize Skin Protection  Recent Flowsheet Documentation  Taken 6/25/2025 0420 by Alecia Belle RN  Activity Management: activity encouraged  Pressure Reduction Techniques: frequent weight shift encouraged  Head of Bed (HOB) Positioning: HOB elevated  Pressure Reduction Devices:   pressure-redistributing mattress utilized   positioning supports utilized  Skin Protection: incontinence pads utilized  Taken 6/25/2025 0200 by Alecia Belle RN  Activity  Management: activity encouraged  Pressure Reduction Techniques: frequent weight shift encouraged  Head of Bed (HOB) Positioning: HOB elevated  Pressure Reduction Devices:   pressure-redistributing mattress utilized   positioning supports utilized  Skin Protection: incontinence pads utilized  Taken 6/25/2025 0000 by Alecia Belle RN  Activity Management: activity encouraged  Pressure Reduction Techniques: frequent weight shift encouraged  Head of Bed (HOB) Positioning: HOB elevated  Pressure Reduction Devices:   pressure-redistributing mattress utilized   positioning supports utilized  Skin Protection: incontinence pads utilized  Taken 6/24/2025 2200 by Alecia Belle RN  Activity Management: activity encouraged  Pressure Reduction Techniques: frequent weight shift encouraged  Head of Bed (HOB) Positioning: HOB elevated  Pressure Reduction Devices:   pressure-redistributing mattress utilized   positioning supports utilized  Skin Protection: incontinence pads utilized  Taken 6/24/2025 2000 by Alecia Belle RN  Activity Management: activity encouraged  Pressure Reduction Techniques: frequent weight shift encouraged  Head of Bed (HOB) Positioning: HOB elevated  Pressure Reduction Devices:   pressure-redistributing mattress utilized   positioning supports utilized  Skin Protection: incontinence pads utilized     Problem: Mechanical Ventilation Invasive  Goal: Absence of Ventilator-Induced Lung Injury  Intervention: Prevent Ventilator-Associated Pneumonia  Recent Flowsheet Documentation  Taken 6/25/2025 0420 by Alecia Belle RN  Head of Bed (HOB) Positioning: HOB elevated  Taken 6/25/2025 0200 by Alecia Belle RN  Head of Bed (HOB) Positioning: HOB elevated  Taken 6/25/2025 0000 by Alecia Belle RN  Head of Bed (HOB) Positioning: HOB elevated  Taken 6/24/2025 2200 by Alecia Belle RN  Head of Bed (HOB) Positioning: HOB elevated  Taken 6/24/2025 2000 by Alecia Belle RN  Head of Bed (HOB) Positioning: HOB elevated   Goal  Outcome Evaluation:           Progress: improving

## 2025-06-25 NOTE — PLAN OF CARE
Problem: Adult Inpatient Plan of Care  Goal: Plan of Care Review  Outcome: Progressing  Goal: Patient-Specific Goal (Individualized)  Outcome: Progressing  Goal: Absence of Hospital-Acquired Illness or Injury  Outcome: Progressing  Intervention: Identify and Manage Fall Risk  Recent Flowsheet Documentation  Taken 6/25/2025 1600 by Juliane Oropeza RN  Safety Promotion/Fall Prevention:   assistive device/personal items within reach   activity supervised   fall prevention program maintained   lighting adjusted   mobility aid in reach   muscle strengthening facilitated   nonskid shoes/slippers when out of bed   room organization consistent   safety round/check completed  Taken 6/25/2025 1400 by Juliane Oropeza RN  Safety Promotion/Fall Prevention:   assistive device/personal items within reach   activity supervised   fall prevention program maintained   lighting adjusted   mobility aid in reach   muscle strengthening facilitated   nonskid shoes/slippers when out of bed   room organization consistent   safety round/check completed  Taken 6/25/2025 1200 by Juliane Oropeza RN  Safety Promotion/Fall Prevention:   assistive device/personal items within reach   activity supervised   fall prevention program maintained   lighting adjusted   mobility aid in reach   muscle strengthening facilitated   nonskid shoes/slippers when out of bed   room organization consistent   safety round/check completed  Taken 6/25/2025 1000 by Juliane Oropeza RN  Safety Promotion/Fall Prevention:   assistive device/personal items within reach   activity supervised   fall prevention program maintained   lighting adjusted   mobility aid in reach   muscle strengthening facilitated   nonskid shoes/slippers when out of bed   room organization consistent   safety round/check completed  Taken 6/25/2025 0800 by Juliane Oropeza RN  Safety Promotion/Fall Prevention: activity supervised  Intervention: Prevent Skin Injury  Recent Flowsheet  Documentation  Taken 6/25/2025 1600 by Juliane Oropeza RN  Body Position: position changed independently  Skin Protection:   skin sealant/moisture barrier applied   transparent dressing maintained  Taken 6/25/2025 1400 by Juliane Oropeza RN  Body Position: position changed independently  Skin Protection:   skin sealant/moisture barrier applied   transparent dressing maintained  Taken 6/25/2025 1200 by Juliane Oropeza RN  Body Position: position changed independently  Skin Protection:   skin sealant/moisture barrier applied   transparent dressing maintained  Taken 6/25/2025 1000 by Juliane Oropeza RN  Body Position: position changed independently  Skin Protection:   skin sealant/moisture barrier applied   transparent dressing maintained  Taken 6/25/2025 0800 by Juliane Oropeza RN  Body Position: position changed independently  Skin Protection:   incontinence pads utilized   skin sealant/moisture barrier applied   transparent dressing maintained  Intervention: Prevent Infection  Recent Flowsheet Documentation  Taken 6/25/2025 1600 by Juliane Oropeza RN  Infection Prevention:   cohorting utilized   hand hygiene promoted   rest/sleep promoted  Taken 6/25/2025 1400 by Juliane Oropeza RN  Infection Prevention:   cohorting utilized   hand hygiene promoted   rest/sleep promoted  Taken 6/25/2025 1200 by Juliane Oropeza RN  Infection Prevention:   cohorting utilized   hand hygiene promoted   rest/sleep promoted  Taken 6/25/2025 1000 by Juliane Oropeza RN  Infection Prevention:   cohorting utilized   hand hygiene promoted   rest/sleep promoted  Taken 6/25/2025 0800 by Juliane Oropeza RN  Infection Prevention:   cohorting utilized   equipment surfaces disinfected   hand hygiene promoted   rest/sleep promoted  Goal: Optimal Comfort and Wellbeing  Outcome: Progressing  Intervention: Provide Person-Centered Care  Recent Flowsheet Documentation  Taken 6/25/2025 1600 by Juliane Oropeza RN  Trust Relationship/Rapport:    care explained   choices provided   emotional support provided   empathic listening provided   questions answered   thoughts/feelings acknowledged  Taken 6/25/2025 1400 by Juliane Oropeza RN  Trust Relationship/Rapport:   care explained   choices provided   emotional support provided   empathic listening provided   questions answered   thoughts/feelings acknowledged  Taken 6/25/2025 1200 by Juliane Oropeza RN  Trust Relationship/Rapport:   care explained   choices provided   emotional support provided   empathic listening provided   questions answered   thoughts/feelings acknowledged  Taken 6/25/2025 1000 by Juliane Oropeza RN  Trust Relationship/Rapport:   care explained   choices provided   emotional support provided   empathic listening provided   questions answered   thoughts/feelings acknowledged  Taken 6/25/2025 0800 by Juliane Oropeza RN  Trust Relationship/Rapport:   care explained   choices provided   emotional support provided   empathic listening provided   questions answered   questions encouraged   thoughts/feelings acknowledged  Goal: Readiness for Transition of Care  Outcome: Progressing     Problem: Cardiovascular Surgery  Goal: Improved Activity Tolerance  Outcome: Progressing  Intervention: Optimize Tolerance for Activity  Recent Flowsheet Documentation  Taken 6/25/2025 1600 by Juliane Oropeza RN  Environmental Support: calm environment promoted  Self-Care Promotion: independence encouraged  Taken 6/25/2025 1400 by Juliane Oropeza RN  Environmental Support: calm environment promoted  Self-Care Promotion: independence encouraged  Taken 6/25/2025 1200 by Juliane Oropeza RN  Environmental Support: calm environment promoted  Self-Care Promotion: independence encouraged  Taken 6/25/2025 1000 by Juliane Oropeza RN  Environmental Support: calm environment promoted  Self-Care Promotion: independence encouraged  Taken 6/25/2025 0800 by Juliane Oropeza RN  Environmental Support: calm environment  promoted  Self-Care Promotion: independence encouraged  Goal: Optimal Coping with Heart Surgery  Outcome: Progressing  Intervention: Support Psychosocial Response to Surgery  Recent Flowsheet Documentation  Taken 6/25/2025 1600 by Juliane Oropeza RN  Supportive Measures: active listening utilized  Family/Support System Care: self-care encouraged  Taken 6/25/2025 1400 by Juliane Oropeza RN  Supportive Measures: active listening utilized  Family/Support System Care: self-care encouraged  Taken 6/25/2025 1200 by Juliane Oropeza RN  Supportive Measures: active listening utilized  Family/Support System Care: self-care encouraged  Taken 6/25/2025 1000 by Juliane Oropeza RN  Supportive Measures: active listening utilized  Family/Support System Care: self-care encouraged  Taken 6/25/2025 0800 by Juliane Oropeza RN  Supportive Measures:   active listening utilized   self-care encouraged  Family/Support System Care: support provided  Goal: Absence of Bleeding  Outcome: Progressing  Intervention: Monitor and Manage Bleeding  Recent Flowsheet Documentation  Taken 6/25/2025 1600 by Juliane Oropeza RN  Bleeding Management: dressing monitored  Taken 6/25/2025 1400 by Juliane Oropeza RN  Bleeding Management: dressing monitored  Taken 6/25/2025 1200 by Juliane Oropeza RN  Bleeding Management: dressing monitored  Taken 6/25/2025 1000 by Juliane Oropeza RN  Bleeding Management: dressing monitored  Taken 6/25/2025 0800 by Juliane Oropeza RN  Bleeding Management: dressing monitored  Goal: Effective Bowel Elimination  Outcome: Progressing  Intervention: Enhance Bowel Motility and Elimination  Recent Flowsheet Documentation  Taken 6/25/2025 1600 by Juliane Oropeza RN  Bowel Motility Enhancement: ambulation promoted  Taken 6/25/2025 1400 by Juliane Oropeza RN  Bowel Motility Enhancement: ambulation promoted  Taken 6/25/2025 1200 by Juliane Oropeza RN  Bowel Motility Enhancement: ambulation promoted  Taken 6/25/2025 1000 by  Juliane Oropeza RN  Bowel Motility Enhancement: ambulation promoted  Taken 6/25/2025 0800 by Juliane Oropeza RN  Bowel Motility Enhancement: ambulation promoted  Goal: Effective Cardiac Function  Outcome: Progressing  Intervention: Optimize Cardiac Output and Blood Flow  Recent Flowsheet Documentation  Taken 6/25/2025 1600 by Juliane Oropeza RN  Stabilization Measures: legs elevated  Taken 6/25/2025 1400 by Juliane Oropeza RN  Stabilization Measures: legs elevated  Taken 6/25/2025 1200 by Juliane Oropeza RN  Stabilization Measures: legs elevated  Taken 6/25/2025 1000 by Juliane Oropeza RN  Stabilization Measures: legs elevated  Goal: Optimal Cerebral Tissue Perfusion  Outcome: Progressing  Intervention: Protect and Optimize Cerebral Perfusion  Recent Flowsheet Documentation  Taken 6/25/2025 1600 by Juliane Oropeza RN  Glycemic Management: blood glucose monitored  Sensory Stimulation Regulation:   care clustered   television on  Head of Bed (HOB) Positioning: other (see comments)  Cerebral Perfusion Promotion: blood pressure monitored  Taken 6/25/2025 1400 by Juliane Oropeza RN  Glycemic Management: blood glucose monitored  Sensory Stimulation Regulation:   care clustered   television on  Head of Bed (HOB) Positioning: other (see comments)  Cerebral Perfusion Promotion: blood pressure monitored  Taken 6/25/2025 1200 by Juliane Oropeza RN  Glycemic Management: blood glucose monitored  Sensory Stimulation Regulation:   care clustered   television on  Head of Bed (HOB) Positioning: other (see comments)  Cerebral Perfusion Promotion: blood pressure monitored  Taken 6/25/2025 1000 by Juliane Oropeza RN  Glycemic Management: blood glucose monitored  Sensory Stimulation Regulation:   care clustered   television on  Head of Bed (HOB) Positioning: other (see comments)  Cerebral Perfusion Promotion: blood pressure monitored  Taken 6/25/2025 0800 by Juliane Oropeza RN  Glycemic Management: blood glucose  monitored  Sensory Stimulation Regulation: care clustered  Head of Bed (HOB) Positioning: HOB elevated  Cerebral Perfusion Promotion: blood pressure monitored  Goal: Fluid and Electrolyte Balance  Outcome: Progressing  Goal: Blood Glucose Level Within Target Range  Outcome: Progressing  Intervention: Optimize Glycemic Control  Recent Flowsheet Documentation  Taken 6/25/2025 1600 by Juliane Oropeza RN  Glycemic Management: blood glucose monitored  Taken 6/25/2025 1400 by Juliane Oropeza RN  Glycemic Management: blood glucose monitored  Taken 6/25/2025 1200 by Juliane Oropeza RN  Glycemic Management: blood glucose monitored  Taken 6/25/2025 1000 by Juliane Oropeza RN  Glycemic Management: blood glucose monitored  Taken 6/25/2025 0800 by Juliane Oropeza RN  Glycemic Management: blood glucose monitored  Goal: Absence of Infection Signs and Symptoms  Outcome: Progressing  Intervention: Prevent or Manage Infection  Recent Flowsheet Documentation  Taken 6/25/2025 1600 by Juliane Oropeza RN  Infection Prevention:   cohorting utilized   hand hygiene promoted   rest/sleep promoted  Taken 6/25/2025 1400 by Juliane Oropeza RN  Infection Prevention:   cohorting utilized   hand hygiene promoted   rest/sleep promoted  Taken 6/25/2025 1200 by Juliane Oropeza RN  Infection Prevention:   cohorting utilized   hand hygiene promoted   rest/sleep promoted  Taken 6/25/2025 1000 by Juliane Oropeza RN  Infection Prevention:   cohorting utilized   hand hygiene promoted   rest/sleep promoted  Taken 6/25/2025 0800 by Juliane Oropeza RN  Infection Prevention:   cohorting utilized   equipment surfaces disinfected   hand hygiene promoted   rest/sleep promoted  Goal: Anesthesia/Sedation Recovery  Outcome: Progressing  Intervention: Optimize Anesthesia Recovery  Recent Flowsheet Documentation  Taken 6/25/2025 1600 by Juliane Oropeza RN  Stabilization Measures: legs elevated  Patient Tolerance (IS):   good   no adverse signs/symptoms  present  Safety Promotion/Fall Prevention:   assistive device/personal items within reach   activity supervised   fall prevention program maintained   lighting adjusted   mobility aid in reach   muscle strengthening facilitated   nonskid shoes/slippers when out of bed   room organization consistent   safety round/check completed  Administration (IS):   instruction provided, follow-up   mouthpiece utilized   proper technique demonstrated   self-administered  Reorientation Measures: clock in view  Level Incentive Spirometer (mL): 2000  Incentive Spirometer Predicted Level (mL): 1500  Number of Repetitions (IS): 4  Taken 6/25/2025 1500 by Juliane Oropeza RN  Patient Tolerance (IS):   good   no adverse signs/symptoms present  Administration (IS):   instruction provided, follow-up   mouthpiece utilized   proper technique demonstrated   self-administered  Level Incentive Spirometer (mL): 3000  Incentive Spirometer Predicted Level (mL): 3500  Number of Repetitions (IS): 6  Taken 6/25/2025 1400 by Juliane Oropeza RN  Stabilization Measures: legs elevated  Patient Tolerance (IS): good  Safety Promotion/Fall Prevention:   assistive device/personal items within reach   activity supervised   fall prevention program maintained   lighting adjusted   mobility aid in reach   muscle strengthening facilitated   nonskid shoes/slippers when out of bed   room organization consistent   safety round/check completed  Administration (IS):   instruction provided, follow-up   mouthpiece utilized   proper technique demonstrated   self-administered  Reorientation Measures: clock in view  Level Incentive Spirometer (mL): 3500  Incentive Spirometer Predicted Level (mL): 3500  Number of Repetitions (IS): 7  Taken 6/25/2025 1300 by Juliane Oropeza RN  Patient Tolerance (IS): (Pt sleeping currently) other (see comments)  Administration (IS): (Pt sleeping currently) other (see comments)  Taken 6/25/2025 1200 by Juliane Oropeza RN  Stabilization  Measures: legs elevated  Patient Tolerance (IS):   good   no adverse signs/symptoms present  Safety Promotion/Fall Prevention:   assistive device/personal items within reach   activity supervised   fall prevention program maintained   lighting adjusted   mobility aid in reach   muscle strengthening facilitated   nonskid shoes/slippers when out of bed   room organization consistent   safety round/check completed  Administration (IS):   instruction provided, follow-up   mouthpiece utilized   proper technique demonstrated   self-administered  Reorientation Measures: clock in view  Level Incentive Spirometer (mL): 3500  Incentive Spirometer Predicted Level (mL): 3500  Number of Repetitions (IS): 7  Taken 6/25/2025 1146 by Juliane Oropeza RN  Patient Tolerance (IS):   good   no adverse signs/symptoms present  Administration (IS):   instruction provided, follow-up   mouthpiece utilized   proper technique demonstrated   self-administered  Level Incentive Spirometer (mL): 3500  Incentive Spirometer Predicted Level (mL): 3500  Number of Repetitions (IS): 7  Taken 6/25/2025 1100 by Juliane Oropeza RN  Patient Tolerance (IS):   good   no adverse signs/symptoms present  Administration (IS):   instruction provided, follow-up   mouthpiece utilized   proper technique demonstrated   self-administered  Level Incentive Spirometer (mL): 2500  Incentive Spirometer Predicted Level (mL): 2500  Number of Repetitions (IS): 5  Taken 6/25/2025 1000 by Juliane Oropeza RN  Stabilization Measures: legs elevated  Patient Tolerance (IS):   good   no adverse signs/symptoms present  Safety Promotion/Fall Prevention:   assistive device/personal items within reach   activity supervised   fall prevention program maintained   lighting adjusted   mobility aid in reach   muscle strengthening facilitated   nonskid shoes/slippers when out of bed   room organization consistent   safety round/check completed  Administration (IS):   instruction provided,  follow-up   mouthpiece utilized   proper technique demonstrated   self-administered  Reorientation Measures: clock in view  Level Incentive Spirometer (mL): 2000  Incentive Spirometer Predicted Level (mL): 1500  Number of Repetitions (IS): 4  Taken 6/25/2025 0948 by Juliane Oropeza RN  Patient Tolerance (IS):   good   no adverse signs/symptoms present  Administration (IS):   instruction provided, follow-up   mouthpiece utilized   self-administered   proper technique demonstrated  Level Incentive Spirometer (mL): 2500  Incentive Spirometer Predicted Level (mL): 2500  Number of Repetitions (IS): 5  Taken 6/25/2025 0900 by Juliane Oropeza RN  Patient Tolerance (IS):   good   no adverse signs/symptoms present  Administration (IS):   instruction provided, follow-up   mouthpiece utilized   proper technique demonstrated   self-administered  Level Incentive Spirometer (mL): 2500  Incentive Spirometer Predicted Level (mL): 2500  Number of Repetitions (IS): 5  Taken 6/25/2025 0800 by Juliane Oropeza RN  Patient Tolerance (IS):   good   no adverse signs/symptoms present  Safety Promotion/Fall Prevention: activity supervised  Administration (IS):   instruction provided, follow-up   mouthpiece utilized   proper technique demonstrated   self-administered  Reorientation Measures: clock in view  Level Incentive Spirometer (mL): 2000  Incentive Spirometer Predicted Level (mL): 2500  Number of Repetitions (IS): 4  Goal: Acceptable Pain Control  Outcome: Progressing  Intervention: Prevent or Manage Pain  Recent Flowsheet Documentation  Taken 6/25/2025 1600 by Juliane Oropeza RN  Diversional Activities: television  Taken 6/25/2025 1400 by Juliane Oropeza RN  Diversional Activities: television  Taken 6/25/2025 1200 by Juliane Oropeza RN  Diversional Activities: television  Taken 6/25/2025 1000 by Juliane Oropeza RN  Diversional Activities: television  Taken 6/25/2025 0800 by Juliane Oropeza RN  Diversional Activities:  television  Goal: Nausea and Vomiting Relief  Outcome: Progressing  Goal: Effective Urinary Elimination  Outcome: Progressing  Intervention: Monitor and Manage Urinary Retention  Recent Flowsheet Documentation  Taken 6/25/2025 1600 by Juliane rOopeza RN  Urinary Elimination Promotion:   toileting device within reach   toileting offered   toileting scheduled  Taken 6/25/2025 1400 by Juliane Oropeza RN  Urinary Elimination Promotion:   toileting device within reach   toileting offered   toileting scheduled  Taken 6/25/2025 1200 by Juliane Oropeza RN  Urinary Elimination Promotion:   toileting device within reach   toileting offered   toileting scheduled  Taken 6/25/2025 1000 by Juliane Oropeza RN  Urinary Elimination Promotion:   toileting device within reach   toileting offered   toileting scheduled  Taken 6/25/2025 0800 by Juliane Oropeza RN  Urinary Elimination Promotion:   toileting device within reach   toileting offered   toileting scheduled  Goal: Effective Oxygenation and Ventilation  Outcome: Progressing  Intervention: Promote Airway Secretion Clearance  Recent Flowsheet Documentation  Taken 6/25/2025 1600 by Juliane Oropeza RN  Patient Tolerance (IS):   good   no adverse signs/symptoms present  Administration (IS):   instruction provided, follow-up   mouthpiece utilized   proper technique demonstrated   self-administered  Airway/Ventilation Management: position adjusted  Level Incentive Spirometer (mL): 2000  Cough And Deep Breathing: done independently per patient  Incentive Spirometer Predicted Level (mL): 1500  Number of Repetitions (IS): 4  Taken 6/25/2025 1500 by Juliane Oropeza RN  Patient Tolerance (IS):   good   no adverse signs/symptoms present  Administration (IS):   instruction provided, follow-up   mouthpiece utilized   proper technique demonstrated   self-administered  Level Incentive Spirometer (mL): 3000  Incentive Spirometer Predicted Level (mL): 3500  Number of Repetitions (IS):  6  Taken 6/25/2025 1400 by Juliane Oropeza RN  Patient Tolerance (IS): good  Administration (IS):   instruction provided, follow-up   mouthpiece utilized   proper technique demonstrated   self-administered  Airway/Ventilation Management: position adjusted  Level Incentive Spirometer (mL): 3500  Cough And Deep Breathing: done independently per patient  Incentive Spirometer Predicted Level (mL): 3500  Number of Repetitions (IS): 7  Taken 6/25/2025 1300 by Juliane Oropeza RN  Patient Tolerance (IS): (Pt sleeping currently) other (see comments)  Administration (IS): (Pt sleeping currently) other (see comments)  Taken 6/25/2025 1200 by Juliane Oropeza RN  Patient Tolerance (IS):   good   no adverse signs/symptoms present  Administration (IS):   instruction provided, follow-up   mouthpiece utilized   proper technique demonstrated   self-administered  Airway/Ventilation Management: position adjusted  Level Incentive Spirometer (mL): 3500  Cough And Deep Breathing: done independently per patient  Incentive Spirometer Predicted Level (mL): 3500  Number of Repetitions (IS): 7  Taken 6/25/2025 1146 by Juliane Oropeza RN  Patient Tolerance (IS):   good   no adverse signs/symptoms present  Administration (IS):   instruction provided, follow-up   mouthpiece utilized   proper technique demonstrated   self-administered  Level Incentive Spirometer (mL): 3500  Incentive Spirometer Predicted Level (mL): 3500  Number of Repetitions (IS): 7  Taken 6/25/2025 1100 by Juliane Oropeza RN  Patient Tolerance (IS):   good   no adverse signs/symptoms present  Administration (IS):   instruction provided, follow-up   mouthpiece utilized   proper technique demonstrated   self-administered  Level Incentive Spirometer (mL): 2500  Incentive Spirometer Predicted Level (mL): 2500  Number of Repetitions (IS): 5  Taken 6/25/2025 1000 by Juliane Oropeza RN  Patient Tolerance (IS):   good   no adverse signs/symptoms present  Administration (IS):    instruction provided, follow-up   mouthpiece utilized   proper technique demonstrated   self-administered  Airway/Ventilation Management: position adjusted  Level Incentive Spirometer (mL): 2000  Cough And Deep Breathing: done independently per patient  Incentive Spirometer Predicted Level (mL): 1500  Number of Repetitions (IS): 4  Taken 6/25/2025 0948 by Juliane Oropeza RN  Patient Tolerance (IS):   good   no adverse signs/symptoms present  Administration (IS):   instruction provided, follow-up   mouthpiece utilized   self-administered   proper technique demonstrated  Level Incentive Spirometer (mL): 2500  Incentive Spirometer Predicted Level (mL): 2500  Number of Repetitions (IS): 5  Taken 6/25/2025 0900 by Juliane Oropeza RN  Patient Tolerance (IS):   good   no adverse signs/symptoms present  Administration (IS):   instruction provided, follow-up   mouthpiece utilized   proper technique demonstrated   self-administered  Level Incentive Spirometer (mL): 2500  Incentive Spirometer Predicted Level (mL): 2500  Number of Repetitions (IS): 5  Taken 6/25/2025 0800 by Juliane Oropeza RN  Patient Tolerance (IS):   good   no adverse signs/symptoms present  Administration (IS):   instruction provided, follow-up   mouthpiece utilized   proper technique demonstrated   self-administered  Airway/Ventilation Management: oxygen therapy provided  Level Incentive Spirometer (mL): 2000  Cough And Deep Breathing: done independently per patient  Incentive Spirometer Predicted Level (mL): 2500  Number of Repetitions (IS): 4     Problem: Skin Injury Risk Increased  Goal: Skin Health and Integrity  Outcome: Progressing  Intervention: Optimize Skin Protection  Recent Flowsheet Documentation  Taken 6/25/2025 1600 by Juliane Oropeza RN  Activity Management: up in chair  Pressure Reduction Techniques: frequent weight shift encouraged  Head of Bed (HOB) Positioning: other (see comments)  Pressure Reduction Devices:   chair cushion  utilized   elbow protectors utilized   heel offloading device utilized   positioning supports utilized   pressure-redistributing mattress utilized   specialty bed utilized  Skin Protection:   skin sealant/moisture barrier applied   transparent dressing maintained  Taken 6/25/2025 1400 by Juliane Oropeza RN  Activity Management: up in chair  Pressure Reduction Techniques: frequent weight shift encouraged  Head of Bed (HOB) Positioning: other (see comments)  Pressure Reduction Devices:   chair cushion utilized   elbow protectors utilized   heel offloading device utilized   positioning supports utilized   pressure-redistributing mattress utilized   specialty bed utilized  Skin Protection:   skin sealant/moisture barrier applied   transparent dressing maintained  Taken 6/25/2025 1200 by Juliane Oropeza RN  Activity Management: up in chair  Pressure Reduction Techniques: frequent weight shift encouraged  Head of Bed (HOB) Positioning: other (see comments)  Pressure Reduction Devices:   chair cushion utilized   elbow protectors utilized   heel offloading device utilized   positioning supports utilized   pressure-redistributing mattress utilized   specialty bed utilized  Skin Protection:   skin sealant/moisture barrier applied   transparent dressing maintained  Taken 6/25/2025 1000 by Juliane Oropeza RN  Pressure Reduction Techniques: frequent weight shift encouraged  Head of Bed (HOB) Positioning: other (see comments)  Pressure Reduction Devices:   chair cushion utilized   elbow protectors utilized   heel offloading device utilized   positioning supports utilized   pressure-redistributing mattress utilized   specialty bed utilized  Skin Protection:   skin sealant/moisture barrier applied   transparent dressing maintained  Taken 6/25/2025 0800 by Juliane Oropeza RN  Activity Management: activity encouraged  Pressure Reduction Techniques: frequent weight shift encouraged  Head of Bed (HOB) Positioning: HOB  elevated  Pressure Reduction Devices:   positioning supports utilized   pressure-redistributing mattress utilized  Skin Protection:   incontinence pads utilized   skin sealant/moisture barrier applied   transparent dressing maintained  Intervention: Promote and Optimize Oral Intake  Recent Flowsheet Documentation  Taken 6/25/2025 1146 by Juliane Oropeza RN  Nutrition Interventions: supplemental drinks provided  Taken 6/25/2025 0800 by Juliane Oropeza RN  Nutrition Interventions: supplemental drinks provided     Problem: Mechanical Ventilation Invasive  Goal: Effective Communication  Outcome: Progressing  Intervention: Ensure Effective Communication  Recent Flowsheet Documentation  Taken 6/25/2025 1600 by Juliane Oropeza RN  Trust Relationship/Rapport:   care explained   choices provided   emotional support provided   empathic listening provided   questions answered   thoughts/feelings acknowledged  Diversional Activities: television  Family/Support System Care: self-care encouraged  Communication Enhancement Strategies: call light answered in person  Taken 6/25/2025 1400 by Juliane Oropeza RN  Trust Relationship/Rapport:   care explained   choices provided   emotional support provided   empathic listening provided   questions answered   thoughts/feelings acknowledged  Diversional Activities: television  Family/Support System Care: self-care encouraged  Communication Enhancement Strategies: call light answered in person  Taken 6/25/2025 1200 by Juliane Oropeza RN  Trust Relationship/Rapport:   care explained   choices provided   emotional support provided   empathic listening provided   questions answered   thoughts/feelings acknowledged  Diversional Activities: television  Family/Support System Care: self-care encouraged  Communication Enhancement Strategies: call light answered in person  Taken 6/25/2025 1000 by Juliane Oropeza RN  Trust Relationship/Rapport:   care explained   choices provided   emotional  support provided   empathic listening provided   questions answered   thoughts/feelings acknowledged  Diversional Activities: television  Family/Support System Care: self-care encouraged  Communication Enhancement Strategies: call light answered in person  Taken 6/25/2025 0800 by Juliane Oropeza RN  Trust Relationship/Rapport:   care explained   choices provided   emotional support provided   empathic listening provided   questions answered   questions encouraged   thoughts/feelings acknowledged  Diversional Activities: television  Family/Support System Care: support provided  Communication Enhancement Strategies: call light answered in person  Goal: Optimal Device Function  Outcome: Progressing  Intervention: Optimize Device Care and Function  Recent Flowsheet Documentation  Taken 6/25/2025 1600 by Juliane Oropeza RN  Airway/Ventilation Management: position adjusted  Airway Safety Measures: oxygen flowmeter at bedside  Taken 6/25/2025 1400 by Juliane Oropeza RN  Airway/Ventilation Management: position adjusted  Airway Safety Measures: oxygen flowmeter at bedside  Taken 6/25/2025 1200 by Juliane Oropeza RN  Airway/Ventilation Management: position adjusted  Airway Safety Measures: oxygen flowmeter at bedside  Taken 6/25/2025 1000 by Juliane Oropeza RN  Airway/Ventilation Management: position adjusted  Airway Safety Measures: oxygen flowmeter at bedside  Taken 6/25/2025 0800 by Juliane Oropeza RN  Airway/Ventilation Management: oxygen therapy provided  Airway Safety Measures: oxygen flowmeter at bedside  Goal: Mechanical Ventilation Liberation  Outcome: Progressing  Intervention: Promote Extubation and Mechanical Ventilation Liberation  Recent Flowsheet Documentation  Taken 6/25/2025 1600 by Juliane Oropeza RN  Environmental Support: calm environment promoted  Sleep/Rest Enhancement: natural light exposure provided  Medication Review/Management: medications reviewed  Taken 6/25/2025 1400 by Juliane Oropeza  RN  Environmental Support: calm environment promoted  Sleep/Rest Enhancement: natural light exposure provided  Medication Review/Management: medications reviewed  Taken 6/25/2025 1200 by Juliane Oropeza RN  Environmental Support: calm environment promoted  Sleep/Rest Enhancement: natural light exposure provided  Medication Review/Management: medications reviewed  Taken 6/25/2025 1000 by Juliane Oropeza RN  Environmental Support: calm environment promoted  Sleep/Rest Enhancement: natural light exposure provided  Medication Review/Management: medications reviewed  Taken 6/25/2025 0800 by Juliane Oropeza RN  Environmental Support: calm environment promoted  Sleep/Rest Enhancement: awakenings minimized  Medication Review/Management: medications reviewed  Goal: Optimal Nutrition Delivery  Outcome: Progressing  Goal: Absence of Device-Related Skin and Tissue Injury  Outcome: Progressing  Intervention: Maintain Skin and Tissue Health  Recent Flowsheet Documentation  Taken 6/25/2025 1600 by Juliane Oropeza RN  Device Skin Pressure Protection:   positioning supports utilized   tubing/devices free from skin contact   skin-to-skin areas padded  Taken 6/25/2025 1400 by Juliane Oropeza RN  Device Skin Pressure Protection:   positioning supports utilized   tubing/devices free from skin contact   skin-to-skin areas padded  Taken 6/25/2025 1200 by Juliane Oropeza RN  Device Skin Pressure Protection:   positioning supports utilized   tubing/devices free from skin contact   skin-to-skin areas padded  Taken 6/25/2025 1000 by Juliane Oropeza RN  Device Skin Pressure Protection:   positioning supports utilized   tubing/devices free from skin contact   skin-to-skin areas padded  Taken 6/25/2025 0800 by Juliane Oropeza RN  Device Skin Pressure Protection:   absorbent pad utilized/changed   positioning supports utilized  Goal: Absence of Ventilator-Induced Lung Injury  Outcome: Progressing  Intervention: Prevent  Ventilator-Associated Pneumonia  Recent Flowsheet Documentation  Taken 6/25/2025 1600 by Juliane Oropeza RN  Head of Bed (HOB) Positioning: other (see comments)  Taken 6/25/2025 1400 by Juliane Oropeza RN  Head of Bed (HOB) Positioning: other (see comments)  Taken 6/25/2025 1200 by Juliane Oropeza RN  Head of Bed (HOB) Positioning: other (see comments)  Taken 6/25/2025 1000 by Juliane Oropeza RN  Head of Bed (HOB) Positioning: other (see comments)  Taken 6/25/2025 0800 by Juliane Oropeza RN  Head of Bed (HOB) Positioning: HOB elevated     Problem: Fall Injury Risk  Goal: Absence of Fall and Fall-Related Injury  Outcome: Progressing  Intervention: Identify and Manage Contributors  Recent Flowsheet Documentation  Taken 6/25/2025 1600 by Juliane Oropeza RN  Medication Review/Management: medications reviewed  Self-Care Promotion: independence encouraged  Taken 6/25/2025 1400 by Juliane Oropeza RN  Medication Review/Management: medications reviewed  Self-Care Promotion: independence encouraged  Taken 6/25/2025 1200 by Juliane Oropeza RN  Medication Review/Management: medications reviewed  Self-Care Promotion: independence encouraged  Taken 6/25/2025 1000 by Juliane Oropeza RN  Medication Review/Management: medications reviewed  Self-Care Promotion: independence encouraged  Taken 6/25/2025 0800 by Juliane Oropeza RN  Medication Review/Management: medications reviewed  Self-Care Promotion: independence encouraged  Intervention: Promote Injury-Free Environment  Recent Flowsheet Documentation  Taken 6/25/2025 1600 by Juliane Oropeza RN  Safety Promotion/Fall Prevention:   assistive device/personal items within reach   activity supervised   fall prevention program maintained   lighting adjusted   mobility aid in reach   muscle strengthening facilitated   nonskid shoes/slippers when out of bed   room organization consistent   safety round/check completed  Taken 6/25/2025 1400 by Juliane Oropeza RN  Safety  Promotion/Fall Prevention:   assistive device/personal items within reach   activity supervised   fall prevention program maintained   lighting adjusted   mobility aid in reach   muscle strengthening facilitated   nonskid shoes/slippers when out of bed   room organization consistent   safety round/check completed  Taken 6/25/2025 1200 by Juliane Oropeza RN  Safety Promotion/Fall Prevention:   assistive device/personal items within reach   activity supervised   fall prevention program maintained   lighting adjusted   mobility aid in reach   muscle strengthening facilitated   nonskid shoes/slippers when out of bed   room organization consistent   safety round/check completed  Taken 6/25/2025 1000 by Juliane Oropeza, RN  Safety Promotion/Fall Prevention:   assistive device/personal items within reach   activity supervised   fall prevention program maintained   lighting adjusted   mobility aid in reach   muscle strengthening facilitated   nonskid shoes/slippers when out of bed   room organization consistent   safety round/check completed  Taken 6/25/2025 0800 by Juliane Oropeza, RN  Safety Promotion/Fall Prevention: activity supervised   Goal Outcome Evaluation:           Progress: improving

## 2025-06-26 ENCOUNTER — READMISSION MANAGEMENT (OUTPATIENT)
Dept: CALL CENTER | Facility: HOSPITAL | Age: 75
End: 2025-06-26
Payer: MEDICARE

## 2025-06-26 ENCOUNTER — APPOINTMENT (OUTPATIENT)
Dept: GENERAL RADIOLOGY | Facility: HOSPITAL | Age: 75
DRG: 234 | End: 2025-06-26
Payer: MEDICARE

## 2025-06-26 VITALS
HEIGHT: 70 IN | HEART RATE: 77 BPM | WEIGHT: 196.3 LBS | BODY MASS INDEX: 28.1 KG/M2 | TEMPERATURE: 98.1 F | OXYGEN SATURATION: 92 % | DIASTOLIC BLOOD PRESSURE: 73 MMHG | RESPIRATION RATE: 16 BRPM | SYSTOLIC BLOOD PRESSURE: 111 MMHG

## 2025-06-26 PROBLEM — Z92.89 HOSPITALIZATION WITHIN LAST 30 DAYS: Status: ACTIVE | Noted: 2025-06-26

## 2025-06-26 LAB
ANION GAP SERPL CALCULATED.3IONS-SCNC: 11.6 MMOL/L (ref 5–15)
BH CV ECHO MEAS - EDV(CUBED): 42.9 ML
BH CV ECHO MEAS - EDV(MOD-SP4): 85.6 ML
BH CV ECHO MEAS - EF(MOD-SP4): 68.1 %
BH CV ECHO MEAS - ESV(CUBED): 13.8 ML
BH CV ECHO MEAS - ESV(MOD-SP4): 27.3 ML
BH CV ECHO MEAS - FS: 31.4 %
BH CV ECHO MEAS - IVS/LVPW: 0.79 CM
BH CV ECHO MEAS - IVSD: 1.1 CM
BH CV ECHO MEAS - LAT PEAK E' VEL: 8.2 CM/SEC
BH CV ECHO MEAS - LV DIASTOLIC VOL/BSA (35-75): 40.9 CM2
BH CV ECHO MEAS - LV MASS(C)D: 144.6 GRAMS
BH CV ECHO MEAS - LV SYSTOLIC VOL/BSA (12-30): 13.1 CM2
BH CV ECHO MEAS - LVIDD: 3.5 CM
BH CV ECHO MEAS - LVIDS: 2.4 CM
BH CV ECHO MEAS - LVPWD: 1.4 CM
BH CV ECHO MEAS - MED PEAK E' VEL: 5.8 CM/SEC
BH CV ECHO MEAS - SV(MOD-SP4): 58.3 ML
BH CV ECHO MEAS - SVI(MOD-SP4): 27.9 ML/M2
BH CV XLRA - TDI S': 7.9 CM/SEC
BUN SERPL-MCNC: 22.7 MG/DL (ref 8–23)
BUN/CREAT SERPL: 25.8 (ref 7–25)
CALCIUM SPEC-SCNC: 9.1 MG/DL (ref 8.6–10.5)
CHLORIDE SERPL-SCNC: 99 MMOL/L (ref 98–107)
CO2 SERPL-SCNC: 28.4 MMOL/L (ref 22–29)
CREAT SERPL-MCNC: 0.88 MG/DL (ref 0.76–1.27)
DEPRECATED RDW RBC AUTO: 44.7 FL (ref 37–54)
EGFRCR SERPLBLD CKD-EPI 2021: 90.2 ML/MIN/1.73
ERYTHROCYTE [DISTWIDTH] IN BLOOD BY AUTOMATED COUNT: 12.7 % (ref 12.3–15.4)
GLUCOSE BLDC GLUCOMTR-MCNC: 110 MG/DL (ref 70–130)
GLUCOSE BLDC GLUCOMTR-MCNC: 96 MG/DL (ref 70–130)
GLUCOSE SERPL-MCNC: 102 MG/DL (ref 65–99)
HCT VFR BLD AUTO: 34.6 % (ref 37.5–51)
HGB BLD-MCNC: 11.8 G/DL (ref 13–17.7)
MAGNESIUM SERPL-MCNC: 2.2 MG/DL (ref 1.6–2.4)
MCH RBC QN AUTO: 33 PG (ref 26.6–33)
MCHC RBC AUTO-ENTMCNC: 34.1 G/DL (ref 31.5–35.7)
MCV RBC AUTO: 96.6 FL (ref 79–97)
PLATELET # BLD AUTO: 299 10*3/MM3 (ref 140–450)
PMV BLD AUTO: 9.6 FL (ref 6–12)
POTASSIUM SERPL-SCNC: 3.6 MMOL/L (ref 3.5–5.2)
RBC # BLD AUTO: 3.58 10*6/MM3 (ref 4.14–5.8)
SODIUM SERPL-SCNC: 139 MMOL/L (ref 136–145)
WBC NRBC COR # BLD AUTO: 10.09 10*3/MM3 (ref 3.4–10.8)

## 2025-06-26 PROCEDURE — 25010000002 HEPARIN (PORCINE) PER 1000 UNITS

## 2025-06-26 PROCEDURE — 99232 SBSQ HOSP IP/OBS MODERATE 35: CPT

## 2025-06-26 PROCEDURE — 94761 N-INVAS EAR/PLS OXIMETRY MLT: CPT

## 2025-06-26 PROCEDURE — 80048 BASIC METABOLIC PNL TOTAL CA: CPT

## 2025-06-26 PROCEDURE — 82948 REAGENT STRIP/BLOOD GLUCOSE: CPT

## 2025-06-26 PROCEDURE — 85027 COMPLETE CBC AUTOMATED: CPT

## 2025-06-26 PROCEDURE — 94799 UNLISTED PULMONARY SVC/PX: CPT

## 2025-06-26 PROCEDURE — 99024 POSTOP FOLLOW-UP VISIT: CPT

## 2025-06-26 PROCEDURE — 71045 X-RAY EXAM CHEST 1 VIEW: CPT

## 2025-06-26 PROCEDURE — 94664 DEMO&/EVAL PT USE INHALER: CPT

## 2025-06-26 PROCEDURE — 83735 ASSAY OF MAGNESIUM: CPT

## 2025-06-26 RX ORDER — POTASSIUM CHLORIDE 1500 MG/1
40 TABLET, EXTENDED RELEASE ORAL EVERY 4 HOURS
Status: COMPLETED | OUTPATIENT
Start: 2025-06-26 | End: 2025-06-26

## 2025-06-26 RX ORDER — SENNOSIDES 8.6 MG
325 CAPSULE ORAL DAILY
Qty: 90 TABLET | Refills: 1 | Status: SHIPPED | OUTPATIENT
Start: 2025-06-27

## 2025-06-26 RX ORDER — HYDROCODONE BITARTRATE AND ACETAMINOPHEN 7.5; 325 MG/1; MG/1
1 TABLET ORAL EVERY 6 HOURS PRN
Qty: 25 TABLET | Refills: 0 | Status: SHIPPED | OUTPATIENT
Start: 2025-06-26

## 2025-06-26 RX ORDER — FUROSEMIDE 40 MG/1
40 TABLET ORAL DAILY
Qty: 14 TABLET | Refills: 0 | Status: SHIPPED | OUTPATIENT
Start: 2025-06-26 | End: 2025-07-01 | Stop reason: SDUPTHER

## 2025-06-26 RX ORDER — METOPROLOL TARTRATE 25 MG/1
25 TABLET, FILM COATED ORAL EVERY 12 HOURS SCHEDULED
Qty: 180 TABLET | Refills: 0 | Status: SHIPPED | OUTPATIENT
Start: 2025-06-26

## 2025-06-26 RX ORDER — POTASSIUM CHLORIDE 1500 MG/1
20 TABLET, EXTENDED RELEASE ORAL DAILY
Qty: 14 TABLET | Refills: 0 | Status: SHIPPED | OUTPATIENT
Start: 2025-06-26 | End: 2025-07-01 | Stop reason: SDUPTHER

## 2025-06-26 RX ADMIN — HEPARIN SODIUM 5000 UNITS: 5000 INJECTION INTRAVENOUS; SUBCUTANEOUS at 05:16

## 2025-06-26 RX ADMIN — METOPROLOL TARTRATE 25 MG: 25 TABLET, FILM COATED ORAL at 08:29

## 2025-06-26 RX ADMIN — ACETAMINOPHEN 1000 MG: 500 TABLET ORAL at 11:32

## 2025-06-26 RX ADMIN — SODIUM CHLORIDE SOLN NEBU 7% 4 ML: 7 NEBU SOLN at 07:49

## 2025-06-26 RX ADMIN — GUAIFENESIN 1200 MG: 600 TABLET, EXTENDED RELEASE ORAL at 08:29

## 2025-06-26 RX ADMIN — GABAPENTIN 100 MG: 100 CAPSULE ORAL at 08:30

## 2025-06-26 RX ADMIN — POTASSIUM CHLORIDE 40 MEQ: 1500 TABLET, EXTENDED RELEASE ORAL at 08:30

## 2025-06-26 RX ADMIN — DOCUSATE SODIUM 50 MG AND SENNOSIDES 8.6 MG 2 TABLET: 8.6; 5 TABLET, FILM COATED ORAL at 08:30

## 2025-06-26 RX ADMIN — ASPIRIN 325 MG: 325 TABLET, COATED ORAL at 08:29

## 2025-06-26 RX ADMIN — POTASSIUM CHLORIDE 40 MEQ: 1500 TABLET, EXTENDED RELEASE ORAL at 11:32

## 2025-06-26 NOTE — DISCHARGE SUMMARY
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 Activities of daily living Strengthening Home safety assessment   Frequency: 1 Week 1        Call MD With Problems / Concerns   As directed      Instructions:   Please call the CT Surgery office with any questions or concerns you may have [phone]    Order Comments: Instructions: Please call the CT Surgery office with any questions or concerns you may have [phone]         Discharge Follow-up with PCP   As directed       Currently Documented PCP:    Kerry James MD    PCP Phone Number:    [phone]     Follow Up Details: Follow Up With PCP Within 7-14 Days        Discharge Follow-up with Specialty: Cardiology; 1 Month   As directed      Specialty: Cardiology   Follow Up: 1 Month        Discharge Follow-up with Specialty: Cardiothoracic Surgery   As directed      Follow Up Details: Follow Up in 6 weeks   Specialty: Cardiothoracic Surgery        Discharge Follow-up with Specialty: [facility]; 1 Week   As directed      Specialty: [facility]   Follow Up: 1 Week   Follow Up Details: Follow Up With [facility] Within 7 Days of Discharge. If Discharged on a Weekend, Schedule Follow Up For The Following  Friday at 0900.        Cardiac Rehab Evaluation and Enrollment   Jul 01, 2025      Reason for Consult: Education                Discharge Education:  Post-Op Education:  Patient was advised on responsible use of opioids in the post-surgical setting.  Patient was advised these medications are highly addictive.  Patient advised on proper disposal of unused opioid medication and was urged to discard any unused opioid medication. I reviewed the patient's sternal precautions and outlined the physical activity suitable for each benchmark at the 6-week 3-month and 1 year intervals.  Wound Care:  Patient educated regarding care of post-operative wounds.  Patient is to wash with plain soap and water and pat dry.  Patient is not to use salves on the incision sites.  Patient instructed regarding the signs and symptoms of infection and when to call the office with any concerns.    Special Instructions:   1.  Keep incisions clean and dry at all times. Take a shower daily. Do not take a tub bath or use hot tubs until seen by the cardiac surgeon in your follow-up visit. Clean  your body and incisions daily with Dial soap and water. Always use a clean washcloth. Do not scrub your incision(s). Do not re-use dressings on your incision(s). Do not use any lotions, creams, oils, powders, antibiotic ointment (i.e., Neosporin), peroxide, alcohol, or iodine UNLESS told to do by the cardiac surgeon.  Patient may shower, but no tub baths until CT Surgery followup.   2.  No lifting over 10 lbs until CT Surgery follow up.  3.  No driving until released to do so by the surgeon.      Surgical Leg Precautions:   -Avoid sitting in one position or standing for long periods of time.  -Do not cross your legs.  -Keep your legs elevated while sitting  -Do not use any lotions, creams, oils, powders, antibiotic ointment (i.e. Neosporin), peroxide, alcohol, or iodine unless specifically prescribed by the cardiac surgeon.  -If elastic stockings (LESTER hose)  were prescribed to you, wear the stockings while you are up for at least two weeks after discharge. The stockings help decrease swelling. However, remove stockings at bedtime. Wash the stockings with mild soap and water, and make sure they are completely dry before you put them back on.    When to Call the Cardiac Surgeon:  -Increased swelling, redness, tenderness, and drainage  -Increased warmth in the skin around an incision  -Large amount of clear or pinkish drainage  -Sudden increased amount of drainage  -White, yellow, or greenish drainage  -Odor, which may be foul or sweet, coming from an incision  -An opening in your incisions  -Temperature higher than 101 degrees Fahrenheit   -Temperature higher than 100 degrees Fahrenheit two times within 24 hours  -Do not hesitate to call the cardiac surgery nurse navigator or cardiac surgeon if you have concerns or questions.     *The [facility] cardiac surgery nurse navigator is available Monday-Friday 8 a.m. to 4:30 p.m. [phone]  Call 911:  -Chest pain (pain similar to what you experienced prior to surgery)  -Fainting spells  -Bright red stool  -Vomiting bright red blood  -Shortness of breath not relieved by rest  -Sudden numbness or weakness in arms or legs  -Sudden, severe headache  -Heart rate faster than 150 beats/minute with shortness of breath or a new irregular heart rate    MARINA Sepulveda  06/26/25  13:20 EDT    Time: I spent 45 minutes on this discharge activity which included: face-to-face encounter with the patient, reviewing the data in the system, coordination of the care with the nursing staff as well as consultants, documentation, and entering orders.

## 2025-06-26 NOTE — PROGRESS NOTES
[facility] Medicine Services  PROGRESS NOTE    Patient Name: Jamari Munoz  : 1950  MRN: [id number]    Date of Admission: 2025  Primary Care Physician: Keshawn Alexandre MD    Subjective   Subjective     CC:  CAD s/p CABG    HPI:  No significant overnight events, had mild increase in oxygen requirements to 3 L, no complaints otherwise, sitting in the chair looks to be doing very well, possible discharge today by CT surgery    Objective   Objective     Vital Signs:   Temp:  [97.9 °F (36.6 °C)-99.2 °F (37.3 °C)] 98.4 °F (36.9 °C)  Heart Rate:  [71-90] 80  Resp:  [16-20] 16  BP: (109-129)/(71-83) 129/83  Flow (L/min) (Oxygen Therapy):  [1-2] 2     Physical Exam:  Constitutional: No acute distress, awake, alert  HENT: NCAT, mucous membranes moist  Respiratory: Clear to auscultation bilaterally, respiratory effort normal   Cardiovascular: RRR, no murmurs, rubs, or gallops  Gastrointestinal: Positive bowel sounds, soft, nontender, nondistended  Musculoskeletal: No bilateral ankle edema  Psychiatric: Appropriate affect, cooperative  Neurologic: Oriented x 3, JORGE, speech clear  Skin: No rashes noted      Results Reviewed:  LAB RESULTS:      Lab 25  0513 25  0503 25  0451 25  0422 25  0502 25  0238 25  0348 25  1632 25  1314   WBC 10.09 9.39 10.08 10.96* 14.83*   < > 11.93*   < > 18.01*   HEMOGLOBIN 11.8* 11.7* 11.4* 11.4* 12.1*   < > 13.0   < > 13.6   HEMATOCRIT 34.6* 34.4* 33.2* 34.3* 36.5*   < > 40.2   < > 39.9   PLATELETS 299 246 225 195 164   < > 195   < > 186   NEUTROS ABS  --   --   --   --   --   --  9.84*  --   --    IMMATURE GRANS (ABS)  --   --   --   --   --   --  0.06*  --   --    LYMPHS ABS  --   --   --   --   --   --  1.14  --   --    MONOS ABS  --   --   --   --   --   --  0.85  --   --    EOS ABS  --   --   --   --   --   --  0.01  --   --    MCV 96.6 97.2* 96.5 98.8* 99.7*   < > 101.0*   < > 96.4   PROTIME  --    --   --   --   --   --  16.9*  --  18.6*   APTT  --   --   --   --   --   --   --   --  28.5    < > = values in this interval not displayed.         Lab 06/26/25  0513 06/25/25  0503 06/24/25  1333 06/24/25  0451 06/23/25  0422 06/22/25  0503 06/21/25  0238 06/20/25  0348 06/19/25  1632 06/19/25  1314   SODIUM 139 138  --  134* 135* 131*   < > 136 139 137   POTASSIUM 3.6 4.2 4.3 3.4* 4.2 4.5   < > 4.6 4.3 4.4   CHLORIDE 99 99  --  95* 97* 95*   < > 105 109* 109*   CO2 28.4 29.9*  --  30.0* 29.0 28.0   < > 19.0* 21.1* 18.0*   ANION GAP 11.6 9.1  --  9.0 9.0 8.0   < > 12.0 8.9 10.0   BUN 22.7 19.8  --  20.8 17.8 19.4   < > 11.2 11.4 11.8   CREATININE 0.88 0.86  --  0.94 0.80 0.82   < > 1.01 0.89 0.86   EGFR 90.2 90.9  --  85.1 92.9 92.2   < > 78.0 89.9 90.9   GLUCOSE 102* 101*  --  100* 107* 105*   < > 155* 162* 133*   CALCIUM 9.1 9.4  --  8.7 9.0 9.0   < > 8.4* 8.6 9.0   IONIZED CALCIUM  --   --   --   --   --   --   --   --   --  1.07*   MAGNESIUM 2.2 2.3  --  1.8  --   --   --  2.4 1.9 2.0   PHOSPHORUS  --   --   --   --   --   --   --   --  3.4 2.8    < > = values in this interval not displayed.         Lab 06/21/25  0238 06/20/25 0348 06/19/25  1632 06/19/25  1314   TOTAL PROTEIN 5.7* 5.7*  --   --    ALBUMIN 3.5 3.8 4.0 3.2*   GLOBULIN 2.2 1.9  --   --    ALT (SGPT) 16 21  --   --    AST (SGOT) 40 54*  --   --    BILIRUBIN 0.7 0.6  --   --    ALK PHOS 39 35*  --   --          Lab 06/20/25  0348 06/19/25  1314   PROTIME 16.9* 18.6*   INR 1.29* 1.45*                     Lab 06/19/25  1635 06/19/25  1308 06/19/25  1112   PH, ARTERIAL 7.342* 7.506* 7.39   PCO2, ARTERIAL 43.4 28.2*  --    PO2 ART 89.0 234.0*  --    FIO2 40 100  --    HCO3 ART 23.5 22.3  --    BASE EXCESS ART -2.3* 0.4  --    CARBOXYHEMOGLOBIN 1.0 0.8  --      Brief Urine Lab Results  (Last result in the past 365 days)        Color   Clarity   Blood   Leuk Est   Nitrite   Protein   CREAT   Urine HCG        06/18/25 1445 Yellow   Clear   Negative    Negative   Negative   Negative                   Microbiology Results Abnormal       None            XR Chest 1 View  Result Date: 6/26/2025  XR CHEST 1 VW Date of Exam: 6/26/2025 2:06 AM EDT Indication: Chest tube removal post op Comparison: 6/25/2025 Findings: Heart size stable status post sternotomy. There is continued atelectasis in the bases that is unchanged. Small left effusion appears stable. Pulmonary vascularity is normal. No evidence of pneumothorax.     Impression: No significant interval change. Electronically Signed: Charles Navarro MD  6/26/2025 5:01 AM EDT  Workstation ID: [id number]    XR Chest 1 View  Result Date: 6/25/2025  XR CHEST 1 VW Date of Exam: 6/25/2025 1:54 AM EDT Indication: Chest tube removal post op Comparison: 6/24/2025 Findings: Heart remains mildly enlarged status post CABG. There is continued bibasilar atelectasis, and there is probably a small left effusion. The lungs are otherwise clear. No pneumothorax.     Impression: Bibasilar atelectasis and small left pleural effusion. No pneumothorax. Electronically Signed: Charles Navarro MD  6/25/2025 4:24 AM EDT  Workstation ID: [id number]      Results for orders placed during the hospital encounter of 06/18/25    Adult Transthoracic Echo Complete W/ Cont if Necessary Per Protocol    Interpretation Summary    Left ventricular systolic function is normal. Left ventricular ejection fraction appears to be 56 - 60%.    Left ventricular diastolic function is consistent with (grade I) impaired relaxation.    The aortic root measures 3.6 cm.      Current medications:  Scheduled Meds:acetaminophen, 1,000 mg, Oral, Q8H  aspirin, 325 mg, Oral, Daily  atorvastatin, 40 mg, Oral, Nightly  gabapentin, 100 mg, Oral, TID  guaiFENesin, 1,200 mg, Oral, Q12H  heparin (porcine), 5,000 Units, Subcutaneous, Q8H  insulin lispro, 2-7 Units, Subcutaneous, 4x Daily AC & at Bedtime  metoprolol tartrate, 25 mg, Oral, Q12H  pharmacy consult - MTM, , Not Applicable,  Daily  potassium chloride ER, 40 mEq, Oral, Q4H  protamine, 50 mg, Intravenous, Once  senna-docusate sodium, 2 tablet, Oral, BID  sodium chloride, 4 mL, Nebulization, BID - RT      Continuous Infusions:     PRN Meds:.  Albuterol Sulfate NEB Orderable    senna-docusate sodium **AND** polyethylene glycol **AND** bisacodyl **AND** bisacodyl    Calcium Replacement - Follow Nurse / BPA Driven Protocol    dextrose    dextrose    glucagon (human recombinant)    Magnesium Cardiology Dose Replacement - Follow Nurse / BPA Driven Protocol    naloxone    ondansetron    Phosphorus Replacement - Follow Nurse / BPA Driven Protocol    Potassium Replacement - Follow Nurse / BPA Driven Protocol    sodium bicarbonate    sodium chloride    sodium chloride    Assessment & Plan   Assessment & Plan     Active Hospital Problems    Diagnosis  POA    **CAD s/p CABG x 4 6/19/2025 [I25.110]  Yes    Hyperlipidemia LDL goal <70 [E78.5]  Yes    Primary hypertension [I10]  Yes      Resolved Hospital Problems    Diagnosis Date Resolved POA    Abnormal findings on diagnostic imaging of heart and coronary circulation [R93.1] 06/18/2025 Yes        Brief Hospital Course to date:  Jamari Munoz is a 74 y.o. male who has been experiencing midsternal chest discomfort with exertion (more notable on colder days) which is relieved by rest. The patient notices this when riding his bike, which he does for exercise on a regular basis. When he saw Dr. Alexandre earlier this month, Dr. Alexandre instructed him not to exert himself and the patient has not had angina at rest or with normal activity.  Underwent CABG x 4 6/19/2025.  Currently out of the ICU on telemetry doing well.    Coronary disease  S/p CABG x 4 6/19/2025  Patient currently CABG x 4 (6/19/2025) with LIMA to LAD, SVG to PDA, SVG to OM1 and SVG to D1  Currently on aspirin 325 mg daily  Metoprolol tartrate 25 mg twice daily  Normotensive  S/p IV Bumex  Doing well with PT  Hospital medicine on board as  consult  Blood sugars are well-controlled at this time  Evidence of hyperinflated lungs with persistent bibasilar airspace disease likely atelectasis, oxygen weaned 3.5L  Encourage aggressive I-S  Is stable for discharge from hospital standpoint    HTN  Remains on metoprolol 25 mg twice daily    HLD  Remains on Lipitor 40 mg daily    Leukocytosis, resolved  Likely reactive in the setting of recent surgery    Anemia  Hemoglobin stable, likely postop anemia  Was 15.5 on admission, 11.7 today      Expected Discharge Location and Transportation: Home with home health PT  Expected Discharge 6/25/2025  Expected Discharge Date: 6/26/2025; Expected Discharge Time: 12:00 PM     VTE Prophylaxis:  Pharmacologic & mechanical VTE prophylaxis orders are present.    Total time spent: Time Spent: Time Spent: 40 minutes  Time spent includes time reviewing chart, face-to-face time, counseling patient/family/caregiver, ordering medications/tests/procedures, communicating with other health care professionals, documenting clinical information in the electronic health record, and coordination of care.       AM-PAC 6 Clicks Score (PT): 19 (06/25/25 2000)    CODE STATUS:   Code Status and Medical Interventions: CPR (Attempt to Resuscitate); Full Support   Ordered at: 06/19/25 1253     Code Status (Patient has no pulse and is not breathing):    CPR (Attempt to Resuscitate)     Medical Interventions (Patient has pulse or is breathing):    Full Support       Raquel Russell MD  06/26/25

## 2025-06-26 NOTE — CASE MANAGEMENT/SOCIAL WORK
Case Management Discharge Note      Final Note: Patient to discharge home with [facility] today. Orders in [facility]. Patient needing home O2 at 2L. I have arranged this with Enrique at [facility]. Orders in [facility]. Room air saturations in Epic less than 88%. Patient's wife at the bedside to transport. No other needs voiced or identified.         Selected Continued Care - Admitted Since 6/18/2025       Destination    No services have been selected for the patient.                Durable Medical Equipment Coordination complete.      Service Provider Services Address Phone Fax Patient Preferred    [facility] [address], [place] [address] [phone] --              Dialysis/Infusion    No services have been selected for the patient.                Home Medical Care Coordination complete.      Service Provider Services Address Phone Fax Patient Preferred    [facility] [address], [place] [id number] [phone] --              Therapy    No services have been selected for the patient.                Community Resources    No services have been selected for the patient.                [facility]    No services have been selected for the patient.                    Transportation Services  Transportation: Private Transportation    Final Discharge Disposition Code: 06 - home with home health care

## 2025-06-26 NOTE — PROGRESS NOTES
[facility] Cardiothoracic Surgery In-Patient Progress Note     LOS: 8 days     POD # 7 s/p CABG x 4    Subjective  Denies chest pain. Mild dyspnea with activity. On 2L NC.     Objective  Vital Signs  Temp:  [97.9 °F (36.6 °C)-99.2 °F (37.3 °C)] 98.4 °F (36.9 °C)  Heart Rate:  [71-90] 71  Resp:  [16-20] 16  BP: (109-129)/(71-83) 129/83    Physical Exam:   General Appearance: alert, appears stated age and cooperative   Lungs: Diminished bases bilaterally   Heart: regular rhythm & normal rate, normal S1, S2, no murmur, no gallop, no rub, and no click   Skin: Incision c/d/I   Sternum: Stable   Ext: Trace edema bilaterally     Results     Results from last 7 days   Lab Units 06/26/25  0513   WBC 10*3/mm3 10.09   HEMOGLOBIN g/dL 11.8*   HEMATOCRIT % 34.6*   PLATELETS 10*3/mm3 299     Results from last 7 days   Lab Units 06/26/25  0513   SODIUM mmol/L 139   POTASSIUM mmol/L 3.6   CHLORIDE mmol/L 99   CO2 mmol/L 28.4   BUN mg/dL 22.7   CREATININE mg/dL 0.88   GLUCOSE mg/dL 102*   CALCIUM mg/dL 9.1     Imaging Results (Last 24 Hours)       Procedure Component Value Units Date/Time    XR Chest 1 View [[id number]] Collected: 06/26/25 0459     Updated: 06/26/25 0504    Narrative:      XR CHEST 1 VW    Date of Exam: 6/26/2025 2:06 AM EDT    Indication: Chest tube removal  post op    Comparison: 6/25/2025    Findings:  Heart size stable status post sternotomy. There is continued atelectasis in the bases that is unchanged. Small left effusion appears stable. Pulmonary vascularity is normal. No evidence of pneumothorax.      Impression:      No significant interval change.      Electronically Signed: Charles Navarro MD    6/26/2025 5:01 AM EDT    Workstation ID: [id number]          Echo: 6/24/25  There is a trivial pericardial effusion.     Chronic Comorbid Conditions relative to CABG include:  Cardiovascular: Coronary Artery Disease, Hyperlipidemia, Hypertension, and trace AR, trace MR  Respiratory: None  Endocrine: None    Nephrology: None  Hematology: None   Other: None     Assessment:  POD # 7 s/p CABG x 4    Plan   Wean oxygen as tolerated  Ambulate  PT/OT  Pulmonary Toilet: IS q1 hr while awake  ASA, statin, BB, subcu heparin for DVT prophylaxis  D/C home today if cardiology is satisfied with medical management  Home lasix and oxygen    Keshawn Ng MD  06/26/25  07:38 EDT

## 2025-06-26 NOTE — PLAN OF CARE
Goal Outcome Evaluation:   Pt on 2L NC, educated on discharge instructions.

## 2025-06-26 NOTE — PLAN OF CARE
Problem: Adult Inpatient Plan of Care  Goal: Plan of Care Review  Outcome: Progressing  Flowsheets (Taken 6/26/2025 0426)  Progress: improving     Problem: Adult Inpatient Plan of Care  Goal: Absence of Hospital-Acquired Illness or Injury  Intervention: Identify and Manage Fall Risk  Recent Flowsheet Documentation  Taken 6/26/2025 0420 by Alecia Belle RN  Safety Promotion/Fall Prevention:   activity supervised   assistive device/personal items within reach   safety round/check completed  Taken 6/26/2025 0200 by Alecia Belle RN  Safety Promotion/Fall Prevention:   activity supervised   assistive device/personal items within reach   safety round/check completed  Taken 6/26/2025 0015 by Alecia Belle RN  Safety Promotion/Fall Prevention:   activity supervised   assistive device/personal items within reach   safety round/check completed  Taken 6/25/2025 2220 by Alecia Belle RN  Safety Promotion/Fall Prevention:   activity supervised   assistive device/personal items within reach   safety round/check completed  Taken 6/25/2025 2000 by Alecia Belle RN  Safety Promotion/Fall Prevention:   activity supervised   assistive device/personal items within reach   safety round/check completed     Problem: Adult Inpatient Plan of Care  Goal: Absence of Hospital-Acquired Illness or Injury  Intervention: Prevent Skin Injury  Recent Flowsheet Documentation  Taken 6/26/2025 0420 by Alecia Belle RN  Body Position: position changed independently  Skin Protection: incontinence pads utilized  Taken 6/26/2025 0200 by Alecia Belle RN  Body Position: position changed independently  Skin Protection: incontinence pads utilized  Taken 6/26/2025 0015 by Alecia Belle RN  Body Position: position changed independently  Skin Protection: incontinence pads utilized  Taken 6/25/2025 2220 by Alecia Belle RN  Body Position: position changed independently  Skin Protection: incontinence pads utilized  Taken 6/25/2025 2000 by Alecia Belle RN  Body  Position: position changed independently  Skin Protection: incontinence pads utilized     Problem: Cardiovascular Surgery  Goal: Optimal Cerebral Tissue Perfusion  Intervention: Protect and Optimize Cerebral Perfusion  Recent Flowsheet Documentation  Taken 6/26/2025 0420 by Alecia Belle RN  Head of Bed (HOB) Positioning: HOB elevated  Taken 6/26/2025 0200 by Alecia Belle RN  Head of Bed (HOB) Positioning: HOB elevated  Taken 6/26/2025 0015 by Alecia Belle RN  Head of Bed (HOB) Positioning: HOB elevated  Taken 6/25/2025 2220 by Alecia Belle RN  Head of Bed (HOB) Positioning: HOB elevated     Problem: Cardiovascular Surgery  Goal: Anesthesia/Sedation Recovery  Intervention: Optimize Anesthesia Recovery  Recent Flowsheet Documentation  Taken 6/26/2025 0420 by Alecia Belle RN  Patient Tolerance (IS):   good   no adverse signs/symptoms present  Safety Promotion/Fall Prevention:   activity supervised   assistive device/personal items within reach   safety round/check completed  Taken 6/26/2025 0200 by Alecia Belle RN  Patient Tolerance (IS):   good   no adverse signs/symptoms present  Safety Promotion/Fall Prevention:   activity supervised   assistive device/personal items within reach   safety round/check completed  Taken 6/26/2025 0015 by Alecia Belle RN  Patient Tolerance (IS):   good   no adverse signs/symptoms present  Safety Promotion/Fall Prevention:   activity supervised   assistive device/personal items within reach   safety round/check completed  Taken 6/25/2025 2220 by Alecia Belle RN  Patient Tolerance (IS):   good   no adverse signs/symptoms present  Safety Promotion/Fall Prevention:   activity supervised   assistive device/personal items within reach   safety round/check completed  Taken 6/25/2025 2000 by Alecia Belle RN  Patient Tolerance (IS):   good   no adverse signs/symptoms present  Safety Promotion/Fall Prevention:   activity supervised   assistive device/personal items within reach   safety  round/check completed     Problem: Cardiovascular Surgery  Goal: Effective Oxygenation and Ventilation  Intervention: Promote Airway Secretion Clearance  Recent Flowsheet Documentation  Taken 6/26/2025 0420 by Alecia Belle RN  Patient Tolerance (IS):   good   no adverse signs/symptoms present  Taken 6/26/2025 0200 by Alecia Belle RN  Patient Tolerance (IS):   good   no adverse signs/symptoms present  Taken 6/26/2025 0015 by Alecia Belle RN  Patient Tolerance (IS):   good   no adverse signs/symptoms present  Taken 6/25/2025 2220 by Alecia Belle RN  Patient Tolerance (IS):   good   no adverse signs/symptoms present  Taken 6/25/2025 2000 by Alecia Belle RN  Patient Tolerance (IS):   good   no adverse signs/symptoms present     Problem: Skin Injury Risk Increased  Goal: Skin Health and Integrity  Intervention: Optimize Skin Protection  Recent Flowsheet Documentation  Taken 6/26/2025 0420 by Alecia Belle RN  Activity Management: activity encouraged  Pressure Reduction Techniques: frequent weight shift encouraged  Head of Bed (HOB) Positioning: HOB elevated  Pressure Reduction Devices:   positioning supports utilized   pressure-redistributing mattress utilized  Skin Protection: incontinence pads utilized  Taken 6/26/2025 0200 by Alecia Belle RN  Activity Management: activity encouraged  Pressure Reduction Techniques: frequent weight shift encouraged  Head of Bed (HOB) Positioning: HOB elevated  Pressure Reduction Devices:   positioning supports utilized   pressure-redistributing mattress utilized  Skin Protection: incontinence pads utilized  Taken 6/26/2025 0015 by Alecia Belle RN  Activity Management: activity encouraged  Pressure Reduction Techniques: frequent weight shift encouraged  Head of Bed (HOB) Positioning: HOB elevated  Pressure Reduction Devices:   positioning supports utilized   pressure-redistributing mattress utilized  Skin Protection: incontinence pads utilized  Taken 6/25/2025 2220 by Yoshi  Alecia, RN  Activity Management: activity encouraged  Pressure Reduction Techniques: frequent weight shift encouraged  Head of Bed (HOB) Positioning: HOB elevated  Pressure Reduction Devices:   positioning supports utilized   pressure-redistributing mattress utilized  Skin Protection: incontinence pads utilized  Taken 6/25/2025 2000 by Alecia Belle, RN  Activity Management: up in chair  Pressure Reduction Techniques: frequent weight shift encouraged  Pressure Reduction Devices: chair cushion utilized  Skin Protection: incontinence pads utilized   Goal Outcome Evaluation:           Progress: improving

## 2025-06-26 NOTE — OUTREACH NOTE
Prep Survey      Flowsheet Row Responses   [facility] patient discharged from? [place]   Is LACE score < 7 ? No   Eligibility [facility]   Date of Admission 06/18/25   Date of Discharge 06/26/25   Discharge Disposition Home-Health Care Sv   Discharge diagnosis CAD s/p CABG x 4 6/19/2025   Does the patient have one of the following disease processes/diagnoses(primary or secondary)? Cardiothoracic surgery   Does the patient have Home health ordered? Yes   What is the Home health agency?  [facility]   Is there a DME ordered? Yes   What DME was ordered? [facility] (O[facility])   Prep survey completed? Yes            YING HOGUE - Registered Nurse

## 2025-06-27 ENCOUNTER — TRANSITIONAL CARE MANAGEMENT TELEPHONE ENCOUNTER (OUTPATIENT)
Dept: CALL CENTER | Facility: HOSPITAL | Age: 75
End: 2025-06-27
Payer: MEDICARE

## 2025-06-27 NOTE — OUTREACH NOTE
Call Center TCM Note      Flowsheet Row Responses   [facility] patient discharged from? [place]   Does the patient have one of the following disease processes/diagnoses(primary or secondary)? Cardiothoracic surgery   TCM attempt successful? Yes   Call start time 0844   Call end time 0850   Discharge diagnosis CAD s/p CABG x 4 6/19/2025   Meds reviewed with patient/caregiver? Yes   Is the patient having any side effects they believe may be caused by any medication additions or changes? No   Does the patient have all medications related to this admission filled (includes all antibiotics, pain medications, cardiac medications, etc.) Yes   Is the patient taking all medications as directed (includes completed medication regime)? Yes   Comments Hospital follow up with MARINA Lara at PCP office on 7/16   Does the patient have an appointment with their PCP within 7-14 days of discharge? Yes   What is the Home health agency?  [facility]   Has home health visited the patient within 72 hours of discharge? N/A   What DME [facility]   Has all DME been delivered? Yes   DME comments O2 sats have been 94% on room air, states he has not had to use his O2 since being home, advised if his O2 sats drop below 90% while wearing his O2 to seek urgent care at ED, patient voiced understanding.   Psychosocial issues? No   Did the patient receive a copy of their discharge instructions? Yes   Nursing interventions Reviewed instructions with patient   What is the patient's perception of their health status since discharge? Improving   Nursing interventions Nurse provided patient education   Nursing interventions Reassured on normal signs of recovery   Is the patient /caregiver able to teach back basic post-op care? Continue use of incentive spirometry at least 1 week post discharge, Practice cough and deep breath every 4 hours while awake, Hold pillow to support chest when  coughing, Drive as instructed by MD in discharge instructions, Shower daily, No tub bath, swimming, or hot tub until instructed by MD, Use a clean wash cloth and antibacterial bar or liquid soap to clean incisions, Lifting as instructed by MD in discharge instructions, If the steri-strips are falling off, it is okay to remove them. (If applicable)   Is the patient/caregiver able to teach back signs and symptoms of incisional infection? Increased redness, swelling or pain at the incisonal site, Increased drainage or bleeding, Incisional warmth, Pus or odor from incision, Fever   Is the patient/caregiver able to teach back steps to recovery at home? Set small, achievable goals for return to baseline health, Rest and rebuild strength, gradually increase activity   Is the patient /caregiver able to teach back the importance of cardiac rehab? Yes   Nursing interventions Provided education on importance of cardiac rehab   If the patient is a current smoker, are they able to teach back resources for cessation? Not a smoker   Is the patient/caregiver able to teach back the hierarchy of who to call/visit for symptoms/problems? PCP, Specialist, Home health nurse, Urgent Care, ED, 911 Yes   TCM call completed? Yes   Wrap up additional comments Doing great, denies any questions or concerns, following his discharge instructions closely, confirmed his hospital follow up appt with PCP office for 7/16.   Call end time 0850   Would this patient benefit from a Referral to Amb Social Work? No   Is the patient interested in additional calls from an ambulatory ? No            Rebecca Ochoa Nurse    6/27/2025, 08:50 EDT

## 2025-06-28 NOTE — PROGRESS NOTES
TCM call noted for follow-up of four-vessel coronary bypass grafting on 6/19/2025 to be seen by APRN July 16, 2025

## 2025-06-29 ENCOUNTER — HOSPITAL ENCOUNTER (EMERGENCY)
Facility: HOSPITAL | Age: 75
Discharge: HOME | End: 2025-06-29
Attending: EMERGENCY MEDICINE | Admitting: EMERGENCY MEDICINE
Payer: MEDICARE

## 2025-06-29 ENCOUNTER — APPOINTMENT (OUTPATIENT)
Dept: CARDIOLOGY | Facility: HOSPITAL | Age: 75
End: 2025-06-29
Payer: MEDICARE

## 2025-06-29 VITALS
HEIGHT: 70 IN | TEMPERATURE: 98.1 F | RESPIRATION RATE: 18 BRPM | BODY MASS INDEX: 27.2 KG/M2 | WEIGHT: 190 LBS | DIASTOLIC BLOOD PRESSURE: 70 MMHG | OXYGEN SATURATION: 96 % | SYSTOLIC BLOOD PRESSURE: 106 MMHG | HEART RATE: 73 BPM

## 2025-06-29 DIAGNOSIS — T14.8XXA BRUISING: ICD-10-CM

## 2025-06-29 DIAGNOSIS — R60.9 DEPENDENT EDEMA: ICD-10-CM

## 2025-06-29 DIAGNOSIS — Z51.89 ENCOUNTER FOR WOUND RE-CHECK: Primary | ICD-10-CM

## 2025-06-29 LAB
BH CV LOWER VASCULAR LEFT COMMON FEMORAL AUGMENT: NORMAL
BH CV LOWER VASCULAR LEFT COMMON FEMORAL COMPRESS: NORMAL
BH CV LOWER VASCULAR LEFT COMMON FEMORAL PHASIC: NORMAL
BH CV LOWER VASCULAR LEFT COMMON FEMORAL SPONT: NORMAL
BH CV LOWER VASCULAR LEFT DISTAL FEMORAL AUGMENT: NORMAL
BH CV LOWER VASCULAR LEFT DISTAL FEMORAL COMPRESS: NORMAL
BH CV LOWER VASCULAR LEFT DISTAL FEMORAL PHASIC: NORMAL
BH CV LOWER VASCULAR LEFT DISTAL FEMORAL SPONT: NORMAL
BH CV LOWER VASCULAR LEFT GASTRONEMIUS COMPRESS: NORMAL
BH CV LOWER VASCULAR LEFT LESSER SAPH COMPRESS: NORMAL
BH CV LOWER VASCULAR LEFT MID FEMORAL AUGMENT: NORMAL
BH CV LOWER VASCULAR LEFT MID FEMORAL COMPRESS: NORMAL
BH CV LOWER VASCULAR LEFT MID FEMORAL PHASIC: NORMAL
BH CV LOWER VASCULAR LEFT MID FEMORAL SPONT: NORMAL
BH CV LOWER VASCULAR LEFT PERONEAL COMPRESS: NORMAL
BH CV LOWER VASCULAR LEFT POPLITEAL AUGMENT: NORMAL
BH CV LOWER VASCULAR LEFT POPLITEAL COMPRESS: NORMAL
BH CV LOWER VASCULAR LEFT POPLITEAL PHASIC: NORMAL
BH CV LOWER VASCULAR LEFT POPLITEAL SPONT: NORMAL
BH CV LOWER VASCULAR LEFT POSTERIOR TIBIAL COMPRESS: NORMAL
BH CV LOWER VASCULAR LEFT PROFUNDA FEMORAL COMPRESS: NORMAL
BH CV LOWER VASCULAR LEFT PROXIMAL FEMORAL AUGMENT: NORMAL
BH CV LOWER VASCULAR LEFT PROXIMAL FEMORAL COMPRESS: NORMAL
BH CV LOWER VASCULAR LEFT PROXIMAL FEMORAL PHASIC: NORMAL
BH CV LOWER VASCULAR LEFT PROXIMAL FEMORAL SPONT: NORMAL
BH CV LOWER VASCULAR LEFT SAPHENOFEMORAL JUNCTION AUGMENT: NORMAL
BH CV LOWER VASCULAR LEFT SAPHENOFEMORAL JUNCTION COMPRESS: NORMAL
BH CV LOWER VASCULAR LEFT SAPHENOFEMORAL JUNCTION PHASIC: NORMAL
BH CV LOWER VASCULAR LEFT SAPHENOFEMORAL JUNCTION SPONT: NORMAL
BH CV LOWER VASCULAR RIGHT COMMON FEMORAL AUGMENT: NORMAL
BH CV LOWER VASCULAR RIGHT COMMON FEMORAL PHASIC: NORMAL
BH CV LOWER VASCULAR RIGHT COMMON FEMORAL SPONT: NORMAL
BH CV VAS PRELIMINARY FINDINGS SCRIPTING: 1

## 2025-06-29 PROCEDURE — 93971 EXTREMITY STUDY: CPT

## 2025-06-29 PROCEDURE — 93971 EXTREMITY STUDY: CPT | Performed by: INTERNAL MEDICINE

## 2025-06-29 PROCEDURE — 99284 EMERGENCY DEPT VISIT MOD MDM: CPT

## 2025-06-29 NOTE — ED PROVIDER NOTES
Subjective   History of Present Illness  74-year-old male who presents for evaluation of left leg swelling.  The patient had a CABG performed by Dr. Ng of the CT surgery service on 6/19/2025.  He reports that he has been overall doing well since that given time.  The wounds have been healing well with no drainage.  He denies fever or infectious symptoms.  He noticed some swelling in the posterior distal left thigh earlier today.  He has associated bruising along the medial and posterior left thigh related to recent left inguinal surgical incision.  The patient was advised by physicians that he contacted to present to the ER.  He has no preceding history of DVT.  He does have some swelling at the level of the left ankle.  As stated veins were harvested from the left lower extremity for the CABG.  No previous history of DVT or PE.  No current shortness of breath.  No other acute complaints.      Review of Systems   Constitutional:  Negative for chills, fatigue and fever.   HENT:  Negative for congestion, ear pain, postnasal drip, sinus pressure and sore throat.    Eyes:  Negative for pain, redness and visual disturbance.   Respiratory:  Negative for cough, chest tightness and shortness of breath.    Cardiovascular:  Negative for chest pain, palpitations and leg swelling.   Gastrointestinal:  Negative for abdominal pain, anal bleeding, blood in stool, diarrhea, nausea and vomiting.   Endocrine: Negative for polydipsia and polyuria.   Genitourinary:  Negative for difficulty urinating, dysuria, frequency and urgency.   Musculoskeletal:  Negative for arthralgias, back pain and neck pain.   Skin:  Positive for color change and wound. Negative for pallor and rash.   Allergic/Immunologic: Negative for environmental allergies and immunocompromised state.   Neurological:  Negative for dizziness, weakness and headaches.   Hematological:  Negative for adenopathy.   Psychiatric/Behavioral:  Negative for confusion, self-injury  and suicidal ideas. The patient is not nervous/anxious.    All other systems reviewed and are negative.      Past Medical History:   Diagnosis Date    Arthritis     Asthma Feb 2025    when riding bike    Atherosclerosis of native coronary artery of native heart with stable angina pectoris 6/13/2025    Ganglion     Head injury 1967    several concussions from football and biking    Hyperlipidemia     Hypertension     Migraine 1975    one now every month or so with Aura       No Known Allergies    Past Surgical History:   Procedure Laterality Date    CARDIAC CATHETERIZATION N/A 6/18/2025    Procedure: Left Heart Cath - Right radial access;  Surgeon: Kai Daniel IV, MD;  Location: [facility] beds at the [facility] CATH INVASIVE LOCATION;  Service: Cardiovascular;  Laterality: N/A;    COLONOSCOPY  2025    CORONARY ARTERY BYPASS GRAFT N/A 6/19/2025    Procedure: MEDIAN STERNOTOMY, CORONARY ARTERY BYPASS GRAFTING X4 UTILIZING THE LEFT INTERNAL MAMMARY ARTERY GRAFT, ENDOSCOPIC VEIN HARVEST OF THE GREATER LEFT SAPHENOUS VEIN, TRANSESOPHAGEAL ECHOCARDIOGRAM WITH ANESTHESIA;  Surgeon: Keshawn Ng MD;  Location: [facility] 234 beds at the [facility] OR;  Service: Cardiothoracic;  Laterality: N/A;    CYST REMOVAL      cyst removed left shoulder and left knee    KNEE MENISCAL REPAIR  2005    TONSILLECTOMY AND ADENOIDECTOMY      VASECTOMY         Family History   Problem Relation Age of Onset    Diabetes Mother         mostly controlled    No Known Problems Brother     No Known Problems Brother     No Known Problems Brother     Diabetes Maternal Grandmother         controlled    Arthritis Paternal Grandmother         not too bad    Osteoarthritis Paternal Grandmother     Dementia Paternal Grandmother         some in mid 80s    Coronary artery disease Paternal Grandfather     Heart attack Paternal Grandfather        Social History     Socioeconomic History    Marital status:    Tobacco Use    Smoking status: Never     Passive exposure: Past    Smokeless tobacco:  Never   Vaping Use    Vaping status: Never Used   Substance and Sexual Activity    Alcohol use: Yes     Alcohol/week: 14.0 standard drinks of alcohol     Types: 7 Glasses of wine, 7 Cans of beer per week     Comment: beer mostly in summer after cycling; liquor is [place]    Drug use: No    Sexual activity: Yes     Partners: Female     Birth control/protection: None           Objective   Physical Exam  Vitals and nursing note reviewed.   Constitutional:       General: He is not in acute distress.     Appearance: Normal appearance. He is well-developed. He is not toxic-appearing or diaphoretic.   HENT:      Head: Normocephalic and atraumatic.      Right Ear: External ear normal.      Left Ear: External ear normal.      Nose: Nose normal.   Eyes:      General: Lids are normal.      Pupils: Pupils are equal, round, and reactive to light.   Neck:      Trachea: No tracheal deviation.   Cardiovascular:      Rate and Rhythm: Normal rate and regular rhythm.      Pulses: No decreased pulses.      Heart sounds: Normal heart sounds. No murmur heard.     No friction rub. No gallop.   Pulmonary:      Effort: Pulmonary effort is normal. No respiratory distress.      Breath sounds: Normal breath sounds. No decreased breath sounds, wheezing, rhonchi or rales.   Abdominal:      General: Bowel sounds are normal.      Palpations: Abdomen is soft.      Tenderness: There is no abdominal tenderness. There is no guarding or rebound.   Musculoskeletal:         General: No deformity. Normal range of motion.      Cervical back: Normal range of motion and neck supple.   Lymphadenopathy:      Cervical: No cervical adenopathy.   Skin:     General: Skin is warm and dry.      Findings: Wound present. No rash.      Comments: Bruising noted to the posterior medial left thigh with some area of swelling at the popliteal region medially.   Neurological:      Mental Status: He is alert and oriented to person, place, and time.      Cranial Nerves: No  cranial nerve deficit.      Sensory: No sensory deficit.   Psychiatric:         Speech: Speech normal.         Behavior: Behavior normal.         Thought Content: Thought content normal.         Judgment: Judgment normal.         Procedures           ED Course  ED Course as of 07/01/25 1255   Sun Jun 29, 2025   1720 Epic chat message from Irish Villegas. Just wanted to let you know pt is negative for DVT and SVT in LLEV. [NS]      ED Course User Index  [NS] Kimberlyn Noe MD                                                       Medical Decision Making  Differential includes hematoma, dependent edema, DVT, other unspecified etiology.    Venous duplex left lower extremity is negative for DVT.    Wound appears consistent with expected bruising and mild dependent edema related to recent surgical intervention.    The patient will be discharged appearing well with referral to CT surgery for further outpatient evaluation and follow-up.    Problems Addressed:  Bruising: complicated acute illness or injury with systemic symptoms  Dependent edema: complicated acute illness or injury with systemic symptoms  Encounter for wound re-check: complicated acute illness or injury with systemic symptoms    Amount and/or Complexity of Data Reviewed  Independent Historian: spouse     Details: Wife provides additional information.  Radiology: ordered and independent interpretation performed. Decision-making details documented in ED Course.        Final diagnoses:   Encounter for wound re-check   Bruising   Dependent edema       ED Disposition  ED Disposition       ED Disposition   Discharge    Condition   Stable    Comment   --               Keshawn Alexandre MD  [address] [address]  [phone]    In 1 week           Medication List        Changed      CALCIUM-MAGNESIUM-ZINC PO  What changed: how much to take                 Kimberlyn Noe MD  07/01/25 1809

## 2025-06-29 NOTE — Clinical Note
[facility] EMERGENCY DEPARTMENT  [address]  [facility] [address]  Phone: [phone]    Jamari Munoz was seen and treated in our emergency department on 6/29/2025.  He may return to work on 07/02/2025.         Thank you for choosing [facility].    Kimberlyn Noe MD

## 2025-06-29 NOTE — Clinical Note
[facility] EMERGENCY DEPARTMENT  [address]  [facility] [address]  Phone: [phone]    Jamari Munoz was seen and treated in our emergency department on 6/29/2025.  He may return to work on 07/02/2025.         Thank you for choosing [facility].    Kimberlyn Noe MD

## 2025-07-01 ENCOUNTER — OFFICE VISIT (OUTPATIENT)
Dept: CARDIOLOGY | Facility: HOSPITAL | Age: 75
End: 2025-07-01
Payer: MEDICARE

## 2025-07-01 VITALS
DIASTOLIC BLOOD PRESSURE: 71 MMHG | HEART RATE: 84 BPM | WEIGHT: 192 LBS | SYSTOLIC BLOOD PRESSURE: 108 MMHG | BODY MASS INDEX: 27.49 KG/M2 | HEIGHT: 70 IN | OXYGEN SATURATION: 90 %

## 2025-07-01 DIAGNOSIS — E78.2 MIXED HYPERLIPIDEMIA: ICD-10-CM

## 2025-07-01 DIAGNOSIS — I10 PRIMARY HYPERTENSION: ICD-10-CM

## 2025-07-01 DIAGNOSIS — I25.110 CORONARY ARTERY DISEASE INVOLVING NATIVE CORONARY ARTERY OF NATIVE HEART WITH UNSTABLE ANGINA PECTORIS: Primary | ICD-10-CM

## 2025-07-01 RX ORDER — POTASSIUM CHLORIDE 1500 MG/1
20 TABLET, EXTENDED RELEASE ORAL DAILY
Qty: 14 TABLET | Refills: 0 | Status: SHIPPED | OUTPATIENT
Start: 2025-07-01 | End: 2025-07-15

## 2025-07-01 RX ORDER — FUROSEMIDE 40 MG/1
40 TABLET ORAL DAILY
Qty: 14 TABLET | Refills: 0 | Status: SHIPPED | OUTPATIENT
Start: 2025-07-01 | End: 2025-07-21

## 2025-07-01 NOTE — PATIENT INSTRUCTIONS
Increase lasix and potassium to 1 tablet twice a day for the next 3 days, then resume one lasix and one potassium a day until the prescription runs out

## 2025-07-01 NOTE — PROGRESS NOTES
"Chief Complaint  Post-op Open Heart Surgery    Subjective    History of Present Illness {CC  Problem List  Visit  Diagnosis   Encounters  Notes  Medications  Labs  Result Review Imaging  Media :23}       History of Present Illness   74-year-old male presents the office today for ongoing evaluation of his CAD.  Patient underwent a CABG x 4 per Dr. Ng 6/19/2025 with utilization of the left internal mammary artery and endoscopic vein harvest of the greater left saphenous vein. Echo noted trivial pericardial effusion postoperatively chest x-ray noted small left pleural effusion.  He did receive IV diuretics while hospitalized and was sent home on Lasix 40 mg daily with 20 mill equivalents of potassium daily for 2 weeks.  Room air sat was obtained which was noted to be 84% and he was discharged home with supplemental oxygen to be used at night.  Past medical history: Primary hypertension, incomplete right bundle branch block, hyperlipidemia, osteoarthritis, restless leg syndrome, asthma when riding his bike.  Patient does report that pain is well-controlled and he has taken a pain pill the last few nights to help with sleep but is not currently taking pain medication throughout the day.  Does report some mild dyspnea on exertion as well as a nonproductive cough.  Patient currently denies dizziness, palpitations, presyncope, syncope, fevers, chills or drainage from incisions.     Objective     Vital Signs:   Vitals:    07/01/25 0814 07/01/25 0815   BP: 106/67 108/71   BP Location: Left arm Left arm   Patient Position: Sitting Standing   Pulse: 74 84   SpO2: 90% 90%   Weight: 87.1 kg (192 lb) 87.1 kg (192 lb)   Height: 177.8 cm (70\")      Body mass index is 27.55 kg/m².  Physical Exam  Vitals and nursing note reviewed.   Constitutional:       Appearance: Normal appearance.   HENT:      Head: Normocephalic.   Eyes:      Pupils: Pupils are equal, round, and reactive to light.   Cardiovascular:      Rate and " Rhythm: Normal rate and regular rhythm.      Pulses: Normal pulses.      Heart sounds: Normal heart sounds. No murmur heard.  Pulmonary:      Effort: Pulmonary effort is normal.      Breath sounds: Normal breath sounds.   Abdominal:      General: Bowel sounds are normal.      Palpations: Abdomen is soft.   Musculoskeletal:         General: Normal range of motion.      Cervical back: Normal range of motion.      Right lower leg: No edema.      Left lower leg: No edema.   Skin:     General: Skin is warm and dry.      Capillary Refill: Capillary refill takes less than 2 seconds.      Comments: Midsternal incision well-healed  MT sites with sutures intact x 3  Left EVH site scabbed with mild ecchymosis noted   Neurological:      Mental Status: He is alert and oriented to person, place, and time.   Psychiatric:         Mood and Affect: Mood normal.         Thought Content: Thought content normal.              Result Review  Data Reviewed:{ Labs  Result Review  Imaging  Med Tab  Media :23}     Lab Results   Component Value Date    GLUCOSE 102 (H) 06/26/2025    CALCIUM 9.1 06/26/2025     06/26/2025    K 3.6 06/26/2025    CO2 28.4 06/26/2025    CL 99 06/26/2025    BUN 22.7 06/26/2025    CREATININE 0.88 06/26/2025    EGFR 90.2 06/26/2025    BCR 25.8 (H) 06/26/2025    ANIONGAP 11.6 06/26/2025     WBC   Date Value Ref Range Status   06/26/2025 10.09 3.40 - 10.80 10*3/mm3 Final     RBC   Date Value Ref Range Status   06/26/2025 3.58 (L) 4.14 - 5.80 10*6/mm3 Final     Hemoglobin   Date Value Ref Range Status   06/26/2025 11.8 (L) 13.0 - 17.7 g/dL Final     Hematocrit   Date Value Ref Range Status   06/26/2025 34.6 (L) 37.5 - 51.0 % Final     MCV   Date Value Ref Range Status   06/26/2025 96.6 79.0 - 97.0 fL Final     MCH   Date Value Ref Range Status   06/26/2025 33.0 26.6 - 33.0 pg Final     MCHC   Date Value Ref Range Status   06/26/2025 34.1 31.5 - 35.7 g/dL Final     RDW   Date Value Ref Range Status   06/26/2025  12.7 12.3 - 15.4 % Final     RDW-SD   Date Value Ref Range Status   06/26/2025 44.7 37.0 - 54.0 fl Final     MPV   Date Value Ref Range Status   06/26/2025 9.6 6.0 - 12.0 fL Final     Platelets   Date Value Ref Range Status   06/26/2025 299 140 - 450 10*3/mm3 Final     Neutrophil %   Date Value Ref Range Status   06/20/2025 82.4 (H) 42.7 - 76.0 % Final     Lymphocyte %   Date Value Ref Range Status   06/20/2025 9.6 (L) 19.6 - 45.3 % Final     Monocyte %   Date Value Ref Range Status   06/20/2025 7.1 5.0 - 12.0 % Final     Eosinophil %   Date Value Ref Range Status   06/20/2025 0.1 (L) 0.3 - 6.2 % Final     Basophil %   Date Value Ref Range Status   06/20/2025 0.3 0.0 - 1.5 % Final     Immature Grans %   Date Value Ref Range Status   06/20/2025 0.5 0.0 - 0.5 % Final     Neutrophils, Absolute   Date Value Ref Range Status   06/20/2025 9.84 (H) 1.70 - 7.00 10*3/mm3 Final     Lymphocytes, Absolute   Date Value Ref Range Status   06/20/2025 1.14 0.70 - 3.10 10*3/mm3 Final     Monocytes, Absolute   Date Value Ref Range Status   06/20/2025 0.85 0.10 - 0.90 10*3/mm3 Final     Eosinophils, Absolute   Date Value Ref Range Status   06/20/2025 0.01 0.00 - 0.40 10*3/mm3 Final     Basophils, Absolute   Date Value Ref Range Status   06/20/2025 0.03 0.00 - 0.20 10*3/mm3 Final     Immature Grans, Absolute   Date Value Ref Range Status   06/20/2025 0.06 (H) 0.00 - 0.05 10*3/mm3 Final     nRBC   Date Value Ref Range Status   06/20/2025 0.0 0.0 - 0.2 /100 WBC Final               Assessment and Plan {CC Problem List  Visit Diagnosis  ROS  Review (Popup)  Health Maintenance  Quality  BestPractice  Medications  SmartSets  SnapShot Encounters  Media :23}   1. CAD s/p CABG x 4 6/19/2025  Stable on asa, lipitor lopressor     2. Primary hypertension  Stable on enalapril, lopressor  Monitor closely     3. Mixed hyperlipidemia  Stable on lipitor     Increase lasix to 40 mg bid with potassium 20 meq bid for the next 3 days, then  resume lasix 40 mg once a day with potassium 20 meq once a day until prescription runs out           Follow Up {Instructions Charge Capture  Follow-up Communications :23}   Return if symptoms worsen or fail to improve.    Patient was given instructions and counseling regarding his condition or for health maintenance advice. Please see specific information pulled into the AVS if appropriate.  Patient was instructed to call the [facility] with any questions, concerns, or worsening symptoms.

## 2025-07-07 ENCOUNTER — HOSPITAL ENCOUNTER (EMERGENCY)
Facility: HOSPITAL | Age: 75
Discharge: HOME | End: 2025-07-07
Attending: EMERGENCY MEDICINE | Admitting: EMERGENCY MEDICINE
Payer: MEDICARE

## 2025-07-07 ENCOUNTER — APPOINTMENT (OUTPATIENT)
Dept: GENERAL RADIOLOGY | Facility: HOSPITAL | Age: 75
End: 2025-07-07
Payer: MEDICARE

## 2025-07-07 ENCOUNTER — PATIENT MESSAGE (OUTPATIENT)
Dept: CARDIOLOGY | Facility: HOSPITAL | Age: 75
End: 2025-07-07
Payer: MEDICARE

## 2025-07-07 ENCOUNTER — APPOINTMENT (OUTPATIENT)
Dept: CARDIOLOGY | Facility: HOSPITAL | Age: 75
End: 2025-07-07
Payer: MEDICARE

## 2025-07-07 VITALS
RESPIRATION RATE: 18 BRPM | TEMPERATURE: 98.8 F | HEIGHT: 70 IN | BODY MASS INDEX: 26.63 KG/M2 | HEART RATE: 73 BPM | DIASTOLIC BLOOD PRESSURE: 59 MMHG | OXYGEN SATURATION: 93 % | SYSTOLIC BLOOD PRESSURE: 93 MMHG | WEIGHT: 186 LBS

## 2025-07-07 DIAGNOSIS — I80.8 THROMBOPHLEBITIS ARM: Primary | ICD-10-CM

## 2025-07-07 DIAGNOSIS — R05.9 COUGH, UNSPECIFIED TYPE: ICD-10-CM

## 2025-07-07 LAB
ALBUMIN SERPL-MCNC: 3.8 G/DL (ref 3.5–5.2)
ALBUMIN/GLOB SERPL: 1.2 G/DL
ALP SERPL-CCNC: 95 U/L (ref 39–117)
ALT SERPL W P-5'-P-CCNC: 19 U/L (ref 1–41)
ANION GAP SERPL CALCULATED.3IONS-SCNC: 10 MMOL/L (ref 5–15)
AST SERPL-CCNC: 24 U/L (ref 1–40)
BASOPHILS # BLD AUTO: 0.04 10*3/MM3 (ref 0–0.2)
BASOPHILS NFR BLD AUTO: 0.4 % (ref 0–1.5)
BH CV UPPER VENOUS LEFT SUBCLAVIAN AUGMENT: NORMAL
BH CV UPPER VENOUS LEFT SUBCLAVIAN COMPRESS: NORMAL
BH CV UPPER VENOUS LEFT SUBCLAVIAN PHASIC: NORMAL
BH CV UPPER VENOUS LEFT SUBCLAVIAN SPONT: NORMAL
BH CV UPPER VENOUS RIGHT AXILLARY AUGMENT: NORMAL
BH CV UPPER VENOUS RIGHT AXILLARY COMPRESS: NORMAL
BH CV UPPER VENOUS RIGHT AXILLARY PHASIC: NORMAL
BH CV UPPER VENOUS RIGHT AXILLARY SPONT: NORMAL
BH CV UPPER VENOUS RIGHT BASILIC FOREARM COLOR: 1
BH CV UPPER VENOUS RIGHT BASILIC FOREARM COMPRESS: NORMAL
BH CV UPPER VENOUS RIGHT BASILIC UPPER COLOR: 1
BH CV UPPER VENOUS RIGHT BASILIC UPPER COMPRESS: NORMAL
BH CV UPPER VENOUS RIGHT BRACHIAL COMPRESS: NORMAL
BH CV UPPER VENOUS RIGHT CEPHALIC FOREARM COLOR: 1
BH CV UPPER VENOUS RIGHT CEPHALIC FOREARM COMPRESS: NORMAL
BH CV UPPER VENOUS RIGHT CEPHALIC UPPER COMPRESS: NORMAL
BH CV UPPER VENOUS RIGHT INTERNAL JUGULAR AUGMENT: NORMAL
BH CV UPPER VENOUS RIGHT INTERNAL JUGULAR COMPRESS: NORMAL
BH CV UPPER VENOUS RIGHT INTERNAL JUGULAR PHASIC: NORMAL
BH CV UPPER VENOUS RIGHT INTERNAL JUGULAR SPONT: NORMAL
BH CV UPPER VENOUS RIGHT RADIAL COMPRESS: NORMAL
BH CV UPPER VENOUS RIGHT SUBCLAVIAN AUGMENT: NORMAL
BH CV UPPER VENOUS RIGHT SUBCLAVIAN COMPRESS: NORMAL
BH CV UPPER VENOUS RIGHT SUBCLAVIAN PHASIC: NORMAL
BH CV UPPER VENOUS RIGHT SUBCLAVIAN SPONT: NORMAL
BH CV UPPER VENOUS RIGHT ULNAR COMPRESS: NORMAL
BH CV VAS PRELIMINARY FINDINGS SCRIPTING: 1
BILIRUB SERPL-MCNC: 0.4 MG/DL (ref 0–1.2)
BUN SERPL-MCNC: 15.4 MG/DL (ref 8–23)
BUN/CREAT SERPL: 18.1 (ref 7–25)
CALCIUM SPEC-SCNC: 9.2 MG/DL (ref 8.6–10.5)
CHLORIDE SERPL-SCNC: 99 MMOL/L (ref 98–107)
CO2 SERPL-SCNC: 28 MMOL/L (ref 22–29)
CREAT SERPL-MCNC: 0.85 MG/DL (ref 0.76–1.27)
DEPRECATED RDW RBC AUTO: 46.5 FL (ref 37–54)
EGFRCR SERPLBLD CKD-EPI 2021: 91.2 ML/MIN/1.73
EOSINOPHIL # BLD AUTO: 0.3 10*3/MM3 (ref 0–0.4)
EOSINOPHIL NFR BLD AUTO: 3.3 % (ref 0.3–6.2)
ERYTHROCYTE [DISTWIDTH] IN BLOOD BY AUTOMATED COUNT: 13 % (ref 12.3–15.4)
GLOBULIN UR ELPH-MCNC: 3.1 GM/DL
GLUCOSE SERPL-MCNC: 91 MG/DL (ref 65–99)
HCT VFR BLD AUTO: 39.6 % (ref 37.5–51)
HGB BLD-MCNC: 12.9 G/DL (ref 13–17.7)
IMM GRANULOCYTES # BLD AUTO: 0.09 10*3/MM3 (ref 0–0.05)
IMM GRANULOCYTES NFR BLD AUTO: 1 % (ref 0–0.5)
LYMPHOCYTES # BLD AUTO: 2.28 10*3/MM3 (ref 0.7–3.1)
LYMPHOCYTES NFR BLD AUTO: 24.7 % (ref 19.6–45.3)
MCH RBC QN AUTO: 31.9 PG (ref 26.6–33)
MCHC RBC AUTO-ENTMCNC: 32.6 G/DL (ref 31.5–35.7)
MCV RBC AUTO: 97.8 FL (ref 79–97)
MONOCYTES # BLD AUTO: 1.29 10*3/MM3 (ref 0.1–0.9)
MONOCYTES NFR BLD AUTO: 14 % (ref 5–12)
NEUTROPHILS NFR BLD AUTO: 5.22 10*3/MM3 (ref 1.7–7)
NEUTROPHILS NFR BLD AUTO: 56.6 % (ref 42.7–76)
NRBC BLD AUTO-RTO: 0 /100 WBC (ref 0–0.2)
PLATELET # BLD AUTO: 790 10*3/MM3 (ref 140–450)
PMV BLD AUTO: 8.9 FL (ref 6–12)
POTASSIUM SERPL-SCNC: 4.4 MMOL/L (ref 3.5–5.2)
PROCALCITONIN SERPL-MCNC: 0.08 NG/ML (ref 0–0.25)
PROT SERPL-MCNC: 6.9 G/DL (ref 6–8.5)
QT INTERVAL: 390 MS
QTC INTERVAL: 427 MS
RBC # BLD AUTO: 4.05 10*6/MM3 (ref 4.14–5.8)
SODIUM SERPL-SCNC: 137 MMOL/L (ref 136–145)
WBC NRBC COR # BLD AUTO: 9.22 10*3/MM3 (ref 3.4–10.8)

## 2025-07-07 PROCEDURE — 99284 EMERGENCY DEPT VISIT MOD MDM: CPT

## 2025-07-07 PROCEDURE — 93005 ELECTROCARDIOGRAM TRACING: CPT | Performed by: EMERGENCY MEDICINE

## 2025-07-07 PROCEDURE — 80053 COMPREHEN METABOLIC PANEL: CPT | Performed by: EMERGENCY MEDICINE

## 2025-07-07 PROCEDURE — 93971 EXTREMITY STUDY: CPT | Performed by: INTERNAL MEDICINE

## 2025-07-07 PROCEDURE — 71045 X-RAY EXAM CHEST 1 VIEW: CPT

## 2025-07-07 PROCEDURE — 93971 EXTREMITY STUDY: CPT

## 2025-07-07 PROCEDURE — 85025 COMPLETE CBC W/AUTO DIFF WBC: CPT | Performed by: EMERGENCY MEDICINE

## 2025-07-07 PROCEDURE — 36415 COLL VENOUS BLD VENIPUNCTURE: CPT

## 2025-07-07 PROCEDURE — 84145 PROCALCITONIN (PCT): CPT | Performed by: EMERGENCY MEDICINE

## 2025-07-07 RX ORDER — NAPROXEN 250 MG/1
250 TABLET ORAL 2 TIMES DAILY PRN
Qty: 14 TABLET | Refills: 0 | Status: SHIPPED | OUTPATIENT
Start: 2025-07-07 | End: 2025-07-14

## 2025-07-07 RX ORDER — CEPHALEXIN 500 MG/1
500 CAPSULE ORAL 2 TIMES DAILY
Qty: 10 CAPSULE | Refills: 0 | Status: SHIPPED | OUTPATIENT
Start: 2025-07-07 | End: 2025-07-14

## 2025-07-07 RX ORDER — ACETAMINOPHEN 500 MG
1000 TABLET ORAL ONCE
Status: COMPLETED | OUTPATIENT
Start: 2025-07-07 | End: 2025-07-07

## 2025-07-07 RX ADMIN — CEPHALEXIN 500 MG: 250 CAPSULE ORAL at 12:05

## 2025-07-07 RX ADMIN — ACETAMINOPHEN 1000 MG: 500 TABLET, FILM COATED ORAL at 12:05

## 2025-07-07 NOTE — TELEPHONE ENCOUNTER
"Received voicemail from pt wife stating that pt had a cath on 6/18/25 that resulted in a CABG on 6/19. Pt woke today with \"swollen vessels\" about 3 inches above the elbow on the arm they used for the cath. She stated it was red and inflamed. She called [facility] office and they told her to call Dr. Tse.    After looking at the picture, I spoke to pt wife and recommended she take pt to the ER for evaluation to make sure there is no blood clot, due to knot, redness and heat. She verbalized understanding. Pt was also listening, and agreed as well.      "

## 2025-07-07 NOTE — ED PROVIDER NOTES
Subjective   History of Present Illness  This is a pleasant 74-year-old male  accompanied by his wife somewhat complex past medical history generally active sexually bicycler a lot in the past but not since his bypass surgery they had on the 19th the last month.  I have reviewed that chart and discharge summary went home on oxygen.     He was directed to the emergency department today by his cardiologist for evaluation of a painful swelling in his right arm he thinks he may have had a IV catheter there but is unsure.  About its placement.  He had a heart catheterization through his right wrist and that area is not painful or tender.  Seen in the emergency department last month for swelling in his leg and it duplex ultrasound that time was negative.    He has been compliant with his medicines only anticoagulant he is on his aspirin 325 mg.       He has followed up in the [facility] center post bypass and was having little bit more dyspnea and was started on increased dose of Lasix which he has been using.  He still feels little bit dyspneic but not as much as he did before.  His blood pressure is little bit on the low side today and has been running about 110-115 at home.  He has not passed out.  Bowel movements and urine to been normal he has not passed blood per any orifice.  He is using the oxygen primarily at night.  He also has a cough it has been persistent nonproductive.    All other systems are reviewed and are negative except as noted above.            Review of Systems   All other systems reviewed and are negative.      Past Medical History:   Diagnosis Date    Arthritis     Asthma Feb 2025    when riding bike    Atherosclerosis of native coronary artery of native heart with stable angina pectoris 6/13/2025    Ganglion     Head injury 1967    several concussions from football and biking    Hyperlipidemia     Hypertension     Migraine 1975    one now every month or so with Aura       No Known  Allergies    Past Surgical History:   Procedure Laterality Date    CARDIAC CATHETERIZATION N/A 6/18/2025    Procedure: Left Heart Cath - Right radial access;  Surgeon: Kai Daniel IV, MD;  Location: [facility] CATH INVASIVE LOCATION;  Service: Cardiovascular;  Laterality: N/A;    COLONOSCOPY  2025    CORONARY ARTERY BYPASS GRAFT N/A 6/19/2025    Procedure: MEDIAN STERNOTOMY, CORONARY ARTERY BYPASS GRAFTING X4 UTILIZING THE LEFT INTERNAL MAMMARY ARTERY GRAFT, ENDOSCOPIC VEIN HARVEST OF THE GREATER LEFT SAPHENOUS VEIN, TRANSESOPHAGEAL ECHOCARDIOGRAM WITH ANESTHESIA;  Surgeon: Keshawn Ng MD;  Location: [facility] OR;  Service: Cardiothoracic;  Laterality: N/A;    CYST REMOVAL      cyst removed left shoulder and left knee    KNEE MENISCAL REPAIR  2005    TONSILLECTOMY AND ADENOIDECTOMY      VASECTOMY         Family History   Problem Relation Age of Onset    Diabetes Mother         mostly controlled    No Known Problems Brother     No Known Problems Brother     No Known Problems Brother     Diabetes Maternal Grandmother         controlled    Arthritis Paternal Grandmother         not too bad    Osteoarthritis Paternal Grandmother     Dementia Paternal Grandmother         some in mid 80s    Coronary artery disease Paternal Grandfather     Heart attack Paternal Grandfather        Social History     Socioeconomic History    Marital status:    Tobacco Use    Smoking status: Never     Passive exposure: Past    Smokeless tobacco: Never   Vaping Use    Vaping status: Never Used   Substance and Sexual Activity    Alcohol use: Yes     Alcohol/week: 14.0 standard drinks of alcohol     Types: 7 Glasses of wine, 7 Cans of beer per week     Comment: beer mostly in summer after cycling; liquor is [place]    Drug use: No    Sexual activity: Yes     Partners: Female     Birth control/protection: None           Objective   Physical Exam  Vitals and nursing note reviewed.   Constitutional:       Comments: Very pleasant  74-year-old alert and oriented GCS 15.  At rest his oxygen saturation drops down about 89% on room air when he talks and goes up to 91.  Blood pressure is 98.   HENT:      Head: Normocephalic and atraumatic.      Right Ear: External ear normal.      Left Ear: External ear normal.      Nose: Nose normal.      Mouth/Throat:      Mouth: Mucous membranes are moist.      Pharynx: Oropharynx is clear.   Eyes:      Extraocular Movements: Extraocular movements intact.      Conjunctiva/sclera: Conjunctivae normal.      Pupils: Pupils are equal, round, and reactive to light.   Neck:      Vascular: No carotid bruit.   Cardiovascular:      Rate and Rhythm: Normal rate and regular rhythm.      Pulses: Normal pulses.      Heart sounds: Normal heart sounds.      Comments:   Sternotomy incision is healing well not red or inflamed.  Pulmonary:      Effort: Pulmonary effort is normal.      Breath sounds: Normal breath sounds.   Abdominal:      General: Abdomen is flat. Bowel sounds are normal. There is no distension.      Palpations: Abdomen is soft. There is no mass.      Tenderness: There is no abdominal tenderness.   Musculoskeletal:      Cervical back: Normal range of motion and neck supple. No rigidity or tenderness.      Comments: Right axilla and supraclavicular area without mass right humerus upper arm area is nontender to palpation no mass or venous cords antecubital fossa is unremarkable but on the right arm basilic vein distribution he has thrombophlebitis.  It is tender to palpation.  He has a good right radial pulse compartments are soft his right hand is neurovascularly intact.    Lower extremities equal pulses saphenous vein harvest site healing well.  Minimal edema at the ankles.   Lymphadenopathy:      Cervical: No cervical adenopathy.   Skin:     General: Skin is warm and dry.      Capillary Refill: Capillary refill takes less than 2 seconds.   Neurological:      Mental Status: He is alert.      Comments:  Asymmetric, voice strong, tongue midline.  Vision, hearing, and speech preserved.  No focal weakness.         Procedures           ED Course                                                  Recent Results (from the past 24 hours)   ECG 12 Lead Dyspnea    Collection Time: 07/07/25 10:58 AM   Result Value Ref Range    QT Interval 390 ms    QTC Interval 427 ms   Duplex Venous Upper Extremity - RIGHT    Collection Time: 07/07/25 11:37 AM   Result Value Ref Range    Right Basilic Upper Color 1.0     Right Basilic Forearm Color 1.0     Right Cephalic Forearm Color 1.0     Right Internal Jugular Spont Y     Right Internal Jugular Phasic Y     Right Internal Jugular Compress C     Right Internal Jugular Augment Y     Right Subclavian Spont Y     Right Subclavian Phasic Y     Right Subclavian Compress C     Right Subclavian Augment Y     Right Axillary Spont Y     Right Axillary Phasic Y     Right Axillary Compress C     Right Axillary Augment Y     Right Brachial Compress C     Right Radial Compress C     Right Ulnar Compress C     Right Basilic Upper Compress N     Right Basilic Forearm Compress N     Right Cephalic Upper Compress C     Right Cephalic Forearm Compress N     Left Subclavian Spont Y     Left Subclavian Phasic Y     Left Subclavian Compress C     Left Subclavian Augment Y     BH CV VAS PRELIMINARY FINDINGS SCRIPTING 1.0    Comprehensive Metabolic Panel    Collection Time: 07/07/25 11:52 AM    Specimen: Blood   Result Value Ref Range    Glucose 91 65 - 99 mg/dL    BUN 15.4 8.0 - 23.0 mg/dL    Creatinine 0.85 0.76 - 1.27 mg/dL    Sodium 137 136 - 145 mmol/L    Potassium 4.4 3.5 - 5.2 mmol/L    Chloride 99 98 - 107 mmol/L    CO2 28.0 22.0 - 29.0 mmol/L    Calcium 9.2 8.6 - 10.5 mg/dL    Total Protein 6.9 6.0 - 8.5 g/dL    Albumin 3.8 3.5 - 5.2 g/dL    ALT (SGPT) 19 1 - 41 U/L    AST (SGOT) 24 1 - 40 U/L    Alkaline Phosphatase 95 39 - 117 U/L    Total Bilirubin 0.4 0.0 - 1.2 mg/dL    Globulin 3.1 gm/dL    A/G  Ratio 1.2 g/dL    BUN/Creatinine Ratio 18.1 7.0 - 25.0    Anion Gap 10.0 5.0 - 15.0 mmol/L    eGFR 91.2 >60.0 mL/min/1.73   Procalcitonin    Collection Time: 07/07/25 11:52 AM    Specimen: Blood   Result Value Ref Range    Procalcitonin 0.08 0.00 - 0.25 ng/mL   CBC Auto Differential    Collection Time: 07/07/25 11:52 AM    Specimen: Blood   Result Value Ref Range    WBC 9.22 3.40 - 10.80 10*3/mm3    RBC 4.05 (L) 4.14 - 5.80 10*6/mm3    Hemoglobin 12.9 (L) 13.0 - 17.7 g/dL    Hematocrit 39.6 37.5 - 51.0 %    MCV 97.8 (H) 79.0 - 97.0 fL    MCH 31.9 26.6 - 33.0 pg    MCHC 32.6 31.5 - 35.7 g/dL    RDW 13.0 12.3 - 15.4 %    RDW-SD 46.5 37.0 - 54.0 fl    MPV 8.9 6.0 - 12.0 fL    Platelets 790 (H) 140 - 450 10*3/mm3    Neutrophil % 56.6 42.7 - 76.0 %    Lymphocyte % 24.7 19.6 - 45.3 %    Monocyte % 14.0 (H) 5.0 - 12.0 %    Eosinophil % 3.3 0.3 - 6.2 %    Basophil % 0.4 0.0 - 1.5 %    Immature Grans % 1.0 (H) 0.0 - 0.5 %    Neutrophils, Absolute 5.22 1.70 - 7.00 10*3/mm3    Lymphocytes, Absolute 2.28 0.70 - 3.10 10*3/mm3    Monocytes, Absolute 1.29 (H) 0.10 - 0.90 10*3/mm3    Eosinophils, Absolute 0.30 0.00 - 0.40 10*3/mm3    Basophils, Absolute 0.04 0.00 - 0.20 10*3/mm3    Immature Grans, Absolute 0.09 (H) 0.00 - 0.05 10*3/mm3    nRBC 0.0 0.0 - 0.2 /100 WBC     Note: In addition to lab results from this visit, the labs listed above may include labs taken at another facility or during a different encounter within the last 24 hours. Please correlate lab times with ED admission and discharge times for further clarification of the services performed during this visit.    XR Chest 1 View   Final Result   Impression:   No acute findings by portable exam. Scattered scarring/subsegmental atelectasis.         Electronically Signed: Luis Toro MD     7/7/2025 12:12 PM EDT     Workstation ID: [id number]        Vitals:    07/07/25 1300 07/07/25 1330 07/07/25 1400 07/07/25 1430   BP: 101/66 96/62 91/63 93/59   BP Location:        Patient Position:       Pulse: 64 66 66 73   Resp:       Temp:       TempSrc:       SpO2: (!) 88% 95% 93%    Weight:       Height:         Medications   acetaminophen (TYLENOL) tablet 1,000 mg (1,000 mg Oral Given 7/7/25 1205)   cephalexin (KEFLEX) capsule 500 mg (500 mg Oral Given 7/7/25 1205)     ECG/EMG Results (last 24 hours)       Procedure Component Value Units Date/Time    Telemetry Scan [[id number]] Resulted: 07/07/25 1035     Updated: 07/07/25 1049    ECG 12 Lead Dyspnea [[id number]] Collected: 07/07/25 1058     Updated: 07/07/25 1100     QT Interval 390 ms      QTC Interval 427 ms     Narrative:      Test Reason : Dyspnea  Blood Pressure :   */*   mmHG  Vent. Rate :  72 BPM     Atrial Rate :  72 BPM     P-R Int : 168 ms          QRS Dur :  82 ms      QT Int : 390 ms       P-R-T Axes :  27  21  64 degrees    QTcB Int : 427 ms    Normal sinus rhythm  Nonspecific ST and T wave abnormality  Abnormal ECG  When compared with ECG of 21-Jun-2025 05:24,  ST depression has replaced ST elevation in Anterior leads  T wave inversion now evident in Anterior leads    Referred By: KAUR ELIZONDO           Confirmed By:           Telemetry Scan   Final Result      ECG 12 Lead Dyspnea   Final Result   Test Reason : Dyspnea   Blood Pressure :   */*   mmHG   Vent. Rate :  72 BPM     Atrial Rate :  72 BPM      P-R Int : 168 ms          QRS Dur :  82 ms       QT Int : 390 ms       P-R-T Axes :  27  21  64 degrees     QTcB Int : 427 ms      Normal sinus rhythm   Nonspecific ST and T wave abnormality   Abnormal ECG   When compared with ECG of 21-Jun-2025 05:24,   ST depression has replaced ST elevation in Anterior leads   T wave inversion now evident in Anterior leads   Confirmed by KYLAH CHILDRESS MD ([id number]) on 7/7/2025 4:22:40 PM      Referred By: ED MD           Confirmed By: KYLAH CHILDRESS MD      Telemetry Scan   Final Result               Medical Decision Making      I have reviewed all available studies at bedside with the patient and  his wife and I have indicated with the ultrasound tech.  He does have thrombophlebitis in the right basilic and cephalic veins but it does not go past the antecubital fossa.    I think it is reasonable continue him on aspirin and put him on a low-dose nonsteroidal for this.  I also think it is reasonable to treat him with a short course of Keflex and use warm compresses.    I have asked him to follow-up with his primary care doctor for serial exams as he may need a repeat ultrasound in 1 week's time and if he has progression of clot he may need more aggressive treatment.    He will keep his follow-up with his cardiac specialist as well.    In regards to his cough his labs are bland his chest x-ray shows mild atelectasis it is likely due to his procedure and probably little atelectasis he may have some acid reflux we talked about precautions on that no follow-up with his primary care and cardiologist.  Return to the emergency department if worse in any way.    Are agreeable with the plan    Problems Addressed:  Cough, unspecified type: undiagnosed new problem with uncertain prognosis  Thrombophlebitis arm: complicated acute illness or injury that poses a threat to life or bodily functions    Amount and/or Complexity of Data Reviewed  Independent Historian: spouse  External Data Reviewed: notes.  Labs: ordered. Decision-making details documented in ED Course.  Radiology: ordered and independent interpretation performed. Decision-making details documented in ED Course.     Details: Chronic changes with atelectasis is my interpretation  ECG/medicine tests: ordered and independent interpretation performed. Decision-making details documented in ED Course.    Risk  OTC drugs.  Prescription drug management.        Final diagnoses:   Thrombophlebitis arm   Cough, unspecified type       ED Disposition  ED Disposition       ED Disposition   Discharge    Condition   Stable    Comment   --               Keshawn Alexandre MD  [id number]    [place] [address]  [phone]    In 1 week  You need to follow-up in 1 week for recheck of this and perhaps repeat ultrasound to make sure it is improving.    Reginaldo Tse MD  [address]  [address]  [place] [address]  [phone]      As needed         Medication List        New Prescriptions      cephalexin 500 MG capsule  Commonly known as: KEFLEX  Take 1 capsule by mouth 2 (Two) Times a Day for 5 days.     naproxen 250 MG tablet  Commonly known as: NAPROSYN  Take 1 tablet by mouth 2 (Two) Times a Day As Needed (Thrombophlebitis) for up to 7 days.            Changed      CALCIUM-MAGNESIUM-ZINC PO  What changed: how much to take               Where to Get Your Medications        These medications were sent to [facility] - [place] [address], [place] [address]      Hours: Monday to Friday 7 AM to 5:30 PM, Saturday & Sunday 8 AM to 4:30 PM Phone: [phone]   cephalexin 500 MG capsule  naproxen 250 MG tablet            Keny Snow MD  07/07/25 8358

## 2025-07-08 ENCOUNTER — READMISSION MANAGEMENT (OUTPATIENT)
Dept: CALL CENTER | Facility: HOSPITAL | Age: 75
End: 2025-07-08
Payer: MEDICARE

## 2025-07-08 NOTE — OUTREACH NOTE
CT Surgery Week 2 Survey      Flowsheet Row Responses   [facility], [place] patient discharged from? [place]   Does the patient have one of the following disease processes/diagnoses(primary or secondary)? Cardiothoracic surgery   Week 2 attempt successful? Yes   Call start time 1137   Call end time 1149   Discharge diagnosis CAD s/p CABG x 4 6/19/2025   Person spoke with today (if not patient) and relationship wife, Haritha, also on phone during conversation   Meds reviewed with patient/caregiver? Yes   Is the patient having any side effects they believe may be caused by any medication additions or changes? Yes   Side effects comments  low B/P so has been advised by MD to decrease enalapril dose to 2.5 mg daily   Does the patient have all medications related to this admission filled (includes all antibiotics, pain medications, cardiac medications, etc.) Yes   Prescription comments only has about 2 more doses of lasix. Advised to contact [facility] to ask if needs to continue as they had prescribed him an additional 3 days worth. Wife states they will send a portal message to them.   Is the patient taking all medications as directed (includes completed medication regime)? Yes   Medication comments Started on naproxen BID by ED on 7/7   Does the patient have a primary care provider?  Yes   Does the patient have an appointment scheduled with their C/T surgeon? Yes   Has the patient kept scheduled appointments due by today? Yes   Comments seen in ED 7/7 for blood clot in right forearm. Advised to use heat to area TID.  Has F/U with Dr Alexandre on 7/14   Has all DME been delivered? Yes   DME comments not using O2, has occasional cough but denies any SOA with rest or activity.   Psychosocial issues? No   Did the patient receive a copy of their discharge instructions? Yes   Nursing interventions Reviewed instructions with patient   What is the patient's perception of their health status since discharge? Improving   Nursing  interventions Nurse provided patient education   Is the patient/caregiver able to teach back normal signs of recovery? Nausea and lack of appetite, Constipation, Pain or discomfort at incisional site   Nursing interventions Reassured on normal signs of recovery   Is the patient /caregiver able to teach back basic post-op care? Shower daily, Use a clean wash cloth and antibacterial bar or liquid soap to clean incisions, No tub bath, swimming, or hot tub until instructed by MD, Drive as instructed by MD in discharge instructions, Lifting as instructed by MD in discharge instructions, Continue use of incentive spirometry at least 1 week post discharge, Practice cough and deep breath every 4 hours while awake, Hold pillow to support chest when coughing   Is the patient/caregiver able to teach back signs and symptoms of incisional infection? Fever, Pus or odor from incision, Incisional warmth, Increased drainage or bleeding, Increased redness, swelling or pain at the incisonal site   Is the patient/caregiver able to teach back steps to recovery at home? Eat a well-balance diet, Weigh daily, Rest and rebuild strength, gradually increase activity, Set small, achievable goals for return to baseline health, Practice good oral hygiene   Is the patient /caregiver able to teach back the importance of cardiac rehab? Yes  [starts on 7/15]   Nursing interventions Provided education on importance of cardiac rehab   If the patient is a current smoker, are they able to teach back resources for cessation? Not a smoker   Is the patient/caregiver able to teach back the hierarchy of who to call/visit for symptoms/problems? PCP, Specialist, Home health nurse, Urgent Care, ED, 911 Yes   Week 2 call completed? Yes   Is the patient interested in additional calls from an ambulatory ? No   Would this patient benefit from a Referral to Amb Social Work? No   Wrap up additional comments pt doing well. Answered all questions. Reviewed  appointents for next week.   Call end time [id number]            Rochelle MCCARTHY - Registered Nurse

## 2025-07-13 PROBLEM — I80.8 THROMBOPHLEBITIS OF RIGHT ARM: Status: ACTIVE | Noted: 2025-07-13

## 2025-07-14 ENCOUNTER — OFFICE VISIT (OUTPATIENT)
Dept: INTERNAL MEDICINE | Facility: CLINIC | Age: 75
End: 2025-07-14
Payer: MEDICARE

## 2025-07-14 VITALS
WEIGHT: 191 LBS | OXYGEN SATURATION: 95 % | SYSTOLIC BLOOD PRESSURE: 112 MMHG | HEIGHT: 70 IN | BODY MASS INDEX: 27.35 KG/M2 | DIASTOLIC BLOOD PRESSURE: 80 MMHG | HEART RATE: 64 BPM

## 2025-07-14 DIAGNOSIS — I25.110 CORONARY ARTERY DISEASE INVOLVING NATIVE CORONARY ARTERY OF NATIVE HEART WITH UNSTABLE ANGINA PECTORIS: Primary | ICD-10-CM

## 2025-07-14 DIAGNOSIS — I80.8 THROMBOPHLEBITIS OF RIGHT ARM: ICD-10-CM

## 2025-07-14 DIAGNOSIS — I10 PRIMARY HYPERTENSION: ICD-10-CM

## 2025-07-14 DIAGNOSIS — E78.5 HYPERLIPIDEMIA LDL GOAL <70: ICD-10-CM

## 2025-07-14 PROCEDURE — 1126F AMNT PAIN NOTED NONE PRSNT: CPT | Performed by: INTERNAL MEDICINE

## 2025-07-14 PROCEDURE — G2211 COMPLEX E/M VISIT ADD ON: HCPCS | Performed by: INTERNAL MEDICINE

## 2025-07-14 PROCEDURE — 99214 OFFICE O/P EST MOD 30 MIN: CPT | Performed by: INTERNAL MEDICINE

## 2025-07-14 PROCEDURE — 3079F DIAST BP 80-89 MM HG: CPT | Performed by: INTERNAL MEDICINE

## 2025-07-14 PROCEDURE — 1159F MED LIST DOCD IN RCRD: CPT | Performed by: INTERNAL MEDICINE

## 2025-07-14 PROCEDURE — 1160F RVW MEDS BY RX/DR IN RCRD: CPT | Performed by: INTERNAL MEDICINE

## 2025-07-14 PROCEDURE — 3074F SYST BP LT 130 MM HG: CPT | Performed by: INTERNAL MEDICINE

## 2025-07-14 NOTE — ASSESSMENT & PLAN NOTE
Essential hypertension 112/80 left and right sitting heart rate of 64 O2 saturation 95% on enalapril 2.5 mg daily and metoprolol tartrate 25 mg twice daily to continue therapy

## 2025-07-14 NOTE — PROGRESS NOTES
[facility]     Jamari Munoz  1950   [id number]      Patient Care Team:  Keshawn Alexandre MD as PCP - General (Internal Medicine)  Scooter Jiménez MD as Consulting Physician (Colon and Rectal Surgery)  Alvino Berrios MD as Consulting Physician (Ophthalmology)  Reginaldo Tse MD as Consulting Physician (Cardiology)  Yanelis Hernandez APRN as Nurse Practitioner (Family Medicine)  Keshawn Ng MD as Surgeon (Cardiothoracic Surgery)  Kaleigh Jones, RN as Ambulatory  ([facility])    Chief Complaint::   Chief Complaint   Patient presents with    ER follow up     [facility] 7/7/25 with dx of thrombophlebitis right arm    Hypotension     Improved since reducing enalapril to 1/2 tablet daily            HPI  History of Present Illness  The patient is a 74-year-old male who presents for follow-up after recently undergoing coronary bypass grafting on 06/19/2025 and was discharged on 06/26/2025. He had ER visits on 06/29/2025 for wound care and on 07/07/2025 for phlebitis of the right arm.    He reports feeling well overall, with no chest heaviness or pressure, and no shortness of breath. He has not yet resumed cycling but plans to start cardiothoracic physical therapy tomorrow. His activity level is good, as evidenced by his ability to walk 2800 steps this morning. He has been socializing more and has not needed multiple naps in the past three days. His cough has improved significantly, although a slight rattle remains. He is not currently taking Lasix.    He reports no headaches or dizziness, even when his blood pressure was low. He experienced low energy for about two days but reports no vision or hearing problems, allergy symptoms, sore throat, difficulty swallowing or talking, or lung issues. Occasionally, he feels unable to take a deep breath but is making an effort to do so regularly. He reports no heart racing, skipping, or palpitations. He uses a watch to monitor his  heart rate and has a finger monitor at home, which he has not used recently.    He reports no bladder issues or leg problems. He had thrombosis behind his knee in the first week after returning home, which has since resolved completely. He sleeps well and takes one pain pill at night to help him sleep through the night. He takes baby aspirin in the morning with his other medications. He has been on enalapril for 20 to 25 years and is currently taking half a tablet. He is also taking metoprolol tartrate twice daily. He has an appointment with his surgeon in two weeks.    Sleep: He sleeps well and takes one pain pill at night to help him sleep through the night.    PAST SURGICAL HISTORY:  Coronary bypass grafting on 06/19/2025      Patient Active Problem List   Diagnosis    Osteoarthritis    Primary hypertension    Low back pain    Carbuncle and furuncle    RLS (restless legs syndrome)    Overweight    Ganglion cyst    Incomplete right bundle branch block    Hyperlipidemia LDL goal <70    Herpes simplex type 2 infection    Medicare annual wellness visit, subsequent    Olecranon bursitis of right elbow    Medicare annual wellness visit, subsequent    Left shoulder tendinitis    Chronic pain of left knee    Medicare annual wellness visit, subsequent    Right shoulder injury    H/O colonoscopy    Migraine    CAD s/p CABG x 4 6/19/2025    Hospitalization within last 30 days    Thrombophlebitis of right arm        Past Medical History:   Diagnosis Date    Arthritis     Asthma Feb 2025    when riding bike    Atherosclerosis of native coronary artery of native heart with stable angina pectoris 6/13/2025    Ganglion     Head injury 1967    several concussions from football and biking    Hyperlipidemia     Hypertension     Migraine 1975    one now every month or so with [facility]       Past Surgical History:   Procedure Laterality Date    CARDIAC CATHETERIZATION N/A 06/18/2025    Procedure: Left Heart Cath - Right radial access;   Surgeon: Kai Daniel IV, MD;  Location:  [facility];  Service: Cardiovascular;  Laterality: N/A;    COLONOSCOPY  2025    CORONARY ARTERY BYPASS GRAFT N/A 06/19/2025    Procedure: MEDIAN STERNOTOMY, CORONARY ARTERY BYPASS GRAFTING X4 UTILIZING THE LEFT INTERNAL MAMMARY ARTERY GRAFT, ENDOSCOPIC VEIN HARVEST OF THE GREATER LEFT SAPHENOUS VEIN, TRANSESOPHAGEAL ECHOCARDIOGRAM WITH ANESTHESIA;  Surgeon: Keshawn Ng MD;  Location:  [facility] OR;  Service: Cardiothoracic;  Laterality: N/A;    CORONARY ARTERY BYPASS GRAFT  6/19/2025    CYST REMOVAL      cyst removed left shoulder and left knee    KNEE MENISCAL REPAIR  2005    TONSILLECTOMY AND ADENOIDECTOMY      VASECTOMY         Family History   Problem Relation Age of Onset    Diabetes Mother         mostly controlled    No Known Problems Brother     No Known Problems Brother     No Known Problems Brother     Diabetes Maternal Grandmother         controlled    Arthritis Paternal Grandmother         not too bad    Osteoarthritis Paternal Grandmother     Dementia Paternal Grandmother         some in mid 80s    Coronary artery disease Paternal Grandfather     Heart attack Paternal Grandfather        Social History     Socioeconomic History    Marital status:    Tobacco Use    Smoking status: Never     Passive exposure: Past    Smokeless tobacco: Never   Vaping Use    Vaping status: Never Used   Substance and Sexual Activity    Alcohol use: Not Currently     Alcohol/week: 14.0 standard drinks of alcohol     Types: 7 Glasses of wine, 7 Cans of beer per week     Comment: beer mostly in summer after cycling; liquor is [place]    Drug use: No    Sexual activity: Not Currently     Partners: Female     Birth control/protection: None       No Known Allergies    Review of Systems     HEENT: Denies dizzy lightheaded, denies vision or hearing  NECK: Denies pain stiffness swelling dysphagia  CHEST: Complains of cough nonproductive improved shortness  "of breath with walking activity  CARDIAC denies chest pain pressure tightness or palpitations  ABD: Denies nausea vomiting abdominal pain  : Denies dysuria frequency  NEURO: Denies syncope concussion  PSYCH: Denies anxiety depression  EXTREM: Complains of leg soreness discomfort at the vein harvest site and right arm phlebitis both improved with heat application saphenous vein site incision healing  SKIN: Denies rash    Vital Signs  Vitals:    07/14/25 1310   BP: 112/80   BP Location: Left arm   Patient Position: Sitting   Cuff Size: Adult   Pulse: 64   SpO2: 95%   Weight: 86.6 kg (191 lb)   Height: 177.8 cm (70\")   PainSc: 0-No pain     Body mass index is 27.41 kg/m².        Advance Care Planning   ACP discussion was held with the patient during this visit. Patient has an advance directive (not in EMR), copy requested.       Current Outpatient Medications:     aspirin 325 MG EC tablet, Take 1 tablet by mouth Daily., Disp: 90 tablet, Rfl: 1    atorvastatin (LIPITOR) 40 MG tablet, TAKE 1 TABLET DAILY (DOSE INCREASE), Disp: 90 tablet, Rfl: 3    CALCIUM-MAGNESIUM-ZINC PO, Take 1 tablet by mouth Daily. (Patient taking differently: Take 7 tablets by mouth Daily.), Disp: , Rfl:     enalapril (VASOTEC) 5 MG tablet, TAKE 1 TABLET DAILY (Patient taking differently: Take 0.5 tablets by mouth Daily.), Disp: 90 tablet, Rfl: 3    Glucosamine-Chondroitin 750-600 MG tablet, Take 1 tablet by mouth 2 (Two) Times a Day., Disp: , Rfl:     HYDROcodone-acetaminophen (NORCO) 7.5-325 MG per tablet, Take 1 tablet by mouth Every 6 (Six) Hours As Needed for Moderate Pain., Disp: 25 tablet, Rfl: 0    metoprolol tartrate (LOPRESSOR) 25 MG tablet, Take 1 tablet by mouth Every 12 (Twelve) Hours., Disp: 180 tablet, Rfl: 0    naproxen (NAPROSYN) 250 MG tablet, Take 1 tablet by mouth 2 (Two) Times a Day As Needed (Thrombophlebitis) for up to 7 days., Disp: 14 tablet, Rfl: 0    valACYclovir (VALTREX) 500 MG tablet, TAKE ONE-HALF (1/2) TABLET " DAILY, Disp: 45 tablet, Rfl: 3    Physical Exam     ACE III MINI        Physical Exam  Physical Exam  Ears: Normal  Nose: Normal  Mouth/Throat: Normal  Neck: Normal  Respiratory: Clear to auscultation, no wheezing, rales or rhonchi  Cardiovascular: Regular rate and rhythm, no murmurs, rubs, or gallops  Gastrointestinal: Soft, no tenderness, no distention, no masses  Extremities: Mild swelling in the ankle  HEENT: No facial asymmetry pharynx is clear pupils equal and reactive sinuses nontender  NECK: No masses bruit or thyromegaly  Distention  CHEST: Clear  CARDIAC: Regular rhythm gallop or rub blood pressure heart rate stable CABG incision is clean and healing stitches remain in the patient's chest tube drainage incision  ABD: Diffusely normal positive bowel sounds no bruit  : Deferred  NEURO: Normal  PSYCH: Normal  EXTREM: Right arm sore superficial phlebitis saphenous vein site surgery incision healing  Skin: Clear     Results Review:    Recent Results (from the past 4 weeks)   Duplex Carotid Ultrasound CAR    Collection Time: 06/18/25 10:29 AM   Result Value Ref Range    Prox CCA PSV 71.4 cm/sec    Prox CCA EDV 15.5 cm/sec    Right Mid CCA PSV 57.8 cm/sec    right Mid CCA EDV 20.5 cm/sec    Dist CCA PSV 48.5 cm/sec    Dist CCA EDV 16.8 cm/sec    Prox ICA PSV 51.5 cm/sec    Prox ICA EDV 19.3 cm/sec    Mid ICA PSV 58.1 cm/sec    Mid ICA EDV 25.2 cm/sec    Dist ICA PSV 69.7 cm/sec    Dist ICA EDV 28.7 cm/sec    Prox ECA PSV 45.9 cm/sec    Prox ECA EDV 7.0 cm/sec    Vertebral A PSV 22.1 cm/sec    Vertebral A EDV 10.9 cm/sec    Prox SCLA PSV 69.6 cm/sec    Prox CCA PSV 78.1 cm/sec    Prox CCA EDV 25.6 cm/sec    left Mid CCA PSV 69.3 cm/sec    left Mid CCA EDV 28.5 cm/sec    Dist CCA PSV 59.5 cm/sec    Dist CCA EDV 19.2 cm/sec    Prox ICA PSV 41.3 cm/sec    Prox ICA EDV 18.2 cm/sec    Mid ICA PSV 58.5 cm/sec    Mid ICA EDV 22.1 cm/sec    Dist ICA PSV 88.5 cm/sec    Dist ICA EDV 40.8 cm/sec    Prox ECA PSV 56.0 cm/sec     Prox ECA EDV 9.3 cm/sec    Vertebral A PSV 35.9 cm/sec    Vertebral A EDV 18.0 cm/sec    Prox SCLA PSV 61.4 cm/sec    ICA/CCA ratio 1.20     ICA/CCA ratio 0.98    Duplex Upper Extremity Art / Grafts - Bilateral CAR    Collection Time: 06/18/25 10:42 AM   Result Value Ref Range    Rt. Radial Artery PSV 22.0 cm/s    RT RADIAL ARTERY DIAMETER - FOREARM PROX 0.33 cm    RT RADIAL ARTERY DIAMETER - FOREARM MID 0.21 cm    RT RADIAL ARTERY DIAMETER - FOREARM DISTAL 0.26 cm    RT RADIAL ARTERY DIAMETER WRIST 0.26 cm    BH CV VAS UPPER ART LT RADIAL ART DIAMETER FOREARM PROX 0.26 cm    BH CV VAS UPPER ART LT RADIAL ART DIAMETER FOREARM MID 0.23 cm    BH CV VAS UPPER ART LT RADIAL ART DIAMETER FOREARM DIST 0.23 cm    BH CV VAS UPPER ART LT RADIAL ARTERY DIAMETER WRIST 0.22 cm   Urine Drug Screen -    Collection Time: 06/18/25  2:45 PM    Specimen: Urine   Result Value Ref Range    THC, Screen, Urine Negative Negative    Phencyclidine (PCP), Urine Negative Negative    Cocaine Screen, Urine Negative Negative    Methamphetamine, Ur Negative Negative    Opiate Screen Negative Negative    Amphetamine Screen, Urine Negative Negative    Benzodiazepine Screen, Urine Negative Negative    Tricyclic Antidepressants Screen Negative Negative    Methadone Screen, Urine Negative Negative    Barbiturates Screen, Urine Negative Negative    Oxycodone Screen, Urine Negative Negative    Buprenorphine, Screen, Urine Negative Negative   Urinalysis With Microscopic If Indicated (No Culture) - Urine, Clean Catch    Collection Time: 06/18/25  2:45 PM    Specimen: Urine, Clean Catch   Result Value Ref Range    Color, UA Yellow Yellow, Straw    Appearance, UA Clear Clear    pH, UA 7.0 5.0 - 8.0    Specific Gravity, UA 1.041 (H) 1.001 - 1.030    Glucose, UA Negative Negative    Ketones, UA Negative Negative    Bilirubin, UA Negative Negative    Blood, UA Negative Negative    Protein, UA Negative Negative    Leuk Esterase, UA Negative Negative     Nitrite, UA Negative Negative    Urobilinogen, UA 0.2 E.U./dL 0.2 - 1.0 E.U./dL   Fentanyl, Urine - Urine, Clean Catch    Collection Time: 06/18/25  2:45 PM    Specimen: Urine   Result Value Ref Range    Fentanyl, Urine Positive (A) Negative   Hemoglobin A1c    Collection Time: 06/18/25  3:36 PM    Specimen: Blood   Result Value Ref Range    Hemoglobin A1C 5.70 (H) 4.80 - 5.60 %   Type & Screen    Collection Time: 06/18/25  3:36 PM    Specimen: Blood   Result Value Ref Range    ABO Type A     RH type Positive     Antibody Screen Negative     T&S Expiration Date 6/21/2025 11:59:59 PM    CBC Auto Differential    Collection Time: 06/18/25  3:36 PM    Specimen: Blood   Result Value Ref Range    WBC 6.91 3.40 - 10.80 10*3/mm3    RBC 4.85 4.14 - 5.80 10*6/mm3    Hemoglobin 15.8 13.0 - 17.7 g/dL    Hematocrit 47.1 37.5 - 51.0 %    MCV 97.1 (H) 79.0 - 97.0 fL    MCH 32.6 26.6 - 33.0 pg    MCHC 33.5 31.5 - 35.7 g/dL    RDW 12.5 12.3 - 15.4 %    RDW-SD 44.9 37.0 - 54.0 fl    MPV 10.0 6.0 - 12.0 fL    Platelets 323 140 - 450 10*3/mm3    Neutrophil % 41.3 (L) 42.7 - 76.0 %    Lymphocyte % 41.1 19.6 - 45.3 %    Monocyte % 12.2 (H) 5.0 - 12.0 %    Eosinophil % 4.6 0.3 - 6.2 %    Basophil % 0.4 0.0 - 1.5 %    Immature Grans % 0.4 0.0 - 0.5 %    Neutrophils, Absolute 2.85 1.70 - 7.00 10*3/mm3    Lymphocytes, Absolute 2.84 0.70 - 3.10 10*3/mm3    Monocytes, Absolute 0.84 0.10 - 0.90 10*3/mm3    Eosinophils, Absolute 0.32 0.00 - 0.40 10*3/mm3    Basophils, Absolute 0.03 0.00 - 0.20 10*3/mm3    Immature Grans, Absolute 0.03 0.00 - 0.05 10*3/mm3    nRBC 0.0 0.0 - 0.2 /100 WBC   Lipid Panel    Collection Time: 06/18/25  3:38 PM    Specimen: Blood   Result Value Ref Range    Total Cholesterol 154 0 - 200 mg/dL    Triglycerides 272 (H) 0 - 150 mg/dL    HDL Cholesterol 50 40 - 60 mg/dL    LDL Cholesterol  61 0 - 100 mg/dL    VLDL Cholesterol 43 (H) 5 - 40 mg/dL    LDL/HDL Ratio 0.99    Magnesium    Collection Time: 06/18/25  3:38 PM     Specimen: Blood   Result Value Ref Range    Magnesium 2.1 1.6 - 2.4 mg/dL   P2Y12 Platelet Inhibition    Collection Time: 06/18/25  3:41 PM    Specimen: Blood   Result Value Ref Range    P2Y12 Reactivity Unit 162 PRU   Adult Transthoracic Echo Complete W/ Cont if Necessary Per Protocol    Collection Time: 06/18/25  4:00 PM   Result Value Ref Range    EF(MOD-bp) 53.3 %    LVIDd 4.3 cm    LVIDs 3.1 cm    IVSd 0.90 cm    LVPWd 0.90 cm    FS 27.9 %    IVS/LVPW 1.00 cm    ESV(cubed) 29.8 ml    LV Sys Vol (BSA corrected) 35.6 cm2    EDV(cubed) 79.5 ml    LV Barrera Vol (BSA corrected) 72.6 cm2    LV mass(C)d 123.3 grams    LVOT area 3.1 cm2    LVOT diam 2.00 cm    EDV(MOD-sp2) 99.7 ml    EDV(MOD-sp4) 150.0 ml    ESV(MOD-sp2) 51.5 ml    ESV(MOD-sp4) 73.5 ml    SV(MOD-sp2) 48.2 ml    SV(MOD-sp4) 76.5 ml    SVi(MOD-SP2) 23.3 ml/m2    SVi(MOD-SP4) 37.0 ml/m2    SVi (LVOT) 32.4 ml/m2    EF(MOD-sp2) 48.3 %    EF(MOD-sp4) 51.0 %    MV E max mitch 50.7 cm/sec    MV A max mitch 52.6 cm/sec    MV dec time 0.21 sec    MV E/A 0.96     IVRT 134.0 ms    Med Peak E' Mitch 7.4 cm/sec    Lat Peak E' Mitch 10.1 cm/sec    Avg E/e' ratio 5.79     SV(LVOT) 66.9 ml    RV Base 3.6 cm    RV Mid 2.6 cm    RV Length 8.1 cm    TAPSE (>1.6) 3.0 cm    RV S' 11.4 cm/sec    LA dimension (2D)  3.8 cm    LV V1 max 96.2 cm/sec    LV V1 max PG 3.7 mmHg    LV V1 mean PG 2.00 mmHg    LV V1 VTI 21.3 cm    Ao pk mitch 117.0 cm/sec    Ao max PG 5.5 mmHg    Ao mean PG 3.0 mmHg    Ao V2 VTI 25.6 cm    COLIN(I,D) 2.6 cm2    Dimensionless Index 0.83 (DI)    MV max PG 2.40 mmHg    MV mean PG 1.00 mmHg    MV V2 VTI 26.6 cm    MV P1/2t 108.9 msec    MVA(P1/2t) 2.02 cm2    MVA(VTI) 2.5 cm2    MV dec slope 160.0 cm/sec2    PA V2 max 112.0 cm/sec    PA acc time 0.16 sec    PI end-d mitch 130.0 cm/sec    Ao root diam 3.6 cm    LA ESV Index (BP) 19.7 ml/m2    Ascending aorta 3.4 cm    BH CV VAS BP LEFT /73 mmHg   ABO RH Specimen Verification    Collection Time: 06/18/25  6:42 PM     Specimen: Blood   Result Value Ref Range    ABO Type A     RH type Positive    aPTT    Collection Time: 06/19/25  4:06 AM    Specimen: Blood   Result Value Ref Range    PTT 32.3 22.0 - 39.0 seconds   Protime-INR    Collection Time: 06/19/25  4:06 AM    Specimen: Blood   Result Value Ref Range    Protime 14.3 12.2 - 15.3 Seconds    INR 1.05 0.89 - 1.12   Lipoprotein A (LPA)    Collection Time: 06/19/25  4:06 AM    Specimen: Blood   Result Value Ref Range    Lipoprotein (a) 83.7 (H) <75.0 nmol/L   Comprehensive Metabolic Panel    Collection Time: 06/19/25  4:07 AM    Specimen: Blood   Result Value Ref Range    Glucose 100 (H) 65 - 99 mg/dL    BUN 13.9 8.0 - 23.0 mg/dL    Creatinine 0.86 0.76 - 1.27 mg/dL    Sodium 139 136 - 145 mmol/L    Potassium 4.4 3.5 - 5.2 mmol/L    Chloride 107 98 - 107 mmol/L    CO2 24.0 22.0 - 29.0 mmol/L    Calcium 9.0 8.6 - 10.5 mg/dL    Total Protein 6.1 6.0 - 8.5 g/dL    Albumin 3.6 3.5 - 5.2 g/dL    ALT (SGPT) 23 1 - 41 U/L    AST (SGOT) 24 1 - 40 U/L    Alkaline Phosphatase 49 39 - 117 U/L    Total Bilirubin 0.4 0.0 - 1.2 mg/dL    Globulin 2.5 gm/dL    A/G Ratio 1.4 g/dL    BUN/Creatinine Ratio 16.2 7.0 - 25.0    Anion Gap 8.0 5.0 - 15.0 mmol/L    eGFR 90.9 >60.0 mL/min/1.73   P2Y12 Platelet Inhibition    Collection Time: 06/19/25  4:07 AM    Specimen: Blood   Result Value Ref Range    P2Y12 Reactivity Unit 159 PRU   Magnesium    Collection Time: 06/19/25  4:07 AM    Specimen: Blood   Result Value Ref Range    Magnesium 2.0 1.6 - 2.4 mg/dL   CBC Auto Differential    Collection Time: 06/19/25  4:07 AM    Specimen: Blood   Result Value Ref Range    WBC 8.11 3.40 - 10.80 10*3/mm3    RBC 4.71 4.14 - 5.80 10*6/mm3    Hemoglobin 15.4 13.0 - 17.7 g/dL    Hematocrit 45.9 37.5 - 51.0 %    MCV 97.5 (H) 79.0 - 97.0 fL    MCH 32.7 26.6 - 33.0 pg    MCHC 33.6 31.5 - 35.7 g/dL    RDW 12.4 12.3 - 15.4 %    RDW-SD 44.9 37.0 - 54.0 fl    MPV 9.6 6.0 - 12.0 fL    Platelets 286 140 - 450 10*3/mm3     Neutrophil % 46.6 42.7 - 76.0 %    Lymphocyte % 38.1 19.6 - 45.3 %    Monocyte % 9.6 5.0 - 12.0 %    Eosinophil % 4.9 0.3 - 6.2 %    Basophil % 0.4 0.0 - 1.5 %    Immature Grans % 0.4 0.0 - 0.5 %    Neutrophils, Absolute 3.78 1.70 - 7.00 10*3/mm3    Lymphocytes, Absolute 3.09 0.70 - 3.10 10*3/mm3    Monocytes, Absolute 0.78 0.10 - 0.90 10*3/mm3    Eosinophils, Absolute 0.40 0.00 - 0.40 10*3/mm3    Basophils, Absolute 0.03 0.00 - 0.20 10*3/mm3    Immature Grans, Absolute 0.03 0.00 - 0.05 10*3/mm3    nRBC 0.0 0.0 - 0.2 /100 WBC   POC Glucose Once    Collection Time: 06/19/25  4:40 AM    Specimen: Blood   Result Value Ref Range    Glucose 94 70 - 130 mg/dL   POC Activated Clotting Time    Collection Time: 06/19/25  6:30 AM    Specimen: Blood   Result Value Ref Range    Activated Clotting Time  101 82 - 152 Seconds   POC Surgery Labs    Collection Time: 06/19/25  6:54 AM    Specimen: Blood   Result Value Ref Range    Ionized Calcium 1.35 (H) 1.20 - 1.32 mmol/L    POC Potassium 4.2 3.5 - 4.9 mmol/L    Sodium 139 138 - 146 mmol/L    Total CO2 23 (L) 24 - 29 mmol/L    Hemoglobin 16.0 12.0 - 17.0 g/dL    Hematocrit 47 38 - 51 %    pCO2, Arterial 37.0 35 - 45 mm Hg    pO2, Arterial 72 (L) 80 - 105 mmHg    Base Excess -3.0000 -5 - 5 mmol/L    O2 Saturation, Arterial 94 (L) 95 - 98 %    pH, Arterial 7.38 7.35 - 7.6 pH units    HCO3, Arterial 22.1 22 - 26 mmol/L    Glucose 101 70 - 130 mg/dL   POC Activated Clotting Time    Collection Time: 06/19/25  7:11 AM    Specimen: Blood   Result Value Ref Range    Activated Clotting Time  608 (H) 82 - 152 Seconds   POC Surgery Labs    Collection Time: 06/19/25  7:21 AM    Specimen: Blood   Result Value Ref Range    Ionized Calcium 0.99 (L) 1.20 - 1.32 mmol/L    POC Potassium 6.0 (H) 3.5 - 4.9 mmol/L    Sodium 132 (L) 138 - 146 mmol/L    Total CO2 25 24 - 29 mmol/L    Hemoglobin 10.2 (L) 12.0 - 17.0 g/dL    Hematocrit 30 (L) 38 - 51 %    pCO2, Arterial 37.7 35 - 45 mm Hg    pO2, Arterial  49 (L) 80 - 105 mmHg    Base Excess -1.0000 -5 - 5 mmol/L    O2 Saturation, Arterial 85 (L) 95 - 98 %    pH, Arterial 7.40 7.35 - 7.6 pH units    HCO3, Arterial 23.6 22 - 26 mmol/L    Glucose 107 70 - 130 mg/dL   POC Surgery Labs    Collection Time: 06/19/25  7:25 AM    Specimen: Blood   Result Value Ref Range    Ionized Calcium 1.02 (L) 1.20 - 1.32 mmol/L    POC Potassium 5.8 (H) 3.5 - 4.9 mmol/L    Sodium 132 (L) 138 - 146 mmol/L    Total CO2 25 24 - 29 mmol/L    Hemoglobin 10.2 (L) 12.0 - 17.0 g/dL    Hematocrit 30 (L) 38 - 51 %    pCO2, Arterial 34.8 (L) 35 - 45 mm Hg    pO2, Arterial 429 (H) 80 - 105 mmHg    Base Excess 0.0000 -5 - 5 mmol/L    O2 Saturation, Arterial 100 (H) 95 - 98 %    pH, Arterial 7.45 7.35 - 7.6 pH units    HCO3, Arterial 23.9 22 - 26 mmol/L    Glucose 111 70 - 130 mg/dL   POC Surgery Labs    Collection Time: 06/19/25  7:45 AM    Specimen: Blood   Result Value Ref Range    Ionized Calcium 1.07 (L) 1.20 - 1.32 mmol/L    POC Potassium 5.7 (H) 3.5 - 4.9 mmol/L    Sodium 134 (L) 138 - 146 mmol/L    Total CO2 25 24 - 29 mmol/L    Hemoglobin 11.9 (L) 12.0 - 17.0 g/dL    Hematocrit 35 (L) 38 - 51 %    pCO2, Arterial 37.4 35 - 45 mm Hg    pO2, Arterial 400 (H) 80 - 105 mmHg    Base Excess -1.0000 -5 - 5 mmol/L    O2 Saturation, Arterial 100 (H) 95 - 98 %    pH, Arterial 7.41 7.35 - 7.6 pH units    HCO3, Arterial 23.9 22 - 26 mmol/L    Glucose 120 70 - 130 mg/dL   POC Activated Clotting Time    Collection Time: 06/19/25  8:18 AM    Specimen: Blood   Result Value Ref Range    Activated Clotting Time  550 (H) 82 - 152 Seconds   POC Activated Clotting Time    Collection Time: 06/19/25  8:29 AM    Specimen: Blood   Result Value Ref Range    Activated Clotting Time  446 (H) 82 - 152 Seconds   POC Surgery Labs    Collection Time: 06/19/25  8:45 AM    Specimen: Blood   Result Value Ref Range    Ionized Calcium 1.39 (H) 1.20 - 1.32 mmol/L    POC Potassium 3.7 3.5 - 4.9 mmol/L    Sodium 142 138 - 146 mmol/L     Total CO2 19 (L) 24 - 29 mmol/L    Hemoglobin 10.2 (L) 12.0 - 17.0 g/dL    Hematocrit 30 (L) 38 - 51 %    pCO2, Arterial 31.2 (L) 35 - 45 mm Hg    pO2, Arterial 234 (H) 80 - 105 mmHg    Base Excess -7.0000 (L) -5 - 5 mmol/L    O2 Saturation, Arterial 100 (H) 95 - 98 %    pH, Arterial 7.38 7.35 - 7.6 pH units    HCO3, Arterial 18.4 (L) 22 - 26 mmol/L    Glucose 113 70 - 130 mg/dL   POC Surgery Labs    Collection Time: 06/19/25  9:03 AM    Specimen: Blood   Result Value Ref Range    Ionized Calcium 1.16 (L) 1.20 - 1.32 mmol/L    POC Potassium 4.0 3.5 - 4.9 mmol/L    Sodium 139 138 - 146 mmol/L    Total CO2 23 (L) 24 - 29 mmol/L    Hemoglobin 13.9 12.0 - 17.0 g/dL    Hematocrit 41 38 - 51 %    pCO2, Arterial 36.5 35 - 45 mm Hg    pO2, Arterial 328 (H) 80 - 105 mmHg    Base Excess -4.0000 -5 - 5 mmol/L    O2 Saturation, Arterial 100 (H) 95 - 98 %    pH, Arterial 7.38 7.35 - 7.6 pH units    HCO3, Arterial 21.4 (L) 22 - 26 mmol/L    Glucose 116 70 - 130 mg/dL   POC Activated Clotting Time    Collection Time: 06/19/25  9:23 AM    Specimen: Blood   Result Value Ref Range    Activated Clotting Time  567 (H) 82 - 152 Seconds   POC Activated Clotting Time    Collection Time: 06/19/25  9:54 AM    Specimen: Blood   Result Value Ref Range    Activated Clotting Time  487 (H) 82 - 152 Seconds   POC Activated Clotting Time    Collection Time: 06/19/25 10:20 AM    Specimen: Blood   Result Value Ref Range    Activated Clotting Time  469 (H) 82 - 152 Seconds   POC Surgery Labs    Collection Time: 06/19/25 10:45 AM    Specimen: Blood   Result Value Ref Range    Ionized Calcium 1.12 (L) 1.20 - 1.32 mmol/L    POC Potassium 5.3 (H) 3.5 - 4.9 mmol/L    Sodium 136 (L) 138 - 146 mmol/L    Total CO2 25 24 - 29 mmol/L    Hemoglobin 11.9 (L) 12.0 - 17.0 g/dL    Hematocrit 35 (L) 38 - 51 %    pCO2, Arterial 38.6 35 - 45 mm Hg    pO2, Arterial 413 (H) 80 - 105 mmHg    Base Excess -1.0000 -5 - 5 mmol/L    O2 Saturation, Arterial 100 (H) 95 - 98  %    pH, Arterial 7.40 7.35 - 7.6 pH units    HCO3, Arterial 23.6 22 - 26 mmol/L    Glucose 132 (H) 70 - 130 mg/dL   POC Surgery Labs    Collection Time: 06/19/25 11:12 AM    Specimen: Blood   Result Value Ref Range    Ionized Calcium 1.13 (L) 1.20 - 1.32 mmol/L    POC Potassium 4.9 3.5 - 4.9 mmol/L    Sodium 137 (L) 138 - 146 mmol/L    Total CO2 25 24 - 29 mmol/L    Hemoglobin 12.2 12.0 - 17.0 g/dL    Hematocrit 36 (L) 38 - 51 %    pCO2, Arterial 40.0 35 - 45 mm Hg    pO2, Arterial 395 (H) 80 - 105 mmHg    Base Excess -1.0000 -5 - 5 mmol/L    O2 Saturation, Arterial 100 (H) 95 - 98 %    pH, Arterial 7.39 7.35 - 7.6 pH units    HCO3, Arterial 24.0 22 - 26 mmol/L    Glucose 141 (H) 70 - 130 mg/dL   POC Activated Clotting Time    Collection Time: 06/19/25 11:36 AM    Specimen: Blood   Result Value Ref Range    Activated Clotting Time  112 82 - 152 Seconds   POC Glucose Once    Collection Time: 06/19/25  1:04 PM    Specimen: Blood   Result Value Ref Range    Glucose 124 70 - 130 mg/dL   Blood Gas, Arterial With Co-Ox    Collection Time: 06/19/25  1:08 PM    Specimen: Arterial Blood   Result Value Ref Range    Site Arterial Line     Ish's Test N/A     pH, Arterial 7.506 (H) 7.350 - 7.450 pH units    pCO2, Arterial 28.2 (L) 35.0 - 45.0 mm Hg    pO2, Arterial 234.0 (H) 83.0 - 108.0 mm Hg    HCO3, Arterial 22.3 20.0 - 26.0 mmol/L    Base Excess, Arterial 0.4 0.0 - 2.0 mmol/L    Hemoglobin, Blood Gas 14.5 13.5 - 17.5 g/dL    Hematocrit, Blood Gas 44.4 38.0 - 51.0 %    Oxyhemoglobin      Methemoglobin 0.30 0.00 - 1.50 %    Carboxyhemoglobin 0.8 0 - 2 %    CO2 Content 23.2 22 - 33 mmol/L    Temperature 37.0     Barometric Pressure for Blood Gas      Modality Ventilator     FIO2 100 %    Ventilator Mode SIMV/VC+     Set Tidal Volume 0.73     Rate 14 Breaths/minute    PEEP 5.0     PSV 10.0 cmH2O    PIP 0 cmH2O    IPAP 0     EPAP 0     pH, Temp Corrected 7.506 pH Units    pCO2, Temperature Corrected 28.2 (L) 35 - 48 mm Hg     pO2, Temperature Corrected 234 (H) 83 - 108 mm Hg   Protime-INR    Collection Time: 06/19/25  1:14 PM    Specimen: Blood   Result Value Ref Range    Protime 18.6 (H) 12.2 - 15.3 Seconds    INR 1.45 (H) 0.89 - 1.12   aPTT    Collection Time: 06/19/25  1:14 PM    Specimen: Blood   Result Value Ref Range    PTT 28.5 22.0 - 39.0 seconds   Calcium, Ionized    Collection Time: 06/19/25  1:14 PM    Specimen: Blood   Result Value Ref Range    Ionized Calcium 1.07 (L) 1.15 - 1.30 mmol/L   CBC (No Diff)    Collection Time: 06/19/25  1:14 PM    Specimen: Blood   Result Value Ref Range    WBC 18.01 (H) 3.40 - 10.80 10*3/mm3    RBC 4.14 4.14 - 5.80 10*6/mm3    Hemoglobin 13.6 13.0 - 17.7 g/dL    Hematocrit 39.9 37.5 - 51.0 %    MCV 96.4 79.0 - 97.0 fL    MCH 32.9 26.6 - 33.0 pg    MCHC 34.1 31.5 - 35.7 g/dL    RDW 12.6 12.3 - 15.4 %    RDW-SD 44.9 37.0 - 54.0 fl    MPV 9.6 6.0 - 12.0 fL    Platelets 186 140 - 450 10*3/mm3   Renal Function Panel    Collection Time: 06/19/25  1:14 PM    Specimen: Blood   Result Value Ref Range    Glucose 133 (H) 65 - 99 mg/dL    BUN 11.8 8.0 - 23.0 mg/dL    Creatinine 0.86 0.76 - 1.27 mg/dL    Sodium 137 136 - 145 mmol/L    Potassium 4.4 3.5 - 5.2 mmol/L    Chloride 109 (H) 98 - 107 mmol/L    CO2 18.0 (L) 22.0 - 29.0 mmol/L    Calcium 9.0 8.6 - 10.5 mg/dL    Albumin 3.2 (L) 3.5 - 5.2 g/dL    Phosphorus 2.8 2.5 - 4.5 mg/dL    Anion Gap 10.0 5.0 - 15.0 mmol/L    BUN/Creatinine Ratio 13.7 7.0 - 25.0    eGFR 90.9 >60.0 mL/min/1.73   Magnesium    Collection Time: 06/19/25  1:14 PM    Specimen: Blood   Result Value Ref Range    Magnesium 2.0 1.6 - 2.4 mg/dL   POC Glucose Once    Collection Time: 06/19/25  1:58 PM    Specimen: Blood   Result Value Ref Range    Glucose 118 70 - 130 mg/dL   POC Glucose Once    Collection Time: 06/19/25  2:57 PM    Specimen: Blood   Result Value Ref Range    Glucose 104 70 - 130 mg/dL   ECG 12 Lead Chest Pain    Collection Time: 06/19/25  3:41 PM   Result Value Ref Range     QT Interval 378 ms    QTC Interval 422 ms   POC Glucose Once    Collection Time: 06/19/25  3:54 PM    Specimen: Blood   Result Value Ref Range    Glucose 103 70 - 130 mg/dL   CBC (No Diff)    Collection Time: 06/19/25  4:32 PM    Specimen: Blood   Result Value Ref Range    WBC 15.77 (H) 3.40 - 10.80 10*3/mm3    RBC 3.66 (L) 4.14 - 5.80 10*6/mm3    Hemoglobin 12.2 (L) 13.0 - 17.7 g/dL    Hematocrit 35.6 (L) 37.5 - 51.0 %    MCV 97.3 (H) 79.0 - 97.0 fL    MCH 33.3 (H) 26.6 - 33.0 pg    MCHC 34.3 31.5 - 35.7 g/dL    RDW 12.7 12.3 - 15.4 %    RDW-SD 45.8 37.0 - 54.0 fl    MPV 9.5 6.0 - 12.0 fL    Platelets 196 140 - 450 10*3/mm3   Renal Function Panel    Collection Time: 06/19/25  4:32 PM    Specimen: Blood   Result Value Ref Range    Glucose 162 (H) 65 - 99 mg/dL    BUN 11.4 8.0 - 23.0 mg/dL    Creatinine 0.89 0.76 - 1.27 mg/dL    Sodium 139 136 - 145 mmol/L    Potassium 4.3 3.5 - 5.2 mmol/L    Chloride 109 (H) 98 - 107 mmol/L    CO2 21.1 (L) 22.0 - 29.0 mmol/L    Calcium 8.6 8.6 - 10.5 mg/dL    Albumin 4.0 3.5 - 5.2 g/dL    Phosphorus 3.4 2.5 - 4.5 mg/dL    Anion Gap 8.9 5.0 - 15.0 mmol/L    BUN/Creatinine Ratio 12.8 7.0 - 25.0    eGFR 89.9 >60.0 mL/min/1.73   Magnesium    Collection Time: 06/19/25  4:32 PM    Specimen: Blood   Result Value Ref Range    Magnesium 1.9 1.6 - 2.4 mg/dL   Blood Gas, Arterial With Co-Ox    Collection Time: 06/19/25  4:35 PM    Specimen: Arterial Blood   Result Value Ref Range    Site Arterial Line     Ish's Test N/A     pH, Arterial 7.342 (L) 7.350 - 7.450 pH units    pCO2, Arterial 43.4 35.0 - 45.0 mm Hg    pO2, Arterial 89.0 83.0 - 108.0 mm Hg    HCO3, Arterial 23.5 20.0 - 26.0 mmol/L    Base Excess, Arterial -2.3 (L) 0.0 - 2.0 mmol/L    Hemoglobin, Blood Gas 12.3 (L) 13.5 - 17.5 g/dL    Hematocrit, Blood Gas 37.7 (L) 38.0 - 51.0 %    Oxyhemoglobin 95.4 94 - 99 %    Methemoglobin 0.40 0.00 - 1.50 %    Carboxyhemoglobin 1.0 0 - 2 %    CO2 Content 24.8 22 - 33 mmol/L    Temperature 37.0      Barometric Pressure for Blood Gas      Modality Ventilator     FIO2 40 %    Ventilator Mode PS     Rate 0 Breaths/minute    PEEP 5.0     PSV 10.0 cmH2O    PIP 0 cmH2O    IPAP 0     EPAP 0     pH, Temp Corrected 7.342 pH Units    pCO2, Temperature Corrected 43.4 35 - 48 mm Hg    pO2, Temperature Corrected 89.0 83 - 108 mm Hg   POC Glucose Once    Collection Time: 06/19/25  4:59 PM    Specimen: Blood   Result Value Ref Range    Glucose 111 70 - 130 mg/dL   POC Activated Clotting Time    Collection Time: 06/19/25  5:36 PM    Specimen: Blood   Result Value Ref Range    Activated Clotting Time  118 82 - 152 Seconds   POC Glucose Once    Collection Time: 06/19/25  5:58 PM    Specimen: Blood   Result Value Ref Range    Glucose 117 70 - 130 mg/dL   POC Glucose Once    Collection Time: 06/19/25 11:39 PM    Specimen: Blood   Result Value Ref Range    Glucose 148 (H) 70 - 130 mg/dL   POC Glucose Once    Collection Time: 06/20/25  2:16 AM    Specimen: Blood   Result Value Ref Range    Glucose 155 (H) 70 - 130 mg/dL   Comprehensive Metabolic Panel    Collection Time: 06/20/25  3:48 AM    Specimen: Arm, Right; Blood   Result Value Ref Range    Glucose 155 (H) 65 - 99 mg/dL    BUN 11.2 8.0 - 23.0 mg/dL    Creatinine 1.01 0.76 - 1.27 mg/dL    Sodium 136 136 - 145 mmol/L    Potassium 4.6 3.5 - 5.2 mmol/L    Chloride 105 98 - 107 mmol/L    CO2 19.0 (L) 22.0 - 29.0 mmol/L    Calcium 8.4 (L) 8.6 - 10.5 mg/dL    Total Protein 5.7 (L) 6.0 - 8.5 g/dL    Albumin 3.8 3.5 - 5.2 g/dL    ALT (SGPT) 21 1 - 41 U/L    AST (SGOT) 54 (H) 1 - 40 U/L    Alkaline Phosphatase 35 (L) 39 - 117 U/L    Total Bilirubin 0.6 0.0 - 1.2 mg/dL    Globulin 1.9 gm/dL    A/G Ratio 2.0 g/dL    BUN/Creatinine Ratio 11.1 7.0 - 25.0    Anion Gap 12.0 5.0 - 15.0 mmol/L    eGFR 78.0 >60.0 mL/min/1.73   Magnesium    Collection Time: 06/20/25  3:48 AM    Specimen: Arm, Right; Blood   Result Value Ref Range    Magnesium 2.4 1.6 - 2.4 mg/dL   Protime-INR    Collection  Time: 06/20/25  3:48 AM    Specimen: Arm, Right; Blood   Result Value Ref Range    Protime 16.9 (H) 12.2 - 15.3 Seconds    INR 1.29 (H) 0.89 - 1.12   CBC Auto Differential    Collection Time: 06/20/25  3:48 AM    Specimen: Arm, Right; Blood   Result Value Ref Range    WBC 11.93 (H) 3.40 - 10.80 10*3/mm3    RBC 3.98 (L) 4.14 - 5.80 10*6/mm3    Hemoglobin 13.0 13.0 - 17.7 g/dL    Hematocrit 40.2 37.5 - 51.0 %    .0 (H) 79.0 - 97.0 fL    MCH 32.7 26.6 - 33.0 pg    MCHC 32.3 31.5 - 35.7 g/dL    RDW 13.1 12.3 - 15.4 %    RDW-SD 49.1 37.0 - 54.0 fl    MPV 9.8 6.0 - 12.0 fL    Platelets 195 140 - 450 10*3/mm3    Neutrophil % 82.4 (H) 42.7 - 76.0 %    Lymphocyte % 9.6 (L) 19.6 - 45.3 %    Monocyte % 7.1 5.0 - 12.0 %    Eosinophil % 0.1 (L) 0.3 - 6.2 %    Basophil % 0.3 0.0 - 1.5 %    Immature Grans % 0.5 0.0 - 0.5 %    Neutrophils, Absolute 9.84 (H) 1.70 - 7.00 10*3/mm3    Lymphocytes, Absolute 1.14 0.70 - 3.10 10*3/mm3    Monocytes, Absolute 0.85 0.10 - 0.90 10*3/mm3    Eosinophils, Absolute 0.01 0.00 - 0.40 10*3/mm3    Basophils, Absolute 0.03 0.00 - 0.20 10*3/mm3    Immature Grans, Absolute 0.06 (H) 0.00 - 0.05 10*3/mm3    nRBC 0.0 0.0 - 0.2 /100 WBC   POC Glucose Once    Collection Time: 06/20/25  5:15 AM    Specimen: Blood   Result Value Ref Range    Glucose 127 70 - 130 mg/dL   ECG 12 Lead Chest Pain    Collection Time: 06/20/25  5:24 AM   Result Value Ref Range    QT Interval 372 ms    QTC Interval 383 ms   POC Glucose Once    Collection Time: 06/20/25  6:27 AM    Specimen: Blood   Result Value Ref Range    Glucose 140 (H) 70 - 130 mg/dL   Prepare RBC, 2 Units    Collection Time: 06/20/25  7:16 AM   Result Value Ref Range    Product Code C5773H38     Unit Number [id number]     UNIT  ABO A     UNIT  RH POS     Crossmatch Interpretation Compatible     Dispense Status RE     Blood Expiration Date 202507012359     Blood Type Barcode 6200     Product Code C7726L33     Unit Number [id number]     UNIT  ABO  A     UNIT  RH POS     Crossmatch Interpretation Compatible     Dispense Status RE     Blood Expiration Date [id number]     Blood Type Barcode 6200    Prepare RBC, 2 Units    Collection Time: 06/20/25  7:16 AM   Result Value Ref Range    Product Code G2943U01     Unit Number [id number]     UNIT  ABO A     UNIT  RH POS     Crossmatch Interpretation Compatible     Dispense Status RE     Blood Expiration Date 736456404234     Blood Type Barcode 6200     Product Code Q1044D39     Unit Number [id number]     UNIT  ABO A     UNIT  RH POS     Crossmatch Interpretation Compatible     Dispense Status RE     Blood Expiration Date [id number]     Blood Type Barcode 6200    POC Glucose Once    Collection Time: 06/20/25  7:38 AM    Specimen: Blood   Result Value Ref Range    Glucose 131 (H) 70 - 130 mg/dL   POC Glucose Once    Collection Time: 06/20/25  8:49 AM    Specimen: Blood   Result Value Ref Range    Glucose 116 70 - 130 mg/dL   POC Glucose Once    Collection Time: 06/20/25 10:04 AM    Specimen: Blood   Result Value Ref Range    Glucose 147 (H) 70 - 130 mg/dL   POC Glucose Once    Collection Time: 06/20/25 12:23 PM    Specimen: Blood   Result Value Ref Range    Glucose 120 70 - 130 mg/dL   POC Glucose Once    Collection Time: 06/20/25  5:07 PM    Specimen: Blood   Result Value Ref Range    Glucose 191 (H) 70 - 130 mg/dL   POC Glucose Once    Collection Time: 06/20/25  8:30 PM    Specimen: Blood   Result Value Ref Range    Glucose 149 (H) 70 - 130 mg/dL   Comprehensive Metabolic Panel    Collection Time: 06/21/25  2:38 AM    Specimen: Blood   Result Value Ref Range    Glucose 142 (H) 65 - 99 mg/dL    BUN 11.1 8.0 - 23.0 mg/dL    Creatinine 0.84 0.76 - 1.27 mg/dL    Sodium 130 (L) 136 - 145 mmol/L    Potassium 4.6 3.5 - 5.2 mmol/L    Chloride 102 98 - 107 mmol/L    CO2 21.0 (L) 22.0 - 29.0 mmol/L    Calcium 8.5 (L) 8.6 - 10.5 mg/dL    Total Protein 5.7 (L) 6.0 - 8.5 g/dL    Albumin 3.5 3.5 - 5.2 g/dL    ALT  (SGPT) 16 1 - 41 U/L    AST (SGOT) 40 1 - 40 U/L    Alkaline Phosphatase 39 39 - 117 U/L    Total Bilirubin 0.7 0.0 - 1.2 mg/dL    Globulin 2.2 gm/dL    A/G Ratio 1.6 g/dL    BUN/Creatinine Ratio 13.2 7.0 - 25.0    Anion Gap 7.0 5.0 - 15.0 mmol/L    eGFR 91.5 >60.0 mL/min/1.73   CBC (No Diff)    Collection Time: 06/21/25  2:38 AM    Specimen: Blood   Result Value Ref Range    WBC 15.88 (H) 3.40 - 10.80 10*3/mm3    RBC 3.73 (L) 4.14 - 5.80 10*6/mm3    Hemoglobin 12.3 (L) 13.0 - 17.7 g/dL    Hematocrit 37.0 (L) 37.5 - 51.0 %    MCV 99.2 (H) 79.0 - 97.0 fL    MCH 33.0 26.6 - 33.0 pg    MCHC 33.2 31.5 - 35.7 g/dL    RDW 12.8 12.3 - 15.4 %    RDW-SD 46.5 37.0 - 54.0 fl    MPV 9.8 6.0 - 12.0 fL    Platelets 183 140 - 450 10*3/mm3   ECG 12 Lead Chest Pain    Collection Time: 06/21/25  5:24 AM   Result Value Ref Range    QT Interval 338 ms    QTC Interval 385 ms   POC Glucose Once    Collection Time: 06/21/25  7:03 AM    Specimen: Blood   Result Value Ref Range    Glucose 135 (H) 70 - 130 mg/dL   POC Glucose Once    Collection Time: 06/21/25 11:05 AM    Specimen: Blood   Result Value Ref Range    Glucose 148 (H) 70 - 130 mg/dL   POC Glucose Once    Collection Time: 06/21/25  4:06 PM    Specimen: Blood   Result Value Ref Range    Glucose 138 (H) 70 - 130 mg/dL   POC Glucose Once    Collection Time: 06/21/25  8:19 PM    Specimen: Blood   Result Value Ref Range    Glucose 133 (H) 70 - 130 mg/dL   CBC (No Diff)    Collection Time: 06/22/25  5:02 AM    Specimen: Blood   Result Value Ref Range    WBC 14.83 (H) 3.40 - 10.80 10*3/mm3    RBC 3.66 (L) 4.14 - 5.80 10*6/mm3    Hemoglobin 12.1 (L) 13.0 - 17.7 g/dL    Hematocrit 36.5 (L) 37.5 - 51.0 %    MCV 99.7 (H) 79.0 - 97.0 fL    MCH 33.1 (H) 26.6 - 33.0 pg    MCHC 33.2 31.5 - 35.7 g/dL    RDW 12.4 12.3 - 15.4 %    RDW-SD 45.5 37.0 - 54.0 fl    MPV 10.1 6.0 - 12.0 fL    Platelets 164 140 - 450 10*3/mm3   Basic Metabolic Panel    Collection Time: 06/22/25  5:03 AM    Specimen: Blood    Result Value Ref Range    Glucose 105 (H) 65 - 99 mg/dL    BUN 19.4 8.0 - 23.0 mg/dL    Creatinine 0.82 0.76 - 1.27 mg/dL    Sodium 131 (L) 136 - 145 mmol/L    Potassium 4.5 3.5 - 5.2 mmol/L    Chloride 95 (L) 98 - 107 mmol/L    CO2 28.0 22.0 - 29.0 mmol/L    Calcium 9.0 8.6 - 10.5 mg/dL    BUN/Creatinine Ratio 23.7 7.0 - 25.0    Anion Gap 8.0 5.0 - 15.0 mmol/L    eGFR 92.2 >60.0 mL/min/1.73   POC Glucose Once    Collection Time: 06/22/25  7:10 AM    Specimen: Blood   Result Value Ref Range    Glucose 112 70 - 130 mg/dL   POC Glucose Once    Collection Time: 06/22/25 11:27 AM    Specimen: Blood   Result Value Ref Range    Glucose 123 70 - 130 mg/dL   POC Glucose Once    Collection Time: 06/22/25  4:17 PM    Specimen: Blood   Result Value Ref Range    Glucose 117 70 - 130 mg/dL   POC Glucose Once    Collection Time: 06/22/25  8:17 PM    Specimen: Blood   Result Value Ref Range    Glucose 142 (H) 70 - 130 mg/dL   Basic Metabolic Panel    Collection Time: 06/23/25  4:22 AM    Specimen: Blood   Result Value Ref Range    Glucose 107 (H) 65 - 99 mg/dL    BUN 17.8 8.0 - 23.0 mg/dL    Creatinine 0.80 0.76 - 1.27 mg/dL    Sodium 135 (L) 136 - 145 mmol/L    Potassium 4.2 3.5 - 5.2 mmol/L    Chloride 97 (L) 98 - 107 mmol/L    CO2 29.0 22.0 - 29.0 mmol/L    Calcium 9.0 8.6 - 10.5 mg/dL    BUN/Creatinine Ratio 22.3 7.0 - 25.0    Anion Gap 9.0 5.0 - 15.0 mmol/L    eGFR 92.9 >60.0 mL/min/1.73   CBC (No Diff)    Collection Time: 06/23/25  4:22 AM    Specimen: Blood   Result Value Ref Range    WBC 10.96 (H) 3.40 - 10.80 10*3/mm3    RBC 3.47 (L) 4.14 - 5.80 10*6/mm3    Hemoglobin 11.4 (L) 13.0 - 17.7 g/dL    Hematocrit 34.3 (L) 37.5 - 51.0 %    MCV 98.8 (H) 79.0 - 97.0 fL    MCH 32.9 26.6 - 33.0 pg    MCHC 33.2 31.5 - 35.7 g/dL    RDW 12.4 12.3 - 15.4 %    RDW-SD 44.8 37.0 - 54.0 fl    MPV 10.6 6.0 - 12.0 fL    Platelets 195 140 - 450 10*3/mm3   POC Glucose Once    Collection Time: 06/23/25  7:06 AM    Specimen: Blood   Result  Value Ref Range    Glucose 99 70 - 130 mg/dL   POC Glucose Once    Collection Time: 06/23/25 11:33 AM    Specimen: Blood   Result Value Ref Range    Glucose 115 70 - 130 mg/dL   POC Glucose Once    Collection Time: 06/23/25  4:04 PM    Specimen: Blood   Result Value Ref Range    Glucose 132 (H) 70 - 130 mg/dL   POC Glucose Once    Collection Time: 06/23/25  8:03 PM    Specimen: Blood   Result Value Ref Range    Glucose 102 70 - 130 mg/dL   CBC (No Diff)    Collection Time: 06/24/25  4:51 AM    Specimen: Blood   Result Value Ref Range    WBC 10.08 3.40 - 10.80 10*3/mm3    RBC 3.44 (L) 4.14 - 5.80 10*6/mm3    Hemoglobin 11.4 (L) 13.0 - 17.7 g/dL    Hematocrit 33.2 (L) 37.5 - 51.0 %    MCV 96.5 79.0 - 97.0 fL    MCH 33.1 (H) 26.6 - 33.0 pg    MCHC 34.3 31.5 - 35.7 g/dL    RDW 12.4 12.3 - 15.4 %    RDW-SD 43.5 37.0 - 54.0 fl    MPV 9.9 6.0 - 12.0 fL    Platelets 225 140 - 450 10*3/mm3   Magnesium    Collection Time: 06/24/25  4:51 AM    Specimen: Blood   Result Value Ref Range    Magnesium 1.8 1.6 - 2.4 mg/dL   Basic Metabolic Panel    Collection Time: 06/24/25  4:51 AM    Specimen: Blood   Result Value Ref Range    Glucose 100 (H) 65 - 99 mg/dL    BUN 20.8 8.0 - 23.0 mg/dL    Creatinine 0.94 0.76 - 1.27 mg/dL    Sodium 134 (L) 136 - 145 mmol/L    Potassium 3.4 (L) 3.5 - 5.2 mmol/L    Chloride 95 (L) 98 - 107 mmol/L    CO2 30.0 (H) 22.0 - 29.0 mmol/L    Calcium 8.7 8.6 - 10.5 mg/dL    BUN/Creatinine Ratio 22.1 7.0 - 25.0    Anion Gap 9.0 5.0 - 15.0 mmol/L    eGFR 85.1 >60.0 mL/min/1.73   POC Glucose Once    Collection Time: 06/24/25  7:13 AM    Specimen: Blood   Result Value Ref Range    Glucose 120 70 - 130 mg/dL   POC Glucose Once    Collection Time: 06/24/25 10:59 AM    Specimen: Blood   Result Value Ref Range    Glucose 101 70 - 130 mg/dL   Adult Transthoracic Echo Limited W/ Cont if Necessary Per Protocol    Collection Time: 06/24/25 11:59 AM   Result Value Ref Range    LVIDd 3.5 cm    LVIDs 2.40 cm    IVSd 1.10 cm     LVPWd 1.40 cm    FS 31.4 %    IVS/LVPW 0.79 cm    ESV(cubed) 13.8 ml    LV Sys Vol (BSA corrected) 13.1 cm2    EDV(cubed) 42.9 ml    LV Barrera Vol (BSA corrected) 40.9 cm2    LV mass(C)d 144.6 grams    EDV(MOD-sp4) 85.6 ml    ESV(MOD-sp4) 27.3 ml    SV(MOD-sp4) 58.3 ml    SVi(MOD-SP4) 27.9 ml/m2    EF(MOD-sp4) 68.1 %    Med Peak E' Mitch 5.8 cm/sec    Lat Peak E' Mitch 8.2 cm/sec    RV S' 7.9 cm/sec   Potassium    Collection Time: 06/24/25  1:33 PM    Specimen: Blood   Result Value Ref Range    Potassium 4.3 3.5 - 5.2 mmol/L   POC Glucose Once    Collection Time: 06/24/25  4:10 PM    Specimen: Blood   Result Value Ref Range    Glucose 115 70 - 130 mg/dL   POC Glucose Once    Collection Time: 06/24/25  8:01 PM    Specimen: Blood   Result Value Ref Range    Glucose 135 (H) 70 - 130 mg/dL   Magnesium    Collection Time: 06/25/25  5:03 AM    Specimen: Blood   Result Value Ref Range    Magnesium 2.3 1.6 - 2.4 mg/dL   Basic Metabolic Panel    Collection Time: 06/25/25  5:03 AM    Specimen: Blood   Result Value Ref Range    Glucose 101 (H) 65 - 99 mg/dL    BUN 19.8 8.0 - 23.0 mg/dL    Creatinine 0.86 0.76 - 1.27 mg/dL    Sodium 138 136 - 145 mmol/L    Potassium 4.2 3.5 - 5.2 mmol/L    Chloride 99 98 - 107 mmol/L    CO2 29.9 (H) 22.0 - 29.0 mmol/L    Calcium 9.4 8.6 - 10.5 mg/dL    BUN/Creatinine Ratio 23.0 7.0 - 25.0    Anion Gap 9.1 5.0 - 15.0 mmol/L    eGFR 90.9 >60.0 mL/min/1.73   CBC (No Diff)    Collection Time: 06/25/25  5:03 AM    Specimen: Blood   Result Value Ref Range    WBC 9.39 3.40 - 10.80 10*3/mm3    RBC 3.54 (L) 4.14 - 5.80 10*6/mm3    Hemoglobin 11.7 (L) 13.0 - 17.7 g/dL    Hematocrit 34.4 (L) 37.5 - 51.0 %    MCV 97.2 (H) 79.0 - 97.0 fL    MCH 33.1 (H) 26.6 - 33.0 pg    MCHC 34.0 31.5 - 35.7 g/dL    RDW 12.7 12.3 - 15.4 %    RDW-SD 45.3 37.0 - 54.0 fl    MPV 9.7 6.0 - 12.0 fL    Platelets 246 140 - 450 10*3/mm3   POC Glucose Once    Collection Time: 06/25/25  7:55 AM    Specimen: Blood   Result Value Ref  Range    Glucose 126 70 - 130 mg/dL   POC Glucose Once    Collection Time: 06/25/25 11:09 AM    Specimen: Blood   Result Value Ref Range    Glucose 110 70 - 130 mg/dL   POC Glucose Once    Collection Time: 06/25/25  4:22 PM    Specimen: Blood   Result Value Ref Range    Glucose 111 70 - 130 mg/dL   POC Glucose Once    Collection Time: 06/25/25  8:13 PM    Specimen: Blood   Result Value Ref Range    Glucose 107 70 - 130 mg/dL   Magnesium    Collection Time: 06/26/25  5:13 AM    Specimen: Blood   Result Value Ref Range    Magnesium 2.2 1.6 - 2.4 mg/dL   Basic Metabolic Panel    Collection Time: 06/26/25  5:13 AM    Specimen: Blood   Result Value Ref Range    Glucose 102 (H) 65 - 99 mg/dL    BUN 22.7 8.0 - 23.0 mg/dL    Creatinine 0.88 0.76 - 1.27 mg/dL    Sodium 139 136 - 145 mmol/L    Potassium 3.6 3.5 - 5.2 mmol/L    Chloride 99 98 - 107 mmol/L    CO2 28.4 22.0 - 29.0 mmol/L    Calcium 9.1 8.6 - 10.5 mg/dL    BUN/Creatinine Ratio 25.8 (H) 7.0 - 25.0    Anion Gap 11.6 5.0 - 15.0 mmol/L    eGFR 90.2 >60.0 mL/min/1.73   CBC (No Diff)    Collection Time: 06/26/25  5:13 AM    Specimen: Blood   Result Value Ref Range    WBC 10.09 3.40 - 10.80 10*3/mm3    RBC 3.58 (L) 4.14 - 5.80 10*6/mm3    Hemoglobin 11.8 (L) 13.0 - 17.7 g/dL    Hematocrit 34.6 (L) 37.5 - 51.0 %    MCV 96.6 79.0 - 97.0 fL    MCH 33.0 26.6 - 33.0 pg    MCHC 34.1 31.5 - 35.7 g/dL    RDW 12.7 12.3 - 15.4 %    RDW-SD 44.7 37.0 - 54.0 fl    MPV 9.6 6.0 - 12.0 fL    Platelets 299 140 - 450 10*3/mm3   POC Glucose Once    Collection Time: 06/26/25  7:10 AM    Specimen: Blood   Result Value Ref Range    Glucose 96 70 - 130 mg/dL   POC Glucose Once    Collection Time: 06/26/25 10:50 AM    Specimen: Blood   Result Value Ref Range    Glucose 110 70 - 130 mg/dL   Duplex Venous Lower Extremity - Left CAR    Collection Time: 06/29/25  4:11 PM   Result Value Ref Range    Right Common Femoral Spont Y     Right Common Femoral Phasic Y     Right Common Femoral Augment Y      Left Common Femoral Spont Y     Left Common Femoral Phasic Y     Left Common Femoral Compress C     Left Common Femoral Augment Y     Left Saphenofemoral Junction Compress C     Left Profunda Femoral Compress C     Left Proximal Femoral Compress C     Left Mid Femoral Spont Y     Left Mid Femoral Phasic Y     Left Mid Femoral Compress C     Left Mid Femoral Augment Y     Left Distal Femoral Compress C     Left Popliteal Spont Y     Left Popliteal Phasic Y     Left Popliteal Compress C     Left Popliteal Augment Y     Left Posterior Tibial Compress C     Left Peroneal Compress C     Left Gastronemius Compress C     Left Lesser Saph Compress C     BH CV VAS PRELIMINARY FINDINGS SCRIPTING 1.0     Left Saphenofemoral Junction Spont Y     Left Saphenofemoral Junction Phasic Y     Left Saphenofemoral Junction Augment Y     Left Proximal Femoral Spont Y     Left Proximal Femoral Phasic Y     Left Proximal Femoral Augment Y     Left Distal Femoral Spont Y     Left Distal Femoral Phasic Y     Left Distal Femoral Augment Y    ECG 12 Lead Dyspnea    Collection Time: 07/07/25 10:58 AM   Result Value Ref Range    QT Interval 390 ms    QTC Interval 427 ms   Duplex Venous Upper Extremity - RIGHT    Collection Time: 07/07/25 11:37 AM   Result Value Ref Range    Right Basilic Upper Color 1.0     Right Basilic Forearm Color 1.0     Right Cephalic Forearm Color 1.0     Right Internal Jugular Spont Y     Right Internal Jugular Phasic Y     Right Internal Jugular Compress C     Right Internal Jugular Augment Y     Right Subclavian Spont Y     Right Subclavian Phasic Y     Right Subclavian Compress C     Right Subclavian Augment Y     Right Axillary Spont Y     Right Axillary Phasic Y     Right Axillary Compress C     Right Axillary Augment Y     Right Brachial Compress C     Right Radial Compress C     Right Ulnar Compress C     Right Basilic Upper Compress N     Right Basilic Forearm Compress N     Right Cephalic Upper Compress C      Right Cephalic Forearm Compress N     Left Subclavian Spont Y     Left Subclavian Phasic Y     Left Subclavian Compress C     Left Subclavian Augment Y     BH CV VAS PRELIMINARY FINDINGS SCRIPTING 1.0    Comprehensive Metabolic Panel    Collection Time: 07/07/25 11:52 AM    Specimen: Blood   Result Value Ref Range    Glucose 91 65 - 99 mg/dL    BUN 15.4 8.0 - 23.0 mg/dL    Creatinine 0.85 0.76 - 1.27 mg/dL    Sodium 137 136 - 145 mmol/L    Potassium 4.4 3.5 - 5.2 mmol/L    Chloride 99 98 - 107 mmol/L    CO2 28.0 22.0 - 29.0 mmol/L    Calcium 9.2 8.6 - 10.5 mg/dL    Total Protein 6.9 6.0 - 8.5 g/dL    Albumin 3.8 3.5 - 5.2 g/dL    ALT (SGPT) 19 1 - 41 U/L    AST (SGOT) 24 1 - 40 U/L    Alkaline Phosphatase 95 39 - 117 U/L    Total Bilirubin 0.4 0.0 - 1.2 mg/dL    Globulin 3.1 gm/dL    A/G Ratio 1.2 g/dL    BUN/Creatinine Ratio 18.1 7.0 - 25.0    Anion Gap 10.0 5.0 - 15.0 mmol/L    eGFR 91.2 >60.0 mL/min/1.73   Procalcitonin    Collection Time: 07/07/25 11:52 AM    Specimen: Blood   Result Value Ref Range    Procalcitonin 0.08 0.00 - 0.25 ng/mL   CBC Auto Differential    Collection Time: 07/07/25 11:52 AM    Specimen: Blood   Result Value Ref Range    WBC 9.22 3.40 - 10.80 10*3/mm3    RBC 4.05 (L) 4.14 - 5.80 10*6/mm3    Hemoglobin 12.9 (L) 13.0 - 17.7 g/dL    Hematocrit 39.6 37.5 - 51.0 %    MCV 97.8 (H) 79.0 - 97.0 fL    MCH 31.9 26.6 - 33.0 pg    MCHC 32.6 31.5 - 35.7 g/dL    RDW 13.0 12.3 - 15.4 %    RDW-SD 46.5 37.0 - 54.0 fl    MPV 8.9 6.0 - 12.0 fL    Platelets 790 (H) 140 - 450 10*3/mm3    Neutrophil % 56.6 42.7 - 76.0 %    Lymphocyte % 24.7 19.6 - 45.3 %    Monocyte % 14.0 (H) 5.0 - 12.0 %    Eosinophil % 3.3 0.3 - 6.2 %    Basophil % 0.4 0.0 - 1.5 %    Immature Grans % 1.0 (H) 0.0 - 0.5 %    Neutrophils, Absolute 5.22 1.70 - 7.00 10*3/mm3    Lymphocytes, Absolute 2.28 0.70 - 3.10 10*3/mm3    Monocytes, Absolute 1.29 (H) 0.10 - 0.90 10*3/mm3    Eosinophils, Absolute 0.30 0.00 - 0.40 10*3/mm3    Basophils,  Absolute 0.04 0.00 - 0.20 10*3/mm3    Immature Grans, Absolute 0.09 (H) 0.00 - 0.05 10*3/mm3    nRBC 0.0 0.0 - 0.2 /100 WBC       Procedures    Medication Review: Medications reviewed and noted    Patient wellness counseling  Exercise: Continue active ADL and begin cardiac rehab July 15, 2025  Diet: Healthy cardiac low-carb low-cholesterol  Screening:  Social History     Socioeconomic History    Marital status:    Tobacco Use    Smoking status: Never     Passive exposure: Past    Smokeless tobacco: Never   Vaping Use    Vaping status: Never Used   Substance and Sexual Activity    Alcohol use: Not Currently     Alcohol/week: 14.0 standard drinks of alcohol     Types: 7 Glasses of wine, 7 Cans of beer per week     Comment: beer mostly in summer after cycling; liquor is [place]    Drug use: No    Sexual activity: Not Currently     Partners: Female     Birth control/protection: None        Assessment/Plan:    Assessment & Plan  1. Postoperative status following coronary artery bypass grafting.  - Underwent four-vessel coronary bypass grafting on 06/19/2025, discharged on 06/26/2025, with ER visits on 06/29/2025 for wound care and 07/07/2025 for phlebitis of the right arm.  - Blood pressure has improved since reducing enalapril to 2.5 mg daily from 5 mg.  - Reports feeling better overall, with improved energy levels and reduced cough.  - Advised to continue monitoring blood pressure and use finger monitor to check for any abnormal rhythms. Vitamin E oil recommended for scar healing. Cardiothoracic physical therapy to start tomorrow.    2. Mixed hyperlipidemia.  - No specific symptoms discussed.  - Lipid-lowering therapy effectiveness not explicitly mentioned.  - Continue current lipid-lowering therapy and maintain a heart-healthy diet.  - No new medications or therapies prescribed.    3. Essential hypertension.  - Blood pressure has improved since reducing enalapril to 2.5 mg daily from 5 mg.  - Blood pressure  readings are stable, with recent measurements around 110/70.  - Continue current regimen and monitor blood pressure regularly.  - No new medications or therapies prescribed.    4. Phlebitis of the right arm.  - ER visit on 07/07/2025 for phlebitis of the right arm.  - Condition has resolved completely.  - No further assessment or tests required.  - No new medications or therapies prescribed.    5. Ankle swelling.  - Reports mild swelling in the ankle.  - Physical exam confirms swelling in the ankle.  - Advised to keep ankle elevated as much as possible.  - No new medications or therapies prescribed.    Follow-up  - Scheduled follow-up on 09/30/2025.    Problem List Items Addressed This Visit          Cardiac and Vasculature    Primary hypertension    Overview   Target blood pressure <130/80 mmHg         Current Assessment & Plan   Essential hypertension 112/80 left and right sitting heart rate of 64 O2 saturation 95% on enalapril 2.5 mg daily and metoprolol tartrate 25 mg twice daily to continue therapy         Relevant Medications    enalapril (VASOTEC) 5 MG tablet    metoprolol tartrate (LOPRESSOR) 25 MG tablet    Hyperlipidemia LDL goal <70    Overview   History of mixed hyperlipidemia on atorvastatin 20 mg daily denies myalgia arthralgia         Current Assessment & Plan    Hyperlipidemia on atorvastatin 40 mg daily denies myalgia or arthralgia continue therapy         Relevant Medications    atorvastatin (LIPITOR) 40 MG tablet    CAD s/p CABG x 4 6/19/2025 - Primary    Overview   Nuclear stress (6/26/2024): Exercised for 7: 48 (6: 50 expected).  Small apical infarct.  Dense coronary artery calcification noted on CT attenuation correction images.  LVEF >70%  Cardiac catheterization for unstable (6/18/2025): Severe left main and severe multi-vessel CAD (LAD/diagonal, ramus, RCA).  Near normal LV filling pressure (LVEDP 13 mmHg)    Coronary bypass grafting x 4 June 19, 2025, LIMA to LAD, greater saphenous vein  left leg to PDA, OM1, D1.  By Dr. Ng         Current Assessment & Plan   Patient have abnormal cardiac catheterization on June 18, 2025 with severe left main, severe LAD ramus and RCA  Underwent LIMA to the LAD and 3-vessel coronary bypass grafting  He developed some soreness at the vein harvest site in his leg and is treating with warm compresses improved and developed phlebitis on the right forearm intravenous site improved with warm compresses  Overall feeling well and will begin cardiac rehab on July 15, 2025         Relevant Medications    metoprolol tartrate (LOPRESSOR) 25 MG tablet    HYDROcodone-acetaminophen (NORCO) 7.5-325 MG per tablet       Coag and Thromboembolic    Thrombophlebitis of right arm    Overview   Patient has thrombophlebitis of the right arm seen in [facility] ER July 7, 2025         Current Assessment & Plan   Thrombophlebitis of the right arm improved with heat therapy             Patient Instructions   Coronary bypass grafting of June 19 stable starts cardiac rehab July 15  Wound of left leg healing at the vein harvest site  Thrombophlebitis of the right forearm vein improved but continue heat application  Continue all current therapy  Continue walking exercise and start cardiac rehab July 15  No need for office visit on July 16 with APRN  Return visit September 30, 2025  Follow-up with cardiology  Follow-up with cardiac surgery     Plan of care reviewed with patient at the conclusion of today's visit. Education was provided regarding diagnosis, management, and any prescribed or recommended OTC medications.Patient verbalizes understanding of and agreement with management plan.         Patient or patient representative verbalized consent for the use of Ambient Listening during the visit with  Keshawn Alexandre MD for chart documentation. 7/14/2025  18:54 EDT      Answers submitted by the patient for this visit:  Chronic Condition Follow-up (Submitted on 7/13/2025)  Chief Complaint:  PCP follow-up  other: Yes  Medication compliance: all of the time  Treatment barriers: no complaince problems  Exercise: daily  Additional other condition information: Feeling better every day

## 2025-07-14 NOTE — PATIENT INSTRUCTIONS
Coronary bypass grafting of June 19 stable starts cardiac rehab July 15  Wound of left leg healing at the vein harvest site  Thrombophlebitis of the right forearm vein improved but continue heat application  Continue all current therapy  Continue walking exercise and start cardiac rehab July 15  No need for office visit on July 16 with APRN  Return visit September 30, 2025  Follow-up with cardiology  Follow-up with cardiac surgery

## 2025-07-14 NOTE — ASSESSMENT & PLAN NOTE
Patient have abnormal cardiac catheterization on June 18, 2025 with severe left main, severe LAD ramus and RCA  Underwent LIMA to the LAD and 3-vessel coronary bypass grafting  He developed some soreness at the vein harvest site in his leg and is treating with warm compresses improved and developed phlebitis on the right forearm intravenous site improved with warm compresses  Overall feeling well and will begin cardiac rehab on July 15, 2025

## 2025-07-15 ENCOUNTER — TREATMENT (OUTPATIENT)
Dept: CARDIAC REHAB | Facility: HOSPITAL | Age: 75
End: 2025-07-15
Payer: MEDICARE

## 2025-07-15 DIAGNOSIS — Z95.1 S/P CABG (CORONARY ARTERY BYPASS GRAFT): Primary | ICD-10-CM

## 2025-07-15 DIAGNOSIS — Z95.1 S/P CABG (CORONARY ARTERY BYPASS GRAFT): ICD-10-CM

## 2025-07-15 PROCEDURE — 93798 PHYS/QHP OP CAR RHAB W/ECG: CPT

## 2025-07-15 NOTE — PROGRESS NOTES
Pt attended Phase II Orientation.  Heart and lung assessment completed per RN and within normal limits.  Fall risk assessment completed.  See [facility] for details.

## 2025-07-17 ENCOUNTER — TREATMENT (OUTPATIENT)
Dept: CARDIAC REHAB | Facility: HOSPITAL | Age: 75
End: 2025-07-17
Payer: MEDICARE

## 2025-07-17 DIAGNOSIS — Z95.1 S/P CABG (CORONARY ARTERY BYPASS GRAFT): Primary | ICD-10-CM

## 2025-07-17 PROCEDURE — 93798 PHYS/QHP OP CAR RHAB W/ECG: CPT

## 2025-07-17 PROCEDURE — 93797 PHYS/QHP OP CAR RHAB WO ECG: CPT

## 2025-07-17 NOTE — PROGRESS NOTES
Attended Phase II Intro to Exercise class and Cardiac Rehab session. No medication or health history changes reported. See [facility] for details.

## 2025-07-18 ENCOUNTER — TREATMENT (OUTPATIENT)
Dept: CARDIAC REHAB | Facility: HOSPITAL | Age: 75
End: 2025-07-18
Payer: MEDICARE

## 2025-07-18 DIAGNOSIS — Z95.1 S/P CABG (CORONARY ARTERY BYPASS GRAFT): Primary | ICD-10-CM

## 2025-07-18 PROCEDURE — 93798 PHYS/QHP OP CAR RHAB W/ECG: CPT

## 2025-07-21 ENCOUNTER — TREATMENT (OUTPATIENT)
Dept: CARDIAC REHAB | Facility: HOSPITAL | Age: 75
End: 2025-07-21
Payer: MEDICARE

## 2025-07-21 DIAGNOSIS — Z95.1 S/P CABG (CORONARY ARTERY BYPASS GRAFT): Primary | ICD-10-CM

## 2025-07-21 PROCEDURE — 93798 PHYS/QHP OP CAR RHAB W/ECG: CPT

## 2025-07-23 ENCOUNTER — READMISSION MANAGEMENT (OUTPATIENT)
Dept: CALL CENTER | Facility: HOSPITAL | Age: 75
End: 2025-07-23
Payer: MEDICARE

## 2025-07-23 ENCOUNTER — TREATMENT (OUTPATIENT)
Dept: CARDIAC REHAB | Facility: HOSPITAL | Age: 75
End: 2025-07-23
Payer: MEDICARE

## 2025-07-23 DIAGNOSIS — Z95.1 S/P CABG (CORONARY ARTERY BYPASS GRAFT): Primary | ICD-10-CM

## 2025-07-23 PROCEDURE — 93798 PHYS/QHP OP CAR RHAB W/ECG: CPT

## 2025-07-23 NOTE — OUTREACH NOTE
CT Surgery Week 3 Survey      Flowsheet Row Responses   [facility] patient discharged from? [facility]   Does the patient have one of the following disease processes/diagnoses(primary or secondary)? Cardiothoracic surgery   Week 3 attempt successful? No   Unsuccessful attempts Attempt 1   Lowell Govea Registered Nurse

## 2025-07-25 ENCOUNTER — TREATMENT (OUTPATIENT)
Dept: CARDIAC REHAB | Facility: HOSPITAL | Age: 75
End: 2025-07-25
Payer: MEDICARE

## 2025-07-25 DIAGNOSIS — Z95.1 S/P CABG (CORONARY ARTERY BYPASS GRAFT): Primary | ICD-10-CM

## 2025-07-25 PROCEDURE — 93798 PHYS/QHP OP CAR RHAB W/ECG: CPT

## 2025-07-28 ENCOUNTER — TREATMENT (OUTPATIENT)
Dept: CARDIAC REHAB | Facility: HOSPITAL | Age: 75
End: 2025-07-28
Payer: MEDICARE

## 2025-07-28 DIAGNOSIS — Z95.1 S/P CABG (CORONARY ARTERY BYPASS GRAFT): Primary | ICD-10-CM

## 2025-07-28 PROCEDURE — 93798 PHYS/QHP OP CAR RHAB W/ECG: CPT

## 2025-07-29 ENCOUNTER — OUTSIDE FACILITY SERVICE (OUTPATIENT)
Dept: CARDIAC SURGERY | Facility: CLINIC | Age: 75
End: 2025-07-29
Payer: MEDICARE

## 2025-07-30 ENCOUNTER — TREATMENT (OUTPATIENT)
Dept: CARDIAC REHAB | Facility: HOSPITAL | Age: 75
End: 2025-07-30
Payer: MEDICARE

## 2025-07-30 DIAGNOSIS — Z95.1 S/P CABG (CORONARY ARTERY BYPASS GRAFT): Primary | ICD-10-CM

## 2025-07-30 PROCEDURE — 93798 PHYS/QHP OP CAR RHAB W/ECG: CPT

## 2025-08-01 ENCOUNTER — TREATMENT (OUTPATIENT)
Dept: CARDIAC REHAB | Facility: HOSPITAL | Age: 75
End: 2025-08-01
Payer: MEDICARE

## 2025-08-01 DIAGNOSIS — Z95.1 S/P CABG (CORONARY ARTERY BYPASS GRAFT): Primary | ICD-10-CM

## 2025-08-01 PROCEDURE — 93798 PHYS/QHP OP CAR RHAB W/ECG: CPT

## 2025-08-04 ENCOUNTER — TREATMENT (OUTPATIENT)
Dept: CARDIAC REHAB | Facility: HOSPITAL | Age: 75
End: 2025-08-04
Payer: MEDICARE

## 2025-08-04 DIAGNOSIS — Z95.1 S/P CABG (CORONARY ARTERY BYPASS GRAFT): Primary | ICD-10-CM

## 2025-08-04 PROCEDURE — 93798 PHYS/QHP OP CAR RHAB W/ECG: CPT

## 2025-08-06 ENCOUNTER — TREATMENT (OUTPATIENT)
Dept: CARDIAC REHAB | Facility: HOSPITAL | Age: 75
End: 2025-08-06
Payer: MEDICARE

## 2025-08-06 DIAGNOSIS — Z95.1 S/P CABG (CORONARY ARTERY BYPASS GRAFT): Primary | ICD-10-CM

## 2025-08-06 PROCEDURE — 93798 PHYS/QHP OP CAR RHAB W/ECG: CPT

## 2025-08-06 PROCEDURE — 93797 PHYS/QHP OP CAR RHAB WO ECG: CPT

## 2025-08-07 ENCOUNTER — OFFICE VISIT (OUTPATIENT)
Dept: CARDIOLOGY | Facility: CLINIC | Age: 75
End: 2025-08-07
Payer: MEDICARE

## 2025-08-07 VITALS
DIASTOLIC BLOOD PRESSURE: 62 MMHG | BODY MASS INDEX: 27.06 KG/M2 | HEIGHT: 70 IN | HEART RATE: 68 BPM | SYSTOLIC BLOOD PRESSURE: 100 MMHG | WEIGHT: 189 LBS | OXYGEN SATURATION: 96 %

## 2025-08-07 DIAGNOSIS — I10 PRIMARY HYPERTENSION: ICD-10-CM

## 2025-08-07 DIAGNOSIS — I25.110 CORONARY ARTERY DISEASE INVOLVING NATIVE CORONARY ARTERY OF NATIVE HEART WITH UNSTABLE ANGINA PECTORIS: Primary | ICD-10-CM

## 2025-08-07 DIAGNOSIS — E78.2 MIXED HYPERLIPIDEMIA: ICD-10-CM

## 2025-08-07 RX ORDER — LOSARTAN POTASSIUM 25 MG/1
25 TABLET ORAL DAILY
Qty: 30 TABLET | Refills: 3 | Status: SHIPPED | OUTPATIENT
Start: 2025-08-07

## 2025-08-08 ENCOUNTER — TREATMENT (OUTPATIENT)
Dept: CARDIAC REHAB | Facility: HOSPITAL | Age: 75
End: 2025-08-08
Payer: MEDICARE

## 2025-08-08 DIAGNOSIS — Z95.1 S/P CABG (CORONARY ARTERY BYPASS GRAFT): Primary | ICD-10-CM

## 2025-08-08 PROCEDURE — 93798 PHYS/QHP OP CAR RHAB W/ECG: CPT

## 2025-08-11 ENCOUNTER — TREATMENT (OUTPATIENT)
Dept: CARDIAC REHAB | Facility: HOSPITAL | Age: 75
End: 2025-08-11
Payer: MEDICARE

## 2025-08-11 DIAGNOSIS — Z95.1 S/P CABG (CORONARY ARTERY BYPASS GRAFT): Primary | ICD-10-CM

## 2025-08-11 PROCEDURE — 93797 PHYS/QHP OP CAR RHAB WO ECG: CPT

## 2025-08-11 PROCEDURE — 93798 PHYS/QHP OP CAR RHAB W/ECG: CPT

## 2025-08-12 ENCOUNTER — OFFICE VISIT (OUTPATIENT)
Dept: CARDIAC SURGERY | Facility: CLINIC | Age: 75
End: 2025-08-12
Payer: MEDICARE

## 2025-08-12 VITALS
OXYGEN SATURATION: 97 % | HEIGHT: 70 IN | WEIGHT: 188 LBS | DIASTOLIC BLOOD PRESSURE: 70 MMHG | TEMPERATURE: 97.4 F | SYSTOLIC BLOOD PRESSURE: 118 MMHG | BODY MASS INDEX: 26.92 KG/M2 | HEART RATE: 69 BPM

## 2025-08-12 DIAGNOSIS — Z95.1 S/P CABG (CORONARY ARTERY BYPASS GRAFT): Primary | ICD-10-CM

## 2025-08-13 ENCOUNTER — TREATMENT (OUTPATIENT)
Dept: CARDIAC REHAB | Facility: HOSPITAL | Age: 75
End: 2025-08-13
Payer: MEDICARE

## 2025-08-13 DIAGNOSIS — Z95.1 S/P CABG (CORONARY ARTERY BYPASS GRAFT): Primary | ICD-10-CM

## 2025-08-13 PROCEDURE — 93798 PHYS/QHP OP CAR RHAB W/ECG: CPT

## 2025-08-15 ENCOUNTER — TREATMENT (OUTPATIENT)
Dept: CARDIAC REHAB | Facility: HOSPITAL | Age: 75
End: 2025-08-15
Payer: MEDICARE

## 2025-08-15 DIAGNOSIS — Z95.1 S/P CABG (CORONARY ARTERY BYPASS GRAFT): Primary | ICD-10-CM

## 2025-08-15 PROCEDURE — 93798 PHYS/QHP OP CAR RHAB W/ECG: CPT

## 2025-08-18 ENCOUNTER — TREATMENT (OUTPATIENT)
Dept: CARDIAC REHAB | Facility: HOSPITAL | Age: 75
End: 2025-08-18
Payer: MEDICARE

## 2025-08-18 DIAGNOSIS — Z95.1 S/P CABG (CORONARY ARTERY BYPASS GRAFT): Primary | ICD-10-CM

## 2025-08-18 PROCEDURE — 93798 PHYS/QHP OP CAR RHAB W/ECG: CPT

## 2025-08-20 ENCOUNTER — TREATMENT (OUTPATIENT)
Dept: CARDIAC REHAB | Facility: HOSPITAL | Age: 75
End: 2025-08-20
Payer: MEDICARE

## 2025-08-20 DIAGNOSIS — Z95.1 S/P CABG (CORONARY ARTERY BYPASS GRAFT): Primary | ICD-10-CM

## 2025-08-20 PROCEDURE — 93798 PHYS/QHP OP CAR RHAB W/ECG: CPT

## (undated) DEVICE — DISPOSABLE TOURNIQUET CUFF SINGLE BLADDER, DUAL PORT AND QUICK CONNECT CONNECTOR: Brand: COLOR CUFF

## (undated) DEVICE — FLTR RESERV PERFUS INTERSEPT 02 STRL

## (undated) DEVICE — SPNG GZ WOVN 4X4IN 12PLY 10/BX STRL

## (undated) DEVICE — MODEL AT P65, P/N 701554-001KIT CONTENTS: HAND CONTROLLER, 3-WAY HIGH-PRESSURE STOPCOCK WITH ROTATING END AND PREMIUM HIGH-PRESSURE TUBING: Brand: ANGIOTOUCH® KIT

## (undated) DEVICE — CATH DIAG EXPO M/ PK 6FR FL4/FR4 PIG 3PK

## (undated) DEVICE — CLTH CLENS READYCLEANSE PERI CARE PK/5

## (undated) DEVICE — 1LYRTR 16FR10ML100%SILTMPS SNP: Brand: MEDLINE INDUSTRIES, INC.

## (undated) DEVICE — INTENDED FOR TISSUE SEPARATION, AND OTHER PROCEDURES THAT REQUIRE A SHARP SURGICAL BLADE TO PUNCTURE OR CUT.: Brand: BARD-PARKER ® STAINLESS STEEL BLADES

## (undated) DEVICE — ELECTRD BLD EZ CLN MOD XLNG 2.75IN

## (undated) DEVICE — SUT SILK 4/0 TIES 18IN A183H

## (undated) DEVICE — SUT PROLN 3/0 V7 D/A 36IN 8976H

## (undated) DEVICE — CVR PROB ULTRASND/TRANSD W/GEL 7X11IN STRL

## (undated) DEVICE — CANN VESL DLP 1WY BLNT/TP 3MM

## (undated) DEVICE — AVID DUAL STAGE VENOUS DRAINAGE CANNULA: Brand: AVID DUAL STAGE VENOUS DRAINAGE CANNULA

## (undated) DEVICE — BOOT,SUTURE,STANDARD,YELLOW-IN-BLUE: Brand: MEDLINE

## (undated) DEVICE — LUBE JELLY SURGILUBE TB/FLIPCAP 4.5OZ

## (undated) DEVICE — BNDG,ELSTC,MATRIX,STRL,4"X5YD,LF,HOOK&LP: Brand: MEDLINE

## (undated) DEVICE — 12 FOOT DISPOSABLE EXTENSION CABLE WITH SAFE CONNECT / SCREW-DOWN

## (undated) DEVICE — PINNACLE INTRODUCER SHEATH: Brand: PINNACLE

## (undated) DEVICE — PK CATH CARD 10

## (undated) DEVICE — TB SXN DLP RIGD MACROSUCKER 6F 3IN

## (undated) DEVICE — [HIGH FLOW INSUFFLATOR,  DO NOT USE IF PACKAGE IS DAMAGED,  KEEP DRY,  KEEP AWAY FROM SUNLIGHT,  PROTECT FROM HEAT AND RADIOACTIVE SOURCES.]: Brand: PNEUMOSURE

## (undated) DEVICE — TB SXN SURG DLP MALL/CARD SFT/TP 6F 6IN

## (undated) DEVICE — NDL ANGIOGR ADV THN SMOTH SGLWALL 21G 1.5

## (undated) DEVICE — Device

## (undated) DEVICE — MICRO HVTSA, 0.5G AND HVTSA SOURCEMARK PRODUCT CODE M1206 AND M1206-01: Brand: EXOFIN MICRO HVTSA, 0.5G

## (undated) DEVICE — SUT PROLN 6/0 C1 D/A 30IN 8706H

## (undated) DEVICE — SUT PROLN 7/0 BV1 D/A 24IN 8702H

## (undated) DEVICE — BNDR ABD PREMIUM/UNIV 10IN 27TO48IN

## (undated) DEVICE — PK HEART OPN 10

## (undated) DEVICE — MARKER,SKIN,W/RULER,DUAL,STOP: Brand: MEDLINE

## (undated) DEVICE — MYOCARDIAL PROBE NON-PYROGENIC: Brand: DEROYAL

## (undated) DEVICE — NDL PERC 1PRT THNWALL W/BASEPLT 18G 7CM

## (undated) DEVICE — SHROD PENCL MEGADYNE ATTACHAVAC W/CONN/22MM

## (undated) DEVICE — SHEET,DRAPE,53X77,STERILE: Brand: MEDLINE

## (undated) DEVICE — ANTIBACTERIAL UNDYED BRAIDED (POLYGLACTIN 910), SYNTHETIC ABSORBABLE SUTURE: Brand: COATED VICRYL

## (undated) DEVICE — TOWEL,OR,DSP,ST,NATURAL,DLX,4/PK,20PK/CS: Brand: MEDLINE

## (undated) DEVICE — DRESSING, SURESITE SELECT, 2.8'X3.50': Brand: MEDLINE

## (undated) DEVICE — SUT SILK 2 SUTUPAK TIE 60IN SA8H 2STRAND

## (undated) DEVICE — LEVEL SENSORS PADS ARE USED TO ATTACH THE LEVEL SENSORS TO A HARD SHELL RESERVOIR. INCLUDES COUPLING GEL.: Brand: TERUMO® ADVANCED PERFUSION SYSTEM 1

## (undated) DEVICE — PAD,ARMBOARD,CONV,FOAM,2X8X20",12PR/CS: Brand: MEDLINE

## (undated) DEVICE — SUT PROLN SURGILENE SURGIPRO 8 0 24IN BL

## (undated) DEVICE — CANN AORT ROOT DLP VNT 14G 7F

## (undated) DEVICE — PENCL ROCKRSWCH MEGADYNE W/HOLSTR 10FT SS

## (undated) DEVICE — SUT SILK 0/0 CT2 18IN C027D

## (undated) DEVICE — BANDAGE,ELASTIC,ESMARK,STERILE,4"X9',LF: Brand: MEDLINE

## (undated) DEVICE — GW PERIPH GUIDERIGHT STD/EXCHNG/J/TIP SS 0.035IN 5X260CM

## (undated) DEVICE — EZ GLIDE AORTIC CANNULA: Brand: EDWARDS LIFESCIENCES EZ GLIDE AORTIC CANNULA

## (undated) DEVICE — CATH DIAG IMPULSE FL3.5 6F 100CM

## (undated) DEVICE — DRAPE,REIN 53X77,STERILE: Brand: MEDLINE

## (undated) DEVICE — SUT ETHIB 1 CT1 30IN  X425H

## (undated) DEVICE — PATIENT RETURN ELECTRODE, SINGLE-USE, CONTACT QUALITY MONITORING, ADULT, WITH 9FT CORD, FOR PATIENTS WEIGING OVER 33LBS. (15KG): Brand: MEGADYNE

## (undated) DEVICE — PK ATS CUST W CARDIOTOMY RESEVOIR

## (undated) DEVICE — CATHETER,FOLEY,100%SILICONE,18FR,10ML,LF: Brand: MEDLINE

## (undated) DEVICE — APPL CHLORAPREP TINTED 26ML TEAL

## (undated) DEVICE — TRAP FLD MINIVAC MEGADYNE 100ML

## (undated) DEVICE — KIT,ANTI FOG,W/SPONGE & FLUID,SOFT PACK: Brand: MEDLINE

## (undated) DEVICE — SUT PROLN CARDIO V5 3/0 36IN 8936H

## (undated) DEVICE — ST TBG INSUFL PNEUMOCLEAR HI/FLO

## (undated) DEVICE — COVER,MAYO STAND,STERILE: Brand: MEDLINE

## (undated) DEVICE — SYS VASOVIEW2 HEMOPRO ENDOSCOPIC HARVST VESL

## (undated) DEVICE — DRSNG SURESITE WNDW 4X4.5

## (undated) DEVICE — CVR PROB ULTRASND/TRANSD W/GEL LNG 18X250CM STRL

## (undated) DEVICE — PK PERFUS CUST W/CARDIOPLEGIA

## (undated) DEVICE — BANDAGE,GAUZE,BULKEE II,4.5"X4.1YD,STRL: Brand: MEDLINE

## (undated) DEVICE — GLIDESHEATH BASIC HYDROPHILIC COATED INTRODUCER SHEATH: Brand: GLIDESHEATH

## (undated) DEVICE — TUBING, SUCTION, 1/4" X 10', STRAIGHT: Brand: MEDLINE

## (undated) DEVICE — SENSR CERBRL O2 PK/2

## (undated) DEVICE — SUCTION CANISTER 2500CC: Brand: DEROYAL

## (undated) DEVICE — SUT SILK 2/0 CT1 CR8 18IN C022D

## (undated) DEVICE — TR BAND RADIAL ARTERY COMPRESSION DEVICE: Brand: TR BAND

## (undated) DEVICE — SUT MONOCRYL PLS ANTIB UND 3/0  PS1 27IN

## (undated) DEVICE — ADAPT ANTEGRADE RETRGR

## (undated) DEVICE — SUT PROLN 4/0 V7 36IN 8975H

## (undated) DEVICE — BLD SCLPL BEAVR MINI STR 2BVL 180D LF

## (undated) DEVICE — FOGARTY SPRING CLIPS 6MM: Brand: FOGARTY SOFTJAW

## (undated) DEVICE — GLV SURG BIOGELULTRATOUCH POLYISPRN PF LF SZ7 STRL

## (undated) DEVICE — 3M™ MEDIPORE™ H SOFT CLOTH SURGICAL TAPE, 2863, 3 IN X 10 YD, 12/CASE: Brand: 3M™ MEDIPORE™

## (undated) DEVICE — MODEL BT2000 P/N 700287-012KIT CONTENTS: MANIFOLD WITH SALINE AND CONTRAST PORTS, SALINE TUBING WITH SPIKE AND HAND SYRINGE, TRANSDUCER: Brand: BT2000 AUTOMATED MANIFOLD KIT

## (undated) DEVICE — OASIS DRAIN, SINGLE, INLINE & ATS COMPATIBLE: Brand: OASIS

## (undated) DEVICE — ELECTRD BLD EZ CLN STD 2.5IN

## (undated) DEVICE — SYR LUERLOK 30CC